# Patient Record
Sex: FEMALE | Race: WHITE | NOT HISPANIC OR LATINO | Employment: OTHER | URBAN - METROPOLITAN AREA
[De-identification: names, ages, dates, MRNs, and addresses within clinical notes are randomized per-mention and may not be internally consistent; named-entity substitution may affect disease eponyms.]

---

## 2017-07-26 ENCOUNTER — OFFICE VISIT (OUTPATIENT)
Dept: URGENT CARE | Facility: CLINIC | Age: 82
End: 2017-07-26
Payer: COMMERCIAL

## 2017-07-26 PROCEDURE — 99213 OFFICE O/P EST LOW 20 MIN: CPT

## 2017-07-26 PROCEDURE — 96372 THER/PROPH/DIAG INJ SC/IM: CPT

## 2018-04-12 LAB
BACTERIA UR QL AUTO: ABNORMAL
BILIRUB UR QL STRIP: NEGATIVE
CLARITY UR: CLEAR
COLOR UR: YELLOW
GLUCOSE UR STRIP-MCNC: NEGATIVE MG/DL
HGB UR QL STRIP.AUTO: ABNORMAL
KETONES UR STRIP-MCNC: NEGATIVE MG/DL
LEUKOCYTE ESTERASE UR QL STRIP: NEGATIVE
MUCUS THREADS (HISTORICAL): PRESENT /HPF
NITRITE UR QL STRIP: NEGATIVE
NON-SQ EPI CELLS URNS QL MICRO: ABNORMAL /HPF
PH UR STRIP.AUTO: 5.5 [PH] (ref 4.5–8)
PROT UR STRIP-MCNC: NEGATIVE MG/DL
RBC #/AREA URNS AUTO: ABNORMAL /HPF
SP GR UR STRIP.AUTO: <= 1.005 (ref 1–1.03)
UROBILINOGEN UR QL STRIP.AUTO: 0.2 EU/DL (ref 0.2–8)
WBC #/AREA URNS AUTO: ABNORMAL /HPF

## 2018-06-28 ENCOUNTER — OFFICE VISIT (OUTPATIENT)
Dept: URGENT CARE | Facility: CLINIC | Age: 83
End: 2018-06-28
Payer: COMMERCIAL

## 2018-06-28 VITALS
RESPIRATION RATE: 18 BRPM | TEMPERATURE: 98.6 F | SYSTOLIC BLOOD PRESSURE: 141 MMHG | HEART RATE: 80 BPM | OXYGEN SATURATION: 97 % | DIASTOLIC BLOOD PRESSURE: 65 MMHG

## 2018-06-28 DIAGNOSIS — J02.9 ACUTE PHARYNGITIS, UNSPECIFIED ETIOLOGY: Primary | ICD-10-CM

## 2018-06-28 LAB — S PYO AG THROAT QL: NEGATIVE

## 2018-06-28 PROCEDURE — 87070 CULTURE OTHR SPECIMN AEROBIC: CPT | Performed by: NURSE PRACTITIONER

## 2018-06-28 PROCEDURE — 99213 OFFICE O/P EST LOW 20 MIN: CPT | Performed by: NURSE PRACTITIONER

## 2018-06-28 RX ORDER — LEVOTHYROXINE SODIUM 0.07 MG/1
TABLET ORAL
Status: ON HOLD | COMMUNITY
Start: 2018-06-14 | End: 2020-06-26 | Stop reason: SDUPTHER

## 2018-06-28 RX ORDER — LOSARTAN POTASSIUM 50 MG/1
50 TABLET ORAL DAILY
COMMUNITY
Start: 2018-06-14 | End: 2019-11-04 | Stop reason: SDUPTHER

## 2018-06-28 RX ORDER — METOPROLOL SUCCINATE 25 MG/1
TABLET, EXTENDED RELEASE ORAL
COMMUNITY
Start: 2018-06-14 | End: 2019-07-26 | Stop reason: SDUPTHER

## 2018-06-28 RX ORDER — ALPRAZOLAM 0.25 MG/1
TABLET ORAL
COMMUNITY
Start: 2018-06-14 | End: 2020-06-09 | Stop reason: HOSPADM

## 2018-06-28 NOTE — PATIENT INSTRUCTIONS

## 2018-06-28 NOTE — PROGRESS NOTES
[facility]        NAME: Leela Valenzuela is a 80 y o  female  : 1935    MRN: [id number]  DATE: 2018  TIME: 2:08 PM    Assessment and Plan   Acute pharyngitis, unspecified etiology [J02 9]  1  Acute pharyngitis, unspecified etiology  POCT rapid strepA    Throat culture         Patient Instructions   May use any of the following for symptomatic control of sore throat symptoms:  Saltwater gargles, tea with honey, Chloraseptic spray, lozenges  Mucinex DM as directed  Follow up with PCP in 3-5 days  Proceed to  ER if symptoms worsen  Chief Complaint     Chief Complaint   Patient presents with    Sore Throat     Pt c/o a sore throat since yesterday  History of Present Illness       Sore Throat    The current episode started yesterday  There has been no fever  Associated symptoms include congestion, a plugged ear sensation and swollen glands  Pertinent negatives include no shortness of breath  She has tried acetaminophen for the symptoms  Review of Systems   Review of Systems   Constitutional: Negative  HENT: Positive for congestion and sore throat  Eyes: Negative  Respiratory: Negative  Negative for shortness of breath  Cardiovascular: Negative  Gastrointestinal: Negative  Genitourinary: Negative  Musculoskeletal: Negative  Neurological: Negative  Hematological: Negative  Psychiatric/Behavioral: Negative  Current Medications     No current outpatient prescriptions on file  Current Allergies     Allergies as of 2018    (No Known Allergies)            The following portions of the patient's history were reviewed and updated as appropriate: allergies, current medications, past family history, past medical history, past social history, past surgical history and problem list      No past medical history on file  No past surgical history on file  No family history on file        Medications have been verified  Objective   /65   Pulse 80   Temp 98 6 °F (37 °C)   Resp 18   SpO2 97%        Physical Exam     Physical Exam   Constitutional: She is oriented to person, place, and time  She appears well-developed and well-nourished  No distress  HENT:   Head: Normocephalic and atraumatic  Left Ear: External ear normal    Nose: Nose normal    Mouth/Throat: Posterior oropharyngeal edema and posterior oropharyngeal erythema present  No oropharyngeal exudate  Eyes: EOM are normal  Pupils are equal, round, and reactive to light  Neck: Normal range of motion  Neck supple  Cardiovascular: Normal rate, regular rhythm and normal heart sounds  Pulmonary/Chest: Breath sounds normal  No respiratory distress  She has no wheezes  She has no rales  Abdominal: Soft  Bowel sounds are normal    Lymphadenopathy:     She has cervical adenopathy  Neurological: She is oriented to person, place, and time  She has normal reflexes  Skin: Skin is warm and dry  She is not diaphoretic  Psychiatric: She has a normal mood and affect  Her behavior is normal  Judgment and thought content normal    Nursing note and vitals reviewed

## 2018-07-01 LAB — BACTERIA THROAT CULT: NORMAL

## 2018-07-04 ENCOUNTER — HOSPITAL ENCOUNTER (EMERGENCY)
Facility: HOSPITAL | Age: 83
Discharge: HOME | End: 2018-07-04
Attending: FAMILY MEDICINE | Admitting: FAMILY MEDICINE
Payer: COMMERCIAL

## 2018-07-04 VITALS
DIASTOLIC BLOOD PRESSURE: 86 MMHG | TEMPERATURE: 98.1 F | HEIGHT: 66 IN | WEIGHT: 126 LBS | RESPIRATION RATE: 16 BRPM | BODY MASS INDEX: 20.25 KG/M2 | OXYGEN SATURATION: 98 % | HEART RATE: 72 BPM | SYSTOLIC BLOOD PRESSURE: 184 MMHG

## 2018-07-04 DIAGNOSIS — J06.9 VIRAL UPPER RESPIRATORY TRACT INFECTION: Primary | ICD-10-CM

## 2018-07-04 PROCEDURE — 99283 EMERGENCY DEPT VISIT LOW MDM: CPT

## 2018-07-04 RX ORDER — AMOXICILLIN AND CLAVULANATE POTASSIUM 875; 125 MG/1; MG/1
1 TABLET, FILM COATED ORAL EVERY 12 HOURS SCHEDULED
Qty: 14 TABLET | Refills: 0 | Status: SHIPPED | OUTPATIENT
Start: 2018-07-04 | End: 2018-07-11

## 2018-07-04 RX ORDER — IBUPROFEN 400 MG/1
400 TABLET ORAL ONCE
Status: COMPLETED | OUTPATIENT
Start: 2018-07-04 | End: 2018-07-04

## 2018-07-04 RX ADMIN — IBUPROFEN 400 MG: 400 TABLET ORAL at 09:45

## 2018-07-04 NOTE — DISCHARGE INSTRUCTIONS
Viral Syndrome   WHAT YOU NEED TO KNOW:   Viral syndrome is a term used for a viral infection that has no clear cause  Viruses are spread easily from person to person through the air and on shared items  DISCHARGE INSTRUCTIONS:   Call 911 for the following:   · You have a seizure  · You cannot be woken  · You have chest pain or trouble breathing  Return to the emergency department if:   · You have a stiff neck, a bad headache, and sensitivity to light  · You feel weak, dizzy, or confused  · You stop urinating or urinate a lot less than normal      · You cough up blood or thick, yellow or green, mucus  · You have severe abdominal pain or your abdomen is larger than usual   Contact your healthcare provider if:   · Your symptoms do not get better with treatment, or get worse, after 3 days  · You have a rash or ear pain  · You have burning when you urinate  · You have questions or concerns about your condition or care  Medicines: You may  need any of the following:  · Acetaminophen  decreases pain and fever  It is available without a doctor's order  Ask how much medicine to take and how often to take it  Follow directions  Acetaminophen can cause liver damage if not taken correctly  · NSAIDs , such as ibuprofen, help decrease swelling, pain, and fever  NSAIDs can cause stomach bleeding or kidney problems in certain people  If you take blood thinner medicine, always ask your healthcare provider if NSAIDs are safe for you  Always read the medicine label and follow directions  · Cold medicine  helps decrease swelling, control a cough, and relieve chest or nasal congestion  · Saline nasal spray  helps decrease nasal congestion  · Take your medicine as directed  Contact your healthcare provider if you think your medicine is not helping or if you have side effects  Tell him of her if you are allergic to any medicine   Keep a list of the medicines, vitamins, and herbs you take  Include the amounts, and when and why you take them  Bring the list or the pill bottles to follow-up visits  Carry your medicine list with you in case of an emergency  Manage your symptoms:   · Drink liquids as directed  to prevent dehydration  Ask how much liquid to drink each day and which liquids are best for you  Ask if you should drink an oral rehydration solution (ORS)  An ORS has the right amounts of water, salts, and sugar you need to replace body fluids  This may help prevent dehydration caused by vomiting or diarrhea  Do not drink liquids with caffeine  Drinks with caffeine can make dehydration worse  · Get plenty of rest  to help your body heal  Take naps throughout the day  Ask your healthcare provider when you can return to work and your normal activities  · Use a cool mist humidifier  to help you breathe easier if you have nasal or chest congestion  Ask your healthcare provider how to use a cool mist humidifier  · Eat honey or use cough drops  to help decrease throat discomfort  Ask your healthcare provider how much honey you should eat each day  Cough drops are available without a doctor's order  Follow directions for taking cough drops  · Do not smoke and stay away from others who smoke  Nicotine and other chemicals in cigarettes and cigars can cause lung damage  Smoking can also delay healing  Ask your healthcare provider for information if you currently smoke and need help to quit  E-cigarettes or smokeless tobacco still contain nicotine  Talk to your healthcare provider before you use these products  · Wash your hands frequently  to prevent the spread of germs to others  Use soap and water  Use gel hand  when soap and water are not available  Wash your hands after you use the bathroom, cough, or sneeze  Wash your hands before you prepare or eat food    Follow up with your healthcare provider as directed:  Write down your questions so you remember to ask them during your visits  © 2017 [address] Information is for End User's use only and may not be sold, redistributed or otherwise used for commercial purposes  All illustrations and images included in CareNotes® are the copyrighted property of A D [employer] M [employer] Inc  or Jitendra Love  The above information is an  only  It is not intended as medical advice for individual conditions or treatments  Talk to your doctor, nurse or pharmacist before following any medical regimen to see if it is safe and effective for you

## 2018-07-04 NOTE — ED PROVIDER NOTES
History  Chief Complaint   Patient presents with    Nasal Congestion     has for 1 week  Was at PCP last week  No RX     URI       History provided by:  Patient   used: No    URI   Presenting symptoms: congestion, cough, fatigue and rhinorrhea    Presenting symptoms: no ear pain, no facial pain, no fever and no sore throat    Severity:  Moderate  Onset quality:  Gradual  Duration:  1 week  Timing:  Constant  Progression:  Worsening  Chronicity:  New  Relieved by:  Nothing  Worsened by:  Nothing  Ineffective treatments:  Decongestant  Associated symptoms: myalgias and sneezing    Associated symptoms: no arthralgias, no headaches, no neck pain, no sinus pain, no swollen glands and no wheezing    Risk factors: not elderly, no chronic cardiac disease, no chronic kidney disease, no chronic respiratory disease, no immunosuppression, no recent illness, no recent travel and no sick contacts        Prior to Admission Medications   Prescriptions Last Dose Informant Patient Reported? Taking? ALPRAZolam (XANAX) 0 25 mg tablet   Yes No   Sig: Use 1 tab 2x/day as needed for anxiety   levothyroxine 75 mcg tablet   Yes No   Sig: Take 1 tab po qday  losartan (COZAAR) 25 mg tablet   Yes No   Sig: Take 25 mg by mouth   metoprolol succinate (TOPROL-XL) 25 mg 24 hr tablet   Yes No   Sig: Take 1 tab po daily      Facility-Administered Medications: None       Past Medical History:   Diagnosis Date    Disease of thyroid gland     Hypertension        No past surgical history on file  No family history on file  I have reviewed and agree with the history as documented  Social History   Substance Use Topics    Smoking status: Never Smoker    Smokeless tobacco: Never Used    Alcohol use No        Review of Systems   Constitutional: Positive for fatigue  Negative for fever  HENT: Positive for congestion, rhinorrhea and sneezing  Negative for ear pain, sinus pain and sore throat      Respiratory: Positive for cough  Negative for wheezing  Musculoskeletal: Positive for myalgias  Negative for arthralgias and neck pain  Neurological: Negative for headaches  Physical Exam  Physical Exam   Constitutional: She appears well-developed and well-nourished  No distress  HENT:   Head: Normocephalic and atraumatic  Mouth/Throat: Oropharynx is clear and moist  No oropharyngeal exudate  Eyes: EOM are normal  Pupils are equal, round, and reactive to light  Right eye exhibits no discharge  Left eye exhibits discharge  Neck: Normal range of motion  Neck supple  Cardiovascular: Normal rate, regular rhythm, normal heart sounds and intact distal pulses  Pulmonary/Chest: Effort normal and breath sounds normal    Skin: She is not diaphoretic  Psychiatric: She has a normal mood and affect  Her behavior is normal        Vital Signs  ED Triage Vitals [07/04/18 0929]   Temperature Pulse Respirations Blood Pressure SpO2   98 °F (36 7 °C) 76 16 (!) 217/192 98 %      Temp Source Heart Rate Source Patient Position - Orthostatic VS BP Location FiO2 (%)   Temporal Monitor Sitting Left arm --      Pain Score       --           Vitals:    07/04/18 0929 07/04/18 0934   BP: (!) 217/192 (!) 217/92   Pulse: 76    Patient Position - Orthostatic VS: Sitting        Visual Acuity      ED Medications  Medications - No data to display    Diagnostic Studies  Results Reviewed     None                 No orders to display              Procedures  Procedures       Phone Contacts  ED Phone Contact    ED Course    patient body aches states that she went to see her PCP 2 days ago and he did not prescribe antibiotics  I told patient and that she should not started antibiotics until started having fever worsening symptoms  Advised return to the ED symptom worse  Patient relies understand planned treatment                              MDM  Number of Diagnoses or Management Options  Risk of Complications, Morbidity, and/or Mortality  Presenting problems: low  Diagnostic procedures: low  Management options: low    Patient Progress  Patient progress: stable    CritCare Time    Disposition  Final diagnoses:   Viral upper respiratory tract infection     Time reflects when diagnosis was documented in both MDM as applicable and the Disposition within this note     Time User Action Codes Description Comment    7/4/2018  9:36 AM Aurelio Martinez Add [J06 9] Viral upper respiratory tract infection       ED Disposition     ED Disposition Condition Comment    Discharge  Miquel Harris discharge to home/self care  Condition at discharge: Stable        Follow-up Information     Follow up With Specialties Details Why Contact Info      In 2 days If symptoms worsen pcp           Patient's Medications   Discharge Prescriptions    AMOXICILLIN-CLAVULANATE (AUGMENTIN) 875-125 MG PER TABLET    Take 1 tablet by mouth every 12 (twelve) hours for 7 days       Start Date: 7/4/2018  End Date: 7/11/2018       Order Dose: 1 tablet       Quantity: 14 tablet    Refills: 0     No discharge procedures on file      ED Provider  Electronically Signed by           Bri Keller MD  07/04/18 5271

## 2018-08-27 ENCOUNTER — HOSPITAL ENCOUNTER (EMERGENCY)
Facility: HOSPITAL | Age: 83
Discharge: HOME | End: 2018-08-27
Attending: EMERGENCY MEDICINE | Admitting: EMERGENCY MEDICINE
Payer: COMMERCIAL

## 2018-08-27 ENCOUNTER — APPOINTMENT (EMERGENCY)
Dept: RADIOLOGY | Facility: HOSPITAL | Age: 83
End: 2018-08-27
Payer: COMMERCIAL

## 2018-08-27 VITALS
WEIGHT: 126 LBS | HEIGHT: 61 IN | HEART RATE: 53 BPM | TEMPERATURE: 97.9 F | BODY MASS INDEX: 23.79 KG/M2 | DIASTOLIC BLOOD PRESSURE: 72 MMHG | RESPIRATION RATE: 30 BRPM | SYSTOLIC BLOOD PRESSURE: 151 MMHG | OXYGEN SATURATION: 95 %

## 2018-08-27 DIAGNOSIS — I10 HYPERTENSION: Primary | ICD-10-CM

## 2018-08-27 LAB
ALBUMIN SERPL BCP-MCNC: 4.2 G/DL (ref 3.5–5.7)
ALP SERPL-CCNC: 56 U/L (ref 55–165)
ALT SERPL W P-5'-P-CCNC: 9 U/L (ref 7–52)
ANION GAP SERPL CALCULATED.3IONS-SCNC: 7 MMOL/L (ref 4–13)
AST SERPL W P-5'-P-CCNC: 18 U/L (ref 13–39)
ATRIAL RATE: 56 BPM
ATRIAL RATE: 62 BPM
BASOPHILS # BLD AUTO: 0 THOUSANDS/ΜL (ref 0–0.1)
BASOPHILS NFR BLD AUTO: 1 % (ref 0–2)
BILIRUB SERPL-MCNC: 0.6 MG/DL (ref 0.2–1)
BUN SERPL-MCNC: 16 MG/DL (ref 7–25)
CALCIUM SERPL-MCNC: 9.2 MG/DL (ref 8.6–10.5)
CHLORIDE SERPL-SCNC: 97 MMOL/L (ref 98–107)
CO2 SERPL-SCNC: 26 MMOL/L (ref 21–31)
CREAT SERPL-MCNC: 0.86 MG/DL (ref 0.6–1.2)
EOSINOPHIL # BLD AUTO: 0.1 THOUSAND/ΜL (ref 0–0.61)
EOSINOPHIL NFR BLD AUTO: 1 % (ref 0–5)
ERYTHROCYTE [DISTWIDTH] IN BLOOD BY AUTOMATED COUNT: 13 % (ref 11.5–14.5)
GFR SERPL CREATININE-BSD FRML MDRD: 63 ML/MIN/1.73SQ M
GLUCOSE SERPL-MCNC: 100 MG/DL (ref 65–99)
HCT VFR BLD AUTO: 35.8 % (ref 34.8–46.1)
HGB BLD-MCNC: 12.5 G/DL (ref 12–16)
LYMPHOCYTES # BLD AUTO: 1.3 THOUSANDS/ΜL (ref 0.6–4.47)
LYMPHOCYTES NFR BLD AUTO: 24 % (ref 21–51)
MCH RBC QN AUTO: 31.5 PG (ref 26–34)
MCHC RBC AUTO-ENTMCNC: 35.1 G/DL (ref 31–37)
MCV RBC AUTO: 90 FL (ref 81–99)
MONOCYTES # BLD AUTO: 0.5 THOUSAND/ΜL (ref 0.17–1.22)
MONOCYTES NFR BLD AUTO: 10 % (ref 2–12)
NEUTROPHILS # BLD AUTO: 3.5 THOUSANDS/ΜL (ref 1.4–6.5)
NEUTS SEG NFR BLD AUTO: 65 % (ref 42–75)
NRBC BLD AUTO-RTO: 0 /100 WBCS
P AXIS: 53 DEGREES
P AXIS: 87 DEGREES
PLATELET # BLD AUTO: 186 THOUSANDS/UL (ref 149–390)
PMV BLD AUTO: 6.7 FL (ref 8.6–11.7)
POTASSIUM SERPL-SCNC: 3.9 MMOL/L (ref 3.5–5.5)
PR INTERVAL: 230 MS
PROT SERPL-MCNC: 6.9 G/DL (ref 6.4–8.9)
QRS AXIS: 91 DEGREES
QRS AXIS: 96 DEGREES
QRSD INTERVAL: 104 MS
QRSD INTERVAL: 104 MS
QT INTERVAL: 428 MS
QT INTERVAL: 464 MS
QTC INTERVAL: 434 MS
QTC INTERVAL: 447 MS
RBC # BLD AUTO: 3.98 MILLION/UL (ref 3.9–5.2)
SODIUM SERPL-SCNC: 130 MMOL/L (ref 134–143)
T WAVE AXIS: 81 DEGREES
T WAVE AXIS: 87 DEGREES
TROPONIN I SERPL-MCNC: <0.03 NG/ML
VENTRICULAR RATE: 56 BPM
VENTRICULAR RATE: 62 BPM
WBC # BLD AUTO: 5.4 THOUSAND/UL (ref 4.8–10.8)

## 2018-08-27 PROCEDURE — 36415 COLL VENOUS BLD VENIPUNCTURE: CPT | Performed by: EMERGENCY MEDICINE

## 2018-08-27 PROCEDURE — 85025 COMPLETE CBC W/AUTO DIFF WBC: CPT | Performed by: EMERGENCY MEDICINE

## 2018-08-27 PROCEDURE — 80053 COMPREHEN METABOLIC PANEL: CPT | Performed by: EMERGENCY MEDICINE

## 2018-08-27 PROCEDURE — 99284 EMERGENCY DEPT VISIT MOD MDM: CPT

## 2018-08-27 PROCEDURE — 96374 THER/PROPH/DIAG INJ IV PUSH: CPT

## 2018-08-27 PROCEDURE — 93005 ELECTROCARDIOGRAM TRACING: CPT

## 2018-08-27 PROCEDURE — 84484 ASSAY OF TROPONIN QUANT: CPT | Performed by: EMERGENCY MEDICINE

## 2018-08-27 PROCEDURE — 71045 X-RAY EXAM CHEST 1 VIEW: CPT

## 2018-08-27 RX ORDER — ONDANSETRON 2 MG/ML
4 INJECTION INTRAMUSCULAR; INTRAVENOUS ONCE
Status: COMPLETED | OUTPATIENT
Start: 2018-08-27 | End: 2018-08-27

## 2018-08-27 RX ORDER — CLONIDINE HYDROCHLORIDE 0.1 MG/1
0.2 TABLET ORAL ONCE
Status: DISCONTINUED | OUTPATIENT
Start: 2018-08-27 | End: 2018-08-27

## 2018-08-27 RX ORDER — ALPRAZOLAM 0.5 MG/1
0.5 TABLET ORAL ONCE
Status: DISCONTINUED | OUTPATIENT
Start: 2018-08-27 | End: 2018-08-27

## 2018-08-27 RX ORDER — ONDANSETRON 4 MG/1
4 TABLET, ORALLY DISINTEGRATING ORAL ONCE
Status: DISCONTINUED | OUTPATIENT
Start: 2018-08-27 | End: 2018-08-27

## 2018-08-27 RX ORDER — FLUOXETINE 10 MG/1
10 CAPSULE ORAL DAILY
COMMUNITY
End: 2019-07-26 | Stop reason: ALTCHOICE

## 2018-08-27 RX ADMIN — ONDANSETRON 4 MG: 2 INJECTION INTRAMUSCULAR; INTRAVENOUS at 03:06

## 2018-08-27 NOTE — ED NOTES
Pt felt nauseated during PCXR  Pt HR dropped into 40s  Repeat EKG performed and shown to Dr Devora Frank, RN  08/27/18 9609

## 2018-08-27 NOTE — ED PROVIDER NOTES
History  Chief Complaint   Patient presents with    Hypertension     Pt is non-compliant with her HTN meds and takes them "when I feel like I need them " Pt states she went to bed with high BP today and awoke not feeling well, so she called EMS  On EMS arrival /114  A 1YEAR-OLD FEMALE WITH A HISTORY OF HYPERTENSION PRESENTS TO THE EMERGENCY DEPARTMENT WITH A SENSE OF BLOOD PRESSURE IS ELEVATED  THIS WAS PROMPTED BY A SENSE OF UNEASINESS ANXIETY AND FATIGUE THAT THE PATIENT TYPICALLY EXPERIENCES WHEN HER PRESSURE IS ELEVATED  DENIES ANY CHEST PAIN OR SHORTNESS OF BREATH IS FAMILIAR TO THE EMERGENCY DEPARTMENT AND TYPICALLY PRESENTS IN THIS FASHION THE EARLY MORNING HOURS WITH A SENSE OF UNEASINESS AND ANXIETY  SHE COMPLAINS OF NO CHEST PAIN OR SHORTNESS OF BREATH  SHE DENIES ANY NAUSEA VOMITING OR DIARRHEA  NO DIAPHORESIS OR SENSE OF FATIGUE  SHE IS NONCOMPLIANT WITH HER MEDICATION REGIMEN            Prior to Admission Medications   Prescriptions Last Dose Informant Patient Reported? Taking? ALPRAZolam (XANAX) 0 25 mg tablet   Yes Yes   Sig: Use 1 tab 2x/day as needed for anxiety   FLUoxetine (PROzac) 10 mg capsule   Yes Yes   Sig: Take 10 mg by mouth daily   levothyroxine 75 mcg tablet   Yes Yes   Sig: Take 1 tab po qday  losartan (COZAAR) 25 mg tablet   Yes Yes   Sig: Take 25 mg by mouth   metoprolol succinate (TOPROL-XL) 25 mg 24 hr tablet   Yes Yes   Sig: Take 1 tab po daily      Facility-Administered Medications: None       Past Medical History:   Diagnosis Date    Anxiety     Depression     Disease of thyroid gland     Hypertension        Past Surgical History:   Procedure Laterality Date    TONSILLECTOMY         History reviewed  No pertinent family history  I have reviewed and agree with the history as documented      Social History   Substance Use Topics    Smoking status: Never Smoker    Smokeless tobacco: Never Used    Alcohol use No        Review of Systems   Constitutional: Negative for chills and fever  HENT: Negative for ear pain, rhinorrhea and sore throat  Eyes: Negative for pain, redness and visual disturbance  Respiratory: Negative for cough and shortness of breath  Cardiovascular: Negative for chest pain and leg swelling  Gastrointestinal: Negative for abdominal pain, diarrhea, nausea and vomiting  Genitourinary: Negative for dysuria, flank pain, frequency and urgency  Musculoskeletal: Negative for back pain, myalgias and neck pain  Skin: Negative for rash  Neurological: Negative for dizziness, weakness, light-headedness and headaches  Hematological: Negative  Psychiatric/Behavioral: Negative for agitation, confusion and suicidal ideas  The patient is nervous/anxious  All other systems reviewed and are negative  Physical Exam  Physical Exam   Constitutional: She is oriented to person, place, and time  She appears well-developed and well-nourished  HENT:   Nose: Nose normal    Mouth/Throat: Oropharynx is clear and moist  No oropharyngeal exudate  Eyes: Conjunctivae and EOM are normal  Pupils are equal, round, and reactive to light  No scleral icterus  Neck: Normal range of motion  Neck supple  No JVD present  No tracheal deviation present  Cardiovascular: Normal rate, regular rhythm and normal heart sounds  No murmur heard  Pulmonary/Chest: Effort normal and breath sounds normal  No respiratory distress  She has no wheezes  She has no rales  Abdominal: Soft  Bowel sounds are normal  There is no tenderness  There is no guarding  Musculoskeletal: Normal range of motion  She exhibits no edema or tenderness  Neurological: She is alert and oriented to person, place, and time  No cranial nerve deficit or sensory deficit  She exhibits normal muscle tone  5/5 motor, nl sens   Skin: Skin is warm and dry  Psychiatric: She has a normal mood and affect  Her behavior is normal    Nursing note and vitals reviewed        Vital Signs  ED Triage Vitals [08/27/18 0220]   Temperature Pulse Respirations Blood Pressure SpO2   97 9 °F (36 6 °C) 64 18 (!) 241/113 97 %      Temp Source Heart Rate Source Patient Position - Orthostatic VS BP Location FiO2 (%)   Temporal Monitor -- Right arm --      Pain Score       No Pain           Vitals:    08/27/18 0220 08/27/18 0230   BP: (!) 241/113 (!) 208/89   Pulse: 64 61       Visual Acuity      ED Medications  Medications   cloNIDine (CATAPRES) tablet 0 2 mg (not administered)       Diagnostic Studies  Results Reviewed     Procedure Component Value Units Date/Time    CBC and differential [[id number]]  (Abnormal) Collected:  08/27/18 0239    Lab Status:  Final result Specimen:  Blood from Arm, Left Updated:  08/27/18 0248     WBC 5 40 Thousand/uL      RBC 3 98 Million/uL      Hemoglobin 12 5 g/dL      Hematocrit 35 8 %      MCV 90 fL      MCH 31 5 pg      MCHC 35 1 g/dL      RDW 13 0 %      MPV 6 7 (L) fL      Platelets 713 Thousands/uL      nRBC 0 /100 WBCs      Neutrophils Relative 65 %      Lymphocytes Relative 24 %      Monocytes Relative 10 %      Eosinophils Relative 1 %      Basophils Relative 1 %      Neutrophils Absolute 3 50 Thousands/µL      Lymphocytes Absolute 1 30 Thousands/µL      Monocytes Absolute 0 50 Thousand/µL      Eosinophils Absolute 0 10 Thousand/µL      Basophils Absolute 0 00 Thousands/µL     Comprehensive metabolic panel [[id number]] Collected:  08/27/18 0239    Lab Status: In process Specimen:  Blood from Arm, Left Updated:  08/27/18 0241    Troponin I [[id number]] Collected:  08/27/18 0239    Lab Status: In process Specimen:  Blood from Arm, Left Updated:  08/27/18 0241                 X-ray chest 1 view portable    (Results Pending)              Procedures  Procedures       Phone Contacts  ED Phone Contact    ED Course                               MDM  Number of Diagnoses or Management Options  Diagnosis management comments: AT 3:15 A M  THE PATIENT WAS COMFORTABLE    HER PREVIOUS EPISODE OF NAUSEA HAD RESOLVED AFTER ZOFRAN   BLOOD PRESSURE IS ESSENTIALLY NORMOTENSIVE AND ACCEPTABLE  PATIENT IS COMPLAINT FREE  VITAL SIGNS REMAINED STABLE  PATIENT REMAINS HEMODYNAMICALLY AND CLINICALLY STABLE APPROPRIATE FOR DISCHARGE AT THIS POINT IN TIME   WITHOUT EVIDENCE OF A CARDIOPULMONARY ETIOLOGY  CritCare Time    Disposition  Final diagnoses:   None     ED Disposition     None      Follow-up Information    None         Patient's Medications   Discharge Prescriptions    No medications on file     No discharge procedures on file      ED Provider  Electronically Signed by           Elvia Cerrato MD  08/27/18 5653       Elvia Cerrato MD  08/28/18 6863

## 2018-08-27 NOTE — ED PROCEDURE NOTE
PROCEDURE  ECG 12 Lead Documentation  Date/Time: 8/27/2018 2:26 AM  Performed by: Nini Waterman  Authorized by: Juwan LAKHANI     ECG reviewed by me, the ED Provider: yes    Patient location:  ED  Previous ECG:     Previous ECG:  Unavailable    Comparison to cardiac monitor: No    Interpretation:     Interpretation: abnormal    Rate:     ECG rate:  60    ECG rate assessment: normal    Rhythm:     Rhythm: sinus rhythm and A-V block    Ectopy:     Ectopy: none    QRS:     QRS axis:  Normal    QRS intervals:  Normal  Conduction:     Conduction: abnormal      Abnormal conduction: incomplete RBBB    ST segments:     ST segments:  Normal  T waves:     T waves: normal    Other findings:     Other findings: BEATRICE Chavarria MD  08/27/18 2979

## 2018-08-27 NOTE — DISCHARGE INSTRUCTIONS
Chronic Hypertension, Ambulatory Care   GENERAL INFORMATION:   Chronic hypertension  is a long-term condition in which your blood pressure (BP) is higher than normal  Your BP is the force of your blood moving against the walls of your arteries  Hypertension is a BP of 140/90 or higher  Common symptoms include the following:   · Headache     · Blurred vision    · Chest pain     · Dizziness or weakness     · Trouble breathing     · Nosebleeds  Seek immediate care for the following symptoms:   · Severe headache or vision loss    · Weakness in an arm or leg    · Confusion or difficulty speaking    · Discomfort in your chest that feels like squeezing, pressure, fullness, or pain    · Suddenly feeling lightheaded or trouble breathing    · Pain or discomfort in your back, neck, jaw, stomach, or arm  Treatment for chronic hypertension  may include medicine to lower your BP  You may also need to make lifestyle changes  Take your medicine exactly as directed  Manage chronic hypertension:   · Take your BP at home  Sit and rest for 5 minutes before you take your BP  Extend your arm and support it on a flat surface  Your arm should be at the same level as your heart  Follow the directions that came with your BP monitor  If possible, take at least 2 BP readings each time  Take your BP at least twice a day at the same times each day, such as morning and evening  Keep a log of your BP readings and bring it to your follow-up visits  · Eat less sodium (salt)  Do not add sodium to your food  Limit foods that are high in sodium, such as canned foods, potato chips, and cold cuts  Your healthcare provider may suggest that you follow the [address] [place] [address] Street  The plan is low in sodium, unhealthy fats, and total fat  It is high in potassium, calcium, and fiber  · Exercise regularly  Exercise at least 30 minutes per day, on most days of the week  This will help decrease your BP   Ask your healthcare provider about the best exercise plan for you  · Limit alcohol  Women should limit alcohol to 1 drink a day  Men should limit alcohol to 2 drinks a day  A drink of alcohol is 12 ounces of beer, 5 ounces of wine, or 1½ ounces of liquor  · Do not smoke  If you smoke, it is never too late to quit  Smoking can increase your BP  Smoking also worsens other health conditions you may have that can increase your risk for hypertension  Ask your healthcare provider for information if you need help quitting  Follow up with your healthcare provider as directed: You will need to return to have your BP checked and to have other lab tests done  Write down your questions so you remember to ask them during your visits  CARE AGREEMENT:   You have the right to help plan your care  Learn about your health condition and how it may be treated  Discuss treatment options with your caregivers to decide what care you want to receive  You always have the right to refuse treatment  The above information is an  only  It is not intended as medical advice for individual conditions or treatments  Talk to your doctor, nurse or pharmacist before following any medical regimen to see if it is safe and effective for you  © 2014 [address] is for End User's use only and may not be sold, redistributed or otherwise used for commercial purposes  All illustrations and images included in CareNotes® are the copyrighted property of A D A M , Inc  or Jitendra Love

## 2019-05-29 ENCOUNTER — APPOINTMENT (EMERGENCY)
Dept: CT IMAGING | Facility: HOSPITAL | Age: 84
End: 2019-05-29
Payer: COMMERCIAL

## 2019-05-29 ENCOUNTER — HOSPITAL ENCOUNTER (EMERGENCY)
Facility: HOSPITAL | Age: 84
Discharge: HOME | End: 2019-05-30
Attending: EMERGENCY MEDICINE | Admitting: EMERGENCY MEDICINE
Payer: COMMERCIAL

## 2019-05-29 DIAGNOSIS — N39.0 UTI (URINARY TRACT INFECTION): ICD-10-CM

## 2019-05-29 DIAGNOSIS — K57.92 DIVERTICULITIS: ICD-10-CM

## 2019-05-29 DIAGNOSIS — R10.2 PELVIC PAIN: Primary | ICD-10-CM

## 2019-05-29 LAB
ALBUMIN SERPL BCP-MCNC: 4.1 G/DL (ref 3.5–5.7)
ALP SERPL-CCNC: 56 U/L (ref 55–165)
ALT SERPL W P-5'-P-CCNC: 8 U/L (ref 7–52)
ANION GAP SERPL CALCULATED.3IONS-SCNC: 6 MMOL/L (ref 4–13)
AST SERPL W P-5'-P-CCNC: 18 U/L (ref 13–39)
BACTERIA UR QL AUTO: ABNORMAL /HPF
BASOPHILS # BLD AUTO: 0 THOUSANDS/ΜL (ref 0–0.1)
BASOPHILS NFR BLD AUTO: 1 % (ref 0–2)
BILIRUB SERPL-MCNC: 0.5 MG/DL (ref 0.2–1)
BILIRUB UR QL STRIP: NEGATIVE
BUN SERPL-MCNC: 16 MG/DL (ref 7–25)
CALCIUM SERPL-MCNC: 9.5 MG/DL (ref 8.6–10.5)
CHLORIDE SERPL-SCNC: 95 MMOL/L (ref 98–107)
CLARITY UR: CLEAR
CO2 SERPL-SCNC: 28 MMOL/L (ref 21–31)
COLOR UR: YELLOW
CREAT SERPL-MCNC: 0.93 MG/DL (ref 0.6–1.2)
EOSINOPHIL # BLD AUTO: 0.1 THOUSAND/ΜL (ref 0–0.61)
EOSINOPHIL NFR BLD AUTO: 2 % (ref 0–5)
ERYTHROCYTE [DISTWIDTH] IN BLOOD BY AUTOMATED COUNT: 13.2 % (ref 11.5–14.5)
GFR SERPL CREATININE-BSD FRML MDRD: 57 ML/MIN/1.73SQ M
GLUCOSE SERPL-MCNC: 98 MG/DL (ref 65–99)
GLUCOSE UR STRIP-MCNC: NEGATIVE MG/DL
HCT VFR BLD AUTO: 33.7 % (ref 42–47)
HGB BLD-MCNC: 11.8 G/DL (ref 12–16)
HGB UR QL STRIP.AUTO: ABNORMAL
KETONES UR STRIP-MCNC: NEGATIVE MG/DL
LEUKOCYTE ESTERASE UR QL STRIP: ABNORMAL
LIPASE SERPL-CCNC: 66 U/L (ref 11–82)
LYMPHOCYTES # BLD AUTO: 1.1 THOUSANDS/ΜL (ref 0.6–4.47)
LYMPHOCYTES NFR BLD AUTO: 24 % (ref 21–51)
MCH RBC QN AUTO: 32.1 PG (ref 26–34)
MCHC RBC AUTO-ENTMCNC: 35.1 G/DL (ref 31–37)
MCV RBC AUTO: 92 FL (ref 81–99)
MONOCYTES # BLD AUTO: 0.5 THOUSAND/ΜL (ref 0.17–1.22)
MONOCYTES NFR BLD AUTO: 10 % (ref 2–12)
NEUTROPHILS # BLD AUTO: 3.1 THOUSANDS/ΜL (ref 1.4–6.5)
NEUTS SEG NFR BLD AUTO: 64 % (ref 42–75)
NITRITE UR QL STRIP: NEGATIVE
NON-SQ EPI CELLS URNS QL MICRO: ABNORMAL /HPF
PH UR STRIP.AUTO: 5.5 [PH]
PLATELET # BLD AUTO: 177 THOUSANDS/UL (ref 149–390)
PMV BLD AUTO: 6.6 FL (ref 8.6–11.7)
POTASSIUM SERPL-SCNC: 3.9 MMOL/L (ref 3.5–5.5)
PROT SERPL-MCNC: 6.9 G/DL (ref 6.4–8.9)
PROT UR STRIP-MCNC: NEGATIVE MG/DL
RBC # BLD AUTO: 3.68 MILLION/UL (ref 3.9–5.2)
RBC #/AREA URNS AUTO: ABNORMAL /HPF
SODIUM SERPL-SCNC: 129 MMOL/L (ref 134–143)
SP GR UR STRIP.AUTO: 1.01 (ref 1–1.03)
UROBILINOGEN UR QL STRIP.AUTO: 0.2 E.U./DL
WBC # BLD AUTO: 4.8 THOUSAND/UL (ref 4.8–10.8)
WBC #/AREA URNS AUTO: ABNORMAL /HPF

## 2019-05-29 PROCEDURE — 96361 HYDRATE IV INFUSION ADD-ON: CPT

## 2019-05-29 PROCEDURE — 99284 EMERGENCY DEPT VISIT MOD MDM: CPT

## 2019-05-29 PROCEDURE — 85025 COMPLETE CBC W/AUTO DIFF WBC: CPT | Performed by: EMERGENCY MEDICINE

## 2019-05-29 PROCEDURE — 74176 CT ABD & PELVIS W/O CONTRAST: CPT

## 2019-05-29 PROCEDURE — 36415 COLL VENOUS BLD VENIPUNCTURE: CPT | Performed by: EMERGENCY MEDICINE

## 2019-05-29 PROCEDURE — 81003 URINALYSIS AUTO W/O SCOPE: CPT | Performed by: EMERGENCY MEDICINE

## 2019-05-29 PROCEDURE — 80053 COMPREHEN METABOLIC PANEL: CPT | Performed by: EMERGENCY MEDICINE

## 2019-05-29 PROCEDURE — 83690 ASSAY OF LIPASE: CPT | Performed by: EMERGENCY MEDICINE

## 2019-05-29 PROCEDURE — 81001 URINALYSIS AUTO W/SCOPE: CPT | Performed by: EMERGENCY MEDICINE

## 2019-05-29 RX ORDER — LEVOFLOXACIN 5 MG/ML
750 INJECTION, SOLUTION INTRAVENOUS ONCE
Status: COMPLETED | OUTPATIENT
Start: 2019-05-30 | End: 2019-05-30

## 2019-05-29 RX ORDER — METRONIDAZOLE 500 MG/1
500 TABLET ORAL EVERY 8 HOURS SCHEDULED
Qty: 30 TABLET | Refills: 0 | Status: SHIPPED | OUTPATIENT
Start: 2019-05-29 | End: 2019-06-09

## 2019-05-29 RX ORDER — LEVOFLOXACIN 500 MG/1
500 TABLET, FILM COATED ORAL ONCE
Status: DISCONTINUED | OUTPATIENT
Start: 2019-05-29 | End: 2019-05-29

## 2019-05-29 RX ORDER — LEVOFLOXACIN 500 MG/1
500 TABLET, FILM COATED ORAL DAILY
Qty: 7 TABLET | Refills: 0 | Status: SHIPPED | OUTPATIENT
Start: 2019-05-29 | End: 2019-06-09

## 2019-05-29 RX ORDER — CLONIDINE HYDROCHLORIDE 0.1 MG/1
0.1 TABLET ORAL ONCE
Status: COMPLETED | OUTPATIENT
Start: 2019-05-29 | End: 2019-05-29

## 2019-05-29 RX ADMIN — SODIUM CHLORIDE 500 ML: 0.9 INJECTION, SOLUTION INTRAVENOUS at 22:57

## 2019-05-29 RX ADMIN — CLONIDINE HYDROCHLORIDE 0.1 MG: 0.1 TABLET ORAL at 23:20

## 2019-05-30 VITALS
WEIGHT: 122 LBS | SYSTOLIC BLOOD PRESSURE: 135 MMHG | HEART RATE: 53 BPM | RESPIRATION RATE: 18 BRPM | TEMPERATURE: 97.1 F | BODY MASS INDEX: 22.45 KG/M2 | HEIGHT: 62 IN | DIASTOLIC BLOOD PRESSURE: 62 MMHG | OXYGEN SATURATION: 96 %

## 2019-05-30 PROCEDURE — 96367 TX/PROPH/DG ADDL SEQ IV INF: CPT

## 2019-05-30 PROCEDURE — 96365 THER/PROPH/DIAG IV INF INIT: CPT

## 2019-05-30 RX ADMIN — METRONIDAZOLE 500 MG: 500 SOLUTION INTRAVENOUS at 00:00

## 2019-05-30 RX ADMIN — LEVOFLOXACIN 750 MG: 750 INJECTION, SOLUTION INTRAVENOUS at 00:35

## 2019-06-02 ENCOUNTER — HOSPITAL ENCOUNTER (EMERGENCY)
Facility: HOSPITAL | Age: 84
Discharge: HOME | End: 2019-06-02
Attending: EMERGENCY MEDICINE | Admitting: EMERGENCY MEDICINE
Payer: COMMERCIAL

## 2019-06-02 VITALS
OXYGEN SATURATION: 97 % | DIASTOLIC BLOOD PRESSURE: 99 MMHG | WEIGHT: 121.91 LBS | HEIGHT: 62 IN | RESPIRATION RATE: 18 BRPM | HEART RATE: 74 BPM | SYSTOLIC BLOOD PRESSURE: 204 MMHG | TEMPERATURE: 98.3 F | BODY MASS INDEX: 22.43 KG/M2

## 2019-06-02 DIAGNOSIS — K59.00 CONSTIPATION: Primary | ICD-10-CM

## 2019-06-02 DIAGNOSIS — K57.92 DIVERTICULITIS: ICD-10-CM

## 2019-06-02 LAB
ANION GAP SERPL CALCULATED.3IONS-SCNC: 7 MMOL/L (ref 4–13)
APTT PPP: 30 SECONDS (ref 26–38)
BASOPHILS # BLD AUTO: 0.1 THOUSANDS/ΜL (ref 0–0.1)
BASOPHILS NFR BLD AUTO: 1 % (ref 0–2)
BUN SERPL-MCNC: 15 MG/DL (ref 7–25)
CALCIUM SERPL-MCNC: 9.6 MG/DL (ref 8.6–10.5)
CHLORIDE SERPL-SCNC: 95 MMOL/L (ref 98–107)
CO2 SERPL-SCNC: 26 MMOL/L (ref 21–31)
CREAT SERPL-MCNC: 1.02 MG/DL (ref 0.6–1.2)
EOSINOPHIL # BLD AUTO: 0.1 THOUSAND/ΜL (ref 0–0.61)
EOSINOPHIL NFR BLD AUTO: 1 % (ref 0–5)
ERYTHROCYTE [DISTWIDTH] IN BLOOD BY AUTOMATED COUNT: 13.3 % (ref 11.5–14.5)
GFR SERPL CREATININE-BSD FRML MDRD: 51 ML/MIN/1.73SQ M
GLUCOSE SERPL-MCNC: 98 MG/DL (ref 65–99)
HCT VFR BLD AUTO: 36 % (ref 42–47)
HGB BLD-MCNC: 12.4 G/DL (ref 12–16)
INR PPP: 1.12 (ref 0.9–1.5)
LYMPHOCYTES # BLD AUTO: 1.1 THOUSANDS/ΜL (ref 0.6–4.47)
LYMPHOCYTES NFR BLD AUTO: 19 % (ref 21–51)
MCH RBC QN AUTO: 31.3 PG (ref 26–34)
MCHC RBC AUTO-ENTMCNC: 34.6 G/DL (ref 31–37)
MCV RBC AUTO: 91 FL (ref 81–99)
MONOCYTES # BLD AUTO: 0.5 THOUSAND/ΜL (ref 0.17–1.22)
MONOCYTES NFR BLD AUTO: 9 % (ref 2–12)
NEUTROPHILS # BLD AUTO: 4.1 THOUSANDS/ΜL (ref 1.4–6.5)
NEUTS SEG NFR BLD AUTO: 70 % (ref 42–75)
PLATELET # BLD AUTO: 186 THOUSANDS/UL (ref 149–390)
PMV BLD AUTO: 6.8 FL (ref 8.6–11.7)
POTASSIUM SERPL-SCNC: 4.3 MMOL/L (ref 3.5–5.5)
PROTHROMBIN TIME: 12.9 SECONDS (ref 10.2–13)
RBC # BLD AUTO: 3.97 MILLION/UL (ref 3.9–5.2)
SODIUM SERPL-SCNC: 128 MMOL/L (ref 134–143)
WBC # BLD AUTO: 5.9 THOUSAND/UL (ref 4.8–10.8)

## 2019-06-02 PROCEDURE — 99284 EMERGENCY DEPT VISIT MOD MDM: CPT

## 2019-06-02 PROCEDURE — 85730 THROMBOPLASTIN TIME PARTIAL: CPT | Performed by: EMERGENCY MEDICINE

## 2019-06-02 PROCEDURE — 85025 COMPLETE CBC W/AUTO DIFF WBC: CPT | Performed by: EMERGENCY MEDICINE

## 2019-06-02 PROCEDURE — 80048 BASIC METABOLIC PNL TOTAL CA: CPT | Performed by: EMERGENCY MEDICINE

## 2019-06-02 PROCEDURE — 85610 PROTHROMBIN TIME: CPT | Performed by: EMERGENCY MEDICINE

## 2019-06-02 PROCEDURE — 36415 COLL VENOUS BLD VENIPUNCTURE: CPT | Performed by: EMERGENCY MEDICINE

## 2019-06-02 RX ORDER — ALPRAZOLAM 0.5 MG/1
0.25 TABLET ORAL ONCE
Status: COMPLETED | OUTPATIENT
Start: 2019-06-02 | End: 2019-06-02

## 2019-06-02 RX ORDER — SODIUM CHLORIDE 9 MG/ML
125 INJECTION, SOLUTION INTRAVENOUS CONTINUOUS
Status: DISCONTINUED | OUTPATIENT
Start: 2019-06-02 | End: 2019-06-02 | Stop reason: HOSPADM

## 2019-06-02 RX ORDER — BISACODYL 10 MG
10 SUPPOSITORY, RECTAL RECTAL ONCE
Status: COMPLETED | OUTPATIENT
Start: 2019-06-02 | End: 2019-06-02

## 2019-06-02 RX ADMIN — SODIUM CHLORIDE 125 ML/HR: 0.9 INJECTION, SOLUTION INTRAVENOUS at 19:45

## 2019-06-02 RX ADMIN — ALPRAZOLAM 0.25 MG: 0.5 TABLET ORAL at 20:40

## 2019-06-02 RX ADMIN — BISACODYL 10 MG: 10 SUPPOSITORY RECTAL at 19:45

## 2019-06-09 ENCOUNTER — HOSPITAL ENCOUNTER (EMERGENCY)
Facility: HOSPITAL | Age: 84
Discharge: HOME | End: 2019-06-10
Payer: COMMERCIAL

## 2019-06-09 ENCOUNTER — APPOINTMENT (EMERGENCY)
Dept: CT IMAGING | Facility: HOSPITAL | Age: 84
End: 2019-06-09
Payer: COMMERCIAL

## 2019-06-09 DIAGNOSIS — I10 HYPERTENSION, UNSPECIFIED TYPE: ICD-10-CM

## 2019-06-09 DIAGNOSIS — K59.00 CONSTIPATION: ICD-10-CM

## 2019-06-09 DIAGNOSIS — R10.30 LOWER ABDOMINAL PAIN: Primary | ICD-10-CM

## 2019-06-09 LAB
ALBUMIN SERPL BCP-MCNC: 4.3 G/DL (ref 3.5–5.7)
ALP SERPL-CCNC: 54 U/L (ref 55–165)
ALT SERPL W P-5'-P-CCNC: 12 U/L (ref 7–52)
ANION GAP SERPL CALCULATED.3IONS-SCNC: 7 MMOL/L (ref 4–13)
APTT PPP: 32 SECONDS (ref 26–38)
AST SERPL W P-5'-P-CCNC: 20 U/L (ref 13–39)
BACTERIA UR QL AUTO: NORMAL /HPF
BASOPHILS # BLD AUTO: 0 THOUSANDS/ΜL (ref 0–0.1)
BASOPHILS NFR BLD AUTO: 1 % (ref 0–2)
BILIRUB SERPL-MCNC: 0.5 MG/DL (ref 0.2–1)
BILIRUB UR QL STRIP: NEGATIVE
BUN SERPL-MCNC: 9 MG/DL (ref 7–25)
CALCIUM SERPL-MCNC: 9.5 MG/DL (ref 8.6–10.5)
CHLORIDE SERPL-SCNC: 94 MMOL/L (ref 98–107)
CLARITY UR: CLEAR
CO2 SERPL-SCNC: 27 MMOL/L (ref 21–31)
COLOR UR: ABNORMAL
CREAT SERPL-MCNC: 1.06 MG/DL (ref 0.6–1.2)
EOSINOPHIL # BLD AUTO: 0.1 THOUSAND/ΜL (ref 0–0.61)
EOSINOPHIL NFR BLD AUTO: 1 % (ref 0–5)
ERYTHROCYTE [DISTWIDTH] IN BLOOD BY AUTOMATED COUNT: 13.5 % (ref 11.5–14.5)
GFR SERPL CREATININE-BSD FRML MDRD: 48 ML/MIN/1.73SQ M
GLUCOSE SERPL-MCNC: 96 MG/DL (ref 65–99)
GLUCOSE UR STRIP-MCNC: NEGATIVE MG/DL
HCT VFR BLD AUTO: 36.5 % (ref 42–47)
HGB BLD-MCNC: 12.6 G/DL (ref 12–16)
HGB UR QL STRIP.AUTO: ABNORMAL
INR PPP: 1.15 (ref 0.9–1.5)
KETONES UR STRIP-MCNC: NEGATIVE MG/DL
LEUKOCYTE ESTERASE UR QL STRIP: NEGATIVE
LIPASE SERPL-CCNC: 50 U/L (ref 11–82)
LYMPHOCYTES # BLD AUTO: 1.1 THOUSANDS/ΜL (ref 0.6–4.47)
LYMPHOCYTES NFR BLD AUTO: 24 % (ref 21–51)
MCH RBC QN AUTO: 31.3 PG (ref 26–34)
MCHC RBC AUTO-ENTMCNC: 34.5 G/DL (ref 31–37)
MCV RBC AUTO: 91 FL (ref 81–99)
MONOCYTES # BLD AUTO: 0.6 THOUSAND/ΜL (ref 0.17–1.22)
MONOCYTES NFR BLD AUTO: 12 % (ref 2–12)
NEUTROPHILS # BLD AUTO: 2.8 THOUSANDS/ΜL (ref 1.4–6.5)
NEUTS SEG NFR BLD AUTO: 62 % (ref 42–75)
NITRITE UR QL STRIP: NEGATIVE
NON-SQ EPI CELLS URNS QL MICRO: NORMAL /HPF
PH UR STRIP.AUTO: 7 [PH]
PLATELET # BLD AUTO: 170 THOUSANDS/UL (ref 149–390)
PMV BLD AUTO: 6.6 FL (ref 8.6–11.7)
POTASSIUM SERPL-SCNC: 3.7 MMOL/L (ref 3.5–5.5)
PROT SERPL-MCNC: 6.9 G/DL (ref 6.4–8.9)
PROT UR STRIP-MCNC: NEGATIVE MG/DL
PROTHROMBIN TIME: 13.3 SECONDS (ref 10.2–13)
RBC # BLD AUTO: 4.02 MILLION/UL (ref 3.9–5.2)
RBC #/AREA URNS AUTO: NORMAL /HPF
SODIUM SERPL-SCNC: 128 MMOL/L (ref 134–143)
SP GR UR STRIP.AUTO: <=1.005 (ref 1–1.03)
UROBILINOGEN UR QL STRIP.AUTO: 0.2 E.U./DL
WBC # BLD AUTO: 4.5 THOUSAND/UL (ref 4.8–10.8)
WBC #/AREA URNS AUTO: NORMAL /HPF

## 2019-06-09 PROCEDURE — 85610 PROTHROMBIN TIME: CPT

## 2019-06-09 PROCEDURE — 99284 EMERGENCY DEPT VISIT MOD MDM: CPT

## 2019-06-09 PROCEDURE — 96365 THER/PROPH/DIAG IV INF INIT: CPT

## 2019-06-09 PROCEDURE — 36415 COLL VENOUS BLD VENIPUNCTURE: CPT

## 2019-06-09 PROCEDURE — 85730 THROMBOPLASTIN TIME PARTIAL: CPT

## 2019-06-09 PROCEDURE — 83690 ASSAY OF LIPASE: CPT

## 2019-06-09 PROCEDURE — 81001 URINALYSIS AUTO W/SCOPE: CPT

## 2019-06-09 PROCEDURE — 96375 TX/PRO/DX INJ NEW DRUG ADDON: CPT

## 2019-06-09 PROCEDURE — 74176 CT ABD & PELVIS W/O CONTRAST: CPT

## 2019-06-09 PROCEDURE — 85025 COMPLETE CBC W/AUTO DIFF WBC: CPT

## 2019-06-09 PROCEDURE — 80053 COMPREHEN METABOLIC PANEL: CPT

## 2019-06-09 RX ORDER — LEVOTHYROXINE SODIUM 0.12 MG/1
60 TABLET ORAL DAILY
COMMUNITY
End: 2019-10-07

## 2019-06-09 RX ORDER — FENTANYL CITRATE 50 UG/ML
25 INJECTION, SOLUTION INTRAMUSCULAR; INTRAVENOUS ONCE
Status: COMPLETED | OUTPATIENT
Start: 2019-06-09 | End: 2019-06-09

## 2019-06-09 RX ADMIN — SODIUM CHLORIDE 2 MG/HR: 9 INJECTION, SOLUTION INTRAVENOUS at 23:13

## 2019-06-09 RX ADMIN — IOHEXOL 50 ML: 240 INJECTION, SOLUTION INTRATHECAL; INTRAVASCULAR; INTRAVENOUS; ORAL at 22:45

## 2019-06-09 RX ADMIN — FENTANYL CITRATE 25 MCG: 50 INJECTION INTRAMUSCULAR; INTRAVENOUS at 22:50

## 2019-06-09 RX ADMIN — SODIUM CHLORIDE 500 ML: 0.9 INJECTION, SOLUTION INTRAVENOUS at 22:50

## 2019-06-10 VITALS
HEIGHT: 62 IN | HEART RATE: 69 BPM | BODY MASS INDEX: 22.45 KG/M2 | OXYGEN SATURATION: 97 % | TEMPERATURE: 98.4 F | SYSTOLIC BLOOD PRESSURE: 154 MMHG | DIASTOLIC BLOOD PRESSURE: 72 MMHG | WEIGHT: 122 LBS | RESPIRATION RATE: 18 BRPM

## 2019-06-10 PROBLEM — K59.00 CONSTIPATION: Status: ACTIVE | Noted: 2019-06-10

## 2019-06-10 PROBLEM — R10.30 LOWER ABDOMINAL PAIN: Status: ACTIVE | Noted: 2019-06-10

## 2019-06-10 PROBLEM — I10 HYPERTENSION: Status: ACTIVE | Noted: 2019-06-10

## 2019-06-22 ENCOUNTER — HOSPITAL ENCOUNTER (EMERGENCY)
Facility: HOSPITAL | Age: 84
Discharge: HOME | End: 2019-06-22
Payer: COMMERCIAL

## 2019-06-22 VITALS
HEIGHT: 62 IN | HEART RATE: 66 BPM | TEMPERATURE: 99.5 F | RESPIRATION RATE: 18 BRPM | DIASTOLIC BLOOD PRESSURE: 63 MMHG | SYSTOLIC BLOOD PRESSURE: 141 MMHG | BODY MASS INDEX: 22.08 KG/M2 | OXYGEN SATURATION: 99 % | WEIGHT: 120 LBS

## 2019-06-22 DIAGNOSIS — G89.29 CHRONIC ABDOMINAL PAIN: Primary | ICD-10-CM

## 2019-06-22 DIAGNOSIS — R10.9 CHRONIC ABDOMINAL PAIN: Primary | ICD-10-CM

## 2019-06-22 LAB
ALBUMIN SERPL BCP-MCNC: 3.9 G/DL (ref 3.5–5.7)
ALP SERPL-CCNC: 51 U/L (ref 55–165)
ALT SERPL W P-5'-P-CCNC: 11 U/L (ref 7–52)
ANION GAP SERPL CALCULATED.3IONS-SCNC: 7 MMOL/L (ref 4–13)
AST SERPL W P-5'-P-CCNC: 18 U/L (ref 13–39)
BILIRUB SERPL-MCNC: 0.5 MG/DL (ref 0.2–1)
BILIRUB UR QL STRIP: NEGATIVE
BUN SERPL-MCNC: 13 MG/DL (ref 7–25)
CALCIUM SERPL-MCNC: 9.4 MG/DL (ref 8.6–10.5)
CHLORIDE SERPL-SCNC: 99 MMOL/L (ref 98–107)
CLARITY UR: CLEAR
CO2 SERPL-SCNC: 28 MMOL/L (ref 21–31)
COLOR UR: YELLOW
CREAT SERPL-MCNC: 0.84 MG/DL (ref 0.6–1.2)
ERYTHROCYTE [DISTWIDTH] IN BLOOD BY AUTOMATED COUNT: 13.5 % (ref 11.5–14.5)
GFR SERPL CREATININE-BSD FRML MDRD: 64 ML/MIN/1.73SQ M
GLUCOSE SERPL-MCNC: 93 MG/DL (ref 65–99)
GLUCOSE UR STRIP-MCNC: NEGATIVE MG/DL
HCT VFR BLD AUTO: 34.1 % (ref 42–47)
HGB BLD-MCNC: 12 G/DL (ref 12–16)
HGB UR QL STRIP.AUTO: NEGATIVE
KETONES UR STRIP-MCNC: NEGATIVE MG/DL
LEUKOCYTE ESTERASE UR QL STRIP: NEGATIVE
LIPASE SERPL-CCNC: 52 U/L (ref 11–82)
MCH RBC QN AUTO: 31.9 PG (ref 26–34)
MCHC RBC AUTO-ENTMCNC: 35 G/DL (ref 31–37)
MCV RBC AUTO: 91 FL (ref 81–99)
NITRITE UR QL STRIP: NEGATIVE
PH UR STRIP.AUTO: 5.5 [PH]
PLATELET # BLD AUTO: 174 THOUSANDS/UL (ref 149–390)
PMV BLD AUTO: 6.8 FL (ref 8.6–11.7)
POTASSIUM SERPL-SCNC: 4 MMOL/L (ref 3.5–5.5)
PROT SERPL-MCNC: 6.7 G/DL (ref 6.4–8.9)
PROT UR STRIP-MCNC: NEGATIVE MG/DL
RBC # BLD AUTO: 3.75 MILLION/UL (ref 3.9–5.2)
SODIUM SERPL-SCNC: 134 MMOL/L (ref 134–143)
SP GR UR STRIP.AUTO: 1.01 (ref 1–1.03)
UROBILINOGEN UR QL STRIP.AUTO: 0.2 E.U./DL
WBC # BLD AUTO: 4.5 THOUSAND/UL (ref 4.8–10.8)

## 2019-06-22 PROCEDURE — 99284 EMERGENCY DEPT VISIT MOD MDM: CPT

## 2019-06-22 PROCEDURE — 83690 ASSAY OF LIPASE: CPT

## 2019-06-22 PROCEDURE — 36415 COLL VENOUS BLD VENIPUNCTURE: CPT

## 2019-06-22 PROCEDURE — 96375 TX/PRO/DX INJ NEW DRUG ADDON: CPT

## 2019-06-22 PROCEDURE — 96374 THER/PROPH/DIAG INJ IV PUSH: CPT

## 2019-06-22 PROCEDURE — 81003 URINALYSIS AUTO W/O SCOPE: CPT

## 2019-06-22 PROCEDURE — 80053 COMPREHEN METABOLIC PANEL: CPT

## 2019-06-22 RX ORDER — LORAZEPAM 2 MG/ML
0.5 INJECTION INTRAMUSCULAR ONCE
Status: COMPLETED | OUTPATIENT
Start: 2019-06-22 | End: 2019-06-22

## 2019-06-22 RX ORDER — LANSOPRAZOLE 30 MG/1
30 CAPSULE, DELAYED RELEASE ORAL DAILY
Status: ON HOLD | COMMUNITY
End: 2020-06-26 | Stop reason: SDUPTHER

## 2019-06-22 RX ORDER — MAGNESIUM HYDROXIDE/ALUMINUM HYDROXICE/SIMETHICONE 120; 1200; 1200 MG/30ML; MG/30ML; MG/30ML
30 SUSPENSION ORAL ONCE
Status: COMPLETED | OUTPATIENT
Start: 2019-06-22 | End: 2019-06-22

## 2019-06-22 RX ORDER — HYDRALAZINE HYDROCHLORIDE 20 MG/ML
10 INJECTION INTRAMUSCULAR; INTRAVENOUS ONCE
Status: COMPLETED | OUTPATIENT
Start: 2019-06-22 | End: 2019-06-22

## 2019-06-22 RX ADMIN — ALUMINUM HYDROXIDE, MAGNESIUM HYDROXIDE, AND SIMETHICONE 30 ML: 200; 200; 20 SUSPENSION ORAL at 09:37

## 2019-06-22 RX ADMIN — HYDRALAZINE HYDROCHLORIDE 10 MG: 20 INJECTION INTRAMUSCULAR; INTRAVENOUS at 09:57

## 2019-06-22 RX ADMIN — LORAZEPAM 0.5 MG: 2 INJECTION INTRAMUSCULAR; INTRAVENOUS at 09:58

## 2019-07-01 ENCOUNTER — TRANSCRIBE ORDERS (OUTPATIENT)
Dept: ADMINISTRATIVE | Facility: HOSPITAL | Age: 84
End: 2019-07-01

## 2019-07-01 DIAGNOSIS — K57.92 DIVERTICULITIS: Primary | ICD-10-CM

## 2019-07-01 DIAGNOSIS — R19.7 DIARRHEA, UNSPECIFIED TYPE: ICD-10-CM

## 2019-07-01 DIAGNOSIS — R10.32 LLQ PAIN: ICD-10-CM

## 2019-07-11 ENCOUNTER — HOSPITAL ENCOUNTER (EMERGENCY)
Facility: HOSPITAL | Age: 84
Discharge: HOME | End: 2019-07-11
Attending: EMERGENCY MEDICINE | Admitting: EMERGENCY MEDICINE
Payer: COMMERCIAL

## 2019-07-11 VITALS
SYSTOLIC BLOOD PRESSURE: 197 MMHG | RESPIRATION RATE: 16 BRPM | BODY MASS INDEX: 22.08 KG/M2 | HEART RATE: 63 BPM | WEIGHT: 120 LBS | TEMPERATURE: 98.7 F | OXYGEN SATURATION: 96 % | HEIGHT: 62 IN | DIASTOLIC BLOOD PRESSURE: 88 MMHG

## 2019-07-11 DIAGNOSIS — I10 HYPERTENSION: Primary | ICD-10-CM

## 2019-07-11 LAB
ANION GAP SERPL CALCULATED.3IONS-SCNC: 7 MMOL/L (ref 4–13)
ATRIAL RATE: 74 BPM
BASOPHILS # BLD AUTO: 0 THOUSANDS/ΜL (ref 0–0.1)
BASOPHILS NFR BLD AUTO: 1 % (ref 0–2)
BUN SERPL-MCNC: 12 MG/DL (ref 7–25)
CALCIUM SERPL-MCNC: 9.6 MG/DL (ref 8.6–10.5)
CHLORIDE SERPL-SCNC: 99 MMOL/L (ref 98–107)
CO2 SERPL-SCNC: 29 MMOL/L (ref 21–31)
CREAT SERPL-MCNC: 0.85 MG/DL (ref 0.6–1.2)
EOSINOPHIL # BLD AUTO: 0.1 THOUSAND/ΜL (ref 0–0.61)
EOSINOPHIL NFR BLD AUTO: 2 % (ref 0–5)
ERYTHROCYTE [DISTWIDTH] IN BLOOD BY AUTOMATED COUNT: 13.3 % (ref 11.5–14.5)
GFR SERPL CREATININE-BSD FRML MDRD: 63 ML/MIN/1.73SQ M
GLUCOSE SERPL-MCNC: 95 MG/DL (ref 65–99)
HCT VFR BLD AUTO: 37.3 % (ref 42–47)
HGB BLD-MCNC: 12.8 G/DL (ref 12–16)
LYMPHOCYTES # BLD AUTO: 1.1 THOUSANDS/ΜL (ref 0.6–4.47)
LYMPHOCYTES NFR BLD AUTO: 26 % (ref 21–51)
MAGNESIUM SERPL-MCNC: 2.2 MG/DL (ref 1.9–2.7)
MCH RBC QN AUTO: 31.8 PG (ref 26–34)
MCHC RBC AUTO-ENTMCNC: 34.4 G/DL (ref 31–37)
MCV RBC AUTO: 93 FL (ref 81–99)
MONOCYTES # BLD AUTO: 0.4 THOUSAND/ΜL (ref 0.17–1.22)
MONOCYTES NFR BLD AUTO: 10 % (ref 2–12)
NEUTROPHILS # BLD AUTO: 2.6 THOUSANDS/ΜL (ref 1.4–6.5)
NEUTS SEG NFR BLD AUTO: 61 % (ref 42–75)
P AXIS: 85 DEGREES
PLATELET # BLD AUTO: 205 THOUSANDS/UL (ref 149–390)
PMV BLD AUTO: 7 FL (ref 8.6–11.7)
POTASSIUM SERPL-SCNC: 3.9 MMOL/L (ref 3.5–5.5)
PR INTERVAL: 238 MS
QRS AXIS: 82 DEGREES
QRSD INTERVAL: 96 MS
QT INTERVAL: 384 MS
QTC INTERVAL: 426 MS
RBC # BLD AUTO: 4.03 MILLION/UL (ref 3.9–5.2)
SODIUM SERPL-SCNC: 135 MMOL/L (ref 134–143)
T WAVE AXIS: 75 DEGREES
TROPONIN I SERPL-MCNC: <0.03 NG/ML
VENTRICULAR RATE: 74 BPM
WBC # BLD AUTO: 4.3 THOUSAND/UL (ref 4.8–10.8)

## 2019-07-11 PROCEDURE — 36415 COLL VENOUS BLD VENIPUNCTURE: CPT | Performed by: EMERGENCY MEDICINE

## 2019-07-11 PROCEDURE — 80048 BASIC METABOLIC PNL TOTAL CA: CPT | Performed by: EMERGENCY MEDICINE

## 2019-07-11 PROCEDURE — 99284 EMERGENCY DEPT VISIT MOD MDM: CPT

## 2019-07-11 PROCEDURE — 93010 ELECTROCARDIOGRAM REPORT: CPT | Performed by: INTERNAL MEDICINE

## 2019-07-11 PROCEDURE — 85025 COMPLETE CBC W/AUTO DIFF WBC: CPT | Performed by: EMERGENCY MEDICINE

## 2019-07-11 PROCEDURE — 93005 ELECTROCARDIOGRAM TRACING: CPT

## 2019-07-11 PROCEDURE — 83735 ASSAY OF MAGNESIUM: CPT | Performed by: EMERGENCY MEDICINE

## 2019-07-11 PROCEDURE — 96374 THER/PROPH/DIAG INJ IV PUSH: CPT

## 2019-07-11 PROCEDURE — 84484 ASSAY OF TROPONIN QUANT: CPT | Performed by: EMERGENCY MEDICINE

## 2019-07-11 RX ORDER — LABETALOL 20 MG/4 ML (5 MG/ML) INTRAVENOUS SYRINGE
10 ONCE
Status: COMPLETED | OUTPATIENT
Start: 2019-07-11 | End: 2019-07-11

## 2019-07-11 RX ADMIN — LABETALOL 20 MG/4 ML (5 MG/ML) INTRAVENOUS SYRINGE 10 MG: at 06:46

## 2019-07-11 NOTE — ED PROVIDER NOTES
History  Chief Complaint   Patient presents with    High Blood Pressure     feels flushed, 224/120 at 0530     PATIENT NOTES SHE HAS NOT BEEN FEELING WELL WITH DIVERTICULITIS AND FEELS THAT THE ANTIBIOTICS THAT WERE PRESCRIBED TO HER MADE HER FEEL WORSE, HOWEVER SHE DID COMPLETE THE ANTIBIOTICS  HOWEVER THIS MORNING SHE NOTED HER BLOOD PRESSURE BE VERY HIGH AND THEREFORE CAME TO THE ER TO BE EVALUATED  INITIAL SYSTOLIC BLOOD PRESSURES GREATER THAN 200 HERE IN THE DEPARTMENT  PATIENT DENIES CHEST PAIN, SHE DENIES HEADACHE, MALAISE, SHORTNESS OF BREATH OR NEAR-SYNCOPE  Prior to Admission Medications   Prescriptions Last Dose Informant Patient Reported? Taking? ALPRAZolam (XANAX) 0 25 mg tablet  Self Yes No   Sig: Use 1 tab 2x/day as needed for anxiety   FLUoxetine (PROzac) 10 mg capsule Not Taking at Unknown time Self Yes No   Sig: Take 10 mg by mouth daily   lansoprazole (PREVACID) 30 mg capsule   Yes No   Sig: Take 30 mg by mouth daily   levothyroxine 125 mcg tablet   Yes No   Sig: Take 60 mcg by mouth daily   levothyroxine 75 mcg tablet  Self Yes No   Sig: tuesday, thursday, saturday, sunday   linaCLOtide (LINZESS) 145 MCG CAPS   Yes No   Sig: Take 145 mcg by mouth daily   losartan (COZAAR) 25 mg tablet  Self Yes No   Sig: Take 25 mg by mouth daily    magnesium citrate solution   No No   Sig: Take 296 mL by mouth once for 1 dose   metoprolol succinate (TOPROL-XL) 25 mg 24 hr tablet  Self Yes No   Sig: Take 1 tab po daily      Facility-Administered Medications: None       Past Medical History:   Diagnosis Date    Anxiety     Depression     Disease of thyroid gland     Diverticulitis     Hypertension        Past Surgical History:   Procedure Laterality Date    TONSILLECTOMY         Family History   Problem Relation Age of Onset    Cancer Father     Prostate cancer Father      I have reviewed and agree with the history as documented      Social History     Tobacco Use    Smoking status: Never Smoker    Smokeless tobacco: Never Used   Substance Use Topics    Alcohol use: No    Drug use: No        Review of Systems   Constitutional: Negative for chills and fever  Eyes: Negative for visual disturbance  Respiratory: Negative for chest tightness and shortness of breath  Cardiovascular: Negative for chest pain  All other systems reviewed and are negative  Physical Exam  Physical Exam   Constitutional: She is oriented to person, place, and time  She appears well-developed and well-nourished  PLEASANT ELDERLY FEMALE WHO IS AMBULATORY  SHE IS NONTOXIC AND IS IN NO ACUTE DISTRESS  HENT:   Head: Normocephalic and atraumatic  Mouth/Throat: Oropharynx is clear and moist    Eyes: Conjunctivae and EOM are normal    Neck: Normal range of motion  Cardiovascular: Normal rate, regular rhythm and normal heart sounds  Pulmonary/Chest: Effort normal and breath sounds normal  No respiratory distress  Abdominal: Soft  Bowel sounds are normal  She exhibits no distension  There is no tenderness  Musculoskeletal: She exhibits no edema or deformity  Neurological: She is alert and oriented to person, place, and time  No cranial nerve deficit  NORMAL GAIT   Skin: Capillary refill takes less than 2 seconds  No rash noted  Vitals reviewed        Vital Signs  ED Triage Vitals [07/11/19 0606]   Temperature Pulse Respirations Blood Pressure SpO2   98 7 °F (37 1 °C) 81 20 (!) 239/119 98 %      Temp src Heart Rate Source Patient Position - Orthostatic VS BP Location FiO2 (%)   -- Monitor Sitting Left arm --      Pain Score       No Pain           Vitals:    07/11/19 0606   BP: (!) 239/119   Pulse: 81   Patient Position - Orthostatic VS: Sitting         Visual Acuity      ED Medications  Medications   Labetalol HCl (NORMODYNE) injection 10 mg (10 mg Intravenous Given 7/11/19 0646)       Diagnostic Studies  Results Reviewed     Procedure Component Value Units Date/Time    Basic metabolic panel [[id number]] Collected:  07/11/19 0652    Lab Status: In process Specimen:  Blood from Arm, Right Updated:  07/11/19 0655    Magnesium [[id number]] Collected:  07/11/19 2251    Lab Status: In process Specimen:  Blood from Arm, Right Updated:  07/11/19 0655    Troponin I [[id number]] Collected:  07/11/19 0652    Lab Status: In process Specimen:  Blood from Arm, Right Updated:  07/11/19 0655    CBC and differential [[id number]] Collected:  07/11/19 0652    Lab Status: In process Specimen:  Blood from Arm, Right Updated:  07/11/19 0655                 No orders to display              Procedures  ECG 12 Lead Documentation Only  Date/Time: 7/11/2019 6:45 AM  Performed by: Keny Rodriguez DO  Authorized by: Keny Rodriguez DO     Indications / Diagnosis:  HYPERTENSION  ECG reviewed by me, the ED Provider: yes    Patient location:  ED  Previous ECG:     Previous ECG:  Unavailable    Comparison to cardiac monitor: Yes    Interpretation:     Interpretation: non-specific    Rate:     ECG rate:  74    ECG rate assessment: normal    Rhythm:     Rhythm: sinus rhythm    Ectopy:     Ectopy: none    Conduction:     Conduction: abnormal      Abnormal conduction: incomplete RBBB and 1st degree    Comments:      NO ST SEGMENT CHANGES TO SUGGEST ACUTE ISCHEMIA           ED Course      EXAMINATION AND EVALUATION  PATIENT PRESENTS WITH CONCERNS OF ELEVATED BLOOD PRESSURE   BLOOD PRESSURE IS MARKEDLY ELEVATED  SHE DID NOT TAKE HER MORNING MEDICINES  WILL START OFF WITH SOME IV LABETALOL AND ASSESS RESPONSE  HER ULTIMATE DISPOSITION WILL BE SIGNED OUT TO DR Samuel Haque                          MDM    Disposition  Final diagnoses:   None     ED Disposition     None      Follow-up Information    None         Patient's Medications   Discharge Prescriptions    No medications on file     No discharge procedures on file      ED Provider  Electronically Signed by           Keny Rodriguez DO  07/11/19 9369

## 2019-07-11 NOTE — DISCHARGE INSTRUCTIONS
Taking blood pressure medication today as normal   Follow-up with your family doctor in the next several days to have a blood pressure recheck

## 2019-07-11 NOTE — ED PROVIDER NOTES
History  Chief Complaint   Patient presents with    High Blood Pressure     feels flushed, 224/120 at 0530     I have accepted care from Dr Riya Rhodes  Patient blood pressure is now 180/80  She feels well  Patient says that she is being treated for diverticulitis  This time she has no abdominal pain  On exam patient's blood pressure is 180/80 her abdominal exam is normal she has no tenderness at all  Will discharge home for follow-up with her PMD          Prior to Admission Medications   Prescriptions Last Dose Informant Patient Reported? Taking? ALPRAZolam (XANAX) 0 25 mg tablet  Self Yes No   Sig: Use 1 tab 2x/day as needed for anxiety   FLUoxetine (PROzac) 10 mg capsule Not Taking at Unknown time Self Yes No   Sig: Take 10 mg by mouth daily   lansoprazole (PREVACID) 30 mg capsule   Yes No   Sig: Take 30 mg by mouth daily   levothyroxine 125 mcg tablet   Yes No   Sig: Take 60 mcg by mouth daily   levothyroxine 75 mcg tablet  Self Yes No   Sig: tuesday, thursday, saturday, sunday   linaCLOtide (LINZESS) 145 MCG CAPS   Yes No   Sig: Take 145 mcg by mouth daily   losartan (COZAAR) 25 mg tablet  Self Yes No   Sig: Take 25 mg by mouth daily    magnesium citrate solution   No No   Sig: Take 296 mL by mouth once for 1 dose   metoprolol succinate (TOPROL-XL) 25 mg 24 hr tablet  Self Yes No   Sig: Take 1 tab po daily      Facility-Administered Medications: None       Past Medical History:   Diagnosis Date    Anxiety     Depression     Disease of thyroid gland     Diverticulitis     Hypertension        Past Surgical History:   Procedure Laterality Date    TONSILLECTOMY         Family History   Problem Relation Age of Onset    Cancer Father     Prostate cancer Father      I have reviewed and agree with the history as documented      Social History     Tobacco Use    Smoking status: Never Smoker    Smokeless tobacco: Never Used   Substance Use Topics    Alcohol use: No    Drug use: No        Review of Systems    Physical Exam  Physical Exam    Vital Signs  ED Triage Vitals [07/11/19 0606]   Temperature Pulse Respirations Blood Pressure SpO2   98 7 °F (37 1 °C) 81 20 (!) 239/119 98 %      Temp src Heart Rate Source Patient Position - Orthostatic VS BP Location FiO2 (%)   -- Monitor Sitting Left arm --      Pain Score       No Pain           Vitals:    07/11/19 0606 07/11/19 0700 07/11/19 0730   BP: (!) 239/119 (!) 183/81 (!) 197/88   Pulse: 81 63 63   Patient Position - Orthostatic VS: Sitting Sitting Sitting         Visual Acuity      ED Medications  Medications   Labetalol HCl (NORMODYNE) injection 10 mg (10 mg Intravenous Given 7/11/19 0646)       Diagnostic Studies  Results Reviewed     Procedure Component Value Units Date/Time    Basic metabolic panel [[id number]] Collected:  07/11/19 0652    Lab Status:  Final result Specimen:  Blood from Arm, Right Updated:  07/11/19 6014     Sodium 135 mmol/L      Potassium 3 9 mmol/L      Chloride 99 mmol/L      CO2 29 mmol/L      ANION GAP 7 mmol/L      BUN 12 mg/dL      Creatinine 0 85 mg/dL      Glucose 95 mg/dL      Calcium 9 6 mg/dL      eGFR 63 ml/min/1 73sq m     Narrative:       [place] guidelines for Chronic Kidney Disease (CKD):     Stage 1 with normal or high GFR (GFR > 90 mL/min/1 73 square meters)    Stage 2 Mild CKD (GFR = 60-89 mL/min/1 73 square meters)    Stage 3A Moderate CKD (GFR = 45-59 mL/min/1 73 square meters)    Stage 3B Moderate CKD (GFR = 30-44 mL/min/1 73 square meters)    Stage 4 Severe CKD (GFR = 15-29 mL/min/1 73 square meters)    Stage 5 End Stage CKD (GFR <15 mL/min/1 73 square meters)  Note: GFR calculation is accurate only with a steady state creatinine    Magnesium [[id number]]  (Normal) Collected:  07/11/19 0652    Lab Status:  Final result Specimen:  Blood from Arm, Right Updated:  07/11/19 0722     Magnesium 2 2 mg/dL     Troponin I [[id number]]  (Normal) Collected:  07/11/19 7203    Lab Status:  Final result Specimen:  Blood from Arm, Right Updated:  07/11/19 0722     Troponin I <0 03 ng/mL     CBC and differential [[id number]]  (Abnormal) Collected:  07/11/19 0652    Lab Status:  Final result Specimen:  Blood from Arm, Right Updated:  07/11/19 0659     WBC 4 30 Thousand/uL      RBC 4 03 Million/uL      Hemoglobin 12 8 g/dL      Hematocrit 37 3 %      MCV 93 fL      MCH 31 8 pg      MCHC 34 4 g/dL      RDW 13 3 %      MPV 7 0 fL      Platelets 263 Thousands/uL      Neutrophils Relative 61 %      Lymphocytes Relative 26 %      Monocytes Relative 10 %      Eosinophils Relative 2 %      Basophils Relative 1 %      Neutrophils Absolute 2 60 Thousands/µL      Lymphocytes Absolute 1 10 Thousands/µL      Monocytes Absolute 0 40 Thousand/µL      Eosinophils Absolute 0 10 Thousand/µL      Basophils Absolute 0 00 Thousands/µL                  No orders to display              Procedures  Procedures       ED Course                               MDM    Disposition  Final diagnoses:   None     ED Disposition     None      Follow-up Information    None         Patient's Medications   Discharge Prescriptions    No medications on file     No discharge procedures on file      ED Provider  Electronically Signed by           Juan Bella MD  07/11/19 3986

## 2019-07-17 ENCOUNTER — HOSPITAL ENCOUNTER (EMERGENCY)
Facility: HOSPITAL | Age: 84
Discharge: HOME | End: 2019-07-17
Attending: FAMILY MEDICINE
Payer: COMMERCIAL

## 2019-07-17 VITALS
HEIGHT: 62 IN | BODY MASS INDEX: 22.08 KG/M2 | OXYGEN SATURATION: 97 % | DIASTOLIC BLOOD PRESSURE: 60 MMHG | HEART RATE: 58 BPM | SYSTOLIC BLOOD PRESSURE: 132 MMHG | RESPIRATION RATE: 16 BRPM | TEMPERATURE: 98.2 F | WEIGHT: 120 LBS

## 2019-07-17 DIAGNOSIS — I10 ESSENTIAL HYPERTENSION: Primary | ICD-10-CM

## 2019-07-17 LAB
ANION GAP SERPL CALCULATED.3IONS-SCNC: 4 MMOL/L (ref 4–13)
ATRIAL RATE: 64 BPM
BACTERIA UR QL AUTO: ABNORMAL /HPF
BASOPHILS # BLD AUTO: 0 THOUSANDS/ΜL (ref 0–0.1)
BASOPHILS NFR BLD AUTO: 0 % (ref 0–2)
BILIRUB UR QL STRIP: NEGATIVE
BUN SERPL-MCNC: 14 MG/DL (ref 7–25)
CALCIUM SERPL-MCNC: 9.3 MG/DL (ref 8.6–10.5)
CHLORIDE SERPL-SCNC: 99 MMOL/L (ref 98–107)
CLARITY UR: CLEAR
CO2 SERPL-SCNC: 31 MMOL/L (ref 21–31)
COLOR UR: YELLOW
CREAT SERPL-MCNC: 0.91 MG/DL (ref 0.6–1.2)
EOSINOPHIL # BLD AUTO: 0 THOUSAND/ΜL (ref 0–0.61)
EOSINOPHIL NFR BLD AUTO: 1 % (ref 0–5)
ERYTHROCYTE [DISTWIDTH] IN BLOOD BY AUTOMATED COUNT: 13.3 % (ref 11.5–14.5)
GFR SERPL CREATININE-BSD FRML MDRD: 58 ML/MIN/1.73SQ M
GLUCOSE SERPL-MCNC: 91 MG/DL (ref 65–99)
GLUCOSE UR STRIP-MCNC: NEGATIVE MG/DL
HCT VFR BLD AUTO: 37.1 % (ref 42–47)
HGB BLD-MCNC: 12.8 G/DL (ref 12–16)
HGB UR QL STRIP.AUTO: ABNORMAL
KETONES UR STRIP-MCNC: NEGATIVE MG/DL
LEUKOCYTE ESTERASE UR QL STRIP: NEGATIVE
LYMPHOCYTES # BLD AUTO: 0.7 THOUSANDS/ΜL (ref 0.6–4.47)
LYMPHOCYTES NFR BLD AUTO: 17 % (ref 21–51)
MCH RBC QN AUTO: 31.9 PG (ref 26–34)
MCHC RBC AUTO-ENTMCNC: 34.5 G/DL (ref 31–37)
MCV RBC AUTO: 93 FL (ref 81–99)
MONOCYTES # BLD AUTO: 0.4 THOUSAND/ΜL (ref 0.17–1.22)
MONOCYTES NFR BLD AUTO: 10 % (ref 2–12)
NEUTROPHILS # BLD AUTO: 2.7 THOUSANDS/ΜL (ref 1.4–6.5)
NEUTS SEG NFR BLD AUTO: 72 % (ref 42–75)
NITRITE UR QL STRIP: NEGATIVE
NON-SQ EPI CELLS URNS QL MICRO: ABNORMAL /HPF
P AXIS: 53 DEGREES
PH UR STRIP.AUTO: 6.5 [PH]
PLATELET # BLD AUTO: 188 THOUSANDS/UL (ref 149–390)
PMV BLD AUTO: 6.7 FL (ref 8.6–11.7)
POTASSIUM SERPL-SCNC: 4 MMOL/L (ref 3.5–5.5)
PR INTERVAL: 228 MS
PROT UR STRIP-MCNC: NEGATIVE MG/DL
QRS AXIS: 87 DEGREES
QRSD INTERVAL: 100 MS
QT INTERVAL: 412 MS
QTC INTERVAL: 425 MS
RBC # BLD AUTO: 4.01 MILLION/UL (ref 3.9–5.2)
RBC #/AREA URNS AUTO: ABNORMAL /HPF
SODIUM SERPL-SCNC: 134 MMOL/L (ref 134–143)
SP GR UR STRIP.AUTO: 1.01 (ref 1–1.03)
T WAVE AXIS: 78 DEGREES
UROBILINOGEN UR QL STRIP.AUTO: 0.2 E.U./DL
VENTRICULAR RATE: 64 BPM
WBC # BLD AUTO: 3.8 THOUSAND/UL (ref 4.8–10.8)
WBC #/AREA URNS AUTO: ABNORMAL /HPF

## 2019-07-17 PROCEDURE — 81003 URINALYSIS AUTO W/O SCOPE: CPT | Performed by: FAMILY MEDICINE

## 2019-07-17 PROCEDURE — 93010 ELECTROCARDIOGRAM REPORT: CPT | Performed by: INTERNAL MEDICINE

## 2019-07-17 PROCEDURE — 80048 BASIC METABOLIC PNL TOTAL CA: CPT | Performed by: FAMILY MEDICINE

## 2019-07-17 PROCEDURE — 99283 EMERGENCY DEPT VISIT LOW MDM: CPT

## 2019-07-17 PROCEDURE — 36415 COLL VENOUS BLD VENIPUNCTURE: CPT | Performed by: FAMILY MEDICINE

## 2019-07-17 PROCEDURE — 93005 ELECTROCARDIOGRAM TRACING: CPT

## 2019-07-17 PROCEDURE — 85025 COMPLETE CBC W/AUTO DIFF WBC: CPT | Performed by: FAMILY MEDICINE

## 2019-07-17 PROCEDURE — 81001 URINALYSIS AUTO W/SCOPE: CPT | Performed by: FAMILY MEDICINE

## 2019-07-17 RX ORDER — CLONIDINE HYDROCHLORIDE 0.1 MG/1
0.1 TABLET ORAL ONCE
Status: COMPLETED | OUTPATIENT
Start: 2019-07-17 | End: 2019-07-17

## 2019-07-17 RX ADMIN — CLONIDINE HYDROCHLORIDE 0.1 MG: 0.1 TABLET ORAL at 10:37

## 2019-07-17 NOTE — ED PROVIDER NOTES
History  Chief Complaint   Patient presents with    Hypertension     sent by pmd for evaluation of hypertension, asymptomatic  History provided by:  Patient   used: No      This is a 61-year-old the female with history of anxiety hypothyroid presented to ED with complain of elevated blood pressure  Patient has been seen in the ED multiple times for similar issue and was told to follow up with her PCP  Patient states that she woke up this morning checked her blood pressure then was 200 over 110  She states she called her PCP who recommended to come to the ED for further evaluation  Patient is currently asymptomatic she denies any headache blurry vision double vision at this time  She denies any chest pain  Patient states she is unsure why her blood pressure was elevated  Prior to Admission Medications   Prescriptions Last Dose Informant Patient Reported? Taking?    ALPRAZolam (XANAX) 0 25 mg tablet  Self Yes No   Sig: Use 1 tab 2x/day as needed for anxiety   FLUoxetine (PROzac) 10 mg capsule  Self Yes No   Sig: Take 10 mg by mouth daily   lansoprazole (PREVACID) 30 mg capsule   Yes No   Sig: Take 30 mg by mouth daily   levothyroxine 125 mcg tablet   Yes No   Sig: Take 60 mcg by mouth daily   levothyroxine 75 mcg tablet  Self Yes No   Sig: tuesday, thursday, saturday, sunday   linaCLOtide (LINZESS) 145 MCG CAPS   Yes No   Sig: Take 145 mcg by mouth daily   losartan (COZAAR) 25 mg tablet  Self Yes No   Sig: Take 25 mg by mouth daily    magnesium citrate solution   No No   Sig: Take 296 mL by mouth once for 1 dose   metoprolol succinate (TOPROL-XL) 25 mg 24 hr tablet  Self Yes No   Sig: Take 1 tab po daily      Facility-Administered Medications: None       Past Medical History:   Diagnosis Date    Anxiety     Depression     Disease of thyroid gland     Diverticulitis     Hypertension        Past Surgical History:   Procedure Laterality Date    TONSILLECTOMY         Family History Problem Relation Age of Onset    Cancer Father     Prostate cancer Father      I have reviewed and agree with the history as documented  Social History     Tobacco Use    Smoking status: Never Smoker    Smokeless tobacco: Never Used   Substance Use Topics    Alcohol use: No    Drug use: No        Review of Systems   Constitutional: Negative  HENT: Negative  Respiratory: Negative  Cardiovascular: Negative  Genitourinary: Negative  Neurological: Negative  Psychiatric/Behavioral: Negative  Physical Exam  Physical Exam   Constitutional: She is oriented to person, place, and time  She appears well-developed and well-nourished  HENT:   Head: Normocephalic and atraumatic  Neck: Normal range of motion  Neck supple  Cardiovascular: Normal rate, regular rhythm and normal heart sounds  Pulmonary/Chest: Effort normal and breath sounds normal    Abdominal: Soft  Bowel sounds are normal    Neurological: She is alert and oriented to person, place, and time  Skin: Skin is warm  Nursing note and vitals reviewed        Vital Signs  ED Triage Vitals [07/17/19 1011]   Temperature Pulse Respirations Blood Pressure SpO2   98 2 °F (36 8 °C) 87 16 (!) 222/110 98 %      Temp Source Heart Rate Source Patient Position - Orthostatic VS BP Location FiO2 (%)   Temporal Monitor Sitting Left arm --      Pain Score       No Pain           Vitals:    07/17/19 1011 07/17/19 1147   BP: (!) 222/110 132/60   Pulse: 87 58   Patient Position - Orthostatic VS: Sitting          Visual Acuity      ED Medications  Medications   cloNIDine (CATAPRES) tablet 0 1 mg (0 1 mg Oral Given 7/17/19 1037)       Diagnostic Studies  Results Reviewed     Procedure Component Value Units Date/Time    Basic metabolic panel [[id number]] Collected:  07/17/19 1057    Lab Status:  Final result Specimen:  Blood from Arm, Right Updated:  07/17/19 1138     Sodium 134 mmol/L      Potassium 4 0 mmol/L      Chloride 99 mmol/L      CO2 31 mmol/L      ANION GAP 4 mmol/L      BUN 14 mg/dL      Creatinine 0 91 mg/dL      Glucose 91 mg/dL      Calcium 9 3 mg/dL      eGFR 58 ml/min/1 73sq m     Narrative:       [place] guidelines for Chronic Kidney Disease (CKD):     Stage 1 with normal or high GFR (GFR > 90 mL/min/1 73 square meters)    Stage 2 Mild CKD (GFR = 60-89 mL/min/1 73 square meters)    Stage 3A Moderate CKD (GFR = 45-59 mL/min/1 73 square meters)    Stage 3B Moderate CKD (GFR = 30-44 mL/min/1 73 square meters)    Stage 4 Severe CKD (GFR = 15-29 mL/min/1 73 square meters)    Stage 5 End Stage CKD (GFR <15 mL/min/1 73 square meters)  Note: GFR calculation is accurate only with a steady state creatinine    UA w Reflex to Microscopic w Reflex to Culture [[id number]]  (Abnormal) Collected:  07/17/19 1127    Lab Status:  Final result Specimen:  Urine, Clean Catch Updated:  07/17/19 1134     Color, UA Yellow     Clarity, UA Clear     Specific Gravity, UA 1 010     pH, UA 6 5     Leukocytes, UA Negative     Nitrite, UA Negative     Protein, UA Negative mg/dl      Glucose, UA Negative mg/dl      Ketones, UA Negative mg/dl      Urobilinogen, UA 0 2 E U /dl      Bilirubin, UA Negative     Blood, UA 1+    Urine Microscopic [[id number]] Collected:  07/17/19 1127    Lab Status:   In process Specimen:  Urine, Clean Catch Updated:  07/17/19 1131    CBC and differential [[id number]]  (Abnormal) Collected:  07/17/19 1057    Lab Status:  Final result Specimen:  Blood from Arm, Right Updated:  07/17/19 1109     WBC 3 80 Thousand/uL      RBC 4 01 Million/uL      Hemoglobin 12 8 g/dL      Hematocrit 37 1 %      MCV 93 fL      MCH 31 9 pg      MCHC 34 5 g/dL      RDW 13 3 %      MPV 6 7 fL      Platelets 959 Thousands/uL      Neutrophils Relative 72 %      Lymphocytes Relative 17 %      Monocytes Relative 10 %      Eosinophils Relative 1 %      Basophils Relative 0 %      Neutrophils Absolute 2 70 Thousands/µL      Lymphocytes Absolute 0 70 Thousands/µL      Monocytes Absolute 0 40 Thousand/µL      Eosinophils Absolute 0 00 Thousand/µL      Basophils Absolute 0 00 Thousands/µL                  No orders to display              Procedures  Procedures       ED Course  ED Course as of Jul 17 1154   Wed Jul 17, 2019   1152 Patient is awake alert oriented x3 deny any headache at this time  I recommend the patient follow up with her PCP for further evaluation and treatment she may need to start her on medication for blood pressure  Patient agrees  blood pressure has improved to 130 2/60 patient is asymptomatic will discharge home  MDM    Disposition  Final diagnoses:   Essential hypertension     Time reflects when diagnosis was documented in both MDM as applicable and the Disposition within this note     Time User Action Codes Description Comment    7/17/2019 11:53 AM Aleksandra Hogue Add [I10] Essential hypertension       ED Disposition     ED Disposition Condition Date/Time Comment    Discharge Stable Wed Jul 17, 2019 11:53 AM Shelley Chávez discharge to home/self care  Follow-up Information     Follow up With Specialties Details Why Contact Info    pcp  Schedule an appointment as soon as possible for a visit in 2 days If symptoms worsen           Patient's Medications   Discharge Prescriptions    No medications on file     No discharge procedures on file      ED Provider  Electronically Signed by           Miriam Shrestha MD  07/17/19 6303

## 2019-07-26 ENCOUNTER — HOSPITAL ENCOUNTER (EMERGENCY)
Facility: HOSPITAL | Age: 84
Discharge: HOME | End: 2019-07-26
Attending: EMERGENCY MEDICINE
Payer: COMMERCIAL

## 2019-07-26 ENCOUNTER — OFFICE VISIT (OUTPATIENT)
Dept: CARDIOLOGY CLINIC | Facility: CLINIC | Age: 84
End: 2019-07-26
Payer: COMMERCIAL

## 2019-07-26 VITALS
BODY MASS INDEX: 21.9 KG/M2 | SYSTOLIC BLOOD PRESSURE: 112 MMHG | HEIGHT: 62 IN | WEIGHT: 119 LBS | HEART RATE: 68 BPM | DIASTOLIC BLOOD PRESSURE: 60 MMHG

## 2019-07-26 VITALS
DIASTOLIC BLOOD PRESSURE: 53 MMHG | BODY MASS INDEX: 22.07 KG/M2 | TEMPERATURE: 97.7 F | OXYGEN SATURATION: 97 % | RESPIRATION RATE: 18 BRPM | HEIGHT: 62 IN | WEIGHT: 119.93 LBS | HEART RATE: 59 BPM | SYSTOLIC BLOOD PRESSURE: 116 MMHG

## 2019-07-26 DIAGNOSIS — I10 HYPERTENSION: Primary | ICD-10-CM

## 2019-07-26 LAB
ANION GAP SERPL CALCULATED.3IONS-SCNC: 8 MMOL/L (ref 4–13)
BASOPHILS # BLD AUTO: 0 THOUSANDS/ΜL (ref 0–0.1)
BASOPHILS NFR BLD AUTO: 1 % (ref 0–2)
BUN SERPL-MCNC: 11 MG/DL (ref 7–25)
CALCIUM SERPL-MCNC: 9.9 MG/DL (ref 8.6–10.5)
CHLORIDE SERPL-SCNC: 99 MMOL/L (ref 98–107)
CO2 SERPL-SCNC: 26 MMOL/L (ref 21–31)
CREAT SERPL-MCNC: 0.84 MG/DL (ref 0.6–1.2)
EOSINOPHIL # BLD AUTO: 0.1 THOUSAND/ΜL (ref 0–0.61)
EOSINOPHIL NFR BLD AUTO: 1 % (ref 0–5)
ERYTHROCYTE [DISTWIDTH] IN BLOOD BY AUTOMATED COUNT: 13.4 % (ref 11.5–14.5)
GFR SERPL CREATININE-BSD FRML MDRD: 64 ML/MIN/1.73SQ M
GLUCOSE SERPL-MCNC: 95 MG/DL (ref 65–99)
HCT VFR BLD AUTO: 37.1 % (ref 42–47)
HGB BLD-MCNC: 13 G/DL (ref 12–16)
LYMPHOCYTES # BLD AUTO: 0.9 THOUSANDS/ΜL (ref 0.6–4.47)
LYMPHOCYTES NFR BLD AUTO: 19 % (ref 21–51)
MCH RBC QN AUTO: 32.2 PG (ref 26–34)
MCHC RBC AUTO-ENTMCNC: 35 G/DL (ref 31–37)
MCV RBC AUTO: 92 FL (ref 81–99)
MONOCYTES # BLD AUTO: 0.5 THOUSAND/ΜL (ref 0.17–1.22)
MONOCYTES NFR BLD AUTO: 11 % (ref 2–12)
NEUTROPHILS # BLD AUTO: 3.1 THOUSANDS/ΜL (ref 1.4–6.5)
NEUTS SEG NFR BLD AUTO: 68 % (ref 42–75)
PLATELET # BLD AUTO: 189 THOUSANDS/UL (ref 149–390)
PMV BLD AUTO: 6.9 FL (ref 8.6–11.7)
POTASSIUM SERPL-SCNC: 3.9 MMOL/L (ref 3.5–5.5)
RBC # BLD AUTO: 4.02 MILLION/UL (ref 3.9–5.2)
SODIUM SERPL-SCNC: 133 MMOL/L (ref 134–143)
TSH SERPL DL<=0.05 MIU/L-ACNC: 2.75 UIU/ML (ref 0.45–5.33)
WBC # BLD AUTO: 4.6 THOUSAND/UL (ref 4.8–10.8)

## 2019-07-26 PROCEDURE — 84443 ASSAY THYROID STIM HORMONE: CPT | Performed by: EMERGENCY MEDICINE

## 2019-07-26 PROCEDURE — 85025 COMPLETE CBC W/AUTO DIFF WBC: CPT | Performed by: EMERGENCY MEDICINE

## 2019-07-26 PROCEDURE — 3074F SYST BP LT 130 MM HG: CPT | Performed by: INTERNAL MEDICINE

## 2019-07-26 PROCEDURE — 80048 BASIC METABOLIC PNL TOTAL CA: CPT | Performed by: EMERGENCY MEDICINE

## 2019-07-26 PROCEDURE — 99203 OFFICE O/P NEW LOW 30 MIN: CPT | Performed by: INTERNAL MEDICINE

## 2019-07-26 PROCEDURE — 99283 EMERGENCY DEPT VISIT LOW MDM: CPT

## 2019-07-26 PROCEDURE — 36415 COLL VENOUS BLD VENIPUNCTURE: CPT | Performed by: EMERGENCY MEDICINE

## 2019-07-26 RX ORDER — ASPIRIN 81 MG/1
81 TABLET, CHEWABLE ORAL DAILY
COMMUNITY
Start: 2015-08-10

## 2019-07-26 RX ORDER — METOPROLOL SUCCINATE 25 MG/1
25 TABLET, EXTENDED RELEASE ORAL EVERY 12 HOURS
Qty: 60 TABLET | Refills: 0 | Status: SHIPPED | OUTPATIENT
Start: 2019-07-26 | End: 2019-11-04 | Stop reason: SDUPTHER

## 2019-07-26 RX ORDER — CLONIDINE HYDROCHLORIDE 0.1 MG/1
0.2 TABLET ORAL ONCE
Status: COMPLETED | OUTPATIENT
Start: 2019-07-26 | End: 2019-07-26

## 2019-07-26 RX ADMIN — CLONIDINE HYDROCHLORIDE 0.2 MG: 0.1 TABLET ORAL at 03:45

## 2019-07-26 NOTE — PATIENT INSTRUCTIONS
DASH Eating Plan   AMBULATORY CARE:   The DASH (Dietary Approaches to Stop Hypertension) Eating Plan  is designed to help prevent or lower high blood pressure  It can also help to lower LDL (bad) cholesterol and decrease your risk for heart disease  The plan is low in sodium, sugar, unhealthy fats, and total fat  It is high in potassium, calcium, magnesium, and fiber  These nutrients are added when you eat more fruits, vegetables, and whole grains  Your sodium limit each day: Your dietitian will tell you how much sodium is safe for you to have each day  People with high blood pressure should have no more than 1,500 to 2,300 mg of sodium in a day  A teaspoon (tsp) of salt has 2,300 mg of sodium  This may seem like a difficult goal, but small changes to the foods you eat can make a big difference  Your healthcare provider or dietitian can help you create a meal plan that follows your sodium limit  How to limit sodium:   · Read food labels  Food labels can help you choose foods that are low in sodium  The amount of sodium is listed in milligrams (mg)  The % Daily Value (DV) column tells you how much of your daily needs are met by 1 serving of the food for each nutrient listed  Choose foods that have less than 5% of the DV of sodium  These foods are considered low in sodium  Foods that have 20% or more of the DV of sodium are considered high in sodium  Avoid foods that have more than 300 mg of sodium in each serving  Choose foods that say low-sodium, reduced-sodium, or no salt added on the food label  · Avoid salt  Do not salt food at the table, and add very little salt to foods during cooking  Use herbs and spices, such as onions, garlic, and salt-free seasonings to add flavor to foods  Try lemon or lime juice or vinegar to give foods a tart flavor  Use hot peppers or a small amount of hot pepper sauce to add a spicy flavor to foods  · Ask about salt substitutes    Ask your healthcare provider if you may use salt substitutes  Some salt substitutes have ingredients that can be harmful if you have certain health conditions  · Choose foods carefully at restaurants  Meals from restaurants, especially fast food restaurants, are often high in sodium  Some restaurants have nutrition information that tells you the amount of sodium in their foods  Ask to have your food prepared with less, or no salt  What you need to know about fats:   · Include healthy fats  Examples are unsaturated fats and omega-3 fatty acids  Unsaturated fats are found in soybean, canola, olive, or sunflower oil, and liquid and soft tub margarines  Omega-3 fatty acids are found in fatty fish, such as salmon, tuna, mackerel, and sardines  It is also found in flaxseed oil and ground flaxseed  · Avoid unhealthy fats  Do not eat unhealthy fats, such as saturated fats and trans fats  Saturated fats are found in foods that contain fat from animals  Examples are fatty meats, whole milk, butter, cream, and other dairy foods  It is also found in shortening, stick margarine, palm oil, and coconut oil  Trans fats are found in fried foods, crackers, chips, and baked goods made with margarine or shortening  Foods to include: With the DASH eating plan, you need to eat a certain number of servings from each food group  This will help you get enough of certain nutrients and limit others  The amount of servings you should eat depends on how many calories you need  Your dietitian can tell you how many calories you need  The number of servings listed next to the food groups below are for people who need about 2,000 calories each day    · Grains:  6 to 8 servings (3 of these servings should be whole-grain foods)    ¨ 1 slice of whole-grain bread     ¨ 1 ounce of dry cereal    ¨ ½ cup of cooked cereal, pasta, or brown rice    · Vegetables and fruits:  4 to 5 servings of fruits and 4 to 5 servings of vegetables    ¨ 1 medium fruit    ¨ ½ cup of frozen, canned (no added salt), or chopped fresh vegetables     ¨ ½ cup of fresh, frozen, dried, or canned fruit (canned in light syrup or fruit juice)    ¨ ½ cup of vegetable or fruit juice    · Dairy:  2 to 3 servings    ¨ 1 cup of nonfat (skim) or 1% milk    ¨ 1½ ounces of fat-free or low-fat cheese    ¨ 6 ounces of nonfat or low-fat yogurt    · Lean meat, poultry, and fish:  6 ounces or less    [employer] (chicken, turkey) with no skin    ¨ Fish (especially fatty fish, such as salmon, fresh tuna, or mackerel)    ¨ Lean beef and pork (loin, round, extra lean hamburger)    ¨ Egg whites and egg substitutes    · Nuts, seeds, and legumes:  4 to 5 servings each week    ¨ ½ cup of cooked beans and peas    ¨ 1½ ounces of unsalted nuts    ¨ 2 tablespoons of peanut butter or seeds    · Sweets and added sugars:  5 or less each week    ¨ 1 tablespoon of sugar, jelly, or jam    ¨ ½ cup of sorbet or gelatin    ¨ 1 cup of lemonade    · Fats:  2 to 3 servings each week    ¨ 1 teaspoon of soft margarine or vegetable oil    ¨ 1 tablespoon of mayonnaise    ¨ 2 tablespoons of salad dressing  Foods to avoid:   · Grains:      [employer], such as doughnuts, pastries, cookies, and biscuits (high in fat and sugar)    ¨ Mixes for cornbread and biscuits, packaged foods, such as bread stuffing, rice and pasta mixes, macaroni and cheese, and instant cereals (high in sodium)    · Fruits and vegetables:      ¨ Regular, canned vegetables (high in sodium)    ¨ Sauerkraut, pickled vegetables, and other foods prepared in brine (high in sodium)    ¨ Fried vegetables or vegetables in butter or high-fat sauces    ¨ Fruit in cream or butter sauce (high in fat)    · Dairy:      ¨ Whole milk, 2% milk, and cream (high in fat)    ¨ Regular cheese and processed cheese (high in fat and sodium)    · Meats and protein foods:      ¨ Smoked or cured meat, such as corned beef, ordaz, ham, hot dogs, and sausage (high in fat and sodium)    ¨ Canned beans and canned meats or spreads, such as potted meats, sardines, anchovies, and imitation seafood (high in sodium)    ¨ Deli or lunch meats, such as bologna, ham, turkey, and roast beef (high in sodium)    ¨ High-fat meat (T-bone steak, regular hamburger, and ribs)    ¨ Whole eggs and egg yolks (high in fat)    · Other:      ¨ Seasonings made with salt, such as garlic salt, celery salt, onion salt, seasoned salt, meat tenderizers, and monosodium glutamate (MSG)    ¨ Miso soup and canned or dried soup mixes (high in sodium)    ¨ Regular soy sauce, barbecue sauce, teriyaki sauce, steak sauce, Worcestershire sauce, and most flavored vinegars (high in sodium)    ¨ Regular condiments, such as mustard, ketchup, and salad dressings (high in sodium)    ¨ Gravy and sauces, such as Jose or cheese sauces (high in sodium and fat)    ¨ Drinks high in sugar, such as soda or fruit drinks    [employer] foods, such as salted chips, popcorn, pretzels, pork rinds, salted crackers, and salted nuts    ¨ Frozen foods, such as dinners, entrees, vegetables with sauces, and breaded meats (high in sodium)  Other guidelines to follow:   · Maintain a healthy weight  Your risk for heart disease is higher if you are overweight  Your healthcare provider may suggest that you lose weight if you are overweight  You can lose weight by eating fewer calories and foods that have added sugars and fat  The DASH meal plan can help you do this  Decrease calories by eating smaller portions at each meal and fewer snacks  Ask your healthcare provider for more information about how to lose weight  · Exercise regularly  Regular exercise can help you reach or maintain a healthy weight  Regular exercise can also help decrease your blood pressure and improve your cholesterol levels  Get 30 minutes or more of moderate exercise each day of the week  To lose weight, get at least 60 minutes of exercise  Talk to your healthcare provider about the best exercise program for you      · Limit alcohol  Women should limit alcohol to 1 drink a day  Men should limit alcohol to 2 drinks a day  A drink of alcohol is 12 ounces of beer, 5 ounces of wine, or 1½ ounces of liquor  © 2017 [address] Information is for End User's use only and may not be sold, redistributed or otherwise used for commercial purposes  All illustrations and images included in CareNotes® are the copyrighted property of [employer] , [employer]  or Jitendra Love  The above information is an  only  It is not intended as medical advice for individual conditions or treatments  Talk to your doctor, nurse or pharmacist before following any medical regimen to see if it is safe and effective for you

## 2019-07-26 NOTE — ED PROVIDER NOTES
History  Chief Complaint   Patient presents with    High Blood Pressure     80YEAR-OLD FEMALE WITH A HISTORY OF HYPERTENSION PRESENTS TO THE EMERGENCY DEPARTMENT WITH ELEVATED BLOOD PRESSURE THAT WAS NOTED AT HOME AFTER THE PATIENT SINCE HER PRESSURE WAS UP  SHE IS EXPERIENCING A SENSE OF WARMTH IN HER FACE AND HER HEAD AND THIS LETTER IS BELIEVE THAT HER PRESSURE WAS ELEVATED AND INDEED IT WAS BY HOME MONITORING TO THE EXTENT OF SYSTOLIC OVER 708  PATIENT AND NO CHEST PAIN ARE SHORTNESS OF BREATH  NO NAUSEA VOMITING OR DIARRHEA  SHE DENIES ANY COUGH SORE THROAT OR RUNNY NOSE SHE DOES TAKE BLOOD PRESSURE MEDICATIONS BUT HISTORICALLY HAS HAD DIFFICULTY CONTROLLING HER BLOOD PRESSURE AND HAS BEEN ON A VARIETY OF AGENTS FOR BLOOD PRESSURE  Prior to Admission Medications   Prescriptions Last Dose Informant Patient Reported? Taking?    ALPRAZolam (XANAX) 0 25 mg tablet  Self Yes No   Sig: Use 1 tab 2x/day as needed for anxiety   FLUoxetine (PROzac) 10 mg capsule  Self Yes No   Sig: Take 10 mg by mouth daily   lansoprazole (PREVACID) 30 mg capsule   Yes No   Sig: Take 30 mg by mouth daily   levothyroxine 125 mcg tablet   Yes No   Sig: Take 60 mcg by mouth daily   levothyroxine 75 mcg tablet  Self Yes No   Sig: tuesday, thursday, saturday, sunday   linaCLOtide (LINZESS) 145 MCG CAPS   Yes No   Sig: Take 145 mcg by mouth daily   losartan (COZAAR) 25 mg tablet 7/25/2019 at 2100 Self Yes Yes   Sig: Take 50 mg by mouth daily    magnesium citrate solution   No No   Sig: Take 296 mL by mouth once for 1 dose   metoprolol succinate (TOPROL-XL) 25 mg 24 hr tablet 7/25/2019 Self Yes Yes   Sig: Take 1 tab po daily      Facility-Administered Medications: None       Past Medical History:   Diagnosis Date    Anxiety     Depression     Disease of thyroid gland     Diverticulitis     Hypertension        Past Surgical History:   Procedure Laterality Date    TONSILLECTOMY         Family History   Problem Relation Age of Onset    Cancer Father     Prostate cancer Father      I have reviewed and agree with the history as documented  Social History     Tobacco Use    Smoking status: Never Smoker    Smokeless tobacco: Never Used   Substance Use Topics    Alcohol use: No    Drug use: No        Review of Systems   Constitutional: Positive for chills and fatigue  Negative for fever  HENT: Negative for ear pain, rhinorrhea and sore throat  Eyes: Negative for pain, redness and visual disturbance  Respiratory: Positive for cough and shortness of breath  Cardiovascular: Positive for chest pain  Negative for leg swelling  Gastrointestinal: Negative for abdominal pain, diarrhea, nausea and vomiting  Endocrine: Positive for cold intolerance and heat intolerance  Genitourinary: Negative for dysuria, flank pain, frequency and urgency  Musculoskeletal: Negative for back pain, myalgias and neck pain  Skin: Negative for rash  Neurological: Positive for headaches  Negative for dizziness, weakness and light-headedness  Hematological: Negative  Psychiatric/Behavioral: Negative for agitation, confusion and suicidal ideas  The patient is not nervous/anxious  All other systems reviewed and are negative  Physical Exam  Physical Exam   Constitutional: She is oriented to person, place, and time  She appears well-developed and well-nourished  HENT:   Nose: Nose normal    Mouth/Throat: Oropharynx is clear and moist  No oropharyngeal exudate  Eyes: Pupils are equal, round, and reactive to light  Conjunctivae and EOM are normal  No scleral icterus  Neck: Normal range of motion  Neck supple  No JVD present  No tracheal deviation present  Cardiovascular: Normal rate, regular rhythm and normal heart sounds  No murmur heard  Pulmonary/Chest: Effort normal and breath sounds normal  No respiratory distress  She has no wheezes  She has no rales  Abdominal: Soft  Bowel sounds are normal  There is no tenderness  There is no guarding  Musculoskeletal: Normal range of motion  She exhibits no edema or tenderness  Neurological: She is alert and oriented to person, place, and time  No cranial nerve deficit or sensory deficit  She exhibits normal muscle tone  5/5 motor, nl sens   Skin: Skin is warm and dry  Psychiatric: She has a normal mood and affect  Her behavior is normal    Nursing note and vitals reviewed  Vital Signs  ED Triage Vitals [07/26/19 0329]   Temperature Pulse Respirations Blood Pressure SpO2   97 7 °F (36 5 °C) 74 18 (!) 215/102 97 %      Temp Source Heart Rate Source Patient Position - Orthostatic VS BP Location FiO2 (%)   Temporal Monitor Sitting Right arm --      Pain Score       No Pain           Vitals:    07/26/19 0329   BP: (!) 215/102   Pulse: 74   Patient Position - Orthostatic VS: Sitting         Visual Acuity      ED Medications  Medications   cloNIDine (CATAPRES) tablet 0 2 mg (0 2 mg Oral Given 7/26/19 2205)       Diagnostic Studies  Results Reviewed     None                 No orders to display              Procedures  Procedures       ED Course                               MDM    Disposition  Final diagnoses:   None     ED Disposition     None      Follow-up Information    None         Patient's Medications   Discharge Prescriptions    No medications on file     No discharge procedures on file      ED Provider  Electronically Signed by           Conor Rivers MD  07/26/19 9807

## 2019-07-26 NOTE — ED NOTES
Patient's daughter called the ED concerned that her mother was not admitted to the hospital   Discussed follow up care with PCP for evaluation of blood pressure  Any concerns she can call again or bring patient to the ED        Thien Mcneil RN  07/26/19 9688

## 2019-07-26 NOTE — PROGRESS NOTES
[facility] [address] [place] [address] [phone]                                              Cardiology Office Consult / Follow Up  Christin Cabrera, 80 y o  female  YOB: 1935  MRN: [id number] Encounter: [id number]      PCP - Ely Rasheed MD    Assessment and Plan  1  Hypertension  2  Hypothyroidism  3  H/o diverticulitis  4  Anxiety    Plan  Hypertension  · Had been on stable dose of losartan 25 + metoprolol XL 25 for many year,s but then recently has had uncontrolled BP  · Losartan was recently increased from 25-50 by her PCP in but despite that her blood pressures continued to be elevated, particularly at night  · That those be some amount of ANCA anxiety component as well as been contributing to her elevated blood pressures  · She received her clonidine 0 2 mg today and the ED, after which her blood pressure dropped significantly and she became lightheaded  She has been orthostatic over the past few hours after getting that clonidine  · I personally checked her blood pressure it was 112/60 with sitting, and decreased to 90/60 on standing up  She did became related standing up  · This is likely postural hypertension related to the clonidine and should wear off once the effect of clonidine reverse of of    · Considering her recurrent severe increases in blood pressure, and evidence of LVH on ECG suggesting extended periods of hypertension, we will uptitrate her BP meds   · she feels that the metoprolol seems to control her blood pressure, but the losartan does not to much to her blood pressure  · We will increase her metoprolol XL to 25 b i d , , and this will started from tomorrow after her lightheadedness has improved and the clonidine is completely worn off  · Low salt diet advised    Follow up with me in office on Tuesday for repeat BP check and further changes to BP meds    History of Present Illness   80-year-old female comes in as a new patient for evaluation of hypertension  She has longstanding history of hypertension, and has been losartan and metoprolol for many years  Recently her blood pressure has been intermittently very elevated to 777-527 systolic  As a result, her losartan was increased from 25-50 mg weeks ago  But despite that her blood pressure seems to have remained elevated intermittently  Per patient, her blood pressure is usually well controlled during the day in the range of 130s  But her blood pressure is usually elevated in the middle of the night  A couple of days ago around 2:00 a m  She woke up and had a blood pressure of 210  She was recently and diagnosed and treated for diverticulitis  Since then she has continued to have some abdominal discomfort  She was scheduled to undergo an EGD for the same  Mom but when she presented for the same, she was found to have a blood pressure of 995 systolic, and as a result this was canceled  Next a again she went to the ED with elevated blood pressures  She was given a dose of clonidine 0 2 mg, and discharged home with follow-up with Cardiology  Since and getting the clonidine 0 2 mg, she has been feeling lightheaded and dizzy, and particularly with standing up  Historical Information   Past Medical History:   Diagnosis Date    Anxiety     CAD (coronary artery disease)     Carotid Duplex 03/06/2018    Plaque formation was prominent bilaterally    Depression     Disease of thyroid gland     Diverticulitis     Dyslipidemia     ECHO 09/14/2017    EF 55%, mild mitral regurg, small pericardial effusion      Hypertension     Mitral regurgitation     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 infarction)     Takotsubo cardiomyopathy      Past Surgical History:   Procedure Laterality Date    CARDIAC CATHETERIZATION  08/27/2012    EF 35%, 70% stenosis of diagonal, Otherwise all OK apart from myopathy    CARDIAC CATHETERIZATION  10/10/2012    EF 55%, no significant CAD   Mild stress induced cardiomyopathy    TONSILLECTOMY       Family History   Problem Relation Age of Onset    Cancer Father     Prostate cancer Father      Current Outpatient Medications on File Prior to Visit   Medication Sig Dispense Refill    ALPRAZolam (XANAX) 0 25 mg tablet Use 1 tab 2x/day as needed for anxiety      lansoprazole (PREVACID) 30 mg capsule Take 30 mg by mouth daily      levothyroxine 125 mcg tablet Take 60 mcg by mouth daily      levothyroxine 75 mcg tablet tuesday, thursday, saturday, sunday      linaCLOtide (LINZESS) 145 MCG CAPS Take 145 mcg by mouth daily      losartan (COZAAR) 50 mg tablet Take 50 mg by mouth daily       magnesium citrate solution Take 296 mL by mouth once for 1 dose 296 mL 0    [DISCONTINUED] metoprolol succinate (TOPROL-XL) 25 mg 24 hr tablet Take 1 tab po daily      [DISCONTINUED] FLUoxetine (PROzac) 10 mg capsule Take 10 mg by mouth daily       Current Facility-Administered Medications on File Prior to Visit   Medication Dose Route Frequency Provider Last Rate Last Dose    [COMPLETED] cloNIDine (CATAPRES) tablet 0 2 mg  0 2 mg Oral Once Gladis Treviño MD   0 2 mg at 07/26/19 0345     Allergies   Allergen Reactions    No Known Allergies      Social History     Socioeconomic History    Marital status:       Spouse name: Not on file    Number of children: Not on file    Years of education: Not on file    Highest education level: Not on file   Occupational History    Not on file   Social Needs    Financial resource strain: Not on file    Food insecurity:     Worry: Not on file     Inability: Not on file    Transportation needs:     Medical: Not on file     Non-medical: Not on file   Tobacco Use    Smoking status: Never Smoker    Smokeless tobacco: Never Used   Substance and Sexual Activity    Alcohol use: No    Drug use: No    Sexual activity: Never   Lifestyle    Physical activity:     Days per week: Not on file     Minutes per session: Not on file    Stress: Not on file   Relationships    Social connections:     Talks on phone: Not on file     Gets together: Not on file     Attends [employer] service: Not on file     Active member of club or organization: Not on file     Attends meetings of clubs or organizations: Not on file     Relationship status: Not on file    Intimate partner violence:     Fear of current or ex partner: Not on file     Emotionally abused: Not on file     Physically abused: Not on file     Forced sexual activity: Not on file   Other Topics Concern    Not on file   Social History Narrative    Not on file        Review of Systems  No c/o chest pain, No c/o palpitations, No c/o shortness of breath, c/o dizziness or light-headedness since taking clonidine  No c/o nausea, fever/ c/o lower abdominal pain+  All other systems negative, except as noted in HPI    Vitals:  Vitals:    07/26/19 1410 07/26/19 1514   BP: 102/64 112/60   BP Location:  Left arm   Patient Position:  Sitting   Pulse: 68    Weight: 54 kg (119 lb)    Height: 5' 2" (1 575 m)      BMI - Body mass index is 21 77 kg/m²  Wt Readings from Last 7 Encounters:   07/26/19 54 kg (119 lb)   07/26/19 54 4 kg (119 lb 14 9 oz)   07/17/19 54 4 kg (120 lb)   07/11/19 54 4 kg (120 lb)   06/22/19 54 4 kg (120 lb)   06/09/19 55 3 kg (122 lb)   06/02/19 55 3 kg (121 lb 14 6 oz)       Physical Exam  Vitals reviewed  Nursing note & chart reviewed  Constitutional:  Licha Sebastian appears frail, pleasant and cooperative  Alert and oriented x 3  No acute distress   HEENT:    Normocephalic, atraumatic   Neck:  Supple, no JVD, negative AJR   Lungs:  Respirations unlabored, clear to auscultation bilaterally, no rales, no wheezing  Chest Wall:  No tenderness, no pertinent deformity   Heart[de-identified]  Regular rate, Regular rhythm, S1 and S2 normal, no murmurs, no rubs, no gallops   Abdomen:  Soft, non-tender, non-distended   Mild lower abdominal tenderness   Neurological:  Awake, alert, oriented x3  Non-focal exam    Extremities:  Trace pedal edema, no calf tenderness       Labs:  CBC:   Lab Results   Component Value Date    WBC 4 60 (L) 07/26/2019    RBC 4 02 07/26/2019    HGB 13 0 07/26/2019    HCT 37 1 (L) 07/26/2019    MCV 92 07/26/2019     07/26/2019    RDW 13 4 07/26/2019       CMP:   Lab Results   Component Value Date    K 3 9 07/26/2019    CL 99 07/26/2019    CO2 26 07/26/2019    BUN 11 07/26/2019    CREATININE 0 84 07/26/2019    EGFR 64 07/26/2019    CALCIUM 9 9 07/26/2019    AST 18 06/22/2019    ALT 11 06/22/2019    ALKPHOS 51 (L) 06/22/2019       Magnesium:  Lab Results   Component Value Date    MG 2 2 07/11/2019       Lipid Profile:   No results found for: CHOL, HDL, TRIG, LDLCALC    Thyroid Studies:   Lab Results   Component Value Date    [id number] 2 750 07/26/2019       No components found for: [employer] [place]    Lab Results   Component Value Date    INR 1 15 06/09/2019    INR 1 12 06/02/2019   5    Imaging: No results found  Cardiac testing:   No results found for this or any previous visit  No results found for this or any previous visit  No results found for this or any previous visit  No results found for this or any previous visit

## 2019-07-31 ENCOUNTER — OFFICE VISIT (OUTPATIENT)
Dept: CARDIOLOGY CLINIC | Facility: CLINIC | Age: 84
End: 2019-07-31
Payer: COMMERCIAL

## 2019-07-31 VITALS
HEART RATE: 60 BPM | DIASTOLIC BLOOD PRESSURE: 62 MMHG | HEIGHT: 62 IN | BODY MASS INDEX: 21.77 KG/M2 | SYSTOLIC BLOOD PRESSURE: 140 MMHG

## 2019-07-31 DIAGNOSIS — I10 ESSENTIAL HYPERTENSION: Primary | ICD-10-CM

## 2019-07-31 PROCEDURE — 99213 OFFICE O/P EST LOW 20 MIN: CPT | Performed by: INTERNAL MEDICINE

## 2019-07-31 NOTE — PROGRESS NOTES
[facility]  Pradeep Grier, [address], [address] [facility]   [phone]                                              Cardiology Office Follow Up  Dann Anaya, 80 y o  female  YOB: 1935  MRN: [id number] Encounter: [id number]      PCP - Brennen Lucas MD    Assessment and Plan  Assessment and Plan  1  Hypertension  2  Hypothyroidism  3  H/o diverticulitis  4  Anxiety    Plan  Hypertension  · Had been on stable dose of losartan 25 + metoprolol XL 25 for many year,s but then recently has had uncontrolled BP  · Had orthostatic hypotension with clonidine 0 2 mg from hospital ED  · Currently on metoprolol XL 25 bid + Losartan 50 at night  · BP logs show multiple BP readings in 150-170s, and 1 reading in 190s  · Will increase Losartan gradually up  · Start with Losartan 25 in AM with 50 mg in PM for 1 week, and then go up to 50 bid if SBP > 150  · Avoid excessive BP reduction for now  · Goal SBP <150/95  · Low salt diet advised  · Reduce frequency of home BP checks, and she will follow up with psychiatry for her anxiety    Follow up for nursing visit in 2 months for BP check    History of Present Illness   80-year-old female comes in for short term follow up evaluation of hypertension  She has longstanding history of hypertension, and has been losartan and metoprolol for many years  Recently her blood pressure has been intermittently very elevated to 038-959 systolic  As a result, her losartan was increased from 25-50 mg weeks ago  But despite that her blood pressure seems to have remained elevated intermittently  Per patient, her blood pressure is usually well controlled during the day in the range of 130s  But her blood pressure is usually elevated in the middle of the night  A couple of days ago around 2:00 a m  She woke up and had a blood pressure of 210  She was recently and diagnosed and treated for diverticulitis    Since then she has continued to have some abdominal discomfort  She was scheduled to undergo an EGD for the same  Mom but when she presented for the same, she was found to have a blood pressure of 184 systolic, and as a result this was canceled  Next a again she went to the ED with elevated blood pressures  She was given a dose of clonidine 0 2 mg, and discharged home with follow-up with Cardiology  Since and getting the clonidine 0 2 mg, she has been feeling lightheaded and dizzy, and particularly with standing up  Interval History: 7/31/19  Has been doing relatively well  Continues to have a lot of anxiety  She continues to check her BP 2-3 times/day, and has been keeping a log  Her dizziness improved the same day after the effects of clonidine wore off  Historical Information   Past Medical History:   Diagnosis Date    Anxiety     CAD (coronary artery disease)     Carotid Duplex 03/06/2018    Plaque formation was prominent bilaterally    Depression     Disease of thyroid gland     Diverticulitis     Dyslipidemia     ECHO 09/14/2017    EF 55%, mild mitral regurg, small pericardial effusion   Hypertension     Mitral regurgitation     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 infarction)     Takotsubo cardiomyopathy      Past Surgical History:   Procedure Laterality Date    CARDIAC CATHETERIZATION  08/27/2012    EF 35%, 70% stenosis of diagonal, Otherwise all OK apart from myopathy    CARDIAC CATHETERIZATION  10/10/2012    EF 55%, no significant CAD    Mild stress induced cardiomyopathy    TONSILLECTOMY       Family History   Problem Relation Age of Onset    Cancer Father     Prostate cancer Father      Current Outpatient Medications on File Prior to Visit   Medication Sig Dispense Refill    ALPRAZolam (XANAX) 0 25 mg tablet Use 1 tab 2x/day as needed for anxiety      aspirin 81 mg chewable tablet Chew 81 mg daily      lansoprazole (PREVACID) 30 mg capsule Take 30 mg by mouth daily      levothyroxine 125 mcg tablet Take 60 mcg by mouth daily      levothyroxine 75 mcg tablet tuesday, thursday, saturday, sunday      linaCLOtide (LINZESS) 145 MCG CAPS Take 145 mcg by mouth daily      losartan (COZAAR) 50 mg tablet Take 50 mg by mouth daily       metoprolol succinate (TOPROL-XL) 25 mg 24 hr tablet Take 1 tablet (25 mg total) by mouth every 12 (twelve) hours 60 tablet 0    magnesium citrate solution Take 296 mL by mouth once for 1 dose 296 mL 0     No current facility-administered medications on file prior to visit  Allergies   Allergen Reactions    No Known Allergies      Social History     Socioeconomic History    Marital status:       Spouse name: None    Number of children: None    Years of education: None    Highest education level: None   Occupational History    None   Social Needs    Financial resource strain: None    Food insecurity:     Worry: None     Inability: None    Transportation needs:     Medical: None     Non-medical: None   Tobacco Use    Smoking status: Never Smoker    Smokeless tobacco: Never Used   Substance and Sexual Activity    Alcohol use: No    Drug use: No    Sexual activity: Never   Lifestyle    Physical activity:     Days per week: None     Minutes per session: None    Stress: None   Relationships    Social connections:     Talks on phone: None     Gets together: None     Attends Mandaen service: None     Active member of club or organization: None     Attends meetings of clubs or organizations: None     Relationship status: None    Intimate partner violence:     Fear of current or ex partner: None     Emotionally abused: None     Physically abused: None     Forced sexual activity: None   Other Topics Concern    None   Social History Narrative    None        Review of Systems  No c/o chest pain, No c/o palpitations, No c/o shortness of breath, No c/o dizziness or light-headedness   All other systems negative, except as noted in HPI    Vitals:  Vitals:    07/31/19 1550 BP: 140/62   Pulse: 60   Height: 5' 2" (1 575 m)     BMI - Body mass index is 21 77 kg/m²  Wt Readings from Last 7 Encounters:   07/26/19 54 kg (119 lb)   07/26/19 54 4 kg (119 lb 14 9 oz)   07/17/19 54 4 kg (120 lb)   07/11/19 54 4 kg (120 lb)   06/22/19 54 4 kg (120 lb)   06/09/19 55 3 kg (122 lb)   06/02/19 55 3 kg (121 lb 14 6 oz)       Physical Exam  Vitals reviewed, BP logs reviewed  Chart reviewed  Constitutional:  Jeferson Mosqueda appears  well, pleasant and cooperative  Alert and oriented x 3  No acute distress   HEENT:    Normocephalic, atraumatic   Neck:  Supple, no JVD, negative AJR   Lungs:  Respirations unlabored, clear to auscultation bilaterally, no rales, no wheezing  Chest Wall:  No tenderness, no pertinent deformity   Heart[de-identified]  Regular rate, Regular rhythm, S1 and S2 normal, no murmurs, no rubs, no gallops   Abdomen:  Soft, non-tender, non-distended   Neurological:  Awake, alert, oriented x3  Non-focal exam    Extremities:  No pedal edema, no calf tenderness       Labs:  CBC:   Lab Results   Component Value Date    WBC 4 60 (L) 07/26/2019    RBC 4 02 07/26/2019    HGB 13 0 07/26/2019    HCT 37 1 (L) 07/26/2019    MCV 92 07/26/2019     07/26/2019    RDW 13 4 07/26/2019       CMP:   Lab Results   Component Value Date    K 3 9 07/26/2019    CL 99 07/26/2019    CO2 26 07/26/2019    BUN 11 07/26/2019    CREATININE 0 84 07/26/2019    EGFR 64 07/26/2019    CALCIUM 9 9 07/26/2019    AST 18 06/22/2019    ALT 11 06/22/2019    ALKPHOS 51 (L) 06/22/2019       Magnesium:  Lab Results   Component Value Date    MG 2 2 07/11/2019       Lipid Profile:   No results found for: CHOL, HDL, TRIG, LDLCALC    Thyroid Studies:   Lab Results   Component Value Date    COW9HBRAMLUY 2 750 07/26/2019       No components found for: [employer] [place]    Lab Results   Component Value Date    INR 1 15 06/09/2019    INR 1 12 06/02/2019   5    Imaging: No results found      Cardiac testing:   No results found for this or any previous visit  No results found for this or any previous visit  No results found for this or any previous visit  No results found for this or any previous visit

## 2019-09-30 ENCOUNTER — OFFICE VISIT (OUTPATIENT)
Dept: URGENT CARE | Facility: CLINIC | Age: 84
End: 2019-09-30
Payer: COMMERCIAL

## 2019-09-30 ENCOUNTER — CLINICAL SUPPORT (OUTPATIENT)
Dept: CARDIOLOGY CLINIC | Facility: CLINIC | Age: 84
End: 2019-09-30
Payer: COMMERCIAL

## 2019-09-30 VITALS — SYSTOLIC BLOOD PRESSURE: 150 MMHG | HEART RATE: 70 BPM | DIASTOLIC BLOOD PRESSURE: 76 MMHG

## 2019-09-30 VITALS
OXYGEN SATURATION: 97 % | WEIGHT: 119 LBS | TEMPERATURE: 98.2 F | HEART RATE: 68 BPM | RESPIRATION RATE: 18 BRPM | HEIGHT: 62 IN | SYSTOLIC BLOOD PRESSURE: 134 MMHG | BODY MASS INDEX: 21.9 KG/M2 | DIASTOLIC BLOOD PRESSURE: 70 MMHG

## 2019-09-30 DIAGNOSIS — I10 ESSENTIAL HYPERTENSION: Primary | ICD-10-CM

## 2019-09-30 DIAGNOSIS — J02.9 SORE THROAT: ICD-10-CM

## 2019-09-30 DIAGNOSIS — J02.9 ACUTE PHARYNGITIS, UNSPECIFIED ETIOLOGY: Primary | ICD-10-CM

## 2019-09-30 LAB — S PYO AG THROAT QL: NEGATIVE

## 2019-09-30 PROCEDURE — S9083 URGENT CARE CENTER GLOBAL: HCPCS | Performed by: NURSE PRACTITIONER

## 2019-09-30 PROCEDURE — 87070 CULTURE OTHR SPECIMN AEROBIC: CPT | Performed by: NURSE PRACTITIONER

## 2019-09-30 PROCEDURE — 99211 OFF/OP EST MAY X REQ PHY/QHP: CPT | Performed by: INTERNAL MEDICINE

## 2019-09-30 PROCEDURE — 99213 OFFICE O/P EST LOW 20 MIN: CPT | Performed by: NURSE PRACTITIONER

## 2019-09-30 PROCEDURE — 87880 STREP A ASSAY W/OPTIC: CPT | Performed by: NURSE PRACTITIONER

## 2019-09-30 RX ORDER — METHYLPREDNISOLONE 4 MG/1
TABLET ORAL
Qty: 1 EACH | Refills: 0 | Status: SHIPPED | OUTPATIENT
Start: 2019-09-30 | End: 2019-10-07

## 2019-09-30 NOTE — PROGRESS NOTES
[facility]        NAME: Jose Maria Morton is a 80 y o  female  : 1935    MRN: [id number]  DATE: 2019  TIME: 12:46 PM    Assessment and Plan   Acute pharyngitis, unspecified etiology [J02 9]  1  Acute pharyngitis, unspecified etiology  methylPREDNISolone 4 MG tablet therapy pack    Throat culture   2  Sore throat  POCT rapid strepA    methylPREDNISolone 4 MG tablet therapy pack    Throat culture         Patient Instructions     Patient Instructions   Your sore throat appears viral   The rapid strep was negative, and we will send the other swab for a culture  If it is positive, we will call you  Drink warm tea with honey, gargle salt water, drink cool beverages and use over the counter medications for pain  Take the medrol dose pack (steroid) as ordered until completed  This will help decrease some of the pain and swelling in your throat  Pharyngitis, Ambulatory Care   GENERAL INFORMATION:   Pharyngitis  is swelling of the inside of your throat, called the pharynx  Pharyngitis is often caused by a cold or flu virus  It may also be caused by bacteria such as strep  Other causes include smoking, a runny nose, allergies, or acid reflux  Common symptoms include the following:   · Sore throat or pain when you swallow    · Fever, chills, and body aches    · Hoarse or raspy voice    · Cough, runny or stuffy nose, itchy or watery eyes    · Headache    · Upset stomach and loss of appetite    · Mild neck stiffness    · Swollen glands that feel like hard lumps when you touch your neck    · White and yellow pus-filled blisters in the back of your throat  Seek immediate care for the following symptoms:   · A painful lump in your throat that does not go away after 5 days    · Blood in your throat or ear    · Fever higher than 102? F (39?C) or lasts longer than 3 days    · Confusion    · Trouble breathing or swallowing  Treatment for pharyngitis  may include antibiotics if your sore throat is caused by bacteria  Viral pharyngitis will go away on its own without treatment  Your sore throat should start to feel better within 3 to 5 days for both viral and bacterial infections  Pain medicine may also be given to decrease your sore throat pain  Manage your symptoms:   · Gargle with salt water  to help reduce swelling in your throat  Mix ¼ teaspoon salt with 1 cup of warm water and gargle  · Drink more liquids  to help prevent dehydration  Cold or warm drinks may help soothe your throat  · Humidify your room  with a cool-steam humidifier to help moisten the air in your room and calm your cough  · Soothe your throat  with cough drops, ice, soft foods, or popsicles  · Rest your throat as much as possible  Try not to use your voice to decrease throat irritation  Prevent the spread of germs  by washing your hands with soap and warm water  Pharyngitis spreads easily from one person to another  Do not share food or drinks  Follow up with your healthcare provider as directed:  Write down your questions so you remember to ask them during your visits  CARE AGREEMENT:   You have the right to help plan your care  Learn about your health condition and how it may be treated  Discuss treatment options with your caregivers to decide what care you want to receive  You always have the right to refuse treatment  The above information is an  only  It is not intended as medical advice for individual conditions or treatments  Talk to your doctor, nurse or pharmacist before following any medical regimen to see if it is safe and effective for you  © 2014 [address] is for End User's use only and may not be sold, redistributed or otherwise used for commercial purposes  All illustrations and images included in CareNotes® are the copyrighted property of A D A M [employer]  or Jitendra Love  Follow up with PCP in 3-5 days    Proceed to  ER if symptoms worsen  Chief Complaint     Chief Complaint   Patient presents with    Sore Throat     sore throat and headache for 1 day         History of Present Illness       Patient was sore throat and headache x1 day  She states that she had trouble sleeping last night because her throat hurt  No treatments tried  No known sick contacts  Review of Systems   Review of Systems   HENT: Positive for sore throat  Neurological: Positive for headaches  All other systems reviewed and are negative          Current Medications       Current Outpatient Medications:     ALPRAZolam (XANAX) 0 25 mg tablet, Use 1 tab 2x/day as needed for anxiety, Disp: , Rfl:     aspirin 81 mg chewable tablet, Chew 81 mg daily, Disp: , Rfl:     lansoprazole (PREVACID) 30 mg capsule, Take 30 mg by mouth daily, Disp: , Rfl:     levothyroxine 125 mcg tablet, Take 60 mcg by mouth daily, Disp: , Rfl:     levothyroxine 75 mcg tablet, tuesday, thursday, saturday, sunday, Disp: , Rfl:     linaCLOtide (LINZESS) 145 MCG CAPS, Take 145 mcg by mouth daily, Disp: , Rfl:     metoprolol succinate (TOPROL-XL) 25 mg 24 hr tablet, Take 1 tablet (25 mg total) by mouth every 12 (twelve) hours, Disp: 60 tablet, Rfl: 0    losartan (COZAAR) 50 mg tablet, Take 50 mg by mouth daily , Disp: , Rfl:     magnesium citrate solution, Take 296 mL by mouth once for 1 dose, Disp: 296 mL, Rfl: 0    methylPREDNISolone 4 MG tablet therapy pack, Use as directed on package, Disp: 1 each, Rfl: 0    Current Allergies     Allergies as of 09/30/2019 - Reviewed 09/30/2019   Allergen Reaction Noted    No known allergies  11/09/2015            The following portions of the patient's history were reviewed and updated as appropriate: allergies, current medications, past family history, past medical history, past social history, past surgical history and problem list      Past Medical History:   Diagnosis Date    Anxiety     CAD (coronary artery disease)     Carotid Duplex 03/06/2018    Plaque formation was prominent bilaterally    Depression     Disease of thyroid gland     Diverticulitis     Dyslipidemia     ECHO 09/14/2017    EF 55%, mild mitral regurg, small pericardial effusion   Hypertension     Mitral regurgitation     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 infarction)     Takotsubo cardiomyopathy        Past Surgical History:   Procedure Laterality Date    CARDIAC CATHETERIZATION  08/27/2012    EF 35%, 70% stenosis of diagonal, Otherwise all OK apart from myopathy    CARDIAC CATHETERIZATION  10/10/2012    EF 55%, no significant CAD  Mild stress induced cardiomyopathy    TONSILLECTOMY         Family History   Problem Relation Age of Onset    Cancer Father     Prostate cancer Father          Medications have been verified  Objective   /70   Pulse 68   Temp 98 2 °F (36 8 °C) (Tympanic)   Resp 18   Ht 5' 2" (1 575 m)   Wt 54 kg (119 lb)   SpO2 97%   BMI 21 77 kg/m²        Physical Exam     Physical Exam   Constitutional: She is oriented to person, place, and time  She appears well-developed and well-nourished  She is cooperative  Non-toxic appearance  No distress  HENT:   Head: Normocephalic and atraumatic  Right Ear: Tympanic membrane, external ear and ear canal normal  Decreased hearing (chronic) is noted  Left Ear: Tympanic membrane, external ear and ear canal normal  Decreased hearing (chronic) is noted  Nose: Nose normal    Mouth/Throat: Uvula is midline and mucous membranes are normal  Posterior oropharyngeal edema (slight) and posterior oropharyngeal erythema (Mild) present  No oropharyngeal exudate or tonsillar abscesses  Tonsils are 1+ on the right  Tonsils are 1+ on the left  No tonsillar exudate  Neck: Normal range of motion  Neck supple  Cardiovascular: Normal rate, regular rhythm and normal heart sounds  Pulmonary/Chest: Effort normal and breath sounds normal  No accessory muscle usage or stridor   No apnea, no tachypnea and no bradypnea  No respiratory distress  She has no decreased breath sounds  She has no wheezes  She has no rhonchi  She has no rales  Abdominal: Soft  Bowel sounds are normal  She exhibits no distension  There is no tenderness  Lymphadenopathy:     She has no cervical adenopathy  Neurological: She is alert and oriented to person, place, and time  Skin: Skin is warm and dry  Capillary refill takes less than 2 seconds  She is not diaphoretic  Psychiatric: She has a normal mood and affect  Her behavior is normal  Judgment and thought content normal    Nursing note and vitals reviewed

## 2019-09-30 NOTE — PROGRESS NOTES
Patient came in for a BP check today  BP today is 150//76  Right before she came in, BP was taken at [facility] due to allergies, her BP was 134/70  She is feeling "rotten" due to allergies and did not get a good night's rest    She is currently taking Losartan 50 mg bid  MD Tatianna Taylor             Continue same meds  Losartan 50 bid, metoprolol 25 bid  Follow up with me in office in early December 2019, as scheduled

## 2019-10-02 LAB — BACTERIA THROAT CULT: NORMAL

## 2019-10-07 ENCOUNTER — APPOINTMENT (EMERGENCY)
Dept: CT IMAGING | Facility: HOSPITAL | Age: 84
End: 2019-10-07
Payer: COMMERCIAL

## 2019-10-07 ENCOUNTER — HOSPITAL ENCOUNTER (EMERGENCY)
Facility: HOSPITAL | Age: 84
Discharge: HOME | End: 2019-10-07
Attending: EMERGENCY MEDICINE | Admitting: EMERGENCY MEDICINE
Payer: COMMERCIAL

## 2019-10-07 ENCOUNTER — APPOINTMENT (EMERGENCY)
Dept: RADIOLOGY | Facility: HOSPITAL | Age: 84
End: 2019-10-07
Payer: COMMERCIAL

## 2019-10-07 VITALS
SYSTOLIC BLOOD PRESSURE: 186 MMHG | TEMPERATURE: 98.2 F | HEIGHT: 62 IN | BODY MASS INDEX: 21.9 KG/M2 | RESPIRATION RATE: 20 BRPM | DIASTOLIC BLOOD PRESSURE: 86 MMHG | WEIGHT: 119 LBS | HEART RATE: 59 BPM | OXYGEN SATURATION: 97 %

## 2019-10-07 DIAGNOSIS — J40 BRONCHITIS: ICD-10-CM

## 2019-10-07 DIAGNOSIS — J32.9 SINUSITIS: Primary | ICD-10-CM

## 2019-10-07 LAB
ALBUMIN SERPL BCP-MCNC: 4.2 G/DL (ref 3.5–5.7)
ALP SERPL-CCNC: 59 U/L (ref 55–165)
ALT SERPL W P-5'-P-CCNC: 10 U/L (ref 7–52)
ANION GAP SERPL CALCULATED.3IONS-SCNC: 5 MMOL/L (ref 4–13)
AST SERPL W P-5'-P-CCNC: 16 U/L (ref 13–39)
BACTERIA UR QL AUTO: ABNORMAL /HPF
BASOPHILS # BLD AUTO: 0 THOUSANDS/ΜL (ref 0–0.1)
BASOPHILS NFR BLD AUTO: 1 % (ref 0–2)
BILIRUB SERPL-MCNC: 0.5 MG/DL (ref 0.2–1)
BILIRUB UR QL STRIP: NEGATIVE
BUN SERPL-MCNC: 12 MG/DL (ref 7–25)
CALCIUM SERPL-MCNC: 9.4 MG/DL (ref 8.6–10.5)
CHLORIDE SERPL-SCNC: 96 MMOL/L (ref 98–107)
CLARITY UR: CLEAR
CO2 SERPL-SCNC: 31 MMOL/L (ref 21–31)
COLOR UR: YELLOW
CREAT SERPL-MCNC: 0.92 MG/DL (ref 0.6–1.2)
EOSINOPHIL # BLD AUTO: 0.1 THOUSAND/ΜL (ref 0–0.61)
EOSINOPHIL NFR BLD AUTO: 2 % (ref 0–5)
ERYTHROCYTE [DISTWIDTH] IN BLOOD BY AUTOMATED COUNT: 13.2 % (ref 11.5–14.5)
GFR SERPL CREATININE-BSD FRML MDRD: 57 ML/MIN/1.73SQ M
GLUCOSE SERPL-MCNC: 82 MG/DL (ref 65–99)
GLUCOSE UR STRIP-MCNC: NEGATIVE MG/DL
HCT VFR BLD AUTO: 39 % (ref 42–47)
HGB BLD-MCNC: 13.4 G/DL (ref 12–16)
HGB UR QL STRIP.AUTO: ABNORMAL
KETONES UR STRIP-MCNC: NEGATIVE MG/DL
LEUKOCYTE ESTERASE UR QL STRIP: ABNORMAL
LYMPHOCYTES # BLD AUTO: 1.3 THOUSANDS/ΜL (ref 0.6–4.47)
LYMPHOCYTES NFR BLD AUTO: 21 % (ref 21–51)
MCH RBC QN AUTO: 31.7 PG (ref 26–34)
MCHC RBC AUTO-ENTMCNC: 34.3 G/DL (ref 31–37)
MCV RBC AUTO: 92 FL (ref 81–99)
MONOCYTES # BLD AUTO: 0.7 THOUSAND/ΜL (ref 0.17–1.22)
MONOCYTES NFR BLD AUTO: 12 % (ref 2–12)
NEUTROPHILS # BLD AUTO: 3.9 THOUSANDS/ΜL (ref 1.4–6.5)
NEUTS SEG NFR BLD AUTO: 65 % (ref 42–75)
NITRITE UR QL STRIP: NEGATIVE
NON-SQ EPI CELLS URNS QL MICRO: ABNORMAL /HPF
PH UR STRIP.AUTO: 6 [PH]
PLATELET # BLD AUTO: 255 THOUSANDS/UL (ref 149–390)
PMV BLD AUTO: 6.4 FL (ref 8.6–11.7)
POTASSIUM SERPL-SCNC: 3.9 MMOL/L (ref 3.5–5.5)
PROT SERPL-MCNC: 7.2 G/DL (ref 6.4–8.9)
PROT UR STRIP-MCNC: NEGATIVE MG/DL
RBC # BLD AUTO: 4.22 MILLION/UL (ref 3.9–5.2)
RBC #/AREA URNS AUTO: ABNORMAL /HPF
SODIUM SERPL-SCNC: 132 MMOL/L (ref 134–143)
SP GR UR STRIP.AUTO: 1.01 (ref 1–1.03)
TROPONIN I SERPL-MCNC: <0.03 NG/ML
UROBILINOGEN UR QL STRIP.AUTO: 1 E.U./DL
WBC # BLD AUTO: 6 THOUSAND/UL (ref 4.8–10.8)
WBC #/AREA URNS AUTO: ABNORMAL /HPF

## 2019-10-07 PROCEDURE — 70450 CT HEAD/BRAIN W/O DYE: CPT

## 2019-10-07 PROCEDURE — 71046 X-RAY EXAM CHEST 2 VIEWS: CPT

## 2019-10-07 PROCEDURE — 87631 RESP VIRUS 3-5 TARGETS: CPT | Performed by: EMERGENCY MEDICINE

## 2019-10-07 PROCEDURE — 80053 COMPREHEN METABOLIC PANEL: CPT | Performed by: EMERGENCY MEDICINE

## 2019-10-07 PROCEDURE — 99284 EMERGENCY DEPT VISIT MOD MDM: CPT

## 2019-10-07 PROCEDURE — 81001 URINALYSIS AUTO W/SCOPE: CPT | Performed by: EMERGENCY MEDICINE

## 2019-10-07 PROCEDURE — 85025 COMPLETE CBC W/AUTO DIFF WBC: CPT | Performed by: EMERGENCY MEDICINE

## 2019-10-07 PROCEDURE — 36415 COLL VENOUS BLD VENIPUNCTURE: CPT | Performed by: EMERGENCY MEDICINE

## 2019-10-07 PROCEDURE — 84484 ASSAY OF TROPONIN QUANT: CPT | Performed by: EMERGENCY MEDICINE

## 2019-10-07 RX ORDER — LEVOTHYROXINE SODIUM 0.05 MG/1
50 TABLET ORAL 3 TIMES WEEKLY
Status: ON HOLD | COMMUNITY
End: 2020-06-26 | Stop reason: SDUPTHER

## 2019-10-07 RX ORDER — AMOXICILLIN AND CLAVULANATE POTASSIUM 875; 125 MG/1; MG/1
1 TABLET, FILM COATED ORAL EVERY 12 HOURS
Qty: 14 TABLET | Refills: 0 | Status: SHIPPED | OUTPATIENT
Start: 2019-10-07 | End: 2019-10-14

## 2019-10-07 RX ORDER — ALPRAZOLAM 0.5 MG/1
0.25 TABLET ORAL ONCE
Status: COMPLETED | OUTPATIENT
Start: 2019-10-07 | End: 2019-10-07

## 2019-10-07 RX ORDER — AMOXICILLIN AND CLAVULANATE POTASSIUM 875; 125 MG/1; MG/1
1 TABLET, FILM COATED ORAL EVERY 12 HOURS SCHEDULED
Status: DISCONTINUED | OUTPATIENT
Start: 2019-10-07 | End: 2019-10-07 | Stop reason: HOSPADM

## 2019-10-07 RX ADMIN — ALPRAZOLAM 0.25 MG: 0.5 TABLET ORAL at 15:55

## 2019-10-07 RX ADMIN — AMOXICILLIN AND CLAVULANATE POTASSIUM 1 TABLET: 875; 125 TABLET, FILM COATED ORAL at 17:23

## 2019-10-07 NOTE — DISCHARGE INSTRUCTIONS
You have an infection in your sinuses which you need to take antibiotics for  Your blood pressure was elevated to day and you should have this closely monitored by your primary care provider and this should be done within the next 24-48 hours  You should also be rechecked at this time  Please return if your symptoms worsen

## 2019-10-07 NOTE — ED PROVIDER NOTES
History  Chief Complaint   Patient presents with   Fabiola Edwina Like Symptoms     for past 1 week per patient; just not going away       History provided by:  Patient   used: No    Cough   Cough characteristics:  Dry and non-productive  Severity:  Moderate  Duration:  1 week  Timing:  Intermittent  Progression:  Unchanged  Chronicity:  New  Smoker: no    Context: upper respiratory infection (For the past week)    Context: not exposure to allergens, not sick contacts and not smoke exposure    Relieved by:  Nothing  Associated symptoms: chest pain ( chest hurts when she coughs), headaches ( frontal headache), rhinorrhea and sinus congestion    Associated symptoms: no chills, no diaphoresis, no ear pain, no eye discharge, no fever, no myalgias, no rash, no shortness of breath, no sore throat and no wheezing        Prior to Admission Medications   Prescriptions Last Dose Informant Patient Reported? Taking?    ALPRAZolam (XANAX) 0 25 mg tablet 10/7/2019 at Unknown time Self Yes Yes   Sig: Use 1 tab 2x/day as needed for anxiety   aspirin 81 mg chewable tablet 10/6/2019 at Unknown time  Yes Yes   Sig: Chew 81 mg daily   lansoprazole (PREVACID) 30 mg capsule 10/7/2019 at Unknown time  Yes Yes   Sig: Take 30 mg by mouth daily   levothyroxine 50 mcg tablet 10/7/2019 at Unknown time  Yes Yes   Sig: Take 50 mcg by mouth 3 (three) times a week   levothyroxine 75 mcg tablet 10/6/2019 at Unknown time Self Yes Yes   Sig: tuesday, thursday, saturday, sunday   losartan (COZAAR) 50 mg tablet 10/7/2019 at Unknown time Self Yes Yes   Sig: Take 50 mg by mouth daily    metoprolol succinate (TOPROL-XL) 25 mg 24 hr tablet 10/7/2019 at Unknown time  No Yes   Sig: Take 1 tablet (25 mg total) by mouth every 12 (twelve) hours      Facility-Administered Medications: None       Past Medical History:   Diagnosis Date    Anxiety     CAD (coronary artery disease)     Carotid Duplex 03/06/2018    Plaque formation was prominent bilaterally    Depression     Disease of thyroid gland     Diverticulitis     Dyslipidemia     ECHO 09/14/2017    EF 55%, mild mitral regurg, small pericardial effusion   Hypertension     Mitral regurgitation     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 infarction)     Takotsubo cardiomyopathy        Past Surgical History:   Procedure Laterality Date    CARDIAC CATHETERIZATION  08/27/2012    EF 35%, 70% stenosis of diagonal, Otherwise all OK apart from myopathy    CARDIAC CATHETERIZATION  10/10/2012    EF 55%, no significant CAD  Mild stress induced cardiomyopathy    TONSILLECTOMY         Family History   Problem Relation Age of Onset    Cancer Father     Prostate cancer Father      I have reviewed and agree with the history as documented  Social History     Tobacco Use    Smoking status: Never Smoker    Smokeless tobacco: Never Used   Substance Use Topics    Alcohol use: No    Drug use: No        Review of Systems   Constitutional: Negative for chills, diaphoresis and fever  HENT: Positive for congestion, rhinorrhea, sinus pressure, sinus pain and sneezing  Negative for ear pain and sore throat  Eyes: Negative for discharge  Respiratory: Positive for cough  Negative for shortness of breath and wheezing  Cardiovascular: Positive for chest pain ( chest hurts when she coughs)  Gastrointestinal: Negative for diarrhea, nausea and vomiting  Endocrine: Negative for cold intolerance  Genitourinary: Negative for dysuria  Musculoskeletal: Negative for myalgias  Skin: Negative for rash  Neurological: Positive for headaches ( frontal headache)  Hematological: Does not bruise/bleed easily  Psychiatric/Behavioral: The patient is not nervous/anxious  Physical Exam  Physical Exam   Constitutional: She is oriented to person, place, and time  She appears well-developed and well-nourished  No distress  HENT:   Head: Normocephalic and atraumatic     Right Ear: External ear normal    Left Ear: External ear normal    Nose: Nose normal    Mouth/Throat: Oropharynx is clear and moist  No oropharyngeal exudate  Eyes: Pupils are equal, round, and reactive to light  Conjunctivae are normal    Neck: Normal range of motion  Neck supple  No tracheal deviation present  Cardiovascular: Normal rate and regular rhythm  No murmur heard  Pulmonary/Chest: Effort normal and breath sounds normal  No stridor  Abdominal: Soft  She exhibits no distension  There is no tenderness  There is no guarding  Musculoskeletal: Normal range of motion  She exhibits no edema  No obvious deformities on exposed limbs identified  Patient is ambulatory  Neurological: She is alert and oriented to person, place, and time  No cranial nerve deficit  Moves all extremities without difficulty   Skin: Skin is warm and dry  Capillary refill takes less than 2 seconds  She is not diaphoretic  Psychiatric: She has a normal mood and affect  Nursing note and vitals reviewed        Vital Signs  ED Triage Vitals [10/07/19 1247]   Temperature Pulse Respirations Blood Pressure SpO2   98 2 °F (36 8 °C) 70 16 136/63 99 %      Temp Source Heart Rate Source Patient Position - Orthostatic VS BP Location FiO2 (%)   Temporal Monitor Sitting Left arm --      Pain Score       No Pain           Vitals:    10/07/19 1628 10/07/19 1630 10/07/19 1645 10/07/19 1700   BP: (!) 200/85   (!) 186/86   Pulse:  59 62 59   Patient Position - Orthostatic VS:             Visual Acuity      ED Medications  Medications   ALPRAZolam (XANAX) tablet 0 25 mg (0 25 mg Oral Given 10/7/19 1555)       Diagnostic Studies  Results Reviewed     Procedure Component Value Units Date/Time    Troponin I [[id number]]  (Normal) Collected:  10/07/19 1408    Lab Status:  Final result Specimen:  Blood from Arm, Right Updated:  10/07/19 1434     Troponin I <0 03 ng/mL     Comprehensive metabolic panel [[id number]]  (Abnormal) Collected:  10/07/19 1408    Lab Status:  Final result Specimen: Blood from Arm, Right Updated:  10/07/19 1434     Sodium 132 mmol/L      Potassium 3 9 mmol/L      Chloride 96 mmol/L      CO2 31 mmol/L      ANION GAP 5 mmol/L      BUN 12 mg/dL      Creatinine 0 92 mg/dL      Glucose 82 mg/dL      Calcium 9 4 mg/dL      AST 16 U/L      ALT 10 U/L      Alkaline Phosphatase 59 U/L      Total Protein 7 2 g/dL      Albumin 4 2 g/dL      Total Bilirubin 0 50 mg/dL      eGFR 57 ml/min/1 73sq m     Narrative:       [place] guidelines for Chronic Kidney Disease (CKD):     Stage 1 with normal or high GFR (GFR > 90 mL/min/1 73 square meters)    Stage 2 Mild CKD (GFR = 60-89 mL/min/1 73 square meters)    Stage 3A Moderate CKD (GFR = 45-59 mL/min/1 73 square meters)    Stage 3B Moderate CKD (GFR = 30-44 mL/min/1 73 square meters)    Stage 4 Severe CKD (GFR = 15-29 mL/min/1 73 square meters)    Stage 5 End Stage CKD (GFR <15 mL/min/1 73 square meters)  Note: GFR calculation is accurate only with a steady state creatinine    Urine Microscopic [[id number]]  (Abnormal) Collected:  10/07/19 1402    Lab Status:  Final result Specimen:  Urine, Clean Catch Updated:  10/07/19 1422     RBC, UA 2-4 /hpf      WBC, UA 1-2 /hpf      Epithelial Cells Occasional /hpf      Bacteria, UA None Seen /hpf     CBC and differential [[id number]]  (Abnormal) Collected:  10/07/19 1408    Lab Status:  Final result Specimen:  Blood from Arm, Right Updated:  10/07/19 1415     WBC 6 00 Thousand/uL      RBC 4 22 Million/uL      Hemoglobin 13 4 g/dL      Hematocrit 39 0 %      MCV 92 fL      MCH 31 7 pg      MCHC 34 3 g/dL      RDW 13 2 %      MPV 6 4 fL      Platelets 185 Thousands/uL      Neutrophils Relative 65 %      Lymphocytes Relative 21 %      Monocytes Relative 12 %      Eosinophils Relative 2 %      Basophils Relative 1 %      Neutrophils Absolute 3 90 Thousands/µL      Lymphocytes Absolute 1 30 Thousands/µL      Monocytes Absolute 0 70 Thousand/µL      Eosinophils Absolute 0 10 Thousand/µL      Basophils Absolute 0 00 Thousands/µL     UA w Reflex to Microscopic [[id number]]  (Abnormal) Collected:  10/07/19 1402    Lab Status:  Final result Specimen:  Urine, Clean Catch Updated:  10/07/19 1414     Color, UA Yellow     Clarity, UA Clear     Specific Gravity, UA 1 010     pH, UA 6 0     Leukocytes, UA Trace     Nitrite, UA Negative     Protein, UA Negative mg/dl      Glucose, UA Negative mg/dl      Ketones, UA Negative mg/dl      Urobilinogen, UA 1 0 E U /dl      Bilirubin, UA Negative     Blood, UA Trace-Intact    Influenza A/B and RSV by PCR [[id number]] Collected:  10/07/19 1403    Lab Status: In process Specimen:  Nasopharyngeal Swab Updated:  10/07/19 1411                 XR chest 2 views   Final Result by Erika Garcia MD (10/07 1445)      No acute cardiopulmonary disease  Pectus excavatum deformity  Workstation performed: [id number]         CT head without contrast   Final Result by Laney Sheridan MD (10/07 1445)      No acute intracranial abnormality  Moderate cerebral chronic microangiopathic disease  Cerebellar atrophy  Left ethmoid sinus disease, correlate for sinusitis  Workstation performed: [id number]                    Procedures  Procedures       ED Course                               MDM  Number of Diagnoses or Management Options  Bronchitis:   Sinusitis:   Diagnosis management comments: History and exam are consistent with acute sinusitis  Vital signs are stable, blood pressure was elevated  Patient is well appearing  Otherwise unremarkable exam     The patient is tolerating fluids and is well hydrated  No clinical evidence by history or evaluation to suspect meningitis and/or sepsis  There was no evidence to suggest deep abscess or cavernous sinus involvement  Chest pain is not likely cardiac  It is more likely due to her cough    The cough is not consistent with pneumonia and is likely coming from her excessive mucus production  Patient's blood pressure was elevated  She did take her antihypertensive medication today, however she states she feels very anxious about being in the emergency room  She was given 1/2 dose of her Xanax which she states made her feel a lot better  Her blood pressure was at an acceptable level upon discharge  I did however instructed her to have this blood pressure closely recheck by her primary care provider tomorrow  There was no evidence of end-organ damage and therefore she is stable for discharge  I discussed with the patient, diagnosis, plan of care, medications and to follow up with the patient's primary physician within 2 days for recheck  The patient was instructed to return if the worsens in anyway, especially if not tolerating fluids, increased sinus pain or swelling, worsening headache, persistent fever, difficulty swallowing or breathing, or as needed  The patient agreed with plan  ENT referral was also given  Amount and/or Complexity of Data Reviewed  Clinical lab tests: ordered and reviewed  Tests in the radiology section of CPT®: ordered and reviewed  Tests in the medicine section of CPT®: ordered and reviewed  Independent visualization of images, tracings, or specimens: yes    Risk of Complications, Morbidity, and/or Mortality  Presenting problems: moderate  Diagnostic procedures: moderate  Management options: moderate    Patient Progress  Patient progress: stable      Disposition  Final diagnoses:   Sinusitis   Bronchitis     Time reflects when diagnosis was documented in both MDM as applicable and the Disposition within this note     Time User Action Codes Description Comment    10/7/2019  5:14 PM Blake Ledbetter, [address] [J32 [id number]] Sinusitis     10/7/2019  5:15 PM Blake Ledbetter, [address] Bronchitis       ED Disposition     ED Disposition Condition Date/Time Comment    Discharge Stable Mon Oct 7, 2019  5:14 PM Sasha Bourne discharge to home/self care              Follow-up Information    None         Discharge Medication List as of 10/7/2019  5:22 PM      START taking these medications    Details   amoxicillin-clavulanate (AUGMENTIN) 875-125 mg per tablet Take 1 tablet by mouth every 12 (twelve) hours for 7 days, Starting Mon 10/7/2019, Until Mon 10/14/2019, Normal         CONTINUE these medications which have NOT CHANGED    Details   ALPRAZolam (XANAX) 0 25 mg tablet Use 1 tab 2x/day as needed for anxiety, Historical Med      aspirin 81 mg chewable tablet Chew 81 mg daily, Starting Mon 8/10/2015, Historical Med      lansoprazole (PREVACID) 30 mg capsule Take 30 mg by mouth daily, Historical Med      !! levothyroxine 50 mcg tablet Take 50 mcg by mouth 3 (three) times a week, Historical Med      !! levothyroxine 75 mcg tablet tuesday, thursday, saturday, sunday, Historical Med      losartan (COZAAR) 50 mg tablet Take 50 mg by mouth daily , Starting Thu 6/14/2018, Until Wed 9/30/2020, Historical Med      metoprolol succinate (TOPROL-XL) 25 mg 24 hr tablet Take 1 tablet (25 mg total) by mouth every 12 (twelve) hours, Starting Fri 7/26/2019, Normal       !! - Potential duplicate medications found  Please discuss with provider  No discharge procedures on file      ED Provider  Electronically Signed by           Hamilton Winters DO  10/08/19 9522

## 2019-10-08 LAB
ATRIAL RATE: 60 BPM
FLUAV AG SPEC QL: NOT DETECTED
FLUBV AG SPEC QL: NOT DETECTED
P AXIS: 37 DEGREES
PR INTERVAL: 236 MS
QRS AXIS: 73 DEGREES
QRSD INTERVAL: 92 MS
QT INTERVAL: 426 MS
QTC INTERVAL: 426 MS
RSV B RNA SPEC QL NAA+PROBE: NOT DETECTED
T WAVE AXIS: 72 DEGREES
VENTRICULAR RATE: 60 BPM

## 2019-10-08 PROCEDURE — 93010 ELECTROCARDIOGRAM REPORT: CPT | Performed by: INTERNAL MEDICINE

## 2019-10-09 ENCOUNTER — TELEPHONE (OUTPATIENT)
Dept: OBGYN CLINIC | Facility: MEDICAL CENTER | Age: 84
End: 2019-10-09

## 2019-10-25 ENCOUNTER — TELEPHONE (OUTPATIENT)
Dept: CARDIOLOGY CLINIC | Facility: CLINIC | Age: 84
End: 2019-10-25

## 2019-10-25 NOTE — TELEPHONE ENCOUNTER
She can take Metoprolol regularly  1/2 losartan only once daily  Until she is seen by Dr Geovanna Varela

## 2019-10-25 NOTE — TELEPHONE ENCOUNTER
Lianet Peres called to report her BP  At 1245pm today it was 108/67 and she "minds it" so she is not taking her BP meds (metoprolol and losartan) right now  Has been running low for about a week now  Will check later and see  She said has been running okay, 124/72, 120/71 then went up a little Wednesday night 140/79  I moved her appt up with dr Sathish Diamond  But do you have any recs? I also told her his note says losartan 50 bid if SBP > 150

## 2019-11-04 ENCOUNTER — OFFICE VISIT (OUTPATIENT)
Dept: CARDIOLOGY CLINIC | Facility: CLINIC | Age: 84
End: 2019-11-04
Payer: COMMERCIAL

## 2019-11-04 VITALS
BODY MASS INDEX: 22.08 KG/M2 | WEIGHT: 120 LBS | HEART RATE: 70 BPM | DIASTOLIC BLOOD PRESSURE: 82 MMHG | HEIGHT: 62 IN | SYSTOLIC BLOOD PRESSURE: 138 MMHG

## 2019-11-04 DIAGNOSIS — I10 HYPERTENSION: Primary | ICD-10-CM

## 2019-11-04 DIAGNOSIS — E03.9 HYPOTHYROIDISM: ICD-10-CM

## 2019-11-04 PROCEDURE — 99214 OFFICE O/P EST MOD 30 MIN: CPT | Performed by: INTERNAL MEDICINE

## 2019-11-04 PROCEDURE — 3075F SYST BP GE 130 - 139MM HG: CPT | Performed by: INTERNAL MEDICINE

## 2019-11-04 PROCEDURE — 3079F DIAST BP 80-89 MM HG: CPT | Performed by: INTERNAL MEDICINE

## 2019-11-04 RX ORDER — ESCITALOPRAM OXALATE 5 MG/1
5 TABLET ORAL DAILY
Refills: 0 | Status: ON HOLD | COMMUNITY
Start: 2019-10-24 | End: 2020-02-09 | Stop reason: CLARIF

## 2019-11-04 RX ORDER — METOPROLOL SUCCINATE 25 MG/1
25 TABLET, EXTENDED RELEASE ORAL EVERY 12 HOURS
Qty: 60 TABLET | Refills: 3 | Status: SHIPPED | OUTPATIENT
Start: 2019-11-04 | End: 2019-12-06 | Stop reason: SDUPTHER

## 2019-11-04 RX ORDER — MULTIVIT WITH MINERALS/LUTEIN
3 TABLET ORAL
Refills: 0 | Status: ON HOLD | COMMUNITY
Start: 2019-10-24 | End: 2020-02-09 | Stop reason: CLARIF

## 2019-11-04 RX ORDER — LOSARTAN POTASSIUM 50 MG/1
50 TABLET ORAL DAILY
Qty: 30 TABLET | Refills: 3 | Status: ON HOLD | OUTPATIENT
Start: 2019-11-04 | End: 2020-03-03 | Stop reason: SDUPTHER

## 2019-11-04 NOTE — PROGRESS NOTES
[facility], [address] [facility] [address], [address]   [phone]                                              Cardiology Office Follow Up  Malia Childers, 80 y o  female  YOB: 1935  MRN: [id number] Encounter: [id number]      PCP - Kyle Nolan MD    Assessment and Plan  1  Hypertension  2  Hypothyroidism  3  H/o diverticulitis  4  Anxiety    Plan  Hypertension  · Had been on stable dose of losartan 25 + metoprolol XL 25 for many year,s but then recently has had uncontrolled BP  · Had orthostatic hypotension with clonidine 0 2 mg from hospital ED  · Currently on metoprolol XL 25 bid + Losartan 50 at night  · Goal SBP <150/95  · Low salt diet advised  · She has been very worried about her blood pressure at home, and on her own decides to hold her blood pressure medications of blood pressure is 110 and she is feeling okay  · She had stopped taking the losartan and metoprolol of week ago due to concerns for low normal blood pressure, but then her blood pressure went up to 180s to 200s, and as a result she decided to start taking it again  · I have advised her to stop checking her blood pressure at home and simply keep taking the losartan 50 and metoprolol 25 mg b i d  Hypothyroidism  · On levothyroxine 75  · She has been missing pills of medications due to trying to not take any medications with the levothyroxine, and this is contributing to significant fluctuation in her blood pressure  · Instead of getting to take the medications, it would be better that she at least takes all the medications to get the and then keep her check on her TSH    Follow-up in 6 months    History of Present Illness   80-year-old female comes in for short term follow up evaluation of hypertension  She has longstanding history of hypertension, and has been losartan and metoprolol for many years    Recently her blood pressure has been intermittently very elevated to 836-860 systolic  As a result, her losartan was increased from 25-50 mg weeks ago  But despite that her blood pressure seems to have remained elevated intermittently  Per patient, her blood pressure is usually well controlled during the day in the range of 130s  But her blood pressure is usually elevated in the middle of the night  A couple of days ago around 2:00 a m  She woke up and had a blood pressure of 210  She was recently and diagnosed and treated for diverticulitis  Since then she has continued to have some abdominal discomfort  She was scheduled to undergo an EGD for the same  Mom but when she presented for the same, she was found to have a blood pressure of 036 systolic, and as a result this was canceled  Next a again she went to the ED with elevated blood pressures  She was given a dose of clonidine 0 2 mg, and discharged home with follow-up with Cardiology  Since and getting the clonidine 0 2 mg, she has been feeling lightheaded and dizzy, and particularly with standing up  Interval History: 7/31/19  Has been doing relatively well  Continues to have a lot of anxiety  She continues to check her BP 2-3 times/day, and has been keeping a log  Her dizziness improved the same day after the effects of clonidine wore off  Interval history - 11/4/19  She comes back for 2 month follow-up of her blood pressure  No more symptoms of dizziness or lightheadedness after having been off clonidine  She continues to be very anxious about her blood pressure, and addressed to make adjustments on her own based on these blood pressure readings, and also forgets to take blood pressure medications at times due to trying to avoid taking it along with levothyroxine  She had called the office about 10 days ago stating that her blood pressure was 108, and she felt it was too low and as a result was not taking her blood pressure medications    But after that her blood pressure went in the 180s, and as a result she started taking it again    Historical Information   Past Medical History:   Diagnosis Date    Anxiety     CAD (coronary artery disease)     Carotid Duplex 03/06/2018    Plaque formation was prominent bilaterally    Depression     Disease of thyroid gland     Diverticulitis     Dyslipidemia     ECHO 09/14/2017    EF 55%, mild mitral regurg, small pericardial effusion   Hypertension     Mitral regurgitation     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 infarction)     Takotsubo cardiomyopathy      Past Surgical History:   Procedure Laterality Date    CARDIAC CATHETERIZATION  08/27/2012    EF 35%, 70% stenosis of diagonal, Otherwise all OK apart from myopathy    CARDIAC CATHETERIZATION  10/10/2012    EF 55%, no significant CAD  Mild stress induced cardiomyopathy    TONSILLECTOMY       Family History   Problem Relation Age of Onset    Cancer Father     Prostate cancer Father      Current Outpatient Medications on File Prior to Visit   Medication Sig Dispense Refill    ALPRAZolam (XANAX) 0 25 mg tablet Use 1 tab 2x/day as needed for anxiety      aspirin 81 mg chewable tablet Chew 81 mg daily      lansoprazole (PREVACID) 30 mg capsule Take 30 mg by mouth daily      levothyroxine 50 mcg tablet Take 50 mcg by mouth 3 (three) times a week      levothyroxine 75 mcg tablet tuesday, thursday, saturday, sunday      losartan (COZAAR) 50 mg tablet Take 50 mg by mouth daily       metoprolol succinate (TOPROL-XL) 25 mg 24 hr tablet Take 1 tablet (25 mg total) by mouth every 12 (twelve) hours 60 tablet 0    RA MELATONIN 3 MG Take 3 mg by mouth daily at bedtime  0    escitalopram (LEXAPRO) 5 mg tablet Take 5 mg by mouth daily   0     No current facility-administered medications on file prior to visit  Allergies   Allergen Reactions    No Known Allergies      Social History     Socioeconomic History    Marital status:       Spouse name: None    Number of children: None    Years of education: None    Highest education level: None   Occupational History    None   Social Needs    Financial resource strain: None    Food insecurity:     Worry: None     Inability: None    Transportation needs:     Medical: None     Non-medical: None   Tobacco Use    Smoking status: Never Smoker    Smokeless tobacco: Never Used   Substance and Sexual Activity    Alcohol use: No    Drug use: No    Sexual activity: Never   Lifestyle    Physical activity:     Days per week: None     Minutes per session: None    Stress: None   Relationships    Social connections:     Talks on phone: None     Gets together: None     Attends Zoroastrianism service: None     Active member of club or organization: None     Attends meetings of clubs or organizations: None     Relationship status: None    Intimate partner violence:     Fear of current or ex partner: None     Emotionally abused: None     Physically abused: None     Forced sexual activity: None   Other Topics Concern    None   Social History Narrative    None        Review of Systems  No c/o chest pain, No c/o palpitations, No c/o shortness of breath, No c/o dizziness or light-headedness   All other systems negative, except as noted in HPI    Vitals:  Vitals:    11/04/19 1519   BP: 138/82   Pulse: 70   Weight: 54 4 kg (120 lb)   Height: 5' 2" (1 575 m)     BMI - Body mass index is 21 95 kg/m²  Wt Readings from Last 7 Encounters:   11/04/19 54 4 kg (120 lb)   10/07/19 54 kg (119 lb)   09/30/19 54 kg (119 lb)   07/26/19 54 kg (119 lb)   07/26/19 54 4 kg (119 lb 14 9 oz)   07/17/19 54 4 kg (120 lb)   07/11/19 54 4 kg (120 lb)       Physical Exam  Vitals reviewed, BP logs reviewed  Chart reviewed  Constitutional:  Ирина Diaz appears  well, pleasant and cooperative  Alert and oriented x 3  No acute distress   HEENT:    Normocephalic, atraumatic   Neck:  Supple, no JVD, negative AJR   Lungs:  Respirations unlabored, clear to auscultation bilaterally, no rales, no wheezing     Chest Wall:  No tenderness, no pertinent deformity   Heart[de-identified]  Regular rate, Regular rhythm, S1 and S2 normal, no murmurs, no rubs, no gallops   Abdomen:  Soft, non-tender, non-distended   Neurological:  Awake, alert, oriented x3  Non-focal exam    Extremities:  No pedal edema, no calf tenderness       Labs:  CBC:   Lab Results   Component Value Date    WBC 6 00 10/07/2019    RBC 4 22 10/07/2019    HGB 13 4 10/07/2019    HCT 39 0 (L) 10/07/2019    MCV 92 10/07/2019     10/07/2019    RDW 13 2 10/07/2019       CMP:   Lab Results   Component Value Date    K 3 9 10/07/2019    CL 96 (L) 10/07/2019    CO2 31 10/07/2019    BUN 12 10/07/2019    CREATININE 0 92 10/07/2019    EGFR 57 10/07/2019    CALCIUM 9 4 10/07/2019    AST 16 10/07/2019    ALT 10 10/07/2019    ALKPHOS 59 10/07/2019       Magnesium:  Lab Results   Component Value Date    MG 2 2 07/11/2019       Lipid Profile:   No results found for: CHOL, HDL, TRIG, LDLCALC    Thyroid Studies:   Lab Results   Component Value Date    [id number] 2 750 07/26/2019       No components found for: [facility] [place]    Lab Results   Component Value Date    INR 1 15 06/09/2019    INR 1 12 06/02/2019   5    Imaging: No results found  Cardiac testing:   No results found for this or any previous visit  No results found for this or any previous visit  No results found for this or any previous visit  No results found for this or any previous visit

## 2019-11-04 NOTE — PATIENT INSTRUCTIONS
DASH Eating Plan   AMBULATORY CARE:   The DASH (Dietary Approaches to Stop Hypertension) Eating Plan  is designed to help prevent or lower high blood pressure  It can also help to lower LDL (bad) cholesterol and decrease your risk for heart disease  The plan is low in sodium, sugar, unhealthy fats, and total fat  It is high in potassium, calcium, magnesium, and fiber  These nutrients are added when you eat more fruits, vegetables, and whole grains  Your sodium limit each day: Your dietitian will tell you how much sodium is safe for you to have each day  People with high blood pressure should have no more than 1,500 to 2,300 mg of sodium in a day  A teaspoon (tsp) of salt has 2,300 mg of sodium  This may seem like a difficult goal, but small changes to the foods you eat can make a big difference  Your healthcare provider or dietitian can help you create a meal plan that follows your sodium limit  How to limit sodium:   · Read food labels  Food labels can help you choose foods that are low in sodium  The amount of sodium is listed in milligrams (mg)  The % Daily Value (DV) column tells you how much of your daily needs are met by 1 serving of the food for each nutrient listed  Choose foods that have less than 5% of the DV of sodium  These foods are considered low in sodium  Foods that have 20% or more of the DV of sodium are considered high in sodium  Avoid foods that have more than 300 mg of sodium in each serving  Choose foods that say low-sodium, reduced-sodium, or no salt added on the food label  · Avoid salt  Do not salt food at the table, and add very little salt to foods during cooking  Use herbs and spices, such as onions, garlic, and salt-free seasonings to add flavor to foods  Try lemon or lime juice or vinegar to give foods a tart flavor  Use hot peppers or a small amount of hot pepper sauce to add a spicy flavor to foods  · Ask about salt substitutes    Ask your healthcare provider if you may use salt substitutes  Some salt substitutes have ingredients that can be harmful if you have certain health conditions  · Choose foods carefully at restaurants  Meals from restaurants, especially fast food restaurants, are often high in sodium  Some restaurants have nutrition information that tells you the amount of sodium in their foods  Ask to have your food prepared with less, or no salt  What you need to know about fats:   · Include healthy fats  Examples are unsaturated fats and omega-3 fatty acids  Unsaturated fats are found in soybean, canola, olive, or sunflower oil, and liquid and soft tub margarines  Omega-3 fatty acids are found in fatty fish, such as salmon, tuna, mackerel, and sardines  It is also found in flaxseed oil and ground flaxseed  · Avoid unhealthy fats  Do not eat unhealthy fats, such as saturated fats and trans fats  Saturated fats are found in foods that contain fat from animals  Examples are fatty meats, whole milk, butter, cream, and other dairy foods  It is also found in shortening, stick margarine, palm oil, and coconut oil  Trans fats are found in fried foods, crackers, chips, and baked goods made with margarine or shortening  Foods to include: With the DASH eating plan, you need to eat a certain number of servings from each food group  This will help you get enough of certain nutrients and limit others  The amount of servings you should eat depends on how many calories you need  Your dietitian can tell you how many calories you need  The number of servings listed next to the food groups below are for people who need about 2,000 calories each day    · Grains:  6 to 8 servings (3 of these servings should be whole-grain foods)    ¨ 1 slice of whole-grain bread     ¨ 1 ounce of dry cereal    ¨ ½ cup of cooked cereal, pasta, or brown rice    · Vegetables and fruits:  4 to 5 servings of fruits and 4 to 5 servings of vegetables    ¨ 1 medium fruit    ¨ ½ cup of frozen, canned (no added salt), or chopped fresh vegetables     ¨ ½ cup of fresh, frozen, dried, or canned fruit (canned in light syrup or fruit juice)    ¨ ½ cup of vegetable or fruit juice    · Dairy:  2 to 3 servings    ¨ 1 cup of nonfat (skim) or 1% milk    ¨ 1½ ounces of fat-free or low-fat cheese    ¨ 6 ounces of nonfat or low-fat yogurt    · Lean meat, poultry, and fish:  6 ounces or less    [employer] (chicken, turkey) with no skin    ¨ Fish (especially fatty fish, such as salmon, fresh tuna, or mackerel)    ¨ Lean beef and pork (loin, round, extra lean hamburger)    ¨ Egg whites and egg substitutes    · Nuts, seeds, and legumes:  4 to 5 servings each week    ¨ ½ cup of cooked beans and peas    ¨ 1½ ounces of unsalted nuts    ¨ 2 tablespoons of peanut butter or seeds    · Sweets and added sugars:  5 or less each week    ¨ 1 tablespoon of sugar, jelly, or jam    ¨ ½ cup of sorbet or gelatin    ¨ 1 cup of lemonade    · Fats:  2 to 3 servings each week    ¨ 1 teaspoon of soft margarine or vegetable oil    ¨ 1 tablespoon of mayonnaise    ¨ 2 tablespoons of salad dressing  Foods to avoid:   · Grains:      [employer], such as doughnuts, pastries, cookies, and biscuits (high in fat and sugar)    ¨ Mixes for cornbread and biscuits, packaged foods, such as bread stuffing, rice and pasta mixes, macaroni and cheese, and instant cereals (high in sodium)    · Fruits and vegetables:      ¨ Regular, canned vegetables (high in sodium)    ¨ Sauerkraut, pickled vegetables, and other foods prepared in brine (high in sodium)    ¨ Fried vegetables or vegetables in butter or high-fat sauces    ¨ Fruit in cream or butter sauce (high in fat)    · Dairy:      ¨ Whole milk, 2% milk, and cream (high in fat)    ¨ Regular cheese and processed cheese (high in fat and sodium)    · Meats and protein foods:      ¨ Smoked or cured meat, such as corned beef, ordaz, ham, hot dogs, and sausage (high in fat and sodium)    ¨ Canned beans and canned meats or spreads, such as potted meats, sardines, anchovies, and imitation seafood (high in sodium)    ¨ Deli or lunch meats, such as bologna, ham, turkey, and roast beef (high in sodium)    ¨ High-fat meat (T-bone steak, regular hamburger, and ribs)    ¨ Whole eggs and egg yolks (high in fat)    · Other:      ¨ Seasonings made with salt, such as garlic salt, celery salt, onion salt, seasoned salt, meat tenderizers, and monosodium glutamate (MSG)    ¨ Miso soup and canned or dried soup mixes (high in sodium)    ¨ Regular soy sauce, barbecue sauce, teriyaki sauce, steak sauce, Worcestershire sauce, and most flavored vinegars (high in sodium)    ¨ Regular condiments, such as mustard, ketchup, and salad dressings (high in sodium)    ¨ Gravy and sauces, such as Jose or cheese sauces (high in sodium and fat)    ¨ Drinks high in sugar, such as soda or fruit drinks    [employer] foods, such as salted chips, popcorn, pretzels, pork rinds, salted crackers, and salted nuts    ¨ Frozen foods, such as dinners, entrees, vegetables with sauces, and breaded meats (high in sodium)  Other guidelines to follow:   · Maintain a healthy weight  Your risk for heart disease is higher if you are overweight  Your healthcare provider may suggest that you lose weight if you are overweight  You can lose weight by eating fewer calories and foods that have added sugars and fat  The DASH meal plan can help you do this  Decrease calories by eating smaller portions at each meal and fewer snacks  Ask your healthcare provider for more information about how to lose weight  · Exercise regularly  Regular exercise can help you reach or maintain a healthy weight  Regular exercise can also help decrease your blood pressure and improve your cholesterol levels  Get 30 minutes or more of moderate exercise each day of the week  To lose weight, get at least 60 minutes of exercise  Talk to your healthcare provider about the best exercise program for you      · Limit alcohol  Women should limit alcohol to 1 drink a day  Men should limit alcohol to 2 drinks a day  A drink of alcohol is 12 ounces of beer, 5 ounces of wine, or 1½ ounces of liquor  © 2017 [address] [employer] Information is for End User's use only and may not be sold, redistributed or otherwise used for commercial purposes  All illustrations and images included in CareNotes® are the copyrighted property of [employer] , [employer]  or Jitendra Love  The above information is an  only  It is not intended as medical advice for individual conditions or treatments  Talk to your doctor, nurse or pharmacist before following any medical regimen to see if it is safe and effective for you

## 2019-11-20 ENCOUNTER — APPOINTMENT (EMERGENCY)
Dept: CT IMAGING | Facility: HOSPITAL | Age: 84
End: 2019-11-20
Payer: COMMERCIAL

## 2019-11-20 ENCOUNTER — HOSPITAL ENCOUNTER (EMERGENCY)
Facility: HOSPITAL | Age: 84
Discharge: HOME | End: 2019-11-21
Attending: EMERGENCY MEDICINE | Admitting: EMERGENCY MEDICINE
Payer: COMMERCIAL

## 2019-11-20 DIAGNOSIS — K59.00 CONSTIPATION, UNSPECIFIED CONSTIPATION TYPE: ICD-10-CM

## 2019-11-20 DIAGNOSIS — N39.0 UTI (URINARY TRACT INFECTION): ICD-10-CM

## 2019-11-20 DIAGNOSIS — R10.32 LEFT LOWER QUADRANT ABDOMINAL PAIN: Primary | ICD-10-CM

## 2019-11-20 LAB
ALBUMIN SERPL BCP-MCNC: 4.6 G/DL (ref 3.5–5.7)
ALP SERPL-CCNC: 64 U/L (ref 55–165)
ALT SERPL W P-5'-P-CCNC: 8 U/L (ref 7–52)
ANION GAP SERPL CALCULATED.3IONS-SCNC: 6 MMOL/L (ref 4–13)
AST SERPL W P-5'-P-CCNC: 16 U/L (ref 13–39)
BACTERIA UR QL AUTO: ABNORMAL /HPF
BASOPHILS # BLD AUTO: 0 THOUSANDS/ΜL (ref 0–0.1)
BASOPHILS NFR BLD AUTO: 1 % (ref 0–2)
BILIRUB SERPL-MCNC: 0.4 MG/DL (ref 0.2–1)
BILIRUB UR QL STRIP: NEGATIVE
BUN SERPL-MCNC: 17 MG/DL (ref 7–25)
CALCIUM SERPL-MCNC: 9.4 MG/DL (ref 8.6–10.5)
CHLORIDE SERPL-SCNC: 96 MMOL/L (ref 98–107)
CLARITY UR: CLEAR
CO2 SERPL-SCNC: 29 MMOL/L (ref 21–31)
COLOR UR: YELLOW
CREAT SERPL-MCNC: 1.02 MG/DL (ref 0.6–1.2)
EOSINOPHIL # BLD AUTO: 0.1 THOUSAND/ΜL (ref 0–0.61)
EOSINOPHIL NFR BLD AUTO: 2 % (ref 0–5)
ERYTHROCYTE [DISTWIDTH] IN BLOOD BY AUTOMATED COUNT: 13.3 % (ref 11.5–14.5)
GFR SERPL CREATININE-BSD FRML MDRD: 51 ML/MIN/1.73SQ M
GLUCOSE SERPL-MCNC: 99 MG/DL (ref 65–99)
GLUCOSE UR STRIP-MCNC: NEGATIVE MG/DL
HCT VFR BLD AUTO: 36.3 % (ref 42–47)
HGB BLD-MCNC: 12.2 G/DL (ref 12–16)
HGB UR QL STRIP.AUTO: ABNORMAL
KETONES UR STRIP-MCNC: NEGATIVE MG/DL
LEUKOCYTE ESTERASE UR QL STRIP: ABNORMAL
LIPASE SERPL-CCNC: 74 U/L (ref 11–82)
LYMPHOCYTES # BLD AUTO: 1.5 THOUSANDS/ΜL (ref 0.6–4.47)
LYMPHOCYTES NFR BLD AUTO: 27 % (ref 21–51)
MCH RBC QN AUTO: 31.2 PG (ref 26–34)
MCHC RBC AUTO-ENTMCNC: 33.7 G/DL (ref 31–37)
MCV RBC AUTO: 92 FL (ref 81–99)
MONOCYTES # BLD AUTO: 0.6 THOUSAND/ΜL (ref 0.17–1.22)
MONOCYTES NFR BLD AUTO: 11 % (ref 2–12)
MUCOUS THREADS UR QL AUTO: ABNORMAL
NEUTROPHILS # BLD AUTO: 3.4 THOUSANDS/ΜL (ref 1.4–6.5)
NEUTS SEG NFR BLD AUTO: 60 % (ref 42–75)
NITRITE UR QL STRIP: NEGATIVE
NON-SQ EPI CELLS URNS QL MICRO: ABNORMAL /HPF
PH UR STRIP.AUTO: 5 [PH]
PLATELET # BLD AUTO: 208 THOUSANDS/UL (ref 149–390)
PMV BLD AUTO: 6.7 FL (ref 8.6–11.7)
POTASSIUM SERPL-SCNC: 3.9 MMOL/L (ref 3.5–5.5)
PROT SERPL-MCNC: 7.2 G/DL (ref 6.4–8.9)
PROT UR STRIP-MCNC: NEGATIVE MG/DL
RBC # BLD AUTO: 3.92 MILLION/UL (ref 3.9–5.2)
RBC #/AREA URNS AUTO: ABNORMAL /HPF
SODIUM SERPL-SCNC: 131 MMOL/L (ref 134–143)
SP GR UR STRIP.AUTO: 1.02 (ref 1–1.03)
UROBILINOGEN UR QL STRIP.AUTO: 0.2 E.U./DL
WBC # BLD AUTO: 5.7 THOUSAND/UL (ref 4.8–10.8)
WBC #/AREA URNS AUTO: ABNORMAL /HPF

## 2019-11-20 PROCEDURE — 96361 HYDRATE IV INFUSION ADD-ON: CPT

## 2019-11-20 PROCEDURE — 83690 ASSAY OF LIPASE: CPT | Performed by: EMERGENCY MEDICINE

## 2019-11-20 PROCEDURE — 85025 COMPLETE CBC W/AUTO DIFF WBC: CPT | Performed by: EMERGENCY MEDICINE

## 2019-11-20 PROCEDURE — 36415 COLL VENOUS BLD VENIPUNCTURE: CPT | Performed by: EMERGENCY MEDICINE

## 2019-11-20 PROCEDURE — 87086 URINE CULTURE/COLONY COUNT: CPT | Performed by: EMERGENCY MEDICINE

## 2019-11-20 PROCEDURE — 74177 CT ABD & PELVIS W/CONTRAST: CPT

## 2019-11-20 PROCEDURE — 80053 COMPREHEN METABOLIC PANEL: CPT | Performed by: EMERGENCY MEDICINE

## 2019-11-20 PROCEDURE — 81003 URINALYSIS AUTO W/O SCOPE: CPT | Performed by: EMERGENCY MEDICINE

## 2019-11-20 PROCEDURE — 99284 EMERGENCY DEPT VISIT MOD MDM: CPT

## 2019-11-20 PROCEDURE — 99284 EMERGENCY DEPT VISIT MOD MDM: CPT | Performed by: EMERGENCY MEDICINE

## 2019-11-20 PROCEDURE — 96374 THER/PROPH/DIAG INJ IV PUSH: CPT

## 2019-11-20 PROCEDURE — 81001 URINALYSIS AUTO W/SCOPE: CPT | Performed by: EMERGENCY MEDICINE

## 2019-11-20 RX ORDER — CEPHALEXIN 500 MG/1
500 CAPSULE ORAL ONCE
Status: COMPLETED | OUTPATIENT
Start: 2019-11-20 | End: 2019-11-20

## 2019-11-20 RX ORDER — KETOROLAC TROMETHAMINE 30 MG/ML
15 INJECTION, SOLUTION INTRAMUSCULAR; INTRAVENOUS ONCE
Status: COMPLETED | OUTPATIENT
Start: 2019-11-20 | End: 2019-11-20

## 2019-11-20 RX ORDER — CEPHALEXIN 500 MG/1
500 CAPSULE ORAL EVERY 12 HOURS SCHEDULED
Qty: 10 CAPSULE | Refills: 0 | Status: SHIPPED | OUTPATIENT
Start: 2019-11-20 | End: 2019-11-25

## 2019-11-20 RX ADMIN — IOHEXOL 100 ML: 350 INJECTION, SOLUTION INTRAVENOUS at 22:06

## 2019-11-20 RX ADMIN — SODIUM CHLORIDE 500 ML: 0.9 INJECTION, SOLUTION INTRAVENOUS at 20:08

## 2019-11-20 RX ADMIN — IOHEXOL 50 ML: 240 INJECTION, SOLUTION INTRATHECAL; INTRAVASCULAR; INTRAVENOUS; ORAL at 22:06

## 2019-11-20 RX ADMIN — KETOROLAC TROMETHAMINE 15 MG: 30 INJECTION, SOLUTION INTRAMUSCULAR; INTRAVENOUS at 20:06

## 2019-11-20 RX ADMIN — CEPHALEXIN 500 MG: 500 CAPSULE ORAL at 23:51

## 2019-11-21 VITALS
SYSTOLIC BLOOD PRESSURE: 200 MMHG | RESPIRATION RATE: 16 BRPM | BODY MASS INDEX: 21.35 KG/M2 | HEART RATE: 77 BPM | TEMPERATURE: 97.6 F | WEIGHT: 116 LBS | OXYGEN SATURATION: 100 % | HEIGHT: 62 IN | DIASTOLIC BLOOD PRESSURE: 100 MMHG

## 2019-11-21 LAB — BACTERIA UR CULT: NORMAL

## 2019-11-21 NOTE — DISCHARGE INSTRUCTIONS
RETURN IF WORSE IN ANY WAY:     CHEST PAIN, SHORTNESS OF BREATH  INCREASED PAIN  FEVER OR FLU LIKE SYMPTOMS  OR NEW AND CONCERNING SYMPTOMS SIGNS OR SYMPTOMS:    PLEASE CALL YOUR PRIMARY DOCTOR IN THE MORNING TO SET UP FOLLOW UP FOR TOMORROW OR FRIDAY   PLEASE REVIEW THE WORK UP RESULTS WITH YOUR DOCTOR

## 2019-11-21 NOTE — ED NOTES
Patient resting quietly reporting she's just tired - lungs cta, abdomen soft with positive bowel sounds x4, negative n/v/d - patient reports her discomfort was on the left side and she does have a history of diverticulitits     Mega Fulton RN  11/20/19 3257

## 2019-11-21 NOTE — ED PROVIDER NOTES
History  Chief Complaint   Patient presents with    Abdominal Pain     patient has left lower quadrant pain that began around 1600 today  denies N,V,D but does have history of diverticulitis     80YEAR-OLD FEMALE  PMH: DIVERTICULITIS  SX HX: NO ABDOMINAL SURGERY HX    PATIENT IS HERE FOR LEFT LOWER QUADRANT ABDOMINAL PAIN      PT IS FAIRLY CERTAIN THIS IS HER DIVERTICULITIS - SHE HAS HAD IS SEVERAL TIMES  SHE WOULD RATE THIS FLARE AS MODERATE  SHE HAS NEVER BEEN ADMITTED FOR DIVERTICULITIS    STATES THIS STARTED AROUND FOR A COUPLE WEEKS, WORSE OVER THE LAST 2 DAYS AGO  CAME ON GRADUALLY  IT HAS BEEN COMING AND GOING, BUT IT HAS BEEN CONSTANT FOR THE LAST COUPLE OF HOURS  IT IS NOW  RATED 5/10  DESCRIBED AS PRESSURE    ASSOCIATED SYMPTOMS:  URINARY  SYMPTOMS: THERE IS NO DYSURIA, NO HEMATURIA, NO FREQUENCY  REPORTS NAUSEA, BUT DENIES ANY VOMITING      NO CP OR SOB  NO BACK PAIN OR FLANK PAIN    DENIES FEVERS AND CHILLS  DENIES LOOSE STOOLS OR DIARRHEA  NO BLOODY STOOLS - NOT BLACK OR BLOODY  DENIES CONSTIPATION, AND NO NAUSEA VOMITING    ALLEVIATING OR EXACERBATING FACTORS:  UNCERTAIN    INTERVENTIONS:   200 mg ADVIL AT 5PM          History provided by:  Patient  Abdominal Pain   Pain location:  LLQ  Pain quality: aching    Pain radiates to:  Back  Pain severity:  Moderate  Onset quality:  Gradual  Timing:  Intermittent  Progression:  Worsening  Chronicity:  Recurrent  Context: not awakening from sleep, not diet changes, not eating, not laxative use, not medication withdrawal, not previous surgeries, not recent illness, not recent sexual activity, not recent travel, not retching, not sick contacts, not suspicious food intake and not trauma    Relieved by:  Nothing  Worsened by:  Nothing  Ineffective treatments:  None tried  Associated symptoms: no chest pain, no chills, no constipation, no cough, no diarrhea, no dysuria, no fatigue, no fever, no flatus, no hematemesis, no hematochezia, no hematuria, no melena, no nausea, no shortness of breath, no sore throat and no vomiting    Risk factors: being elderly        Prior to Admission Medications   Prescriptions Last Dose Informant Patient Reported? Taking? ALPRAZolam (XANAX) 0 25 mg tablet  Self Yes No   Sig: Use 1 tab 2x/day as needed for anxiety   RA MELATONIN 3 MG   Yes No   Sig: Take 3 mg by mouth daily at bedtime   aspirin 81 mg chewable tablet   Yes No   Sig: Chew 81 mg daily   escitalopram (LEXAPRO) 5 mg tablet   Yes No   Sig: Take 5 mg by mouth daily    lansoprazole (PREVACID) 30 mg capsule   Yes No   Sig: Take 30 mg by mouth daily   levothyroxine 50 mcg tablet   Yes No   Sig: Take 50 mcg by mouth 3 (three) times a week   levothyroxine 75 mcg tablet  Self Yes No   Sig: tuesday, thursday, saturday, sunday   losartan (COZAAR) 50 mg tablet   No No   Sig: Take 1 tablet (50 mg total) by mouth daily   metoprolol succinate (TOPROL-XL) 25 mg 24 hr tablet   No No   Sig: Take 1 tablet (25 mg total) by mouth every 12 (twelve) hours      Facility-Administered Medications: None       Past Medical History:   Diagnosis Date    Anxiety     CAD (coronary artery disease)     Carotid Duplex 03/06/2018    Plaque formation was prominent bilaterally    Depression     Disease of thyroid gland     Diverticulitis     Dyslipidemia     ECHO 09/14/2017    EF 55%, mild mitral regurg, small pericardial effusion   Hypertension     Mitral regurgitation     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 infarction)     Takotsubo cardiomyopathy        Past Surgical History:   Procedure Laterality Date    CARDIAC CATHETERIZATION  08/27/2012    EF 35%, 70% stenosis of diagonal, Otherwise all OK apart from myopathy    CARDIAC CATHETERIZATION  10/10/2012    EF 55%, no significant CAD  Mild stress induced cardiomyopathy    TONSILLECTOMY         Family History   Problem Relation Age of Onset    Cancer Father     Prostate cancer Father      I have reviewed and agree with the history as documented      Social History     Tobacco Use    Smoking status: Never Smoker    Smokeless tobacco: Never Used   Substance Use Topics    Alcohol use: No    Drug use: No        Review of Systems   Constitutional: Negative for chills, fatigue and fever  HENT: Negative for rhinorrhea, sinus pressure, sinus pain, sore throat and trouble swallowing  Respiratory: Negative for cough and shortness of breath  Cardiovascular: Negative for chest pain  Gastrointestinal: Positive for abdominal pain  Negative for abdominal distention, anal bleeding, blood in stool, constipation, diarrhea, flatus, hematemesis, hematochezia, melena, nausea and vomiting  Genitourinary: Negative for difficulty urinating, dysuria, flank pain, frequency and hematuria  Musculoskeletal: Positive for back pain  Negative for gait problem, joint swelling, myalgias, neck pain and neck stiffness  Neurological: Negative for dizziness, weakness, light-headedness and headaches  All other systems reviewed and are negative  Physical Exam  Physical Exam   Constitutional: She is oriented to person, place, and time  She appears well-developed and well-nourished  No distress  HENT:   Head: Normocephalic and atraumatic  Nose: Nose normal    Mouth/Throat: Oropharynx is clear and moist  No oropharyngeal exudate  Eyes: Pupils are equal, round, and reactive to light  Conjunctivae and EOM are normal  Right eye exhibits no discharge  Left eye exhibits no discharge  No scleral icterus  Neck: Normal range of motion  Neck supple  No JVD present  No tracheal deviation present  Cardiovascular: Normal rate, regular rhythm, normal heart sounds and intact distal pulses  Exam reveals no gallop and no friction rub  No murmur heard  Pulmonary/Chest: Effort normal and breath sounds normal  No stridor  No respiratory distress  She has no wheezes  She has no rhonchi  She has no rales  She exhibits no tenderness  Abdominal: Soft   Bowel sounds are normal  She exhibits no distension, no pulsatile liver, no abdominal bruit, no ascites, no pulsatile midline mass and no mass  There is no hepatosplenomegaly, splenomegaly or hepatomegaly  There is tenderness in the left lower quadrant  There is no rigidity, no rebound, no guarding, no CVA tenderness, no tenderness at McBurney's point and negative Gu's sign  No hernia  Musculoskeletal: Normal range of motion  She exhibits no edema, tenderness or deformity  Lymphadenopathy:     She has no cervical adenopathy  Neurological: She is alert and oriented to person, place, and time  No cranial nerve deficit or sensory deficit  She exhibits normal muscle tone  Coordination normal    Skin: Skin is warm  Capillary refill takes less than 2 seconds  No rash noted  She is not diaphoretic  No erythema  No pallor  Psychiatric: She has a normal mood and affect   Her behavior is normal  Judgment and thought content normal        Vital Signs  ED Triage Vitals [11/20/19 1902]   Temperature Pulse Respirations Blood Pressure SpO2   98 2 °F (36 8 °C) 79 16 (!) 180/81 100 %      Temp Source Heart Rate Source Patient Position - Orthostatic VS BP Location FiO2 (%)   Temporal Monitor Sitting Left arm --      Pain Score       6           Vitals:    11/20/19 1902 11/20/19 2359   BP: (!) 180/81 (!) 200/100   Pulse: 79 77   Patient Position - Orthostatic VS: Sitting Sitting         Visual Acuity      ED Medications  Medications   ketorolac (TORADOL) injection 15 mg (15 mg Intravenous Given 11/20/19 2006)   sodium chloride 0 9 % bolus 500 mL (0 mL Intravenous Stopped 11/21/19 0001)   iohexol (OMNIPAQUE) 350 MG/ML injection (MULTI-DOSE) 100 mL (100 mL Intravenous Given 11/20/19 2206)   iohexol (OMNIPAQUE) 240 MG/ML solution 50 mL (50 mL Oral Given 11/20/19 2206)   cephalexin (KEFLEX) capsule 500 mg (500 mg Oral Given 11/20/19 2351)       Diagnostic Studies  Results Reviewed     Procedure Component Value Units Date/Time    Lipase [[id number]]  (Normal) Collected: 11/20/19 2001    Lab Status:  Final result Specimen:  Blood from Arm, Right Updated:  11/20/19 2027     Lipase 74 u/L     CMP [[id number]]  (Abnormal) Collected:  11/20/19 2001    Lab Status:  Final result Specimen:  Blood from Arm, Right Updated:  11/20/19 2027     Sodium 131 mmol/L      Potassium 3 9 mmol/L      Chloride 96 mmol/L      CO2 29 mmol/L      ANION GAP 6 mmol/L      BUN 17 mg/dL      Creatinine 1 02 mg/dL      Glucose 99 mg/dL      Calcium 9 4 mg/dL      AST 16 U/L      ALT 8 U/L      Alkaline Phosphatase 64 U/L      Total Protein 7 2 g/dL      Albumin 4 6 g/dL      Total Bilirubin 0 40 mg/dL      eGFR 51 ml/min/1 73sq m     Narrative:       [place] guidelines for Chronic Kidney Disease (CKD):     Stage 1 with normal or high GFR (GFR > 90 mL/min/1 73 square meters)    Stage 2 Mild CKD (GFR = 60-89 mL/min/1 73 square meters)    Stage 3A Moderate CKD (GFR = 45-59 mL/min/1 73 square meters)    Stage 3B Moderate CKD (GFR = 30-44 mL/min/1 73 square meters)    Stage 4 Severe CKD (GFR = 15-29 mL/min/1 73 square meters)    Stage 5 End Stage CKD (GFR <15 mL/min/1 73 square meters)  Note: GFR calculation is accurate only with a steady state creatinine    Urine Microscopic [[id number]]  (Abnormal) Collected:  11/20/19 1955    Lab Status:  Final result Specimen:  Urine, Clean Catch Updated:  11/20/19 2011     RBC, UA 1-2 /hpf      WBC, UA 20-30 /hpf      Epithelial Cells Occasional /hpf      Bacteria, UA Occasional /hpf      MUCUS THREADS Occasional    Urine culture [[id number]] Collected:  11/20/19 1955    Lab Status:   In process Specimen:  Urine, Clean Catch Updated:  11/20/19 2011    UA w Reflex to Microscopic w Reflex to Culture [[id number]]  (Abnormal) Collected:  11/20/19 1955    Lab Status:  Final result Specimen:  Urine, Clean Catch Updated:  11/20/19 2009     Color, UA Yellow     Clarity, UA Clear     Specific [place], UA 1 020     pH, UA 5 0     Leukocytes, UA 3+ Nitrite, UA Negative     Protein, UA Negative mg/dl      Glucose, UA Negative mg/dl      Ketones, UA Negative mg/dl      Urobilinogen, UA 0 2 E U /dl      Bilirubin, UA Negative     Blood, UA 1+    CBC and differential [[id number]]  (Abnormal) Collected:  11/20/19 2001    Lab Status:  Final result Specimen:  Blood from Arm, Right Updated:  11/20/19 2009     WBC 5 70 Thousand/uL      RBC 3 92 Million/uL      Hemoglobin 12 2 g/dL      Hematocrit 36 3 %      MCV 92 fL      MCH 31 2 pg      MCHC 33 7 g/dL      RDW 13 3 %      MPV 6 7 fL      Platelets 938 Thousands/uL      Neutrophils Relative 60 %      Lymphocytes Relative 27 %      Monocytes Relative 11 %      Eosinophils Relative 2 %      Basophils Relative 1 %      Neutrophils Absolute 3 40 Thousands/µL      Lymphocytes Absolute 1 50 Thousands/µL      Monocytes Absolute 0 60 Thousand/µL      Eosinophils Absolute 0 10 Thousand/µL      Basophils Absolute 0 00 Thousands/µL                  CT Abdomen pelvis with contrast   Final Result by Jaclyn Bee MD (11/20 3714)      1  Stool-filled ascending, transverse, and proximal descending colon, correlate for constipation  2   Diverticulosis without evidence of diverticulitis  3   Small amount of contrast within the lower esophagus may be due to gastroesophageal reflux  4   Moderate to severe narrowing at the origins of the celiac and superior mesenteric arteries  5   Other findings as above              Workstation performed: [id number]                    Procedures  Procedures       ED Course  ED Course as of Nov 21 0411 Wed Nov 20, 2019   2248 Nitrite, UA: Negative   2248 POCT URINE PROTEIN: Negative   2248 Glucose, UA: Negative   2248 Ketones, UA: Negative   2248 Leukocytes, UA(!): 3+   2248 Lipase: 74   2248 WBC, UA(!): 20-30   2248 WBC: 5 70   2248 Hemoglobin: 12 2   2248 Neutrophils %: 60   2249 Lymphocytes Relative: 27   2249 Monocytes Relative: 11   2250 PT FEELS MUCH BETTER  SHE IS ANXIOUS TO LEAVE AND IS REQUESTING DC  SHE UNDERSTANDS RESULTS   UTI, OTHERWISE LABS UNREMARKABLE AND APPEAR AT BASELINE    SHE UNDERSTANDS THAT CT STILL NOT READ      2250 Sodium(!): 131   2250 Potassium: 3 9   2250 Chloride(!): 96   2250 CO2: 29   2250 Anion Gap: 6   2250 BUN: 17   2250 Creatinine: 1 02   2250 Calcium: 9 4   2250 AST: 16   2339 CT ABDOMEN AND PELVIS WITH IV CONTRAST     INDICATION:   Abdominal pain, acute, nonlocalized      COMPARISON:  CT of abdomen pelvis on Farhana 10, 2019      IMPRESSION:     1   Stool-filled ascending, transverse, and proximal descending colon, correlate for constipation  2   Diverticulosis without evidence of diverticulitis  3   Small amount of contrast within the lower esophagus may be due to gastroesophageal reflux  4   Moderate to severe narrowing at the origins of the celiac and superior mesenteric arteries  5   Other findings as above                 2340 CT reviewed  Will dc at pt's request  Dx: UTI          2345 PATIENT UNDERSTANDS THE RESULTS OF HER WORKUP    SHE IS VERY APPRECIATIVE OF HER ER CARE AND SHE IS READY TO MANAGE FROM HOME          2346 PT JUST TOOK HER NIGHT TIME BP MEDS  SHE DOES NOT WANT TO WAIT TO SEE IF IT TAKES AFFECT, WANTS TO GO HOME NOW                                  MDM    Disposition  Final diagnoses:   Left lower quadrant abdominal pain   UTI (urinary tract infection)   Constipation, unspecified constipation type     Time reflects when diagnosis was documented in both MDM as applicable and the Disposition within this note     Time User Action Codes Description Comment    11/20/2019 11:02 PM Beck Thurman Add [R10 32] Left lower quadrant abdominal pain     11/20/2019 11:02 PM Beck Thurman Add [N39 0] UTI (urinary tract infection)     11/21/2019  4:06 AM Beck Thurman Add [K59 00] Constipation, unspecified constipation type       ED Disposition     ED Disposition Condition Date/Time Comment    Discharge Stable Wed Nov 20, 2019 11:40 PM Lily Salvia discharge to home/self care  Follow-up Information    None         Discharge Medication List as of 11/20/2019 11:43 PM      START taking these medications    Details   cephalexin (KEFLEX) 500 mg capsule Take 1 capsule (500 mg total) by mouth every 12 (twelve) hours for 5 days, Starting Wed 11/20/2019, Until Mon 11/25/2019, Normal         CONTINUE these medications which have NOT CHANGED    Details   ALPRAZolam (XANAX) 0 25 mg tablet Use 1 tab 2x/day as needed for anxiety, Historical Med      aspirin 81 mg chewable tablet Chew 81 mg daily, Starting Mon 8/10/2015, Historical Med      escitalopram (LEXAPRO) 5 mg tablet Take 5 mg by mouth daily , Starting Thu 10/24/2019, Historical Med      lansoprazole (PREVACID) 30 mg capsule Take 30 mg by mouth daily, Historical Med      !! levothyroxine 50 mcg tablet Take 50 mcg by mouth 3 (three) times a week, Historical Med      !! levothyroxine 75 mcg tablet tuesday, thursday, saturday, sunday, Historical Med      losartan (COZAAR) 50 mg tablet Take 1 tablet (50 mg total) by mouth daily, Starting Mon 11/4/2019, Until Sun 2/20/2022, Normal      metoprolol succinate (TOPROL-XL) 25 mg 24 hr tablet Take 1 tablet (25 mg total) by mouth every 12 (twelve) hours, Starting Mon 11/4/2019, Normal      RA MELATONIN 3 MG Take 3 mg by mouth daily at bedtime, Starting Thu 10/24/2019, Historical Med       !! - Potential duplicate medications found  Please discuss with provider  No discharge procedures on file      ED Provider  Electronically Signed by           Yola Dong MD  11/21/19 0559       Yola Dong MD  11/21/19 2502

## 2019-12-03 ENCOUNTER — OFFICE VISIT (OUTPATIENT)
Dept: CARDIOLOGY CLINIC | Facility: CLINIC | Age: 84
End: 2019-12-03
Payer: COMMERCIAL

## 2019-12-03 VITALS
BODY MASS INDEX: 22.08 KG/M2 | SYSTOLIC BLOOD PRESSURE: 130 MMHG | HEIGHT: 62 IN | HEART RATE: 66 BPM | WEIGHT: 120 LBS | DIASTOLIC BLOOD PRESSURE: 70 MMHG

## 2019-12-03 DIAGNOSIS — R55 SYNCOPE, UNSPECIFIED SYNCOPE TYPE: Primary | ICD-10-CM

## 2019-12-03 DIAGNOSIS — I51.81 TAKOTSUBO CARDIOMYOPATHY: ICD-10-CM

## 2019-12-03 DIAGNOSIS — I10 BENIGN ESSENTIAL HTN: ICD-10-CM

## 2019-12-03 PROCEDURE — 99214 OFFICE O/P EST MOD 30 MIN: CPT | Performed by: INTERNAL MEDICINE

## 2019-12-03 PROCEDURE — 3078F DIAST BP <80 MM HG: CPT | Performed by: INTERNAL MEDICINE

## 2019-12-03 PROCEDURE — 3075F SYST BP GE 130 - 139MM HG: CPT | Performed by: INTERNAL MEDICINE

## 2019-12-03 RX ORDER — PREDNISOLONE ACETATE 10 MG/ML
1 SUSPENSION/ DROPS OPHTHALMIC
COMMUNITY
End: 2020-06-26 | Stop reason: HOSPADM

## 2019-12-03 NOTE — PROGRESS NOTES
Patient ID: Sadaf Haque is a 80 y o  female  Plan:      Takotsubo cardiomyopathy  Resolved  Syncope  No clear etiology  Short pauses at [facility]  Will check an event recorder  Benign essential HTN  BP well controlled today  Follow up Plan:   if the event recorder is unrevealing then return visit in 1 year  HPI:     The patient is seen today regarding follow-up of a syncopal episode  On black Friday, that is the day after Thanksgiving, she was shopping and suddenly felt lightheaded and just about passed out  She was [facility] to [facility]  An echocardiogram was normal   There were short pauses on telemetry that is less than 2 seconds  Follow-up was recommended  No recent chest pain or chest pressure  She follows with us for a history of hypertension and prior Takasubo's cardiomyopathy  she is brought today by her granddaughter  Most recent or relevant cardiac/vascular testing:      Cardiac catheterization 10/02/2010 012: No significant CAD  Echocardiogram 11/30/2019: Normal       Past Surgical History:   Procedure Laterality Date    CARDIAC CATHETERIZATION  08/27/2012    EF 35%, 70% stenosis of diagonal, Otherwise all OK apart from myopathy    CARDIAC CATHETERIZATION  10/10/2012    EF 55%, no significant CAD  Mild stress induced cardiomyopathy    TONSILLECTOMY       CMP:   Lab Results   Component Value Date    K 3 9 11/20/2019    CL 96 (L) 11/20/2019    CO2 29 11/20/2019    BUN 17 11/20/2019    CREATININE 1 02 11/20/2019    EGFR 51 11/20/2019       Lipid Profile: No results found for: CHOL, TRIG, HDL, LDL      Review of Systems   10  point ROS  was otherwise non pertinent or negative except as per HPI or as below  Gait:   Slow but normal       Objective:     /70   Pulse 66   Ht 5' 2" (1 575 m)   Wt 54 4 kg (120 lb)   BMI 21 95 kg/m²     PHYSICAL EXAM:    General:  Normal appearance in no distress  Eyes:  Anicteric    Oral mucosa: Moist   Neck:  No JVD  Carotid upstrokes are brisk without bruits  No masses  Chest:  Clear to auscultation and percussion  Cardiac:  Normal PMI  Normal S1 and S2  No murmur gallop or rub  Abdomen:  Soft and nontender  No palpable organomegaly or aortic enlargement  Extremities:  No peripheral edema  Musculoskeletal:  Symmetric  Vascular:  Femoral pulses are brisk without bruits  Popliteal pulses are intact bilaterally  Pedal pulses are intact  Neuro:  Grossly symmetric  Psych:  Alert and oriented x3  Current Outpatient Medications:     ALPRAZolam (XANAX) 0 25 mg tablet, Use 1 tab 2x/day as needed for anxiety, Disp: , Rfl:     aspirin 81 mg chewable tablet, Chew 81 mg daily, Disp: , Rfl:     escitalopram (LEXAPRO) 5 mg tablet, Take 5 mg by mouth daily , Disp: , Rfl: 0    lansoprazole (PREVACID) 30 mg capsule, Take 30 mg by mouth daily, Disp: , Rfl:     levothyroxine 50 mcg tablet, Take 50 mcg by mouth 3 (three) times a week, Disp: , Rfl:     levothyroxine 75 mcg tablet, tuesday, thursday, saturday, sunday, Disp: , Rfl:     losartan (COZAAR) 50 mg tablet, Take 1 tablet (50 mg total) by mouth daily (Patient taking differently: Take 50 mg by mouth 2 (two) times a day ), Disp: 30 tablet, Rfl: 3    metoprolol succinate (TOPROL-XL) 25 mg 24 hr tablet, Take 1 tablet (25 mg total) by mouth every 12 (twelve) hours, Disp: 60 tablet, Rfl: 3    prednisoLONE acetate (PRED FORTE) 1 % ophthalmic suspension, Apply 1 drop to eye, Disp: , Rfl:     RA MELATONIN 3 MG, Take 3 mg by mouth daily at bedtime, Disp: , Rfl: 0  Allergies   Allergen Reactions    No Known Allergies      Past Medical History:   Diagnosis Date    Anxiety     CAD (coronary artery disease)     Carotid Duplex 03/06/2018    Plaque formation was prominent bilaterally    Depression     Disease of thyroid gland     Diverticulitis     Dyslipidemia     ECHO 09/14/2017    EF 55%, mild mitral regurg, small pericardial effusion      Hypertension     Mitral regurgitation     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 infarction)     Takotsubo cardiomyopathy            Social History     Tobacco Use   Smoking Status Never Smoker   Smokeless Tobacco Never Used

## 2019-12-06 DIAGNOSIS — I10 HYPERTENSION: ICD-10-CM

## 2019-12-06 RX ORDER — METOPROLOL SUCCINATE 25 MG/1
25 TABLET, EXTENDED RELEASE ORAL EVERY 12 HOURS
Qty: 180 TABLET | Refills: 3 | Status: SHIPPED | OUTPATIENT
Start: 2019-12-06 | End: 2020-05-20 | Stop reason: HOSPADM

## 2019-12-06 NOTE — TELEPHONE ENCOUNTER
refill   Received:  Today   Message Contents   [facility]             Metoprolol XL 25 mg bid     90-day supply     [employer]

## 2020-02-09 ENCOUNTER — HOSPITAL ENCOUNTER (INPATIENT)
Facility: HOSPITAL | Age: 85
LOS: 1 days | Discharge: HOME | DRG: 282 | End: 2020-02-10
Attending: INTERNAL MEDICINE | Admitting: INTERNAL MEDICINE
Payer: COMMERCIAL

## 2020-02-09 ENCOUNTER — APPOINTMENT (EMERGENCY)
Dept: RADIOLOGY | Facility: HOSPITAL | Age: 85
DRG: 282 | End: 2020-02-09
Payer: COMMERCIAL

## 2020-02-09 DIAGNOSIS — R10.30 LOWER ABDOMINAL PAIN: ICD-10-CM

## 2020-02-09 DIAGNOSIS — K59.04 CHRONIC IDIOPATHIC CONSTIPATION: ICD-10-CM

## 2020-02-09 DIAGNOSIS — I16.1 HYPERTENSIVE EMERGENCY: ICD-10-CM

## 2020-02-09 DIAGNOSIS — E03.4 HYPOTHYROIDISM DUE TO ACQUIRED ATROPHY OF THYROID: Chronic | ICD-10-CM

## 2020-02-09 DIAGNOSIS — I21.A1 TYPE 2 MI (MYOCARDIAL INFARCTION) (HCC): Primary | ICD-10-CM

## 2020-02-09 PROBLEM — E03.9 HYPOTHYROIDISM: Chronic | Status: ACTIVE | Noted: 2019-11-04

## 2020-02-09 LAB
ANION GAP SERPL CALCULATED.3IONS-SCNC: 8 MMOL/L (ref 4–13)
ATRIAL RATE: 78 BPM
BASOPHILS # BLD AUTO: 0 THOUSANDS/ΜL (ref 0–0.1)
BASOPHILS NFR BLD AUTO: 0 % (ref 0–2)
BUN SERPL-MCNC: 11 MG/DL (ref 7–25)
CALCIUM SERPL-MCNC: 10.2 MG/DL (ref 8.6–10.5)
CHLORIDE SERPL-SCNC: 97 MMOL/L (ref 98–107)
CO2 SERPL-SCNC: 28 MMOL/L (ref 21–31)
CREAT SERPL-MCNC: 0.92 MG/DL (ref 0.6–1.2)
D DIMER PPP FEU-MCNC: 0.16 UG/ML FEU
EOSINOPHIL # BLD AUTO: 0 THOUSAND/ΜL (ref 0–0.61)
EOSINOPHIL NFR BLD AUTO: 1 % (ref 0–5)
ERYTHROCYTE [DISTWIDTH] IN BLOOD BY AUTOMATED COUNT: 13 % (ref 11.5–14.5)
GFR SERPL CREATININE-BSD FRML MDRD: 57 ML/MIN/1.73SQ M
GLUCOSE SERPL-MCNC: 104 MG/DL (ref 65–99)
HCT VFR BLD AUTO: 40.5 % (ref 42–47)
HGB BLD-MCNC: 13.3 G/DL (ref 12–16)
LYMPHOCYTES # BLD AUTO: 0.7 THOUSANDS/ΜL (ref 0.6–4.47)
LYMPHOCYTES NFR BLD AUTO: 15 % (ref 21–51)
MCH RBC QN AUTO: 30.7 PG (ref 26–34)
MCHC RBC AUTO-ENTMCNC: 32.9 G/DL (ref 31–37)
MCV RBC AUTO: 93 FL (ref 81–99)
MONOCYTES # BLD AUTO: 0.5 THOUSAND/ΜL (ref 0.17–1.22)
MONOCYTES NFR BLD AUTO: 9 % (ref 2–12)
NEUTROPHILS # BLD AUTO: 3.8 THOUSANDS/ΜL (ref 1.4–6.5)
NEUTS SEG NFR BLD AUTO: 75 % (ref 42–75)
P AXIS: 88 DEGREES
PLATELET # BLD AUTO: 208 THOUSANDS/UL (ref 149–390)
PMV BLD AUTO: 6.9 FL (ref 8.6–11.7)
POTASSIUM SERPL-SCNC: 4.2 MMOL/L (ref 3.5–5.5)
PR INTERVAL: 218 MS
QRS AXIS: 95 DEGREES
QRSD INTERVAL: 104 MS
QT INTERVAL: 400 MS
QTC INTERVAL: 456 MS
RBC # BLD AUTO: 4.34 MILLION/UL (ref 3.9–5.2)
SODIUM SERPL-SCNC: 133 MMOL/L (ref 134–143)
T WAVE AXIS: 90 DEGREES
TROPONIN I SERPL-MCNC: 0.49 NG/ML
TROPONIN I SERPL-MCNC: 1.16 NG/ML
TROPONIN I SERPL-MCNC: 1.22 NG/ML
VENTRICULAR RATE: 78 BPM
WBC # BLD AUTO: 5.1 THOUSAND/UL (ref 4.8–10.8)

## 2020-02-09 PROCEDURE — 99285 EMERGENCY DEPT VISIT HI MDM: CPT

## 2020-02-09 PROCEDURE — 71046 X-RAY EXAM CHEST 2 VIEWS: CPT

## 2020-02-09 PROCEDURE — 36415 COLL VENOUS BLD VENIPUNCTURE: CPT | Performed by: INTERNAL MEDICINE

## 2020-02-09 PROCEDURE — 85379 FIBRIN DEGRADATION QUANT: CPT | Performed by: INTERNAL MEDICINE

## 2020-02-09 PROCEDURE — 84484 ASSAY OF TROPONIN QUANT: CPT | Performed by: INTERNAL MEDICINE

## 2020-02-09 PROCEDURE — 85025 COMPLETE CBC W/AUTO DIFF WBC: CPT | Performed by: INTERNAL MEDICINE

## 2020-02-09 PROCEDURE — 93005 ELECTROCARDIOGRAM TRACING: CPT

## 2020-02-09 PROCEDURE — 99223 1ST HOSP IP/OBS HIGH 75: CPT | Performed by: PHYSICIAN ASSISTANT

## 2020-02-09 PROCEDURE — 99283 EMERGENCY DEPT VISIT LOW MDM: CPT | Performed by: INTERNAL MEDICINE

## 2020-02-09 PROCEDURE — 80048 BASIC METABOLIC PNL TOTAL CA: CPT | Performed by: INTERNAL MEDICINE

## 2020-02-09 PROCEDURE — 93010 ELECTROCARDIOGRAM REPORT: CPT | Performed by: INTERNAL MEDICINE

## 2020-02-09 PROCEDURE — 84484 ASSAY OF TROPONIN QUANT: CPT | Performed by: PHYSICIAN ASSISTANT

## 2020-02-09 RX ORDER — ONDANSETRON 2 MG/ML
4 INJECTION INTRAMUSCULAR; INTRAVENOUS EVERY 6 HOURS PRN
Status: DISCONTINUED | OUTPATIENT
Start: 2020-02-09 | End: 2020-02-10 | Stop reason: HOSPADM

## 2020-02-09 RX ORDER — MELATONIN
1000 DAILY
Status: DISCONTINUED | OUTPATIENT
Start: 2020-02-09 | End: 2020-02-10 | Stop reason: HOSPADM

## 2020-02-09 RX ORDER — ACETAMINOPHEN 325 MG/1
650 TABLET ORAL EVERY 6 HOURS PRN
Status: DISCONTINUED | OUTPATIENT
Start: 2020-02-09 | End: 2020-02-10 | Stop reason: HOSPADM

## 2020-02-09 RX ORDER — MELATONIN
1000 DAILY
COMMUNITY

## 2020-02-09 RX ORDER — PREDNISOLONE ACETATE 10 MG/ML
1 SUSPENSION/ DROPS OPHTHALMIC
Status: DISCONTINUED | OUTPATIENT
Start: 2020-02-10 | End: 2020-02-10 | Stop reason: HOSPADM

## 2020-02-09 RX ORDER — ASPIRIN 81 MG/1
81 TABLET, CHEWABLE ORAL DAILY
Status: DISCONTINUED | OUTPATIENT
Start: 2020-02-09 | End: 2020-02-09

## 2020-02-09 RX ORDER — ALPRAZOLAM 0.25 MG/1
0.25 TABLET ORAL 3 TIMES DAILY PRN
Status: DISCONTINUED | OUTPATIENT
Start: 2020-02-09 | End: 2020-02-10 | Stop reason: HOSPADM

## 2020-02-09 RX ORDER — METOPROLOL SUCCINATE 25 MG/1
25 TABLET, EXTENDED RELEASE ORAL EVERY 12 HOURS SCHEDULED
Status: DISCONTINUED | OUTPATIENT
Start: 2020-02-09 | End: 2020-02-10 | Stop reason: HOSPADM

## 2020-02-09 RX ORDER — ENALAPRILAT 2.5 MG/2ML
1.25 INJECTION INTRAVENOUS EVERY 6 HOURS PRN
Status: DISCONTINUED | OUTPATIENT
Start: 2020-02-09 | End: 2020-02-10 | Stop reason: HOSPADM

## 2020-02-09 RX ORDER — MINERAL OIL AND PETROLATUM 150; 830 MG/G; MG/G
OINTMENT OPHTHALMIC
Status: DISCONTINUED | OUTPATIENT
Start: 2020-02-09 | End: 2020-02-10 | Stop reason: HOSPADM

## 2020-02-09 RX ORDER — NITROGLYCERIN 0.4 MG/1
0.4 TABLET SUBLINGUAL
Status: DISCONTINUED | OUTPATIENT
Start: 2020-02-09 | End: 2020-02-10 | Stop reason: HOSPADM

## 2020-02-09 RX ORDER — LEVOTHYROXINE SODIUM 0.07 MG/1
75 TABLET ORAL
Status: DISCONTINUED | OUTPATIENT
Start: 2020-02-11 | End: 2020-02-10 | Stop reason: HOSPADM

## 2020-02-09 RX ORDER — ASPIRIN 81 MG/1
81 TABLET, CHEWABLE ORAL DAILY
Status: DISCONTINUED | OUTPATIENT
Start: 2020-02-10 | End: 2020-02-10 | Stop reason: HOSPADM

## 2020-02-09 RX ORDER — LEVOTHYROXINE SODIUM 0.05 MG/1
50 TABLET ORAL 3 TIMES WEEKLY
Status: DISCONTINUED | OUTPATIENT
Start: 2020-02-10 | End: 2020-02-10 | Stop reason: HOSPADM

## 2020-02-09 RX ORDER — SODIUM CHLORIDE 9 MG/ML
3 INJECTION INTRAVENOUS AS NEEDED
Status: DISCONTINUED | OUTPATIENT
Start: 2020-02-09 | End: 2020-02-10 | Stop reason: HOSPADM

## 2020-02-09 RX ORDER — AMLODIPINE BESYLATE 5 MG/1
5 TABLET ORAL ONCE
Status: COMPLETED | OUTPATIENT
Start: 2020-02-09 | End: 2020-02-09

## 2020-02-09 RX ORDER — MAGNESIUM HYDROXIDE/ALUMINUM HYDROXICE/SIMETHICONE 120; 1200; 1200 MG/30ML; MG/30ML; MG/30ML
30 SUSPENSION ORAL EVERY 4 HOURS PRN
Status: DISCONTINUED | OUTPATIENT
Start: 2020-02-09 | End: 2020-02-10 | Stop reason: HOSPADM

## 2020-02-09 RX ORDER — POLYETHYLENE GLYCOL 3350 17 G/17G
17 POWDER, FOR SOLUTION ORAL DAILY
Status: DISCONTINUED | OUTPATIENT
Start: 2020-02-10 | End: 2020-02-10 | Stop reason: HOSPADM

## 2020-02-09 RX ORDER — PANTOPRAZOLE SODIUM 20 MG/1
20 TABLET, DELAYED RELEASE ORAL
Status: DISCONTINUED | OUTPATIENT
Start: 2020-02-10 | End: 2020-02-10 | Stop reason: HOSPADM

## 2020-02-09 RX ORDER — LOSARTAN POTASSIUM 50 MG/1
50 TABLET ORAL 2 TIMES DAILY
Status: DISCONTINUED | OUTPATIENT
Start: 2020-02-09 | End: 2020-02-10 | Stop reason: HOSPADM

## 2020-02-09 RX ADMIN — ENOXAPARIN SODIUM 40 MG: 40 INJECTION SUBCUTANEOUS at 13:13

## 2020-02-09 RX ADMIN — METOPROLOL SUCCINATE 25 MG: 25 TABLET, EXTENDED RELEASE ORAL at 21:41

## 2020-02-09 RX ADMIN — AMLODIPINE BESYLATE 5 MG: 5 TABLET ORAL at 12:02

## 2020-02-09 RX ADMIN — NITROGLYCERIN 0.4 MG: 0.4 TABLET SUBLINGUAL at 13:13

## 2020-02-09 RX ADMIN — LOSARTAN POTASSIUM 50 MG: 50 TABLET, FILM COATED ORAL at 17:26

## 2020-02-09 RX ADMIN — ALUMINUM HYDROXIDE, MAGNESIUM HYDROXIDE, AND SIMETHICONE 30 ML: 200; 200; 20 SUSPENSION ORAL at 13:13

## 2020-02-09 RX ADMIN — ALPRAZOLAM 0.25 MG: 0.25 TABLET ORAL at 17:26

## 2020-02-09 RX ADMIN — ACETAMINOPHEN 650 MG: 325 TABLET ORAL at 13:23

## 2020-02-09 RX ADMIN — ENOXAPARIN SODIUM 50 MG: 60 INJECTION SUBCUTANEOUS at 21:41

## 2020-02-09 RX ADMIN — Medication: at 21:41

## 2020-02-09 NOTE — ASSESSMENT & PLAN NOTE
· Likely secondary to elevated blood pressure  · Will continue to trend troponin  · Monitor telemetry  · Cardiology consult in the a m  · Check an echocardiogram in the a m

## 2020-02-09 NOTE — H&P
H&P- Blake Gonzalez 1935, 80 y o  female MRN: [id number]    Unit/Bed#: -02 Encounter: [id number]    Primary Care Provider: Kayley Dacosta MD   Date and time admitted to hospital: 2/9/2020 10:40 AM        * Type 2 MI (myocardial infarction) ([facility] [address] )  Assessment & Plan  · Likely secondary to elevated blood pressure  · Will continue to trend troponin  · Monitor telemetry  · Cardiology consult in the a m  · Had Echo November 30, 2019 at [address] with EF 60%, mild aortic regurg, mild mitral regurg, mild tricuspid regurg  Troponin increasing, will change to therapeutic lovenox    Hypertensive emergency  Assessment & Plan  · BP was 205/98 in the emergency room, she was given Norvasc  · Will have p r n  Medicines and resume her home meds  · Symptomatic secondary to elevated troponin    Hypothyroidism  Assessment & Plan  · Continue levothyroxine      VTE Prophylaxis: Enoxaparin (Lovenox)  Code Status: full code  POLST: POLST is not applicable to this patient  Discussion with family: daughter and son updated in the room    Anticipated Length of Stay:  Patient will be admitted on an Inpatient basis with an anticipated length of stay of  > 2 midnights  Justification for Hospital Stay: Lab monitoring, specialist input    Chief Complaint:   Midsternal discomfort    History of Present Illness:    Blake Gonzalez is a 80 y o  female who presents with midsternal discomfort  Patient with past medical history CAD, hypertension, hyperlipidemia, history of Takotsubo's cardiomyopathy prior MI presented secondary to midsternal chest discomfort she was found have a blood pressure of 205/98  She also had abnormal troponin  Patient reported yesterday had fried potatoes and fish, had some indigestion all night  This morning continued with feeling  Had elevated blood pressure, took meds, no improvement  Was feeling good until ate the dinner  No jaw pain, arm  Had some neck and back pain  No diaphoresis, clammy    Had some burping and belching  Patient reports improvement after the nitro in the Maalox and she was getting ready to have some soup  Repeat blood pressure improved to 130/84  Patient had syncopal episode in November 30th while out shopping had a workup at [facility] which was negative  Review of Systems:  Review of Systems   Constitutional: Negative for chills, diaphoresis and fever  HENT: Positive for rhinorrhea  Negative for sore throat and trouble swallowing  Eyes: Negative for discharge and redness  Respiratory: Negative for cough and shortness of breath  Cardiovascular: Positive for chest pain  Negative for palpitations and leg swelling  Gastrointestinal: Positive for abdominal pain and nausea  Negative for diarrhea and vomiting  Genitourinary: Negative for dysuria and hematuria  Musculoskeletal: Positive for back pain and neck pain  Skin: Negative for rash and wound  Neurological: Negative for dizziness and headaches  Psychiatric/Behavioral: Negative for agitation and confusion  Past Medical and Surgical History:   Past Medical History:   Diagnosis Date    Anxiety     CAD (coronary artery disease)     Carotid Duplex 03/06/2018    Plaque formation was prominent bilaterally    Depression     Disease of thyroid gland     Diverticulitis     Dyslipidemia     ECHO 09/14/2017    EF 55%, mild mitral regurg, small pericardial effusion   Hypertension     Mitral regurgitation     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 infarction)     Shingles     Takotsubo cardiomyopathy        Past Surgical History:   Procedure Laterality Date    CARDIAC CATHETERIZATION  08/27/2012    EF 35%, 70% stenosis of diagonal, Otherwise all OK apart from myopathy    CARDIAC CATHETERIZATION  10/10/2012    EF 55%, no significant CAD    Mild stress induced cardiomyopathy    CATARACT EXTRACTION Bilateral     EYE SURGERY      bilateral cataract    TONSILLECTOMY         Meds/Allergies:  Prior to Admission medications Medication Sig Start Date End Date Taking? Authorizing Provider   ALPRAZolam Norbert Vela) 0 25 mg tablet Use 1 tab 2x/day as needed for anxiety 6/14/18   Historical Provider, MD   aspirin 81 mg chewable tablet Chew 81 mg daily 8/10/15   Historical Provider, MD   escitalopram (LEXAPRO) 5 mg tablet Take 5 mg by mouth daily  10/24/19   Historical Provider, MD   lansoprazole (PREVACID) 30 mg capsule Take 30 mg by mouth daily    Historical Provider, MD   levothyroxine 50 mcg tablet Take 50 mcg by mouth 3 (three) times a week    Historical Provider, MD   levothyroxine 75 mcg tablet tuesday, thursday, saturday, sunday 6/14/18   Historical Provider, MD   losartan (COZAAR) 50 mg tablet Take 1 tablet (50 mg total) by mouth daily  Patient taking differently: Take 50 mg by mouth 2 (two) times a day  11/4/19 2/20/22  Irma Carvajal MD   metoprolol succinate (TOPROL-XL) 25 mg 24 hr tablet Take 1 tablet (25 mg total) by mouth every 12 (twelve) hours 12/6/19   Pratik Jain PA-C   prednisoLONE acetate (PRED FORTE) 1 % ophthalmic suspension Apply 1 drop to eye    Historical Provider, MD KERR MELATONIN 3 MG Take 3 mg by mouth daily at bedtime 10/24/19   Historical Provider, MD     I have reviewed home medications with patient personally  Allergies: Allergies   Allergen Reactions    No Known Allergies        Social History:  Marital Status:     Occupation: retired  Patient Pre-hospital Living Situation: home  Patient Pre-hospital Level of Mobility: no assisted device  Patient Pre-hospital Diet Restrictions: low fat, low sodium  Substance Use History:     Social History     Substance and Sexual Activity   Alcohol Use Never    Frequency: Never     Social History     Tobacco Use   Smoking Status Never Smoker   Smokeless Tobacco Never Used     Social History     Substance and Sexual Activity   Drug Use Never       Family History:  I have reviewed the patients family history    Physical Exam:   Vitals:   Blood Pressure: 130/84 (02/09/20 1343)  Pulse: 88 (02/09/20 1229)  Temperature: 98 °F (36 7 °C) (02/09/20 1229)  Temp Source: Temporal (02/09/20 1229)  Respirations: 18 (02/09/20 1229)  Height: 5' 2" (157 5 cm) (02/09/20 1229)  Weight - Scale: 52 5 kg (115 lb 11 9 oz) (02/09/20 1229)  SpO2: 97 % (02/09/20 1229)    Physical Exam   Constitutional: She is oriented to person, place, and time  She appears well-developed and well-nourished  Frail elderly  female   HENT:   Head: Normocephalic and atraumatic  Eyes: Conjunctivae and EOM are normal  Right eye exhibits no discharge  Left eye exhibits no discharge  Glasses in place   Neck: Normal range of motion  No tracheal deviation present  Cardiovascular: Normal rate, regular rhythm and normal heart sounds  Exam reveals no gallop and no friction rub  No murmur heard  Pulmonary/Chest: Effort normal and breath sounds normal  No respiratory distress  She has no wheezes  She has no rales  Abdominal: Soft  Bowel sounds are normal  She exhibits no distension and no mass  There is no tenderness  There is no guarding  Musculoskeletal: Normal range of motion  She exhibits no edema, tenderness or deformity  Neurological: She is alert and oriented to person, place, and time  Skin: Skin is warm and dry  No rash noted  No erythema  There is pallor  Psychiatric: Her behavior is normal  Judgment and thought content normal    Anxious   Nursing note and vitals reviewed  Additional Data:   Lab Results: I have personally reviewed pertinent reports        Results from last 7 days   Lab Units 02/09/20  1106   WBC Thousand/uL 5 10   HEMOGLOBIN g/dL 13 3   HEMATOCRIT % 40 5*   PLATELETS Thousands/uL 208   NEUTROS PCT % 75   LYMPHS PCT % 15*   MONOS PCT % 9   EOS PCT % 1     Results from last 7 days   Lab Units 02/09/20  1106   POTASSIUM mmol/L 4 2   CHLORIDE mmol/L 97*   CO2 mmol/L 28   BUN mg/dL 11   CREATININE mg/dL 0 92   CALCIUM mg/dL 10 2     Imaging: xray pending  X-ray chest 2 views   Final Result by Quin Samuel MD (02/09 1159)      No acute cardiopulmonary disease  Workstation performed: [id number]           [facility] Records Reviewed: Yes     ** Please Note: This note has been constructed using a voice recognition system   **

## 2020-02-09 NOTE — ASSESSMENT & PLAN NOTE
· BP was 205/98 in the emergency room, she was given Norvasc  · Will have p r n   Medicines and resume her home meds  · Symptomatic secondary to elevated troponin

## 2020-02-09 NOTE — ED PROVIDER NOTES
History  Chief Complaint   Patient presents with    Chest Pain     pt reports "indigestion" since last pm   Denies radiation  not reproduceable     70-year-old female accompanied by her son presents with midsternal discomfort  She states she eat food and dry last evening and she feels that she is not able to digest to purchase some epigastric discomfort and midsternal chest pressure  Her son is concerned because at home her blood pressure was over 829 systolic  She has no true chest pain chest pressure some tightness, she states nonradiating she is not short of breath  She denies any lightheadedness dizziness  She denies any recent illness fever chills or rigors  She sees her cardiologist regularly takes losartan b i d  As well as metoprolol b i d  She states she was told not to take her medication for systolic blood pressure was under 140  She did take both medications this morning at around 7:30 a m  Murphy Perla Patient states that usually in place her blood pressure in a good range between 135 and 145 however today did not seem to coming down  She is extremely anxious as per her son  Prior to Admission Medications   Prescriptions Last Dose Informant Patient Reported? Taking?    ALPRAZolam (XANAX) 0 25 mg tablet 2/9/2020 at Unknown time Self Yes Yes   Sig: Use 1 tab 2x/day as needed for anxiety   aspirin 81 mg chewable tablet 2/9/2020 at Unknown time  Yes Yes   Sig: Chew 81 mg daily   cholecalciferol (VITAMIN D3) 1,000 units tablet 2/9/2020 at Unknown time  Yes Yes   Sig: Take 1,000 Units by mouth daily   lansoprazole (PREVACID) 30 mg capsule 2/9/2020 at Unknown time  Yes Yes   Sig: Take 30 mg by mouth daily   levothyroxine 50 mcg tablet Past Week at Unknown time  Yes Yes   Sig: Take 50 mcg by mouth 3 (three) times a week   levothyroxine 75 mcg tablet 2/9/2020 at Unknown time Self Yes Yes   Sig: tuesday, thursday, saturday, sunday   losartan (COZAAR) 50 mg tablet 2/9/2020 at Unknown time  No Yes   Sig: Take 1 tablet (50 mg total) by mouth daily   Patient taking differently: Take 50 mg by mouth 2 (two) times a day    metoprolol succinate (TOPROL-XL) 25 mg 24 hr tablet 2/9/2020 at Unknown time  No Yes   Sig: Take 1 tablet (25 mg total) by mouth every 12 (twelve) hours   prednisoLONE acetate (PRED FORTE) 1 % ophthalmic suspension 2/9/2020 at Unknown time  Yes Yes   Sig: Apply 1 drop to eye      Facility-Administered Medications: None       Past Medical History:   Diagnosis Date    Anxiety     CAD (coronary artery disease)     Carotid Duplex 03/06/2018    Plaque formation was prominent bilaterally    Depression     Disease of thyroid gland     Diverticulitis     Dyslipidemia     ECHO 09/14/2017    EF 55%, mild mitral regurg, small pericardial effusion   Hypertension     Mitral regurgitation     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 infarction)     Shingles     Takotsubo cardiomyopathy        Past Surgical History:   Procedure Laterality Date    CARDIAC CATHETERIZATION  08/27/2012    EF 35%, 70% stenosis of diagonal, Otherwise all OK apart from myopathy    CARDIAC CATHETERIZATION  10/10/2012    EF 55%, no significant CAD  Mild stress induced cardiomyopathy    CATARACT EXTRACTION Bilateral     EYE SURGERY      bilateral cataract    TONSILLECTOMY         Family History   Problem Relation Age of Onset    Cancer Father     Prostate cancer Father      I have reviewed and agree with the history as documented  Social History     Tobacco Use    Smoking status: Never Smoker    Smokeless tobacco: Never Used   Substance Use Topics    Alcohol use: Never     Frequency: Never    Drug use: Never        Review of Systems   Constitutional: Negative  Respiratory: Negative  Cardiovascular: Positive for chest pain  Gastrointestinal: Positive for nausea  Genitourinary: Negative  Musculoskeletal: Negative  Skin: Negative  Neurological: Negative  Hematological: Negative      Psychiatric/Behavioral: The patient is nervous/anxious  Physical Exam  Physical Exam   Constitutional: She is oriented to person, place, and time  Non-toxic appearance  She does not appear ill  No distress  Frail-appearing 61-year-old female  HENT:   Head: Normocephalic and atraumatic  Neck: Normal range of motion  Neck supple  Cardiovascular: Normal rate, regular rhythm, intact distal pulses and normal pulses  Pulmonary/Chest: Effort normal and breath sounds normal    Abdominal: Soft  Bowel sounds are normal  She exhibits no distension  There is no tenderness  Musculoskeletal:        Right lower leg: Normal  She exhibits no edema  Left lower leg: Normal  She exhibits no edema  Neurological: She is alert and oriented to person, place, and time  Skin: Skin is warm and dry  Capillary refill takes less than 2 seconds  Psychiatric: Her mood appears anxious  Nursing note and vitals reviewed        Vital Signs  ED Triage Vitals   Temperature Pulse Respirations Blood Pressure SpO2   02/09/20 1049 02/09/20 1049 02/09/20 1049 02/09/20 1049 02/09/20 1049   (!) 97 4 °F (36 3 °C) 75 20 (!) 205/98 100 %      Temp Source Heart Rate Source Patient Position - Orthostatic VS BP Location FiO2 (%)   02/09/20 1229 02/09/20 1229 02/09/20 1049 02/09/20 1049 --   Temporal Monitor Lying Right arm       Pain Score       02/09/20 1049       3           Vitals:    02/10/20 0305 02/10/20 0753 02/10/20 0917 02/10/20 1224   BP: 120/63 137/71 137/71 110/60   Pulse: 68 65 65    Patient Position - Orthostatic VS: Lying Lying           Visual Acuity      ED Medications  Medications   amLODIPine (NORVASC) tablet 5 mg (5 mg Oral Given 2/9/20 1202)       Diagnostic Studies  Results Reviewed     Procedure Component Value Units Date/Time    Magnesium [[id number]]  (Normal) Collected:  02/10/20 0604    Lab Status:  Final result Specimen:  Blood from Arm, Right Updated:  02/10/20 0711     Magnesium 2 0 mg/dL     Lipid panel [[id number]] Collected:  02/10/20 7522    Lab Status:  Final result Specimen:  Blood from Arm, Right Updated:  02/10/20 0711     Cholesterol 169 mg/dL      Triglycerides 75 mg/dL      HDL, Direct 61 mg/dL      LDL Calculated 93 mg/dL      Non-HDL-Chol (CHOL-HDL) 108 mg/dl     Basic metabolic panel [[id number]] Collected:  02/10/20 0604    Lab Status:  Final result Specimen:  Blood from Arm, Right Updated:  02/10/20 0711     Sodium 134 mmol/L      Potassium 4 3 mmol/L      Chloride 99 mmol/L      CO2 27 mmol/L      ANION GAP 8 mmol/L      BUN 10 mg/dL      Creatinine 0 83 mg/dL      Glucose 87 mg/dL      Calcium 9 4 mg/dL      eGFR 65 ml/min/1 73sq m     Narrative:       [place] guidelines for Chronic Kidney Disease (CKD):     Stage 1 with normal or high GFR (GFR > 90 mL/min/1 73 square meters)    Stage 2 Mild CKD (GFR = 60-89 mL/min/1 73 square meters)    Stage 3A Moderate CKD (GFR = 45-59 mL/min/1 73 square meters)    Stage 3B Moderate CKD (GFR = 30-44 mL/min/1 73 square meters)    Stage 4 Severe CKD (GFR = 15-29 mL/min/1 73 square meters)    Stage 5 End Stage CKD (GFR <15 mL/min/1 73 square meters)  Note: GFR calculation is accurate only with a steady state creatinine    Troponin I [[id number]]  (Abnormal) Collected:  02/09/20 1757    Lab Status:  Final result Specimen:  Blood from Arm, Left Updated:  02/09/20 1827     Troponin I 1 22 ng/mL     Troponin I [[id number]]  (Abnormal) Collected:  02/09/20 1455    Lab Status:  Final result Specimen:  Blood from Arm, Left Updated:  02/09/20 1526     Troponin I 1 16 ng/mL     Troponin I [[id number]]  (Abnormal) Collected:  02/09/20 1106    Lab Status:  Final result Specimen:  Blood from Arm, Right Updated:  02/09/20 1136     Troponin I 0 49 ng/mL     Basic metabolic panel [[id number]]  (Abnormal) Collected:  02/09/20 1106    Lab Status:  Final result Specimen:  Blood from Arm, Right Updated:  02/09/20 1135     Sodium 133 mmol/L      Potassium 4 2 mmol/L      Chloride 97 mmol/L CO2 28 mmol/L      ANION GAP 8 mmol/L      BUN 11 mg/dL      Creatinine 0 92 mg/dL      Glucose 104 mg/dL      Calcium 10 2 mg/dL      eGFR 57 ml/min/1 73sq m     Narrative:       [place] guidelines for Chronic Kidney Disease (CKD):     Stage 1 with normal or high GFR (GFR > 90 mL/min/1 73 square meters)    Stage 2 Mild CKD (GFR = 60-89 mL/min/1 73 square meters)    Stage 3A Moderate CKD (GFR = 45-59 mL/min/1 73 square meters)    Stage 3B Moderate CKD (GFR = 30-44 mL/min/1 73 square meters)    Stage 4 Severe CKD (GFR = 15-29 mL/min/1 73 square meters)    Stage 5 End Stage CKD (GFR <15 mL/min/1 73 square meters)  Note: GFR calculation is accurate only with a steady state creatinine    D-dimer, quantitative [[id number]]  (Normal) Collected:  02/09/20 1106    Lab Status:  Final result Specimen:  Blood from Arm, Right Updated:  02/09/20 1126     D-Dimer, Quant 0 16 ug/ml FEU     CBC and differential [[id number]]  (Abnormal) Collected:  02/09/20 1106    Lab Status:  Final result Specimen:  Blood from Arm, Right Updated:  02/09/20 1116     WBC 5 10 Thousand/uL      RBC 4 34 Million/uL      Hemoglobin 13 3 g/dL      Hematocrit 40 5 %      MCV 93 fL      MCH 30 7 pg      MCHC 32 9 g/dL      RDW 13 0 %      MPV 6 9 fL      Platelets 443 Thousands/uL      Neutrophils Relative 75 %      Lymphocytes Relative 15 %      Monocytes Relative 9 %      Eosinophils Relative 1 %      Basophils Relative 0 %      Neutrophils Absolute 3 80 Thousands/µL      Lymphocytes Absolute 0 70 Thousands/µL      Monocytes Absolute 0 50 Thousand/µL      Eosinophils Absolute 0 00 Thousand/µL      Basophils Absolute 0 00 Thousands/µL                  X-ray chest 2 views   Final Result by Rock Amor MD (02/09 1159)      No acute cardiopulmonary disease              Workstation performed: [id number]                    Procedures  ECG 12 Lead Documentation Only  Date/Time: 2/9/2020 11:03 AM  Performed by: Aldo Ramirez Vicente Craig MD  Authorized by: Abigail Zavala MD     Indications / Diagnosis:  CP  ECG reviewed by me, the ED Provider: yes    Patient location:  ED  Previous ECG:     Previous ECG:  Compared to current    Comparison ECG info:  Change in AVL inverted Twave     Similarity:  Changes noted    Comparison to cardiac monitor: Yes    Interpretation:     Interpretation: abnormal    Rhythm:     Rhythm: sinus rhythm    Ectopy:     Ectopy: none    QRS:     QRS axis:  Normal  Conduction:     Conduction: abnormal      Abnormal conduction: incomplete RBBB    ST segments:     ST segments:  Normal  T waves:     T waves: normal               ED Course  ED Course as of Feb 13 2038   Sun Feb 09, 2020   1128 D-Dimer, Quant: 0 16         HEART Risk Score      Most Recent Value   History  2 Filed at: 02/09/2020 1413   ECG  1 Filed at: 02/09/2020 1413   Age  2 Filed at: 02/09/2020 1413   Risk Factors  2 Filed at: 02/09/2020 1413   Troponin  2 Filed at: 02/09/2020 1413   Heart Score Risk Calculator   History  2 Filed at: 02/09/2020 1413   ECG  1 Filed at: 02/09/2020 1413   Age  2 Filed at: 02/09/2020 1413   Risk Factors  2 Filed at: 02/09/2020 1413   Troponin  2 Filed at: 02/09/2020 1413   HEART Score  9 Filed at: 02/09/2020 1413   HEART Score  9 Filed at: 02/09/2020 1413                            MDM      Disposition  Final diagnoses:   Type 2 MI (myocardial infarction) [facility])   Hypertensive emergency   Lower abdominal pain   Chronic idiopathic constipation   Hypothyroidism due to acquired atrophy of thyroid     Time reflects when diagnosis was documented in both MDM as applicable and the Disposition within this note     Time User Action Codes Description Comment    2/9/2020 12:05 PM Jettie Sea D Add [I21  A1] [facility]     2/9/2020 12:05 PM Jettie Sea D Modify Miriam Phy  A1] Type [facility] ([facility]     2/9/2020 12:05 PM Jettie Sea D Add [I16 1] Hypertensive emergency 2/9/2020  8:08 PM Flaco Perkins Add [R10 30] Lower abdominal pain     2/9/2020  8:08 PM Sarah Donahue Add [K59 04] Chronic idiopathic constipation     2/10/2020 11:47 AM Juana Quigley Add [E03 4] Hypothyroidism due to acquired atrophy of thyroid       ED Disposition     None      Follow-up Information     Follow up With Specialties Details Why Bijan Alfred MD Cardiology Follow up Please call and schedule for post hospital discharge follow-up visit to ideally occur within 1 week [facility]  [address] [place] [address] 3  [address] [facility] [address] [facility]      Primary care provider  Follow up in 1 week(s) Please follow-up with your primary care physician within 7 days of being discharged           Discharge Medication List as of 2/10/2020 12:10 PM      START taking these medications    Details   amLODIPine (NORVASC) 5 mg tablet Take 1 tablet (5 mg total) by mouth daily, Starting Mon 2/10/2020, Normal      !! aspirin 81 mg chewable tablet Chew 1 tablet (81 mg total) daily, Starting Tue 2/11/2020, Until Thu 3/12/2020, No Print       !! - Potential duplicate medications found  Please discuss with provider        CONTINUE these medications which have NOT CHANGED    Details   ALPRAZolam (XANAX) 0 25 mg tablet Use 1 tab 2x/day as needed for anxiety, Historical Med      !! aspirin 81 mg chewable tablet Chew 81 mg daily, Starting Mon 8/10/2015, Historical Med      cholecalciferol (VITAMIN D3) 1,000 units tablet Take 1,000 Units by mouth daily, Historical Med      lansoprazole (PREVACID) 30 mg capsule Take 30 mg by mouth daily, Historical Med      !! levothyroxine 50 mcg tablet Take 50 mcg by mouth 3 (three) times a week, Historical Med      !! levothyroxine 75 mcg tablet tuesday, thursday, saturday, sunday, Historical Med      losartan (COZAAR) 50 mg tablet Take 1 tablet (50 mg total) by mouth daily, Starting Mon 11/4/2019, Until Sun 2/20/2022, Normal      metoprolol succinate (TOPROL-XL) 25 mg 24 hr tablet Take 1 tablet (25 mg total) by mouth every 12 (twelve) hours, Starting Fri 12/6/2019, Normal      prednisoLONE acetate (PRED FORTE) 1 % ophthalmic suspension Apply 1 drop to eye, Historical Med       !! - Potential duplicate medications found  Please discuss with provider          Outpatient Discharge Orders   Discharge Diet     Activity as tolerated     No strenuous exercise     Call provider for:  persistent nausea or vomiting     Call provider for:  severe uncontrolled pain     Call provider for:  difficulty breathing, headache or visual disturbances     Call provider for:  persistent dizziness or light-headedness     Call provider for:  extreme fatigue     No dressing needed       ED Provider  Electronically Signed by           Pita Chester MD  02/09/20 1113       Pita Chester MD  02/13/20 2038       Pita Chester MD  02/13/20 2038

## 2020-02-09 NOTE — ASSESSMENT & PLAN NOTE
· Patient is completely asymptomatic  · Status post a Cardiology consultation  · Case reviewed with Dr Noe Montana - no need for any type of further inpatient workup  · This type 2 MI was secondary to uncontrolled blood pressure/hypertensive emergency  · Blood pressures are much better controlled now  · Will add an aspirin for discharge  · Otherwise continue beta-blocker and ARB  · Okay for DC home  · Outpatient follow-up with Cardiology and her primary care physician

## 2020-02-09 NOTE — PLAN OF CARE
Problem: Potential for Falls  Goal: Patient will remain free of falls  Description  INTERVENTIONS:  - Assess patient frequently for physical needs  -  Identify cognitive and physical deficits and behaviors that affect risk of falls    -  [place] fall precautions as indicated by assessment   - Educate patient/family on patient safety including physical limitations  - Instruct patient to call for assistance with activity based on assessment  - Modify environment to reduce risk of injury  - Consider OT/PT consult to assist with strengthening/mobility  Outcome: Progressing     Problem: CARDIOVASCULAR - ADULT  Goal: Maintains optimal cardiac output and hemodynamic stability  Description  INTERVENTIONS:  - Monitor I/O, vital signs and rhythm  - Monitor for S/S and trends of decreased cardiac output  - Assess quality of pulses, skin color and temperature  - Assess for signs of decreased coronary artery perfusion  - Instruct patient to report change in severity of symptoms   Outcome: Progressing  Goal: Absence of cardiac dysrhythmias or at baseline rhythm  Description  INTERVENTIONS:  - Continuous cardiac monitoring, vital signs, obtain 12 lead EKG if ordered  - Administer antiarrhythmic and heart rate control medications as ordered  - Monitor electrolytes and administer replacement therapy as ordered  Outcome: Progressing     Problem: METABOLIC, FLUID AND ELECTROLYTES - ADULT  Goal: Electrolytes maintained within normal limits  Description  INTERVENTIONS:  - Monitor labs and assess patient for signs and symptoms of electrolyte imbalances  - Administer electrolyte replacement as ordered  - Monitor response to electrolyte replacements, including repeat lab results as appropriate  - Instruct patient on fluid and nutrition as appropriate  Outcome: Progressing     Problem: SKIN/TISSUE INTEGRITY - ADULT  Goal: Skin integrity remains intact  Description  INTERVENTIONS  - Identify patients at risk for skin breakdown  - Assess and monitor skin integrity  - Assess and monitor nutrition and hydration status  - Monitor labs (i e  albumin)  - Assess for incontinence   - Turn and reposition patient  - Assist with mobility/ambulation  - Relieve pressure over bony prominences  - Avoid friction and shearing  - Provide appropriate hygiene as needed including keeping skin clean and dry  - Evaluate need for skin moisturizer/barrier cream  - Collaborate with interdisciplinary team (i e  Nutrition, Rehabilitation, etc )   - Patient/family teaching  Outcome: Progressing     Problem: MUSCULOSKELETAL - ADULT  Goal: Maintain or return mobility to safest level of function  Description  INTERVENTIONS:  - Assess patient's ability to carry out ADLs; assess patient's baseline for ADL function and identify physical deficits which impact ability to perform ADLs (bathing, care of mouth/teeth, toileting, grooming, dressing, etc )  - Assess/evaluate cause of self-care deficits   - Assess range of motion  - Assess patient's mobility  - Assess patient's need for assistive devices and provide as appropriate  - Encourage maximum independence but intervene and supervise when necessary  - Involve family in performance of ADLs  - Assess for home care needs following discharge   - Consider OT consult to assist with ADL evaluation and planning for discharge  - Provide patient education as appropriate  Outcome: Progressing     Problem: PAIN - ADULT  Goal: Verbalizes/displays adequate comfort level or baseline comfort level  Description  Interventions:  - Encourage patient to monitor pain and request assistance  - Assess pain using appropriate pain scale  - Administer analgesics based on type and severity of pain and evaluate response  - Implement non-pharmacological measures as appropriate and evaluate response  - Consider cultural and social influences on pain and pain management  - Notify physician/advanced practitioner if interventions unsuccessful or patient reports new pain  Outcome: Progressing     Problem: INFECTION - ADULT  Goal: Absence or prevention of progression during hospitalization  Description  INTERVENTIONS:  - Assess and monitor for signs and symptoms of infection  - Monitor lab/diagnostic results  - Monitor all insertion sites, i e  indwelling lines, tubes, and drains  - Shade appropriate cooling/warming therapies per order  - Administer medications as ordered  - Instruct and encourage patient and family to use good hand hygiene technique   Outcome: Progressing     Problem: SAFETY ADULT  Goal: Maintain or return to baseline ADL function  Description  INTERVENTIONS:  -  Assess patient's ability to carry out ADLs; assess patient's baseline for ADL function and identify physical deficits which impact ability to perform ADLs (bathing, care of mouth/teeth, toileting, grooming, dressing, etc )  - Assess/evaluate cause of self-care deficits   - Assess range of motion  - Assess patient's mobility; develop plan if impaired  - Assess patient's need for assistive devices and provide as appropriate  - Encourage maximum independence but intervene and supervise when necessary  - Involve family in performance of ADLs  - Assess for home care needs following discharge   - Consider OT consult to assist with ADL evaluation and planning for discharge  - Provide patient education as appropriate  Outcome: Progressing  Goal: Maintain or return mobility status to optimal level  Description  INTERVENTIONS:  - Assess patient's baseline mobility status (ambulation, transfers, stairs, etc )    - Identify cognitive and physical deficits and behaviors that affect mobility  - Identify mobility aids required to assist with transfers and/or ambulation (gait belt, sit-to-stand, lift, walker, cane, etc )  - Shade fall precautions as indicated by assessment  - Record patient progress and toleration of activity level on Mobility SBAR; progress patient to next Phase/Stage  - Instruct patient to call for assistance with activity based on assessment  - Consider rehabilitation consult to assist with strengthening/weightbearing, etc   Outcome: Progressing     Problem: DISCHARGE PLANNING  Goal: Discharge to home or other facility with appropriate resources  Description  INTERVENTIONS:  - Identify barriers to discharge w/patient and caregiver  - Arrange for needed discharge resources and transportation as appropriate  - Identify discharge learning needs (meds, wound care, etc )  - Refer to Case Management Department for coordinating discharge planning if the patient needs post-hospital services based on physician/advanced practitioner order or complex needs related to functional status, cognitive ability, or social support system   Outcome: Progressing     Problem: Knowledge Deficit  Goal: Patient/family/caregiver demonstrates understanding of disease process, treatment plan, medications, and discharge instructions  Description  Complete learning assessment and assess knowledge base    Interventions:  - Provide teaching at level of understanding  - Provide teaching via preferred learning methods  Outcome: Progressing

## 2020-02-10 VITALS
HEIGHT: 62 IN | SYSTOLIC BLOOD PRESSURE: 110 MMHG | RESPIRATION RATE: 18 BRPM | DIASTOLIC BLOOD PRESSURE: 60 MMHG | WEIGHT: 115.74 LBS | TEMPERATURE: 97.8 F | OXYGEN SATURATION: 95 % | HEART RATE: 65 BPM | BODY MASS INDEX: 21.3 KG/M2

## 2020-02-10 LAB
ANION GAP SERPL CALCULATED.3IONS-SCNC: 8 MMOL/L (ref 4–13)
BUN SERPL-MCNC: 10 MG/DL (ref 7–25)
CALCIUM SERPL-MCNC: 9.4 MG/DL (ref 8.6–10.5)
CHLORIDE SERPL-SCNC: 99 MMOL/L (ref 98–107)
CHOLEST SERPL-MCNC: 169 MG/DL (ref 0–200)
CO2 SERPL-SCNC: 27 MMOL/L (ref 21–31)
CREAT SERPL-MCNC: 0.83 MG/DL (ref 0.6–1.2)
GFR SERPL CREATININE-BSD FRML MDRD: 65 ML/MIN/1.73SQ M
GLUCOSE SERPL-MCNC: 87 MG/DL (ref 65–99)
HDLC SERPL-MCNC: 61 MG/DL
LDLC SERPL CALC-MCNC: 93 MG/DL (ref 0–100)
MAGNESIUM SERPL-MCNC: 2 MG/DL (ref 1.9–2.7)
NONHDLC SERPL-MCNC: 108 MG/DL
POTASSIUM SERPL-SCNC: 4.3 MMOL/L (ref 3.5–5.5)
SODIUM SERPL-SCNC: 134 MMOL/L (ref 134–143)
TRIGL SERPL-MCNC: 75 MG/DL (ref 44–166)
TROPONIN I SERPL-MCNC: 0.48 NG/ML

## 2020-02-10 PROCEDURE — 80061 LIPID PANEL: CPT | Performed by: PHYSICIAN ASSISTANT

## 2020-02-10 PROCEDURE — 99239 HOSP IP/OBS DSCHRG MGMT >30: CPT | Performed by: HOSPITALIST

## 2020-02-10 PROCEDURE — 83735 ASSAY OF MAGNESIUM: CPT | Performed by: PHYSICIAN ASSISTANT

## 2020-02-10 PROCEDURE — 80048 BASIC METABOLIC PNL TOTAL CA: CPT | Performed by: PHYSICIAN ASSISTANT

## 2020-02-10 PROCEDURE — 84484 ASSAY OF TROPONIN QUANT: CPT | Performed by: PHYSICIAN ASSISTANT

## 2020-02-10 PROCEDURE — 99222 1ST HOSP IP/OBS MODERATE 55: CPT | Performed by: PHYSICIAN ASSISTANT

## 2020-02-10 RX ORDER — AMLODIPINE BESYLATE 5 MG/1
5 TABLET ORAL DAILY
Qty: 30 TABLET | Refills: 0 | Status: SHIPPED | OUTPATIENT
Start: 2020-02-10 | End: 2020-02-18 | Stop reason: SDUPTHER

## 2020-02-10 RX ORDER — ASPIRIN 81 MG/1
81 TABLET, CHEWABLE ORAL DAILY
Refills: 0 | Status: ON HOLD
Start: 2020-02-11 | End: 2020-03-02 | Stop reason: CLARIF

## 2020-02-10 RX ORDER — AMLODIPINE BESYLATE 5 MG/1
5 TABLET ORAL DAILY
Status: DISCONTINUED | OUTPATIENT
Start: 2020-02-10 | End: 2020-02-10 | Stop reason: HOSPADM

## 2020-02-10 RX ADMIN — ASPIRIN 81 MG 81 MG: 81 TABLET ORAL at 09:13

## 2020-02-10 RX ADMIN — PANTOPRAZOLE SODIUM 20 MG: 20 TABLET, DELAYED RELEASE ORAL at 05:51

## 2020-02-10 RX ADMIN — ENOXAPARIN SODIUM 50 MG: 60 INJECTION SUBCUTANEOUS at 09:13

## 2020-02-10 RX ADMIN — LOSARTAN POTASSIUM 50 MG: 50 TABLET, FILM COATED ORAL at 09:14

## 2020-02-10 RX ADMIN — ALPRAZOLAM 0.25 MG: 0.25 TABLET ORAL at 12:24

## 2020-02-10 RX ADMIN — LEVOTHYROXINE SODIUM 50 MCG: 50 TABLET ORAL at 05:51

## 2020-02-10 RX ADMIN — POLYETHYLENE GLYCOL 3350 17 G: 17 POWDER, FOR SOLUTION ORAL at 09:17

## 2020-02-10 RX ADMIN — METOPROLOL SUCCINATE 25 MG: 25 TABLET, EXTENDED RELEASE ORAL at 09:17

## 2020-02-10 RX ADMIN — VITAMIN D, TAB 1000IU (100/BT) 1000 UNITS: 25 TAB at 09:13

## 2020-02-10 RX ADMIN — AMLODIPINE BESYLATE 5 MG: 5 TABLET ORAL at 12:24

## 2020-02-10 RX ADMIN — PREDNISOLONE ACETATE 1 DROP: 10 SUSPENSION/ DROPS OPHTHALMIC at 12:16

## 2020-02-10 RX ADMIN — ALPRAZOLAM 0.25 MG: 0.25 TABLET ORAL at 01:16

## 2020-02-10 NOTE — ASSESSMENT & PLAN NOTE
· Initial period of hypertensive emergency has resolved  · Case reviewed with Cardiologyraymundo for discharge home on pre-admit meds at pre-admit dosages  · Question of medication noncompliance, patient sporadically misses doses of her antihypertensives for unspecified reasons  · Will add Norvasc for discharge  · Medication compliance counseling was provided

## 2020-02-10 NOTE — DISCHARGE SUMMARY
Discharge- Lor Rosenthal 1935, 80 y o  female MRN: [id number]    Unit/Bed#: -02 Encounter: [id number]    Primary Care Provider: Ruma Kidd MD   Date and time admitted to hospital: 2/9/2020 10:40 AM    Patient was discharged prior to the initial suspected greater than 2 midnight stay because she got better sooner than expected, also cardiology cleared the patient and felt that the patient no longer required any type of further inpatient workup    * Type 2 MI (myocardial infarction) [facility])  Assessment & Plan  · Patient is completely asymptomatic  · Status post a Cardiology consultation  · Case reviewed with Dr Mckenna Martinez - no need for any type of further inpatient workup  · This type 2 MI was secondary to uncontrolled blood pressure/hypertensive emergency  · Blood pressures are much better controlled now  · Will add an aspirin for discharge  · Otherwise continue beta-blocker and ARB  · Okay for DC home  · Outpatient follow-up with Cardiology and her primary care physician    Hypertensive emergency  Assessment & Plan  · Initial period of hypertensive emergency has resolved  · Case reviewed with Cardiology, raymundo for discharge home on pre-admit meds at pre-admit dosages  · Question of medication noncompliance, patient sporadically misses doses of her antihypertensives for unspecified reasons  · Will add Norvasc for discharge  · Medication compliance counseling was provided    Hypothyroidism  Assessment & Plan  · Continue levothyroxine          Discharging Physician / Practitioner: Brennan Acosta MD  PCP: Ruma Kidd MD  Admission Date:   Admission Orders (From admission, onward)     Ordered        02/09/20 1207  Inpatient Admission  Once         02/09/20 1204  Inpatient Admission (expected length of stay for this patient Order details is greater than two midnights)  Once                   Discharge Date: 02/10/20    Resolved Problems  Date Reviewed: 2/9/2020    None          Consultations During Hospital Stay:  · Cardiology    Procedures Performed:   · None    Significant Findings / Test Results:   · Chest x-ray-no acute cardiopulmonary disease    Incidental Findings:   · None     Test Results Pending at Discharge (will require follow up): · None     Outpatient Tests Requested:  · None    Complications:     None    Reason for Admission:  A non ST segment elevation myocardial infarction type 2    Hospital Course:     Tiffani Pelayo is a 80 y o  female patient who originally presented to the hospital on 2/9/2020 due to midsternal chest discomfort  Please refer to the initial history and physical examination completed by Brant Curling for the initial presenting features and complaints  In brief, the patient is an 28-year-old female who presented to the emergency room yesterday for what she thought was indigestion  In the emergency room she was found to have minimally elevated troponins  First troponin was 0 49  At that point she was admitted to [facility] with telemetry  She subsequently ruled in for the possibility of an acute coronary event with the 2nd troponin being 1 16, the 3rd being 1 22 in the last being 0 48  Patient was anticoagulated with full-dose Lovenox  A cardiology consultation was obtained  After Cardiology evaluated the patient, they felt that there was no need for any further inpatient testing and/or workup  She recently had an echocardiogram as an outpatient prior to arrival   It was deemed that her elevated troponins/non ST segment elevation MI type 2 was secondary to periods of accelerated hypertension/hypertensive emergency  The patient admitted that she randomly does not take her regularly scheduled medications for unspecified reasons  Medication compliance counseling was provided  Patient will follow up in the outpatient setting with her primary care physician and with Cardiology    She was discharged home on all other pre-admit meds at pre-admit dosages  An aspirin was started also at time of discharge  Please see above list of diagnoses and related plan for additional information  Condition at Discharge: good     Discharge Day Visit / Exam:     Subjective:  Patient seen and examined, no complaints no distress  Vitals: Blood Pressure: 137/71 (02/10/20 0917)  Pulse: 65 (02/10/20 0917)  Temperature: 97 8 °F (36 6 °C) (02/10/20 0753)  Temp Source: Temporal (02/10/20 0753)  Respirations: 18 (02/10/20 0753)  Height: 5' 2" (157 5 cm) (02/09/20 1229)  Weight - Scale: 52 5 kg (115 lb 11 9 oz) (02/09/20 1229)  SpO2: 95 % (02/10/20 0753)  Exam:   Physical Exam   Constitutional: She is oriented to person, place, and time  She appears well-developed and well-nourished  HENT:   Head: Normocephalic and atraumatic  Nose: Nose normal    Mouth/Throat: Oropharynx is clear and moist    Eyes: Pupils are equal, round, and reactive to light  Conjunctivae and EOM are normal    Neck: Normal range of motion  Neck supple  No JVD present  No thyromegaly present  Cardiovascular: Normal rate, regular rhythm and intact distal pulses  Exam reveals no gallop and no friction rub  No murmur heard  Pulmonary/Chest: Effort normal and breath sounds normal  No respiratory distress  Abdominal: Soft  Bowel sounds are normal  She exhibits no distension and no mass  There is no tenderness  There is no guarding  Musculoskeletal: Normal range of motion  She exhibits no edema  Lymphadenopathy:     She has no cervical adenopathy  Neurological: She is alert and oriented to person, place, and time  No cranial nerve deficit  Skin: Skin is warm  No rash noted  No erythema  Psychiatric: She has a normal mood and affect  Her behavior is normal    Vitals reviewed        Discussion with Family:  No family members were present at the time of my discharge evaluation    Discharge instructions/Information to patient and family:   See after visit summary for information provided to patient and family  Provisions for Follow-Up Care:  See after visit summary for information related to follow-up care and any pertinent home health orders  Disposition:     Home    For Discharges to [address] [facility]:   · Not Applicable to this Patient - Not Applicable to this Patient    Planned Readmission:    None     Discharge Statement:  I spent 40 minutes discharging the patient  This time was spent on the day of discharge  I had direct contact with the patient on the day of discharge  Greater than 50% of the total time was spent examining patient, answering all patient questions, arranging and discussing plan of care with patient as well as directly providing post-discharge instructions  Additional time then spent on discharge activities  Discharge Medications:  See after visit summary for reconciled discharge medications provided to patient and family        ** Please Note: This note has been constructed using a voice recognition system **

## 2020-02-10 NOTE — UTILIZATION REVIEW
Initial Clinical Review    Admission: Date/Time/Statement: Admission Orders (From admission, onward)     Ordered        02/09/20 1207  Inpatient Admission  Once         02/09/20 1204  Inpatient Admission (expected length of stay for this patient Order details is greater than two midnights)  Once                   Orders Placed This Encounter   Procedures    Inpatient Admission (expected length of stay for this patient Order details is greater than two midnights)     Standing Status:   Standing     Number of Occurrences:   1     Order Specific Question:   Admitting Physician     Answer:   Samreen Polanco [[id number]]     Order Specific Question:   Level of Care     Answer:   Med Surg [[id number]]     Order Specific Question:   Estimated length of stay     Answer:   More than 2 Midnights     Order Specific Question:   Certification     Answer:   I certify that inpatient services are medically necessary for this patient for a duration of greater than two midnights  See H&P and MD Progress Notes for additional information about the patient's course of treatment   Inpatient Admission     Standing Status:   Standing     Number of Occurrences:   1     Order Specific Question:   Admitting Physician     Answer:   Samreen Polanco [[id number]]     Order Specific Question:   Level of Care     Answer:   Med Surg [16]     Order Specific Question:   Estimated length of stay     Answer:   More than 2 Midnights     Order Specific Question:   Certification     Answer:   I certify that inpatient services are medically necessary for this patient for a duration of greater than two midnights  See H&P and MD Progress Notes for additional information about the patient's course of treatment       ED Arrival Information     Expected Arrival Acuity Means of Arrival Escorted By Service Admission Type    - 2/9/2020 10:29 Emergent Walk-In Family Member Hospitalist Emergency    Arrival Complaint    hypertension        Chief Complaint   Patient presents with   Taisha Aldrich Chest Pain     pt reports "indigestion" since last pm   Denies radiation  not reproduceable     Assessment/Plan:   80-year-old female ambulatory to ER from home c/o midsternal discomfort, tightness, SBP>200 at home, despite meds  States indigestion feeling since eating fried potatoes & fish  Hx cad, htn, hld, takotsubo's cardiomyopathy, MI  Presents pale, anxious & hypertensive, c/o chest & abd pain, nausea, back & neck pain  Admission work-up showing hyponatremia & +troponin  Admitted to inpatient status for type 2 MI, cardio & GI consulted    ED Triage Vitals   Temperature Pulse Respirations Blood Pressure SpO2   02/09/20 1049 02/09/20 1049 02/09/20 1049 02/09/20 1049 02/09/20 1049   (!) 97 4 °F (36 3 °C) 75 20 (!) 205/98 100 %      Temp Source Heart Rate Source Patient Position - Orthostatic VS BP Location FiO2 (%)   02/09/20 1229 02/09/20 1229 02/09/20 1049 02/09/20 1049 --   Temporal Monitor Lying Right arm       Pain Score       02/09/20 1049       3        Wt Readings from Last 1 Encounters:   02/09/20 52 5 kg (115 lb 11 9 oz)     Additional Vital Signs:   02/09/20 1229  98 °F (36 7 °C)  88  18  190/106Abnormal   97 %  None (Room air)  Sitting   02/09/20 1204    77  18    98 %  None (Room air)  Lying   02/09/20 1202        180/88Abnormal          02/09/20 1056            None (Room air)     02/09/20 1049  97 4 °F (36 3 °C)Abnormal   75  20  205/98Abnormal   100 %  None (Room air)  Lying   Pertinent Labs/Diagnostic Test Results:   Results from last 7 days   Lab Units 02/09/20  1106   WBC Thousand/uL 5 10   HEMOGLOBIN g/dL 13 3   HEMATOCRIT % 40 5*   PLATELETS Thousands/uL 208   NEUTROS ABS Thousands/µL 3 80     Results from last 7 days   Lab Units 02/10/20  0604 02/09/20  1106   SODIUM mmol/L 134 133*   POTASSIUM mmol/L 4 3 4 2   CHLORIDE mmol/L 99 97*   CO2 mmol/L 27 28   ANION GAP mmol/L 8 8   BUN mg/dL 10 11   CREATININE mg/dL 0 83 0 92   EGFR ml/min/1 73sq m 65 57   CALCIUM mg/dL 9 4 10 2 MAGNESIUM mg/dL 2 0  --      Results from last 7 days   Lab Units 02/10/20  0604 02/09/20  1106   GLUCOSE RANDOM mg/dL 87 104*     Results from last 7 days   Lab Units 02/10/20  0604 02/09/20  1757 02/09/20  1455 02/09/20  1106   TROPONIN I ng/mL 0 48* 1 22* 1 16* 0 49*     Results from last 7 days   Lab Units 02/09/20  1106   D-DIMER QUANTITATIVE ug/ml FEU 0 16     2/9  cxr=No acute cardiopulmonary disease   ekg=Sinus rhythm with 1st degree A-V block  Left atrial enlargement  Rightward axis  Incomplete right bundle branch block  Anterior infarct (cited on or before 09-FEB-2020)    ED Treatment:   Medication Administration from 02/09/2020 1029 to 02/09/2020 1222       Date/Time Order Dose Route     02/09/2020 1202 amLODIPine (NORVASC) tablet 5 mg 5 mg Oral        Past Medical History:   Diagnosis Date    Anxiety     CAD (coronary artery disease)     Carotid Duplex 03/06/2018    Plaque formation was prominent bilaterally    Depression     Disease of thyroid gland     Diverticulitis     Dyslipidemia     ECHO 09/14/2017    EF 55%, mild mitral regurg, small pericardial effusion   Hypertension     Mitral regurgitation     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 infarction)     Shingles     Takotsubo cardiomyopathy      Present on Admission:   [facility] infarction) [facility] )   Hypothyroidism   Hypertensive emergency  Admitting Diagnosis: Hypertension [I10]  Hypertensive emergency [I16 1]  Type 2 MI (myocardial infarction) ([facility] ) [S29  A1]  Age/Sex: 80 y o  female  Admission Orders:  Telemetry  Consult cardio  Scd/foot pumps  Consult GI  Scheduled Medications:  artificial tear  Both Eyes HS   aspirin 81 mg Oral Daily   cholecalciferol 1,000 Units Oral Daily   enoxaparin 1 mg/kg Subcutaneous Q12H [address]   levothyroxine 50 mcg Oral Once per day on Mon Wed Fri   [START ON 2/11/2020] levothyroxine 75 mcg Oral Once per day on Sun Tue Thu Sat   losartan 50 mg Oral BID   metoprolol succinate 25 mg Oral Q12H [address]   pantoprazole 20 mg Oral Early Morning   polyethylene glycol 17 g Oral Daily   prednisoLONE acetate 1 drop Right Eye Once per day on Mon Wed Fri     PRN Meds:  acetaminophen 650 mg Oral Q6H PRN   ALPRAZolam 0 25 mg Oral TID PRN   aluminum-magnesium hydroxide-simethicone 30 mL Oral Q4H PRN   dextran 70-hypromellose 1 drop Both Eyes Q3H PRN   enalaprilat 1 25 mg Intravenous Q6H PRN   nitroglycerin 0 4 mg Sublingual Q5 Min PRN   ondansetron 4 mg Intravenous Q6H PRN   sodium chloride (PF) 3 mL Intravenous PRN     Network Utilization Review Department  [email or web address]  ATTENTION: Please call with any questions or concerns to [phone] and carefully listen to the prompts so that you are directed to the right person  All voicemails are confidential   Valeriy Sanchez all requests for admission clinical reviews, approved or denied determinations and any other requests to dedicated fax number below belonging to the campus where the patient is receiving treatment   List of dedicated fax numbers for the Facilities:  [address] DENIALS (Administrative/Medical Necessity) [phone]   [address] (Maternity/NICU/Pediatrics) [phone]   Sal[place] Gallego [phone]   Sandy Lopez [phone]   Pura Hardy [phone]   Gino Martinez [phone]   [address] [place] [address] [phone]   [facility]  [phone]   [address] [facility] [address]  [address] [address] [address] [facility] [phone]

## 2020-02-10 NOTE — PLAN OF CARE
Problem: Potential for Falls  Goal: Patient will remain free of falls  Description  INTERVENTIONS:  - Assess patient frequently for physical needs  -  Identify cognitive and physical deficits and behaviors that affect risk of falls    -  [place] fall precautions as indicated by assessment   - Educate patient/family on patient safety including physical limitations  - Instruct patient to call for assistance with activity based on assessment  - Modify environment to reduce risk of injury  - Consider OT/PT consult to assist with strengthening/mobility  Outcome: Progressing     Problem: CARDIOVASCULAR - ADULT  Goal: Maintains optimal cardiac output and hemodynamic stability  Description  INTERVENTIONS:  - Monitor I/O, vital signs and rhythm  - Monitor for S/S and trends of decreased cardiac output  - Assess quality of pulses, skin color and temperature  - Assess for signs of decreased coronary artery perfusion  - Instruct patient to report change in severity of symptoms   Outcome: Progressing  Goal: Absence of cardiac dysrhythmias or at baseline rhythm  Description  INTERVENTIONS:  - Continuous cardiac monitoring, vital signs, obtain 12 lead EKG if ordered  - Administer antiarrhythmic and heart rate control medications as ordered  - Monitor electrolytes and administer replacement therapy as ordered  Outcome: Progressing     Problem: METABOLIC, FLUID AND ELECTROLYTES - ADULT  Goal: Electrolytes maintained within normal limits  Description  INTERVENTIONS:  - Monitor labs and assess patient for signs and symptoms of electrolyte imbalances  - Administer electrolyte replacement as ordered  - Monitor response to electrolyte replacements, including repeat lab results as appropriate  - Instruct patient on fluid and nutrition as appropriate  Outcome: Progressing     Problem: SKIN/TISSUE INTEGRITY - ADULT  Goal: Skin integrity remains intact  Description  INTERVENTIONS  - Identify patients at risk for skin breakdown  - Assess and monitor skin integrity  - Assess and monitor nutrition and hydration status  - Monitor labs (i e  albumin)  - Assess for incontinence   - Turn and reposition patient  - Assist with mobility/ambulation  - Relieve pressure over bony prominences  - Avoid friction and shearing  - Provide appropriate hygiene as needed including keeping skin clean and dry  - Evaluate need for skin moisturizer/barrier cream  - Collaborate with interdisciplinary team (i e  Nutrition, Rehabilitation, etc )   - Patient/family teaching  Outcome: Progressing     Problem: MUSCULOSKELETAL - ADULT  Goal: Maintain or return mobility to safest level of function  Description  INTERVENTIONS:  - Assess patient's ability to carry out ADLs; assess patient's baseline for ADL function and identify physical deficits which impact ability to perform ADLs (bathing, care of mouth/teeth, toileting, grooming, dressing, etc )  - Assess/evaluate cause of self-care deficits   - Assess range of motion  - Assess patient's mobility  - Assess patient's need for assistive devices and provide as appropriate  - Encourage maximum independence but intervene and supervise when necessary  - Involve family in performance of ADLs  - Assess for home care needs following discharge   - Consider OT consult to assist with ADL evaluation and planning for discharge  - Provide patient education as appropriate  Outcome: Progressing     Problem: PAIN - ADULT  Goal: Verbalizes/displays adequate comfort level or baseline comfort level  Description  Interventions:  - Encourage patient to monitor pain and request assistance  - Assess pain using appropriate pain scale  - Administer analgesics based on type and severity of pain and evaluate response  - Implement non-pharmacological measures as appropriate and evaluate response  - Consider cultural and social influences on pain and pain management  - Notify physician/advanced practitioner if interventions unsuccessful or patient reports new pain  Outcome: Progressing     Problem: INFECTION - ADULT  Goal: Absence or prevention of progression during hospitalization  Description  INTERVENTIONS:  - Assess and monitor for signs and symptoms of infection  - Monitor lab/diagnostic results  - Monitor all insertion sites, i e  indwelling lines, tubes, and drains  - [place] appropriate cooling/warming therapies per order  - Administer medications as ordered  - Instruct and encourage patient and family to use good hand hygiene technique   Outcome: Progressing     Problem: SAFETY ADULT  Goal: Maintain or return to baseline ADL function  Description  INTERVENTIONS:  -  Assess patient's ability to carry out ADLs; assess patient's baseline for ADL function and identify physical deficits which impact ability to perform ADLs (bathing, care of mouth/teeth, toileting, grooming, dressing, etc )  - Assess/evaluate cause of self-care deficits   - Assess range of motion  - Assess patient's mobility; develop plan if impaired  - Assess patient's need for assistive devices and provide as appropriate  - Encourage maximum independence but intervene and supervise when necessary  - Involve family in performance of ADLs  - Assess for home care needs following discharge   - Consider OT consult to assist with ADL evaluation and planning for discharge  - Provide patient education as appropriate  Outcome: Progressing  Goal: Maintain or return mobility status to optimal level  Description  INTERVENTIONS:  - Assess patient's baseline mobility status (ambulation, transfers, stairs, etc )    - Identify cognitive and physical deficits and behaviors that affect mobility  - Identify mobility aids required to assist with transfers and/or ambulation (gait belt, sit-to-stand, lift, walker, cane, etc )  - [place] fall precautions as indicated by assessment  - Record patient progress and toleration of activity level on Mobility SBAR; progress patient to next Phase/Stage  - Instruct patient to call for assistance with activity based on assessment  - Consider rehabilitation consult to assist with strengthening/weightbearing, etc   Outcome: Progressing     Problem: DISCHARGE PLANNING  Goal: Discharge to home or other facility with appropriate resources  Description  INTERVENTIONS:  - Identify barriers to discharge w/patient and caregiver  - Arrange for needed discharge resources and transportation as appropriate  - Identify discharge learning needs (meds, wound care, etc )  - Refer to Case Management Department for coordinating discharge planning if the patient needs post-hospital services based on physician/advanced practitioner order or complex needs related to functional status, cognitive ability, or social support system   Outcome: Progressing     Problem: Knowledge Deficit  Goal: Patient/family/caregiver demonstrates understanding of disease process, treatment plan, medications, and discharge instructions  Description  Complete learning assessment and assess knowledge base    Interventions:  - Provide teaching at level of understanding  - Provide teaching via preferred learning methods  Outcome: Progressing

## 2020-02-10 NOTE — CONSULTS
Consult- Sadaf Haque 1935, 80 y o  female MRN: [id number]    Unit/Bed#: -02 Encounter: [id number]    Primary Care Provider: Julien Diego MD   Date and time admitted to hospital: 2/9/2020 10:40 AM      Inpatient consult to Cardiology  Consult performed by: Yady Woo PA-C  Consult ordered by: Merrill Messina PA-C        Assessment/Plan:   1  Hypertensive emergency   · SBP in the 200s at home, 205/98 in the ED  · BP now well controlled w/ -130s   · On Toprol 25mg BID and losartan 50 mg daily (occasionally takes BID) at home  Has isolated high blood pressures related to anxiety, but has otherwise been well-controlled at home  No medication changes at this time - will arrange outpatient f/u    2  Type 2 MI   · 0 49 --> 1 16 --> 1 22 --> 0 48  · Likely secondary to uncontrolled HTN on presentation   · EKG with nonspecific ST changes  · She is not interested in ischemic testing in the absence of chest pain    3  History of Takasubo CM - resolved  · Echo 11/30/19 - EF 60%    HPI: Sadaf Haque is a 80 y o  female who presents with epigastric pain and SBP >200  She had fried fish and potatoes for dinner last night and developed epigastric "fullness," as if the food was not being digested  This sensation caused her to have trouble falling asleep and she became anxious  BP was >200, and her son brought her to the ED  The epigastric fullness resolved after receiving Mylanta  She takes Toprol 25mg BID at home and is prescribed losartan 50mg BID  Recently she has been holding her AM losartan as she noticed that she was getting lightheaded  She reports that BP has been well controlled at home with SBP averaging 130s, without AM dose of losartan  Last night she attributes uncontrolled BP to being anxious about not being able to fall asleep  Troponin levels peaked to 1 22  No acute ischemic changes on EKG  No recent chest pain/pressure       EKG: NSR, 1st degree AV block, left atrial enlargement, incomplete RBBB, nonspecific ST changes    Telemetry: No events     Most recent cardiac testing:    Echo 11/30/19 - EF 60%  Mild TR/MR/AI  Small pericardial effusion w/o evidence of hemodynamic compromise     Review of Systems:   Review of Systems   Constitution: Negative  HENT: Negative  Eyes: Negative  Cardiovascular: Negative for chest pain, claudication, dyspnea on exertion, irregular heartbeat, leg swelling, near-syncope, orthopnea, palpitations, paroxysmal nocturnal dyspnea and syncope  Respiratory: Negative for cough, shortness of breath and wheezing  Endocrine: Negative  Skin: Negative  Musculoskeletal: Negative  Gastrointestinal: Positive for bloating and abdominal pain (epigastric)  Genitourinary: Negative  Neurological: Negative for dizziness and light-headedness  Psychiatric/Behavioral: The patient is nervous/anxious  Historical Information   Past Medical History:   Diagnosis Date    Anxiety     CAD (coronary artery disease)     Carotid Duplex 03/06/2018    Plaque formation was prominent bilaterally    Depression     Disease of thyroid gland     Diverticulitis     Dyslipidemia     ECHO 09/14/2017    EF 55%, mild mitral regurg, small pericardial effusion   Hypertension     Mitral regurgitation     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 infarction)     Shingles     Takotsubo cardiomyopathy      Past Surgical History:   Procedure Laterality Date    CARDIAC CATHETERIZATION  08/27/2012    EF 35%, 70% stenosis of diagonal, Otherwise all OK apart from myopathy    CARDIAC CATHETERIZATION  10/10/2012    EF 55%, no significant CAD    Mild stress induced cardiomyopathy    CATARACT EXTRACTION Bilateral     EYE SURGERY      bilateral cataract    TONSILLECTOMY       Social History     Substance and Sexual Activity   Alcohol Use Never    Frequency: Never     Social History     Substance and Sexual Activity   Drug Use Never     Social History     Tobacco Use   Smoking Status Never Smoker   Smokeless Tobacco Never Used       Family History:   Family History   Problem Relation Age of Onset    Cancer Father     Prostate cancer Father      Meds/Allergies   all current active meds have been reviewed  Medications Prior to Admission   Medication    ALPRAZolam (XANAX) 0 25 mg tablet    aspirin 81 mg chewable tablet    cholecalciferol (VITAMIN D3) 1,000 units tablet    lansoprazole (PREVACID) 30 mg capsule    levothyroxine 50 mcg tablet    levothyroxine 75 mcg tablet    losartan (COZAAR) 50 mg tablet    metoprolol succinate (TOPROL-XL) 25 mg 24 hr tablet    prednisoLONE acetate (PRED FORTE) 1 % ophthalmic suspension       Allergies   Allergen Reactions    No Known Allergies        Objective   Vitals: Blood pressure 137/71, pulse 65, temperature 97 8 °F (36 6 °C), temperature source Temporal, resp  rate 18, height 5' 2" (1 575 m), weight 52 5 kg (115 lb 11 9 oz), SpO2 95 %, not currently breastfeeding , Body mass index is 21 17 kg/m² ,   Orthostatic Blood Pressures      Most Recent Value   Blood Pressure  137/71 filed at 02/10/2020 0917   Patient Position - Orthostatic VS  Lying filed at 02/10/2020 3344        Physical Exam   Physical Exam   Constitutional: She is oriented to person, place, and time  She appears well-developed and well-nourished  No distress  HENT:   Head: Normocephalic and atraumatic  Eyes: EOM are normal  No scleral icterus  Neck: Normal range of motion  Neck supple  Cardiovascular: Normal rate, regular rhythm, S1 normal and S2 normal    No murmur heard  Pulmonary/Chest: Effort normal and breath sounds normal  She has no wheezes  She has no rales  Abdominal: Soft  Bowel sounds are normal    Musculoskeletal: She exhibits no edema  Neurological: She is alert and oriented to person, place, and time  Skin: Skin is warm and dry  Psychiatric: She has a normal mood and affect  Her behavior is normal    Nursing note and vitals reviewed          Lab Results:     Troponins:   Results from last 7 days   Lab Units 02/10/20  0604 02/09/20  1757 02/09/20  1455   TROPONIN I ng/mL 0 48* 1 22* 1 16*     BNP:    CBC with diff:   Results from last 7 days   Lab Units 02/09/20  1106   WBC Thousand/uL 5 10   HEMOGLOBIN g/dL 13 3   HEMATOCRIT % 40 5*   PLATELETS Thousands/uL 208   RBC Million/uL 4 34     CMP:  Results from last 7 days   Lab Units 02/10/20  0604 02/09/20  1106   POTASSIUM mmol/L 4 3 4 2   CHLORIDE mmol/L 99 97*   CO2 mmol/L 27 28   BUN mg/dL 10 11   CREATININE mg/dL 0 83 0 92   CALCIUM mg/dL 9 4 10 2   EGFR ml/min/1 73sq m 65 57     TSH:     Coags:

## 2020-02-10 NOTE — NURSING NOTE
Pt is alert and oriented, pleasant and cooperative  Heart is nsr w/first degree  No edema noted  Lungs are clear on room air  Denies cough, denies sob  Denies pain, states she feels 'much better, just a little heartburn now'  Hourly rounding discussed, no needs at this time  Call bell is within reach

## 2020-02-10 NOTE — NURSING NOTE
Pt requesting tylenol due to 'restlessness', educated that xanax is ordered and it treats restlessness, pt verbalized understanding and requested xanax, given per order  No other needs at this time  Call bell within reach

## 2020-02-18 ENCOUNTER — OFFICE VISIT (OUTPATIENT)
Dept: CARDIOLOGY CLINIC | Facility: CLINIC | Age: 85
End: 2020-02-18
Payer: COMMERCIAL

## 2020-02-18 VITALS
WEIGHT: 118 LBS | HEIGHT: 62 IN | HEART RATE: 68 BPM | DIASTOLIC BLOOD PRESSURE: 62 MMHG | SYSTOLIC BLOOD PRESSURE: 112 MMHG | BODY MASS INDEX: 21.71 KG/M2

## 2020-02-18 DIAGNOSIS — I21.A1 TYPE 2 MI (MYOCARDIAL INFARCTION) (HCC): ICD-10-CM

## 2020-02-18 DIAGNOSIS — E03.9 HYPOTHYROIDISM: ICD-10-CM

## 2020-02-18 DIAGNOSIS — R55 SYNCOPE: ICD-10-CM

## 2020-02-18 DIAGNOSIS — I10 HYPERTENSION: Primary | ICD-10-CM

## 2020-02-18 PROCEDURE — 99214 OFFICE O/P EST MOD 30 MIN: CPT | Performed by: INTERNAL MEDICINE

## 2020-02-18 RX ORDER — AMLODIPINE BESYLATE 5 MG/1
5 TABLET ORAL DAILY
Qty: 90 TABLET | Refills: 1 | Status: ON HOLD | OUTPATIENT
Start: 2020-02-18 | End: 2020-03-03 | Stop reason: SDUPTHER

## 2020-02-19 NOTE — PROGRESS NOTES
[facility] REILLY [place], [address] [facility] AFFILIATED WITH [facility], [address]   [phone]                                              Cardiology Office Follow Up  April Beer, 80 y o  female  YOB: 1935  MRN: [id number] Encounter: [id number]      PCP - Lin Bernabe MD    Assessment and Plan  1  Hypertension  2  Recent type 2 MI (2/10/2020)  · Troponin peak 1 22  3  Syncope (11/2019)  · Event monitor - 12/2019 - NSR, no Afib, occasional PACs & PVCs, 1st degree AV block, asymptomatic 2 3 second pauses, lowest HR 38 bpm  4  Hypothyroidism  5  H/o diverticulitis  6  Anxiety  7   H/o takotsubo cardiomyopathy    Plan  Hypertension  · BP has been previously very up & down with various medications changes with PCP & ED  · Originally had been stable on losartan 25 + metoprolol XL 25 for many years but then recently has had uncontrolled BP  · Had orthostatic hypotension with clonidine 0 2 mg from hospital ED  · Subsequently, with increase of losartan to 50, and continuing metoprolol XL 25 bid, it was again well controlled for months, before she ended up in the hospital with uncontrolled BP in 200s & type 2 MI in 2/2020  · She had amlodipine 5 mg added during that hospital stay  · Bp today is 122/62, and she is symptom free  · She continues to be very anxious about everything  · Avoid frequent BP checks  · Continue losartan 50, metoprolol XL 25 bid, amlodipine 5    Type 2 MI  · ECG 2/9/2020 - personally reviewed - NSR with 1st degree AV block, iRBBB, poor R wave progression  · Was evaluated by Lasha Hercules in hospital, and it was felt to be related to uncontrolled BP in 200s  · Currently no symptoms  · Echo - 11/2019 - LVEF 60%, mild TR/MR/AI    Syncope  · Single episode on black Friday while she was trying to do shopping in the afternoon in a crowded store  · No significant AV block or pauses on event monitor  · No further episodes  · Likely was reflex syncope  · Continue to monitor    Hypothyroidism  · Continue levothyroxine 75  · Previously was missing medications due to trying to avoid taking other medications with levothyroxine    Follow-up in 6 months    History of Present Illness   60-year-old female comes in for short term follow up evaluation of hypertension  She has longstanding history of hypertension, and has been losartan and metoprolol for many years  Recently her blood pressure has been intermittently very elevated to 975-751 systolic  As a result, her losartan was increased from 25-50 mg weeks ago  But despite that her blood pressure seems to have remained elevated intermittently  Per patient, her blood pressure is usually well controlled during the day in the range of 130s  But her blood pressure is usually elevated in the middle of the night  A couple of days ago around 2:00 a m  She woke up and had a blood pressure of 210  She was recently and diagnosed and treated for diverticulitis  Since then she has continued to have some abdominal discomfort  She was scheduled to undergo an EGD for the same  Mom but when she presented for the same, she was found to have a blood pressure of 782 systolic, and as a result this was canceled  Next a again she went to the ED with elevated blood pressures  She was given a dose of clonidine 0 2 mg, and discharged home with follow-up with Cardiology  Since and getting the clonidine 0 2 mg, she has been feeling lightheaded and dizzy, and particularly with standing up  Interval History: 7/31/19  Has been doing relatively well  Continues to have a lot of anxiety  She continues to check her BP 2-3 times/day, and has been keeping a log  Her dizziness improved the same day after the effects of clonidine wore off  Interval history - 11/4/19  She comes back for 2 month follow-up of her blood pressure  No more symptoms of dizziness or lightheadedness after having been off clonidine    She continues to be very anxious about her blood pressure, and addressed to make adjustments on her own based on these blood pressure readings, and also forgets to take blood pressure medications at times due to trying to avoid taking it along with levothyroxine  She had called the office about 10 days ago stating that her blood pressure was 108, and she felt it was too low and as a result was not taking her blood pressure medications  But after that her blood pressure went in the 180s, and as a result she started taking it again    Interval history - 2/19/2020  Since my last follow up visit, she had an episode of syncope on black Friday for which she followed up with Dr Elroy Gates, and was ordered an event monitor  This did not reveal any significant findings  Subsequently about a week ago, and she ended up in the hospital with complains of abdominal pain and fullness after eating some fried sweet potatoes and fish for dinner  She was unable to sleep as a result and got very anxious and her blood pressure was very elevated in the 200s  As a result she was brought into the ED for evaluation  Her symptoms resolved after [place]  But during this she was found to have troponin elevation to 1, and Cardiology was consulted  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, related to her elevated blood pressures  Her medications were adjusted and she was set up for outpatient follow-up with me here today  She reports no further symptoms since discharge, and is currently feeling well, although continues to be very anxious about her blood pressure  Historical Information   Past Medical History:   Diagnosis Date    Anxiety     CAD (coronary artery disease)     Carotid Duplex 03/06/2018    Plaque formation was prominent bilaterally    Depression     Disease of thyroid gland     Diverticulitis     Dyslipidemia     ECHO 09/14/2017    EF 55%, mild mitral regurg, small pericardial effusion      Hypertension     Mitral regurgitation     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 infarction)     Shingles     Takotsubo cardiomyopathy      Past Surgical History:   Procedure Laterality Date    CARDIAC CATHETERIZATION  08/27/2012    EF 35%, 70% stenosis of diagonal, Otherwise all OK apart from myopathy    CARDIAC CATHETERIZATION  10/10/2012    EF 55%, no significant CAD  Mild stress induced cardiomyopathy    CATARACT EXTRACTION Bilateral     EYE SURGERY      bilateral cataract    TONSILLECTOMY       Family History   Problem Relation Age of Onset    Cancer Father     Prostate cancer Father      Current Outpatient Medications on File Prior to Visit   Medication Sig Dispense Refill    ALPRAZolam (XANAX) 0 25 mg tablet Use 1 tab 2x/day as needed for anxiety      aspirin 81 mg chewable tablet Chew 1 tablet (81 mg total) daily  0    cholecalciferol (VITAMIN D3) 1,000 units tablet Take 1,000 Units by mouth daily      lansoprazole (PREVACID) 30 mg capsule Take 30 mg by mouth daily      levothyroxine 50 mcg tablet Take 50 mcg by mouth 3 (three) times a week      levothyroxine 75 mcg tablet tuesday, thursday, saturday, sunday      losartan (COZAAR) 50 mg tablet Take 1 tablet (50 mg total) by mouth daily (Patient taking differently: Take 50 mg by mouth 2 (two) times a day ) 30 tablet 3    metoprolol succinate (TOPROL-XL) 25 mg 24 hr tablet Take 1 tablet (25 mg total) by mouth every 12 (twelve) hours 180 tablet 3    prednisoLONE acetate (PRED FORTE) 1 % ophthalmic suspension Apply 1 drop to eye      [DISCONTINUED] amLODIPine (NORVASC) 5 mg tablet Take 1 tablet (5 mg total) by mouth daily 30 tablet 0    aspirin 81 mg chewable tablet Chew 81 mg daily       No current facility-administered medications on file prior to visit  Allergies   Allergen Reactions    No Known Allergies      Social History     Socioeconomic History    Marital status:       Spouse name: None    Number of children: None    Years of education: None    Highest education level: None   Occupational History  None   Social Needs    Financial resource strain: None    Food insecurity:     Worry: None     Inability: None    Transportation needs:     Medical: None     Non-medical: None   Tobacco Use    Smoking status: Never Smoker    Smokeless tobacco: Never Used   Substance and Sexual Activity    Alcohol use: Never     Frequency: Never    Drug use: Never    Sexual activity: Never   Lifestyle    Physical activity:     Days per week: None     Minutes per session: None    Stress: None   Relationships    Social connections:     Talks on phone: None     Gets together: None     Attends [employer] service: None     Active member of club or organization: None     Attends meetings of clubs or organizations: None     Relationship status: None    Intimate partner violence:     Fear of current or ex partner: None     Emotionally abused: None     Physically abused: None     Forced sexual activity: None   Other Topics Concern    None   Social History Narrative    None        Review of Systems  No c/o chest pain, No c/o palpitations, No c/o shortness of breath, No c/o dizziness or light-headedness   All other systems negative, except as noted in HPI    Vitals:  Vitals:    02/18/20 1243   BP: 112/62   Pulse: 68   Weight: 53 5 kg (118 lb)   Height: 5' 2" (1 575 m)     BMI - Body mass index is 21 58 kg/m²  Wt Readings from Last 7 Encounters:   02/18/20 53 5 kg (118 lb)   02/09/20 52 5 kg (115 lb 11 9 oz)   12/03/19 54 4 kg (120 lb)   11/20/19 52 6 kg (116 lb)   11/04/19 54 4 kg (120 lb)   10/07/19 54 kg (119 lb)   09/30/19 54 kg (119 lb)       Physical Exam   Constitutional: She is oriented to person, place, and time  She appears well-developed and well-nourished  No distress  HENT:   Head: Normocephalic and atraumatic  Eyes: No scleral icterus  Neck: No JVD present  Cardiovascular: Normal rate, regular rhythm and normal heart sounds  Exam reveals no gallop and no friction rub  No murmur heard    Pulmonary/Chest: Effort normal and breath sounds normal  No respiratory distress  She has no rales  Abdominal: Soft  She exhibits no distension  There is no tenderness  Musculoskeletal: She exhibits no edema  Neurological: She is alert and oriented to person, place, and time  Skin: Skin is warm  Psychiatric: She has a normal mood and affect  Her behavior is normal    Nursing note and vitals reviewed  Labs:  CBC:   Lab Results   Component Value Date    WBC 5 10 02/09/2020    RBC 4 34 02/09/2020    HGB 13 3 02/09/2020    HCT 40 5 (L) 02/09/2020    MCV 93 02/09/2020     02/09/2020    RDW 13 0 02/09/2020       CMP:   Lab Results   Component Value Date    K 4 3 02/10/2020    CL 99 02/10/2020    CO2 27 02/10/2020    BUN 10 02/10/2020    CREATININE 0 83 02/10/2020    EGFR 65 02/10/2020    CALCIUM 9 4 02/10/2020    AST 16 11/20/2019    ALT 8 11/20/2019    ALKPHOS 64 11/20/2019       Magnesium:  Lab Results   Component Value Date    MG 2 0 02/10/2020       Lipid Profile:   Lab Results   Component Value Date    HDL 61 02/10/2020    TRIG 75 02/10/2020    [address] 93 02/10/2020       Thyroid Studies:   Lab Results   Component Value Date    [id number] 2 750 07/26/2019       No components found for: [place]    Lab Results   Component Value Date    INR 1 15 06/09/2019    INR 1 12 06/02/2019   5    Imaging: No results found  Cardiac testing:   No results found for this or any previous visit  No results found for this or any previous visit  No results found for this or any previous visit  No results found for this or any previous visit

## 2020-03-02 ENCOUNTER — APPOINTMENT (EMERGENCY)
Dept: RADIOLOGY | Facility: HOSPITAL | Age: 85
End: 2020-03-02
Payer: COMMERCIAL

## 2020-03-02 ENCOUNTER — HOSPITAL ENCOUNTER (OUTPATIENT)
Facility: HOSPITAL | Age: 85
Setting detail: OBSERVATION
Discharge: HOME | End: 2020-03-03
Attending: EMERGENCY MEDICINE | Admitting: HOSPITALIST
Payer: COMMERCIAL

## 2020-03-02 DIAGNOSIS — I16.1 HYPERTENSIVE EMERGENCY: Primary | ICD-10-CM

## 2020-03-02 DIAGNOSIS — F41.9 ANXIETY: ICD-10-CM

## 2020-03-02 DIAGNOSIS — E03.9 HYPOTHYROIDISM: ICD-10-CM

## 2020-03-02 DIAGNOSIS — R94.31 ABNORMAL EKG: ICD-10-CM

## 2020-03-02 DIAGNOSIS — R94.31 EKG ABNORMALITY: ICD-10-CM

## 2020-03-02 DIAGNOSIS — I21.A1 TYPE 2 MI (MYOCARDIAL INFARCTION) (HCC): ICD-10-CM

## 2020-03-02 DIAGNOSIS — I10 HYPERTENSION: ICD-10-CM

## 2020-03-02 PROBLEM — E87.1 HYPONATREMIA: Status: ACTIVE | Noted: 2020-03-02

## 2020-03-02 LAB
ALBUMIN SERPL BCP-MCNC: 4.4 G/DL (ref 3.5–5.7)
ALP SERPL-CCNC: 65 U/L (ref 55–165)
ALT SERPL W P-5'-P-CCNC: 10 U/L (ref 7–52)
ANION GAP SERPL CALCULATED.3IONS-SCNC: 5 MMOL/L (ref 4–13)
AST SERPL W P-5'-P-CCNC: 22 U/L (ref 13–39)
BASOPHILS # BLD AUTO: 0 THOUSANDS/ΜL (ref 0–0.1)
BASOPHILS NFR BLD AUTO: 0 % (ref 0–2)
BILIRUB SERPL-MCNC: 0.4 MG/DL (ref 0.2–1)
BUN SERPL-MCNC: 15 MG/DL (ref 7–25)
CALCIUM SERPL-MCNC: 9.3 MG/DL (ref 8.6–10.5)
CHLORIDE SERPL-SCNC: 98 MMOL/L (ref 98–107)
CO2 SERPL-SCNC: 29 MMOL/L (ref 21–31)
CREAT SERPL-MCNC: 0.93 MG/DL (ref 0.6–1.2)
EOSINOPHIL # BLD AUTO: 0 THOUSAND/ΜL (ref 0–0.61)
EOSINOPHIL NFR BLD AUTO: 1 % (ref 0–5)
ERYTHROCYTE [DISTWIDTH] IN BLOOD BY AUTOMATED COUNT: 13.4 % (ref 11.5–14.5)
GFR SERPL CREATININE-BSD FRML MDRD: 57 ML/MIN/1.73SQ M
GLUCOSE SERPL-MCNC: 84 MG/DL (ref 65–99)
HCT VFR BLD AUTO: 36 % (ref 42–47)
HGB BLD-MCNC: 12 G/DL (ref 12–16)
LYMPHOCYTES # BLD AUTO: 0.5 THOUSANDS/ΜL (ref 0.6–4.47)
LYMPHOCYTES NFR BLD AUTO: 11 % (ref 21–51)
MAGNESIUM SERPL-MCNC: 2.1 MG/DL (ref 1.9–2.7)
MCH RBC QN AUTO: 31.1 PG (ref 26–34)
MCHC RBC AUTO-ENTMCNC: 33.4 G/DL (ref 31–37)
MCV RBC AUTO: 93 FL (ref 81–99)
MONOCYTES # BLD AUTO: 0.4 THOUSAND/ΜL (ref 0.17–1.22)
MONOCYTES NFR BLD AUTO: 9 % (ref 2–12)
NEUTROPHILS # BLD AUTO: 3.7 THOUSANDS/ΜL (ref 1.4–6.5)
NEUTS SEG NFR BLD AUTO: 79 % (ref 42–75)
PHOSPHATE SERPL-MCNC: 4 MG/DL (ref 3–5.5)
PLATELET # BLD AUTO: 192 THOUSANDS/UL (ref 149–390)
PMV BLD AUTO: 6.7 FL (ref 8.6–11.7)
POTASSIUM SERPL-SCNC: 4.2 MMOL/L (ref 3.5–5.5)
PROT SERPL-MCNC: 6.9 G/DL (ref 6.4–8.9)
RBC # BLD AUTO: 3.87 MILLION/UL (ref 3.9–5.2)
SODIUM SERPL-SCNC: 132 MMOL/L (ref 134–143)
TROPONIN I SERPL-MCNC: <0.03 NG/ML
TROPONIN I SERPL-MCNC: <0.03 NG/ML
TSH SERPL DL<=0.05 MIU/L-ACNC: 1.87 UIU/ML (ref 0.45–5.33)
WBC # BLD AUTO: 4.7 THOUSAND/UL (ref 4.8–10.8)

## 2020-03-02 PROCEDURE — 84484 ASSAY OF TROPONIN QUANT: CPT | Performed by: EMERGENCY MEDICINE

## 2020-03-02 PROCEDURE — 84484 ASSAY OF TROPONIN QUANT: CPT | Performed by: PHYSICIAN ASSISTANT

## 2020-03-02 PROCEDURE — 83735 ASSAY OF MAGNESIUM: CPT | Performed by: EMERGENCY MEDICINE

## 2020-03-02 PROCEDURE — 36415 COLL VENOUS BLD VENIPUNCTURE: CPT | Performed by: EMERGENCY MEDICINE

## 2020-03-02 PROCEDURE — 99285 EMERGENCY DEPT VISIT HI MDM: CPT | Performed by: EMERGENCY MEDICINE

## 2020-03-02 PROCEDURE — 85025 COMPLETE CBC W/AUTO DIFF WBC: CPT | Performed by: EMERGENCY MEDICINE

## 2020-03-02 PROCEDURE — 93005 ELECTROCARDIOGRAM TRACING: CPT

## 2020-03-02 PROCEDURE — 99285 EMERGENCY DEPT VISIT HI MDM: CPT

## 2020-03-02 PROCEDURE — 84100 ASSAY OF PHOSPHORUS: CPT | Performed by: EMERGENCY MEDICINE

## 2020-03-02 PROCEDURE — 84443 ASSAY THYROID STIM HORMONE: CPT | Performed by: EMERGENCY MEDICINE

## 2020-03-02 PROCEDURE — 71046 X-RAY EXAM CHEST 2 VIEWS: CPT

## 2020-03-02 PROCEDURE — 80053 COMPREHEN METABOLIC PANEL: CPT | Performed by: EMERGENCY MEDICINE

## 2020-03-02 PROCEDURE — 99220 PR INITIAL OBSERVATION CARE/DAY 70 MINUTES: CPT | Performed by: PHYSICIAN ASSISTANT

## 2020-03-02 RX ORDER — METOPROLOL SUCCINATE 25 MG/1
25 TABLET, EXTENDED RELEASE ORAL EVERY 12 HOURS
Status: DISCONTINUED | OUTPATIENT
Start: 2020-03-02 | End: 2020-03-03 | Stop reason: HOSPADM

## 2020-03-02 RX ORDER — ALPRAZOLAM 0.25 MG/1
0.25 TABLET ORAL 2 TIMES DAILY PRN
Status: DISCONTINUED | OUTPATIENT
Start: 2020-03-02 | End: 2020-03-03 | Stop reason: HOSPADM

## 2020-03-02 RX ORDER — ALPRAZOLAM 0.5 MG/1
0.25 TABLET ORAL ONCE
Status: COMPLETED | OUTPATIENT
Start: 2020-03-02 | End: 2020-03-02

## 2020-03-02 RX ORDER — ASPIRIN 81 MG/1
81 TABLET, CHEWABLE ORAL DAILY
Status: DISCONTINUED | OUTPATIENT
Start: 2020-03-03 | End: 2020-03-03 | Stop reason: HOSPADM

## 2020-03-02 RX ORDER — MELATONIN
1000 DAILY
Status: DISCONTINUED | OUTPATIENT
Start: 2020-03-03 | End: 2020-03-03 | Stop reason: HOSPADM

## 2020-03-02 RX ORDER — SODIUM CHLORIDE 9 MG/ML
100 INJECTION, SOLUTION INTRAVENOUS ONCE
Status: COMPLETED | OUTPATIENT
Start: 2020-03-02 | End: 2020-03-02

## 2020-03-02 RX ORDER — LEVOTHYROXINE SODIUM 0.05 MG/1
50 TABLET ORAL 3 TIMES WEEKLY
Status: DISCONTINUED | OUTPATIENT
Start: 2020-03-04 | End: 2020-03-03 | Stop reason: HOSPADM

## 2020-03-02 RX ORDER — PANTOPRAZOLE SODIUM 20 MG/1
20 TABLET, DELAYED RELEASE ORAL
Status: DISCONTINUED | OUTPATIENT
Start: 2020-03-03 | End: 2020-03-03 | Stop reason: HOSPADM

## 2020-03-02 RX ORDER — ACETAMINOPHEN 325 MG/1
650 TABLET ORAL EVERY 6 HOURS PRN
Status: DISCONTINUED | OUTPATIENT
Start: 2020-03-02 | End: 2020-03-03 | Stop reason: HOSPADM

## 2020-03-02 RX ORDER — PREDNISOLONE ACETATE 10 MG/ML
1 SUSPENSION/ DROPS OPHTHALMIC
Status: DISCONTINUED | OUTPATIENT
Start: 2020-03-02 | End: 2020-03-03 | Stop reason: HOSPADM

## 2020-03-02 RX ORDER — ASPIRIN 325 MG
325 TABLET ORAL ONCE
Status: COMPLETED | OUTPATIENT
Start: 2020-03-02 | End: 2020-03-02

## 2020-03-02 RX ORDER — LOSARTAN POTASSIUM 50 MG/1
50 TABLET ORAL 2 TIMES DAILY
Status: DISCONTINUED | OUTPATIENT
Start: 2020-03-02 | End: 2020-03-03 | Stop reason: HOSPADM

## 2020-03-02 RX ORDER — MULTIVITAMIN
1 CAPSULE ORAL DAILY
COMMUNITY

## 2020-03-02 RX ORDER — POLYETHYLENE GLYCOL 3350 17 G/17G
17 POWDER, FOR SOLUTION ORAL DAILY
COMMUNITY

## 2020-03-02 RX ORDER — POLYETHYLENE GLYCOL 3350 17 G/17G
17 POWDER, FOR SOLUTION ORAL DAILY
Status: DISCONTINUED | OUTPATIENT
Start: 2020-03-03 | End: 2020-03-03 | Stop reason: HOSPADM

## 2020-03-02 RX ORDER — AMLODIPINE BESYLATE 5 MG/1
5 TABLET ORAL DAILY
Status: DISCONTINUED | OUTPATIENT
Start: 2020-03-03 | End: 2020-03-03

## 2020-03-02 RX ORDER — LEVOTHYROXINE SODIUM 0.07 MG/1
75 TABLET ORAL
Status: DISCONTINUED | OUTPATIENT
Start: 2020-03-03 | End: 2020-03-03 | Stop reason: HOSPADM

## 2020-03-02 RX ORDER — ONDANSETRON 2 MG/ML
4 INJECTION INTRAMUSCULAR; INTRAVENOUS EVERY 6 HOURS PRN
Status: DISCONTINUED | OUTPATIENT
Start: 2020-03-02 | End: 2020-03-03 | Stop reason: HOSPADM

## 2020-03-02 RX ADMIN — METOPROLOL TARTRATE 25 MG: 25 TABLET ORAL at 18:58

## 2020-03-02 RX ADMIN — PREDNISOLONE ACETATE 1 DROP: 10 SUSPENSION/ DROPS OPHTHALMIC at 23:59

## 2020-03-02 RX ADMIN — ALPRAZOLAM 0.25 MG: 0.5 TABLET ORAL at 19:09

## 2020-03-02 RX ADMIN — ASPIRIN 325 MG: 325 TABLET, FILM COATED ORAL at 14:57

## 2020-03-02 RX ADMIN — SODIUM CHLORIDE 100 ML/HR: 0.9 INJECTION, SOLUTION INTRAVENOUS at 23:39

## 2020-03-02 RX ADMIN — LOSARTAN POTASSIUM 50 MG: 50 TABLET, FILM COATED ORAL at 23:40

## 2020-03-02 RX ADMIN — NITROGLYCERIN 1 INCH: 20 OINTMENT TOPICAL at 19:01

## 2020-03-02 NOTE — ED PROVIDER NOTES
History  Chief Complaint   Patient presents with    Palpitations     pt reports became "stressed" and anxious r/t family today  Pt reports sudden onset of palpitations and pressure in head  Took 1 xanax and called 911  Pt reports sx's have since resolved  [facility]     Patient is an 60-year-old female  She presents to the emergency room for evaluation of a possible anxiety attack  She was sitting at the table cutting of cookies when she became stressed  She began to experience palpitations  No real chest pain or shortness of breath  She did feel palpitations in her upper abdomen  Last November she did have a CT scan of her abdomen and pelvis which was negative for AAA  She does have a history of coronary artery disease  She has hypertension and hyperlipidemia  She has a history an old myocardial infarction, mitral regurgitation and Takotsubo's cardiomyopathy  She does have a known history of anxiety and depression  Symptoms were moderately severe  She took Xanax prior to arrival and feels improved  Prior to Admission Medications   Prescriptions Last Dose Informant Patient Reported? Taking?    ALPRAZolam (XANAX) 0 25 mg tablet  Self Yes No   Sig: Use 1 tab 2x/day as needed for anxiety   amLODIPine (NORVASC) 5 mg tablet   No No   Sig: Take 1 tablet (5 mg total) by mouth daily   aspirin 81 mg chewable tablet   Yes No   Sig: Chew 81 mg daily   aspirin 81 mg chewable tablet   No No   Sig: Chew 1 tablet (81 mg total) daily   cholecalciferol (VITAMIN D3) 1,000 units tablet   Yes No   Sig: Take 1,000 Units by mouth daily   lansoprazole (PREVACID) 30 mg capsule   Yes No   Sig: Take 30 mg by mouth daily   levothyroxine 50 mcg tablet   Yes No   Sig: Take 50 mcg by mouth 3 (three) times a week   levothyroxine 75 mcg tablet  Self Yes No   Sig: tuesday, thursday, saturday, sunday   losartan (COZAAR) 50 mg tablet   No No   Sig: Take 1 tablet (50 mg total) by mouth daily   Patient taking differently: Take 50 mg by mouth 2 (two) times a day    metoprolol succinate (TOPROL-XL) 25 mg 24 hr tablet   No No   Sig: Take 1 tablet (25 mg total) by mouth every 12 (twelve) hours   prednisoLONE acetate (PRED FORTE) 1 % ophthalmic suspension   Yes No   Sig: Apply 1 drop to eye      Facility-Administered Medications: None       Past Medical History:   Diagnosis Date    Anxiety     CAD (coronary artery disease)     Carotid Duplex 03/06/2018    Plaque formation was prominent bilaterally    Depression     Disease of thyroid gland     Diverticulitis     Dyslipidemia     ECHO 09/14/2017    EF 55%, mild mitral regurg, small pericardial effusion   Hypertension     Mitral regurgitation     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 infarction)     Shingles     Takotsubo cardiomyopathy        Past Surgical History:   Procedure Laterality Date    CARDIAC CATHETERIZATION  08/27/2012    EF 35%, 70% stenosis of diagonal, Otherwise all OK apart from myopathy    CARDIAC CATHETERIZATION  10/10/2012    EF 55%, no significant CAD  Mild stress induced cardiomyopathy    CATARACT EXTRACTION Bilateral     EYE SURGERY      bilateral cataract    TONSILLECTOMY         Family History   Problem Relation Age of Onset    Cancer Father     Prostate cancer Father      I have reviewed and agree with the history as documented  E-Cigarette/Vaping     E-Cigarette/Vaping Substances     Social History     Tobacco Use    Smoking status: Never Smoker    Smokeless tobacco: Never Used   Substance Use Topics    Alcohol use: Never     Frequency: Never    Drug use: Never       Review of Systems   Constitutional: Negative for chills and fever  HENT: Negative for rhinorrhea and sore throat  Eyes: Negative for pain, redness and visual disturbance  Respiratory: Negative for cough and shortness of breath  Cardiovascular: Positive for palpitations  Negative for chest pain and leg swelling     Gastrointestinal: Negative for abdominal pain, diarrhea and vomiting  Endocrine: Negative for polydipsia and polyuria  Genitourinary: Negative for dysuria, frequency, hematuria, vaginal bleeding and vaginal discharge  Musculoskeletal: Negative for back pain and neck pain  Skin: Negative for rash and wound  Allergic/Immunologic: Negative for immunocompromised state  Neurological: Positive for headaches  Negative for weakness and numbness  Hematological: Does not bruise/bleed easily  Psychiatric/Behavioral: Positive for dysphoric mood  Negative for hallucinations and suicidal ideas  The patient is nervous/anxious  All other systems reviewed and are negative  Physical Exam  Physical Exam   Constitutional: She is oriented to person, place, and time  She appears well-developed and well-nourished  No distress  HENT:   Head: Normocephalic and atraumatic  Mouth/Throat: Oropharynx is clear and moist    Eyes: Conjunctivae are normal  Right eye exhibits no discharge  Left eye exhibits no discharge  No scleral icterus  Neck: Normal range of motion  Neck supple  Cardiovascular: Normal rate, regular rhythm and intact distal pulses  Exam reveals no gallop and no friction rub  Murmur heard  Pulmonary/Chest: Effort normal and breath sounds normal  No stridor  No respiratory distress  She has no wheezes  She has no rales  Abdominal: Soft  Bowel sounds are normal  She exhibits no distension  There is no tenderness  There is no rebound and no guarding  Musculoskeletal: Normal range of motion  She exhibits no edema, tenderness or deformity  No CVA tenderness  No calf tenderness/palpable cords  Neurological: She is alert and oriented to person, place, and time  She has normal strength  No sensory deficit  GCS eye subscore is 4  GCS verbal subscore is 5  GCS motor subscore is 6  Skin: Skin is warm and dry  No rash noted  Psychiatric: She has a normal mood and affect  Her behavior is normal    Vitals reviewed        Vital Signs  ED Triage Vitals Temp Pulse Respirations Blood Pressure SpO2   -- 03/02/20 1400 03/02/20 1400 03/02/20 1400 03/02/20 1400    71 20 (!) 212/95 99 %      Temp src Heart Rate Source Patient Position - Orthostatic VS BP Location FiO2 (%)   -- 03/02/20 1806 03/02/20 1806 03/02/20 1806 --    Monitor Lying Left arm       Pain Score       03/02/20 1400       No Pain           Vitals:    03/02/20 1400 03/02/20 1445 03/02/20 1806   BP: (!) 212/95 161/73 (!) 187/81   Pulse: 71 63 70   Patient Position - Orthostatic VS:   Lying         Visual Acuity      ED Medications  Medications   nitroglycerin (NITRO-BID) 2 % TD ointment 1 inch (has no administration in time range)   metoprolol tartrate (LOPRESSOR) tablet 25 mg (has no administration in time range)   aspirin tablet 325 mg (325 mg Oral Given 3/2/20 1457)       Diagnostic Studies  Results Reviewed     Procedure Component Value Units Date/Time    TSH [[id number]]  (Normal) Collected:  03/02/20 1437    Lab Status:  Final result Specimen:  Blood from Arm, Right Updated:  03/02/20 1518     TSH 3RD GENERATON 1 870 uIU/mL     Narrative:       Patients undergoing fluorescein dye angiography may retain small amounts of fluorescein in the body for 48-72 hours post procedure  Samples containing fluorescein can produce falsely depressed TSH values  If the patient had this procedure,a specimen should be resubmitted post fluorescein clearance        Troponin I [[id number]]  (Normal) Collected:  03/02/20 1437    Lab Status:  Final result Specimen:  Blood from Arm, Right Updated:  03/02/20 1505     Troponin I <0 03 ng/mL     Phosphorus [[id number]]  (Normal) Collected:  03/02/20 1437    Lab Status:  Final result Specimen:  Blood from Arm, Right Updated:  03/02/20 1504     Phosphorus 4 0 mg/dL     Magnesium [[id number]]  (Normal) Collected:  03/02/20 1437    Lab Status:  Final result Specimen:  Blood from Arm, Right Updated:  03/02/20 1504     Magnesium 2 1 mg/dL     Comprehensive metabolic panel [[id number]] (Abnormal) Collected:  03/02/20 1437    Lab Status:  Final result Specimen:  Blood from Arm, Right Updated:  03/02/20 1504     Sodium 132 mmol/L      Potassium 4 2 mmol/L      Chloride 98 mmol/L      CO2 29 mmol/L      ANION GAP 5 mmol/L      BUN 15 mg/dL      Creatinine 0 93 mg/dL      Glucose 84 mg/dL      Calcium 9 3 mg/dL      AST 22 U/L      ALT 10 U/L      Alkaline Phosphatase 65 U/L      Total Protein 6 9 g/dL      Albumin 4 4 g/dL      Total Bilirubin 0 40 mg/dL      eGFR 57 ml/min/1 73sq m     Narrative:       [facility] guidelines for Chronic Kidney Disease (CKD):     Stage 1 with normal or high GFR (GFR > 90 mL/min/1 73 square meters)    Stage 2 Mild CKD (GFR = 60-89 mL/min/1 73 square meters)    Stage 3A Moderate CKD (GFR = 45-59 mL/min/1 73 square meters)    Stage 3B Moderate CKD (GFR = 30-44 mL/min/1 73 square meters)    Stage 4 Severe CKD (GFR = 15-29 mL/min/1 73 square meters)    Stage 5 End Stage CKD (GFR <15 mL/min/1 73 square meters)  Note: GFR calculation is accurate only with a steady state creatinine    CBC and differential [[id number]]  (Abnormal) Collected:  03/02/20 1437    Lab Status:  Final result Specimen:  Blood from Arm, Right Updated:  03/02/20 1444     WBC 4 70 Thousand/uL      RBC 3 87 Million/uL      Hemoglobin 12 0 g/dL      Hematocrit 36 0 %      MCV 93 fL      MCH 31 1 pg      MCHC 33 4 g/dL      RDW 13 4 %      MPV 6 7 fL      Platelets 262 Thousands/uL      Neutrophils Relative 79 %      Lymphocytes Relative 11 %      Monocytes Relative 9 %      Eosinophils Relative 1 %      Basophils Relative 0 %      Neutrophils Absolute 3 70 Thousands/µL      Lymphocytes Absolute 0 50 Thousands/µL      Monocytes Absolute 0 40 Thousand/µL      Eosinophils Absolute 0 00 Thousand/µL      Basophils Absolute 0 00 Thousands/µL                  XR chest 2 views   ED Interpretation by Bri German MD (03/02 1728)   Cardiomegaly  No infiltrates  Procedures  ECG 12 Lead Documentation Only  Date/Time: 3/2/2020 2:27 PM  Performed by: Sabina Bhakta MD  Authorized by: Sabina Bhakta MD     ECG reviewed by me, the ED Provider: yes    Patient location:  ED  Comments:      Normal sinus rhythm  There is first-degree AV block  Incomplete right bundle branch block  There are new T-wave inversions inferiorly and in V4 through V6  Right axis deviation  ED Course                               MDM  Number of Diagnoses or Management Options  Diagnosis management comments: Reviewed patient's old chart  Patient was admitted a couple weeks ago for an indigestion type chest pain  She had an elevated troponin  It was felt to be due to a hypertensive emergency  Today patient does not have any chest pain or shortness of breath  She did have palpitations  This may have been an anxiety attack  However, patient's EKG is changed  She has flipped Ts inferiorly and laterally that are concerning for ischemia  Troponin is negative  Blood pressure is, again, high  Lopressor nitroglycerin were ordered  Aspirin was ordered  Consulted with hospitalist for admission         Amount and/or Complexity of Data Reviewed  Clinical lab tests: ordered and reviewed  Tests in the radiology section of CPT®: ordered and reviewed  Review and summarize past medical records: yes  Discuss the patient with other providers: yes  Independent visualization of images, tracings, or specimens: yes          Disposition  Final diagnoses:   Hypertensive emergency   Abnormal EKG   Anxiety     Time reflects when diagnosis was documented in both MDM as applicable and the Disposition within this note     Time User Action Codes Description Comment    3/2/2020  6:27 PM Milind Cameron [I16 1] Hypertensive emergency     3/2/2020  6:27 PM Milind Kurtz Add [R94 31] Abnormal EKG     3/2/2020  6:28 PM Milind Kurtz Add [F41 9] Anxiety       ED Disposition     ED Disposition Condition Date/Time Comment    Admit Stable Mon Mar 2, 2020  6:27 PM       Follow-up Information    None         Patient's Medications   Discharge Prescriptions    No medications on file     No discharge procedures on file      PDMP Review     None          ED Provider  Electronically Signed by           Celia Dave MD  03/02/20 [id number]       Celia Dave MD  03/02/20 [id number]

## 2020-03-03 VITALS
HEART RATE: 65 BPM | OXYGEN SATURATION: 99 % | TEMPERATURE: 98.5 F | RESPIRATION RATE: 16 BRPM | DIASTOLIC BLOOD PRESSURE: 76 MMHG | HEIGHT: 62 IN | SYSTOLIC BLOOD PRESSURE: 172 MMHG | BODY MASS INDEX: 22.03 KG/M2 | WEIGHT: 119.71 LBS

## 2020-03-03 PROBLEM — R94.31 EKG ABNORMALITY: Chronic | Status: ACTIVE | Noted: 2020-03-02

## 2020-03-03 PROBLEM — E87.1 HYPONATREMIA: Chronic | Status: ACTIVE | Noted: 2020-03-02

## 2020-03-03 PROBLEM — F41.9 ANXIETY: Status: ACTIVE | Noted: 2020-03-03

## 2020-03-03 PROBLEM — I51.81 TAKOTSUBO CARDIOMYOPATHY: Chronic | Status: ACTIVE | Noted: 2019-12-03

## 2020-03-03 LAB
ANION GAP SERPL CALCULATED.3IONS-SCNC: 7 MMOL/L (ref 4–13)
ATRIAL RATE: 66 BPM
BUN SERPL-MCNC: 11 MG/DL (ref 7–25)
CALCIUM SERPL-MCNC: 9.2 MG/DL (ref 8.6–10.5)
CHLORIDE SERPL-SCNC: 102 MMOL/L (ref 98–107)
CO2 SERPL-SCNC: 28 MMOL/L (ref 21–31)
CREAT SERPL-MCNC: 0.81 MG/DL (ref 0.6–1.2)
ERYTHROCYTE [DISTWIDTH] IN BLOOD BY AUTOMATED COUNT: 13.5 % (ref 11.5–14.5)
GFR SERPL CREATININE-BSD FRML MDRD: 67 ML/MIN/1.73SQ M
GLUCOSE P FAST SERPL-MCNC: 76 MG/DL (ref 65–99)
GLUCOSE SERPL-MCNC: 76 MG/DL (ref 65–99)
HCT VFR BLD AUTO: 38.4 % (ref 42–47)
HGB BLD-MCNC: 12.8 G/DL (ref 12–16)
MCH RBC QN AUTO: 31.4 PG (ref 26–34)
MCHC RBC AUTO-ENTMCNC: 33.4 G/DL (ref 31–37)
MCV RBC AUTO: 94 FL (ref 81–99)
P AXIS: 83 DEGREES
PLATELET # BLD AUTO: 193 THOUSANDS/UL (ref 149–390)
PMV BLD AUTO: 7.1 FL (ref 8.6–11.7)
POTASSIUM SERPL-SCNC: 3.9 MMOL/L (ref 3.5–5.5)
PR INTERVAL: 256 MS
QRS AXIS: 92 DEGREES
QRSD INTERVAL: 104 MS
QT INTERVAL: 420 MS
QTC INTERVAL: 440 MS
RBC # BLD AUTO: 4.08 MILLION/UL (ref 3.9–5.2)
SODIUM SERPL-SCNC: 137 MMOL/L (ref 134–143)
T WAVE AXIS: 234 DEGREES
TROPONIN I SERPL-MCNC: <0.03 NG/ML
VENTRICULAR RATE: 66 BPM
WBC # BLD AUTO: 4.8 THOUSAND/UL (ref 4.8–10.8)

## 2020-03-03 PROCEDURE — 80048 BASIC METABOLIC PNL TOTAL CA: CPT | Performed by: PHYSICIAN ASSISTANT

## 2020-03-03 PROCEDURE — 99215 OFFICE O/P EST HI 40 MIN: CPT | Performed by: INTERNAL MEDICINE

## 2020-03-03 PROCEDURE — 93010 ELECTROCARDIOGRAM REPORT: CPT | Performed by: INTERNAL MEDICINE

## 2020-03-03 PROCEDURE — 99217 PR OBSERVATION CARE DISCHARGE MANAGEMENT: CPT | Performed by: NURSE PRACTITIONER

## 2020-03-03 PROCEDURE — 84484 ASSAY OF TROPONIN QUANT: CPT | Performed by: PHYSICIAN ASSISTANT

## 2020-03-03 PROCEDURE — 85027 COMPLETE CBC AUTOMATED: CPT | Performed by: PHYSICIAN ASSISTANT

## 2020-03-03 RX ORDER — AMLODIPINE BESYLATE 5 MG/1
10 TABLET ORAL DAILY
Status: DISCONTINUED | OUTPATIENT
Start: 2020-03-03 | End: 2020-03-03 | Stop reason: HOSPADM

## 2020-03-03 RX ORDER — LOSARTAN POTASSIUM 50 MG/1
50 TABLET ORAL 2 TIMES DAILY
Qty: 60 TABLET | Refills: 0 | Status: SHIPPED | OUTPATIENT
Start: 2020-03-03 | End: 2020-06-26 | Stop reason: HOSPADM

## 2020-03-03 RX ORDER — AMLODIPINE BESYLATE 5 MG/1
10 TABLET ORAL DAILY
Qty: 90 TABLET | Refills: 0 | Status: SHIPPED | OUTPATIENT
Start: 2020-03-03 | End: 2020-04-27 | Stop reason: SDUPTHER

## 2020-03-03 RX ORDER — AMLODIPINE BESYLATE 5 MG/1
10 TABLET ORAL DAILY
Status: DISCONTINUED | OUTPATIENT
Start: 2020-03-04 | End: 2020-03-03

## 2020-03-03 RX ADMIN — POLYETHYLENE GLYCOL 3350 17 G: 17 POWDER, FOR SOLUTION ORAL at 11:48

## 2020-03-03 RX ADMIN — LEVOTHYROXINE SODIUM 75 MCG: 75 TABLET ORAL at 06:35

## 2020-03-03 RX ADMIN — ASPIRIN 81 MG 81 MG: 81 TABLET ORAL at 11:46

## 2020-03-03 RX ADMIN — METOPROLOL SUCCINATE 25 MG: 25 TABLET, EXTENDED RELEASE ORAL at 11:47

## 2020-03-03 RX ADMIN — ALPRAZOLAM 0.25 MG: 0.25 TABLET ORAL at 06:35

## 2020-03-03 RX ADMIN — ALPRAZOLAM 0.25 MG: 0.25 TABLET ORAL at 14:20

## 2020-03-03 RX ADMIN — PANTOPRAZOLE SODIUM 20 MG: 20 TABLET, DELAYED RELEASE ORAL at 06:35

## 2020-03-03 RX ADMIN — AMLODIPINE BESYLATE 10 MG: 5 TABLET ORAL at 12:42

## 2020-03-03 RX ADMIN — VITAMIN D, TAB 1000IU (100/BT) 1000 UNITS: 25 TAB at 11:46

## 2020-03-03 RX ADMIN — LOSARTAN POTASSIUM 50 MG: 50 TABLET, FILM COATED ORAL at 11:46

## 2020-03-03 RX ADMIN — ENOXAPARIN SODIUM 40 MG: 40 INJECTION SUBCUTANEOUS at 11:48

## 2020-03-03 RX ADMIN — ACETAMINOPHEN 650 MG: 325 TABLET ORAL at 06:35

## 2020-03-03 NOTE — UTILIZATION REVIEW
Initial Clinical Review    Admission: Date/Time/Statement: Admission Orders (From admission, onward)     Ordered        03/02/20 1830  Place in Observation (expected length of stay for this patient is less than two midnights)  Once             Orders Placed This Encounter   Procedures    Place in Observation (expected length of stay for this patient is less than two midnights)     Standing Status:   Standing     Number of Occurrences:   1     Order Specific Question:   Admitting Physician     Answer:   Hu Carvajal [[id number]]     Order Specific Question:   Level of Care     Answer:   Med Surg [16]     ED Arrival Information     Expected Arrival Acuity Means of Arrival Escorted By Service Admission Type    - 3/2/2020 13:55 Urgent Ambulance [address] [facility] General Medicine Urgent    Arrival Complaint    anxiety / depression     Chief Complaint   Patient presents with    Palpitations     pt reports became "stressed" and anxious r/t family today  Pt reports sudden onset of palpitations and pressure in head  Took 1 xanax and called 911  Assessment/Plan: 80year old female with PMHx HTN, HLD, CAD/ MI, Takotsubo's cardiomyopathy, mitral regurgitation, Takotsubo's Cardiomyopathy, Depression/ anxiety  EMS to [facility] ED 3/2/20 2nd palpitations with anxiety  States  she was sitting at the table cutting cookies when she became stressed and began to experience moderately severe palpitations in her upper abdomen and took xanax  In ED:  BP  212/95  EKG - 1st degree A-V bloc, RAD, Incomplete RBBB, non-specific T wave abnormalities  - possible ischemia  Troponin negative   Sodium 132   ED TX: ASA, NTG paste, Lopressor po + Xanax  Placed in Observation 3/2/20 at 1830 2nd palpitations with new EKG changes  Plan: Telemetry, serial Troponin,Cardiology Consult   Also 1 L NSS IV 2nd low sodium    ED Triage Vitals   Temperature Pulse Respirations Blood Pressure SpO2   03/02/20 2040 03/02/20 1400 03/02/20 1400 03/02/20 1400 03/02/20 1400   97 5 °F (36 4 °C) 71 20 (!) 212/95 99 %      Temp Source Heart Rate Source Patient Position - Orthostatic VS BP Location FiO2 (%)   03/02/20 2040 03/02/20 1806 03/02/20 1806 03/02/20 1806 --   Temporal Monitor Lying Left arm     Wt Readings from Last 1 Encounters:   03/02/20 54 3 kg (119 lb 11 4 oz)     Additional Vital Signs:    03/02 0701 03/03 0700 03/03 0701 03/03 1025  Most Recent    Temperature (°F) 97 197 8 97 7  97 7 (36 5)    Pulse 5871 61  61    Respirations 1520 18  18    Blood Pressure 132/62212/95 159/72  159/72    Shock Index 0 330 44 0 38  0 38    SpO2 (%) 9699 95  95      I/O   03/02 0701 03/03 0700   I V  (mL/kg) 1000 (18 4)   Total Intake(mL/kg) 1000 (18 4)   Net +1000         Pertinent Labs/Diagnostic Test Results:   Results from last 7 days   Lab Units 03/03/20  0641 03/02/20  1437   WBC Thousand/uL 4 80 4 70*   HEMOGLOBIN g/dL 12 8 12 0   HEMATOCRIT % 38 4* 36 0*   PLATELETS Thousands/uL 193 192   NEUTROS ABS Thousands/µL  --  3 70     Results from last 7 days   Lab Units 03/03/20  0641 03/02/20  1437   SODIUM mmol/L 137 132*   POTASSIUM mmol/L 3 9 4 2   CHLORIDE mmol/L 102 98   CO2 mmol/L 28 29   ANION GAP mmol/L 7 5   BUN mg/dL 11 15   CREATININE mg/dL 0 81 0 93   EGFR ml/min/1 73sq m 67 57   CALCIUM mg/dL 9 2 9 3   MAGNESIUM mg/dL  --  2 1   PHOSPHORUS mg/dL  --  4 0     Results from last 7 days   Lab Units 03/02/20  1437   AST U/L 22   ALT U/L 10   ALK PHOS U/L 65   TOTAL PROTEIN g/dL 6 9   ALBUMIN g/dL 4 4   TOTAL BILIRUBIN mg/dL 0 40     Results from last 7 days   Lab Units 03/03/20  0641 03/02/20  1437   GLUCOSE RANDOM mg/dL 76 84     Results from last 7 days   Lab Units 03/03/20  0031 03/02/20  2220 03/02/20  1437   TROPONIN I ng/mL <0 03 <0 03 <0 03     Results from last 7 days   Lab Units 03/02/20  1437   TSH 3RD GENERATON uIU/mL 1 870        EKG -  Sinus rhythm with 1st degree A-V block  Rightward axis  Incomplete right bundle branch block  T wave abnormality, consider inferolateral ischemia  Abnormal ECG  When compared with ECG of 09-FEB-2020 10:59,  T wave inversion now evident in Inferior leads  T wave inversion now evident in Anterolateral leads    ED Treatment:   Medication Administration from 03/02/2020 1355 to 03/02/2020 1936       Date/Time Order Dose Route Action     03/02/2020 1457 aspirin tablet 325 mg 325 mg Oral Given     03/02/2020 1901 nitroglycerin (NITRO-BID) 2 % TD ointment 1 inch 1 inch Topical Given     03/02/2020 1858 metoprolol tartrate (LOPRESSOR) tablet 25 mg 25 mg Oral Given     03/02/2020 1909 ALPRAZolam (XANAX) tablet 0 25 mg 0 25 mg Oral Given     Past Medical History:   Diagnosis Date    Anxiety     CAD (coronary artery disease)     Carotid Duplex 03/06/2018    Plaque formation was prominent bilaterally    Depression     Disease of thyroid gland     Diverticulitis     Dyslipidemia     ECHO 09/14/2017    EF 55%, mild mitral regurg, small pericardial effusion   Hypertension     Mitral regurgitation     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 infarction)     Shingles     Takotsubo cardiomyopathy      Present on Admission:   EKG abnormality   Hypothyroidism   Benign essential HTN   Syncope   Hyponatremia    Admitting Diagnosis:  Anxiety [F41 9]  Palpitation [R00 2]  Abnormal EKG [R94 31]  Hypertensive emergency [I16 1]    Age/Sex: 80 y o  female    Admission Orders:  Telemetry  VS q4hrs  Up + OOB a sTolerated  Regular House Diet    Scheduled Medications:  amLODIPine 5 mg Oral Daily   aspirin 81 mg Oral Daily   cholecalciferol 1,000 Units Oral Daily   enoxaparin 40 mg Subcutaneous Daily   [START ON 3/4/2020] levothyroxine 50 mcg Oral Once per day on Mon Wed Fri   levothyroxine 75 mcg Oral Once per day on Sun Tue Thu Sat   losartan 50 mg Oral BID   metoprolol succinate 25 mg Oral Q12H   pantoprazole 20 mg Oral Early Morning   polyethylene glycol 17 g Oral Daily   prednisoLONE acetate 1 drop Right Eye Once per day on Mon Wed Fri     Continuous IV Infusions:  IVF sodium chloride 0 9 % infusion    Ordered Dose: 100 mL/hr Route: Intravenous Frequency: Once @ 100 mL/hr   Scheduled Start Date/Time: 03/02/20 2045 End Date/Time: 03/02/20 2339 after 1 doses        PRN Meds:  acetaminophen 650 mg Oral Q6H PRN  GIVEN X 1   ALPRAZolam 0 25 mg Oral BID PRN  GIVEN X 1   ondansetron 4 mg Intravenous Q6H PRN     IP CONSULT TO CASE MANAGEMENT  IP CONSULT TO CARDIOLOGY    Network Utilization Review Department  [email or web address]  ATTENTION: Please call with any questions or concerns to [phone] and carefully listen to the prompts so that you are directed to the right person  All voicemails are confidential   Daxa Celeste all requests for admission clinical reviews, approved or denied determinations and any other requests to dedicated fax number below belonging to the campus where the patient is receiving treatment   List of dedicated fax numbers for the Facilities:  [address] [place] [address] DENIALS (Administrative/Medical Necessity) [phone]   [address] [place] [address] (Maternity/NICU/Pediatrics) [phone]   Ivanna Roberts [phone]   Regine Talbot [phone]   Juli Rogel [phone]   Denton Dumont [phone]   [address] [place] [address] [phone]   [facility]  [phone]   [address] [facility] [address]  [address] [address] [address] [facility] [phone]

## 2020-03-03 NOTE — CONSULTS
Consult- Bijan Sanchez 1935, 80 y o  female MRN: [id number]    Unit/Bed#: -01 Encounter: [id number]    Primary Care Provider: Kenn Yost MD   Date and time admitted to hospital: 3/2/2020  1:55 PM      Inpatient consult to Cardiology  Consult performed by: Sonia Hanson PA-C  Consult ordered by: Loni Nolan PA-C        Assessment/Plan:   1  Abnormal EKG - new inferolateral T wave inversions   · /95 on presentation  · 3 troponin levels negative   · Recent type 2 MI w/ troponin peak 1 22 in the setting of uncontrolled BP (2/10/20) - refused stress test at that time but is now agreeable  · Will arrange outpatient Lexiscan    2  HTN   · /95 on presentation, improved to 150s this morning   · Continues to be labile w/ anxiety/stress   · Continue amlodipine, losartan, Toprol    3  History of takotsubo cardiomyopathy  4  Anxiety  5  Hypothyroidism - TSH normal      HPI: Bijan Sanchez is a 80 y o  female who presents with anxiety and palpitations  She was admitted last month with type 2 MI (trop peak 1 22) suspected secondary to BP in the 200s  BP has been labile, often uncontrolled when she is anxious  She was not interested in ischemic testing at that time  Last night she presented to the ED w/ concern for a panic attack  She had a stressful conversation with her daughter-in-law and was not able to calm herself down  She took Xanax with little relief and called EMS  She was admitted for observation as new inferolateral T wave inversions were noted on EKG  She denies chest pain, dyspnea, N/V or any other associated symptoms  EKG/tele: sinus rhythm w/ inferolateral T wave inversions    Most recent cardiac testing:   Echo 11/30/19 - EF 60%  Mild TR/MR/AI  Small pericardial effusion w/o evidence of hemodynamic compromise     Review of Systems:   Review of Systems   Constitution: Negative  HENT: Negative  Eyes: Negative  Cardiovascular: Positive for palpitations  Negative for chest pain, claudication, dyspnea on exertion, irregular heartbeat, leg swelling, near-syncope, orthopnea, paroxysmal nocturnal dyspnea and syncope  Respiratory: Negative for cough, shortness of breath and wheezing  Endocrine: Negative  Skin: Negative  Musculoskeletal: Negative  Gastrointestinal: Negative  Genitourinary: Negative  Neurological: Negative for dizziness and light-headedness  Psychiatric/Behavioral: The patient is nervous/anxious  Historical Information   Past Medical History:   Diagnosis Date    Anxiety     CAD (coronary artery disease)     Carotid Duplex 03/06/2018    Plaque formation was prominent bilaterally    Depression     Disease of thyroid gland     Diverticulitis     Dyslipidemia     ECHO 09/14/2017    EF 55%, mild mitral regurg, small pericardial effusion   Hypertension     Mitral regurgitation     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 infarction)     Shingles     Takotsubo cardiomyopathy      Past Surgical History:   Procedure Laterality Date    CARDIAC CATHETERIZATION  08/27/2012    EF 35%, 70% stenosis of diagonal, Otherwise all OK apart from myopathy    CARDIAC CATHETERIZATION  10/10/2012    EF 55%, no significant CAD    Mild stress induced cardiomyopathy    CATARACT EXTRACTION Bilateral     EYE SURGERY      bilateral cataract    TONSILLECTOMY       Social History     Substance and Sexual Activity   Alcohol Use Never    Frequency: Never     Social History     Substance and Sexual Activity   Drug Use Never     Social History     Tobacco Use   Smoking Status Never Smoker   Smokeless Tobacco Never Used       Family History:   Family History   Problem Relation Age of Onset    Cancer Father     Prostate cancer Father        Meds/Allergies   all current active meds have been reviewed  Medications Prior to Admission   Medication    ALPRAZolam (XANAX) 0 25 mg tablet    amLODIPine (NORVASC) 5 mg tablet    aspirin 81 mg chewable tablet    cholecalciferol (VITAMIN D3) 1,000 units tablet    lansoprazole (PREVACID) 30 mg capsule    levothyroxine 50 mcg tablet    levothyroxine 75 mcg tablet    losartan (COZAAR) 50 mg tablet    metoprolol succinate (TOPROL-XL) 25 mg 24 hr tablet    Multiple Vitamin (MULTIVITAMIN) capsule    polyethylene glycol (MIRALAX) 17 g packet    prednisoLONE acetate (PRED FORTE) 1 % ophthalmic suspension       Allergies   Allergen Reactions    No Known Allergies        Objective   Vitals: Blood pressure 159/72, pulse 61, temperature 97 7 °F (36 5 °C), temperature source Tympanic, resp  rate 18, height 5' 2" (1 575 m), weight 54 3 kg (119 lb 11 4 oz), SpO2 95 %, not currently breastfeeding , Body mass index is 21 9 kg/m² ,   Orthostatic Blood Pressures      Most Recent Value   Blood Pressure  159/72 filed at 03/03/2020 0800   Patient Position - Orthostatic VS  Lying filed at 03/03/2020 0800          Physical Exam   Physical Exam   Constitutional: She is oriented to person, place, and time  She appears well-developed and well-nourished  No distress  HENT:   Head: Normocephalic and atraumatic  Eyes: EOM are normal  No scleral icterus  Neck: Normal range of motion  Neck supple  Cardiovascular: Normal rate, regular rhythm, S1 normal and S2 normal    No murmur heard  Pulmonary/Chest: Effort normal and breath sounds normal  She has no wheezes  She has no rales  Abdominal: Soft  Bowel sounds are normal    Musculoskeletal: She exhibits no edema  Neurological: She is alert and oriented to person, place, and time  Skin: Skin is warm and dry  Psychiatric: She has a normal mood and affect  Her behavior is normal    Nursing note and vitals reviewed      Lab Results:     Troponins:   Results from last 7 days   Lab Units 03/03/20  0031 03/02/20  2220 03/02/20  1437   TROPONIN I ng/mL <0 03 <0 03 <0 03     BNP:    CBC with diff:   Results from last 7 days   Lab Units 03/03/20  0641 03/02/20  1437   WBC Thousand/uL 4 80 4 70* HEMOGLOBIN g/dL 12 8 12 0   HEMATOCRIT % 38 4* 36 0*   PLATELETS Thousands/uL 193 192   RBC Million/uL 4 08 3 87*     CMP:  Results from last 7 days   Lab Units 03/03/20  0641 03/02/20  1437   POTASSIUM mmol/L 3 9 4 2   CHLORIDE mmol/L 102 98   CO2 mmol/L 28 29   BUN mg/dL 11 15   CREATININE mg/dL 0 81 0 93   CALCIUM mg/dL 9 2 9 3   AST U/L  --  22   ALT U/L  --  10   ALK PHOS U/L  --  65   EGFR ml/min/1 73sq m 67 57     TSH:     Coags:

## 2020-03-03 NOTE — H&P
H&P- Sasha Bourne 1935, 80 y o  female MRN: [id number]    Unit/Bed#: -01 Encounter: [id number]    Primary Care Provider: Subhash Day MD   Date and time admitted to hospital: 3/2/2020  1:55 PM        * EKG abnormality  Assessment & Plan  · Patient presented with palpitations EKG noted to have flipped T-waves which is new compared to prior  · Monitor troponins  · Cardiology consult in the a m  Hyponatremia  Assessment & Plan  · 1 L IV fluids and monitor    Benign essential HTN  Assessment & Plan  · Continue meds and monitor    Hypothyroidism  Assessment & Plan  · Continue levothyroxine    VTE Prophylaxis: Enoxaparin (Lovenox)  Code Status: full code  POLST: There is no POLST form on file for this patient (pre-hospital)  Discussion with patient    Anticipated Length of Stay:  Patient will be admitted on an Observation basis with an anticipated length of stay of  < 2 midnights  Justification for Hospital Stay: cardiology input    Chief Complaint:   palpitations    History of Present Illness:    Sasha Bourne is a 80 y o  female who presents with palpitations  Patient with past medical history of CAD, hypertension, hyperlipidemia, history of Takotsubo's cardiomyopathy and prior MI presented to the hospital secondary to palpitations  Patient was brought in by EMS, she felt she was stressed, anxious and did not feel well  Felt palpitations in her stomach  ER found patient to have EKG changes  No jaw, neck pain, has chronic back pain  Patient was given Xanax 0 25 mg aspirin 325 mg, metoprolol 25 mg and nitro 2% ointment  Review of Systems:  Review of Systems   Constitutional: Negative for chills and fever  HENT: Negative for rhinorrhea, sore throat and trouble swallowing  Eyes: Negative for discharge and redness  Respiratory: Negative for cough and shortness of breath  Cardiovascular: Positive for palpitations  Negative for chest pain and leg swelling     Gastrointestinal: Negative for abdominal pain, diarrhea, nausea and vomiting  Genitourinary: Negative for dysuria and hematuria  Musculoskeletal: Positive for back pain  Negative for neck pain  Skin: Negative for rash and wound  Neurological: Negative for dizziness, weakness and headaches  Psychiatric/Behavioral: Positive for agitation  Negative for confusion  Past Medical and Surgical History:   Past Medical History:   Diagnosis Date    Anxiety     CAD (coronary artery disease)     Carotid Duplex 03/06/2018    Plaque formation was prominent bilaterally    Depression     Disease of thyroid gland     Diverticulitis     Dyslipidemia     ECHO 09/14/2017    EF 55%, mild mitral regurg, small pericardial effusion   Hypertension     Mitral regurgitation     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 infarction)     Shingles     Takotsubo cardiomyopathy        Past Surgical History:   Procedure Laterality Date    CARDIAC CATHETERIZATION  08/27/2012    EF 35%, 70% stenosis of diagonal, Otherwise all OK apart from myopathy    CARDIAC CATHETERIZATION  10/10/2012    EF 55%, no significant CAD  Mild stress induced cardiomyopathy    CATARACT EXTRACTION Bilateral     EYE SURGERY      bilateral cataract    TONSILLECTOMY         Meds/Allergies:  Prior to Admission medications    Medication Sig Start Date End Date Taking?  Authorizing Provider   ALPRAZolam Sonia Fam) 0 25 mg tablet Use 1 tab 2x/day as needed for anxiety 6/14/18  Yes Historical Provider, MD   amLODIPine (NORVASC) 5 mg tablet Take 1 tablet (5 mg total) by mouth daily 2/18/20  Yes Lauren Cortez MD   aspirin 81 mg chewable tablet Chew 81 mg daily 8/10/15  Yes Historical Provider, MD   aspirin 81 mg chewable tablet Chew 1 tablet (81 mg total) daily 2/11/20 3/12/20 Yes Isa Alberto MD   cholecalciferol (VITAMIN D3) 1,000 units tablet Take 1,000 Units by mouth daily   Yes Historical Provider, MD   lansoprazole (PREVACID) 30 mg capsule Take 30 mg by mouth daily Yes Historical Provider, MD   levothyroxine 50 mcg tablet Take 50 mcg by mouth 3 (three) times a week   Yes Historical Provider, MD   levothyroxine 75 mcg tablet tuesday, thursday, saturday, sunday 6/14/18  Yes Historical Provider, MD   losartan (COZAAR) 50 mg tablet Take 1 tablet (50 mg total) by mouth daily  Patient taking differently: Take 50 mg by mouth 2 (two) times a day  11/4/19 2/20/22 Yes Juanito Tompkins MD   metoprolol succinate (TOPROL-XL) 25 mg 24 hr tablet Take 1 tablet (25 mg total) by mouth every 12 (twelve) hours 12/6/19  Yes Franca Freeman PA-C   Multiple Vitamin (MULTIVITAMIN) capsule Take 1 capsule by mouth daily   Yes Historical Provider, MD   polyethylene glycol (MIRALAX) 17 g packet Take 17 g by mouth daily   Yes Historical Provider, MD   prednisoLONE acetate (PRED FORTE) 1 % ophthalmic suspension Apply 1 drop to eye   Yes Historical Provider, MD     I have reviewed home medications with patient personally  Allergies: Allergies   Allergen Reactions    No Known Allergies        Social History:  Marital Status:     Occupation: retired  Patient Pre-hospital Living Situation: home  Patient Pre-hospital Level of Mobility: no assisted device  Patient Pre-hospital Diet Restrictions: low fat, low sodium  Substance Use History:   Social History     Substance and Sexual Activity   Alcohol Use Never    Frequency: Never     Social History     Tobacco Use   Smoking Status Never Smoker   Smokeless Tobacco Never Used     Social History     Substance and Sexual Activity   Drug Use Never       Family History:  I have reviewed the patients family history    Physical Exam:   Vitals:   Blood Pressure: 143/63 (03/02/20 2040)  Pulse: 62 (03/02/20 2040)  Temperature: 97 5 °F (36 4 °C) (03/02/20 2040)  Temp Source: Temporal (03/02/20 2040)  Respirations: 17 (03/02/20 2040)  Height: 5' 2" (157 5 cm) (03/02/20 2020)  Weight - Scale: 54 3 kg (119 lb 11 4 oz) (03/02/20 2020)  SpO2: 97 % (03/02/20 2040)    Physical Exam   Constitutional: She is oriented to person, place, and time  She appears well-developed and well-nourished  Frail elderly  female   HENT:   Head: Normocephalic and atraumatic  Eyes: Conjunctivae and EOM are normal  Right eye exhibits no discharge  Left eye exhibits no discharge  Neck: Normal range of motion  No tracheal deviation present  Cardiovascular: Normal rate, regular rhythm and normal heart sounds  Exam reveals no gallop and no friction rub  No murmur heard  Pulmonary/Chest: Effort normal and breath sounds normal  No respiratory distress  She has no wheezes  She has no rales  Abdominal: Soft  Bowel sounds are normal  She exhibits no distension and no mass  There is no tenderness  There is no guarding  Musculoskeletal: Normal range of motion  She exhibits edema  She exhibits no tenderness or deformity  Neurological: She is alert and oriented to person, place, and time  Skin: Skin is warm and dry  No rash noted  No erythema  There is pallor  Psychiatric: She has a normal mood and affect  Her behavior is normal  Judgment and thought content normal    Nursing note and vitals reviewed  Additional Data:   Lab Results: I have personally reviewed pertinent reports  Results from last 7 days   Lab Units 03/02/20  1437   WBC Thousand/uL 4 70*   HEMOGLOBIN g/dL 12 0   HEMATOCRIT % 36 0*   PLATELETS Thousands/uL 192   NEUTROS PCT % 79*   LYMPHS PCT % 11*   MONOS PCT % 9   EOS PCT % 1     Results from last 7 days   Lab Units 03/02/20  1437   POTASSIUM mmol/L 4 2   CHLORIDE mmol/L 98   CO2 mmol/L 29   BUN mg/dL 15   CREATININE mg/dL 0 93   CALCIUM mg/dL 9 3   ALK PHOS U/L 65   ALT U/L 10   AST U/L 22                   Imaging: Formal read pending  XR chest 2 views   ED Interpretation by Anurag Vázquez MD (03/02 1728)   Cardiomegaly  No infiltrates            NetAccess / Epic Records Reviewed: Yes     ** Please Note: This note has been constructed using a voice recognition system   **

## 2020-03-03 NOTE — DISCHARGE INSTRUCTIONS
Nuclear Stress Test   WHAT YOU NEED TO KNOW:   What do I need to know about a nuclear stress test?  A nuclear stress test uses exercise or medicine to put stress on your heart  A radioactive liquid is used to help your heart show up better in pictures  Pictures of your heart are taken before and after you exercise or get medicine  The pictures help your healthcare provider compare blood flow to your heart muscle during rest and stress  Why may I need a nuclear stress test?   · Find the cause of chest pain, dizziness, tiredness, or trouble breathing    · Monitor or check for heart disease or an abnormal heartbeat    · Check for heart damage after a heart attack    · Check blood flow to your heart after heart surgery  How do I prepare for a nuclear stress test?   · Tell your healthcare provider if you are pregnant, think you are pregnant, or are breastfeeding  Also tell your healthcare provider if you have an allergy to any contrast liquid  Caffeine and nicotine can affect your test results  Do not have caffeine for at least 48 hours before your test  This includes foods, drinks, and medicine with caffeine  Do not smoke for 48 hours before your test or as directed  · Your healthcare provider may tell you not to eat or drink anything after midnight on the day of your test  He will tell you what medicines to take or not take on the day of your test  If you take a beta blocker, you may be told to skip your dose on the day of your test  Remove all jewelry and metal before your test  Wear comfortable clothes and shoes if you will exercise during the test  If you have an inhaler, bring it with you  The test may take 2 to 4 hours  What will happen during a nuclear stress test?   · A healthcare provider will insert an IV and place electrodes (sticky patches) on your chest  Hair may be removed to help the patches stick to your skin  Your healthcare provider will attach a wire to each patch   The wires are connected to a monitor that will display the electrical activity of your heart  · Your healthcare provider will inject radioactive liquid into your IV  Your arm may feel cold when the liquid is injected  This should only last for a minute  You will rest for 20 to 40 minutes  Then your healthcare provider will take pictures of your heart  Keep your arms above your head and lie still while pictures are taken  · You will exercise or receive medicine to stress your heart  Your heart rate, heart rhythm, and blood pressure will be monitored closely during the test      ¨ During a nuclear stress test with exercise  you will walk on a treadmill or pedal on a stationary bicycle  The speed and resistance of the exercise machine may be increased over time  You will be asked to exercise for as long as you can  Your healthcare provider will tell you to stop exercising if you have shortness of breath, chest pain, or leg pain  ¨ During a nuclear stress test with medicine  your healthcare provider will inject medicine through your IV  The medicine will make your heart beat faster and work harder  It may make you feel anxious, dizzy, nauseous, shaky, or short of breath  You may also have mild chest pain  These symptoms usually stop when your healthcare provider stops giving you medicine  Tell your healthcare provider if you have severe chest pain or dizziness  Other medicine may be given to treat severe chest pain or dizziness  · Your healthcare provider will inject more radioactive liquid through your IV  You will rest for 20 to 40 minutes  Then your healthcare provider will take more pictures of your heart  What will happen after a nuclear stress test?  A healthcare provider will remove your IV  You can usually return to work and your normal activities right away  What are the risks of a nuclear stress test?  You may have an allergic reaction to the radioactive liquid   Medicine or exercise may cause an abnormal heartbeat, chest pain, dizziness, or a heart attack  Medicine given to stress your heart may cause wheezing or shortness of breath  CARE AGREEMENT:   You have the right to help plan your care  Learn about your health condition and how it may be treated  Discuss treatment options with your caregivers to decide what care you want to receive  You always have the right to refuse treatment  The above information is an  only  It is not intended as medical advice for individual conditions or treatments  Talk to your doctor, nurse or pharmacist before following any medical regimen to see if it is safe and effective for you  © 2017 [facility] [facility] Information is for End User's use only and may not be sold, redistributed or otherwise used for commercial purposes  All illustrations and images included in CareNotes® are the copyrighted property of A D A M [employer] Inc  or Jitendra Love

## 2020-03-03 NOTE — ASSESSMENT & PLAN NOTE
· Patient presented with palpitations EKG noted to have flipped T-waves which is new compared to prior  · Monitor troponins  · Cardiology consult in the a m

## 2020-03-03 NOTE — NURSING NOTE
Pt IV was removed and dry dressing applied  Pt dressed self and PCA assisted with gathering belongings  Pt was taken to the lobby via wheelchair escorted by son and RN  The discharge instruction was gone over with Pt and pt expressed her understanding of the instructions

## 2020-03-03 NOTE — DISCHARGE SUMMARY
Discharge- Julio Gomes 1935, 80 y o  female MRN: [id number]    Unit/Bed#: -01 Encounter: [id number]    Primary Care Provider: Nata Rhodes MD   Date and time admitted to hospital: 3/2/2020  1:55 PM        * EKG abnormality  Assessment & Plan  · Patient presented with palpitations EKG noted to have inferolateral flipped T-waves which is new compared to prior  · Monitor troponins - negative x3  · Cardiology consult in the a m  - patient is appropriate for discharge home and to follow-up with scheduled outpatient Lexiscan stress test      Hyponatremia  Assessment & Plan  · 1 L IV fluids and monitor  · Resolved    Benign essential HTN  Assessment & Plan  · Continue meds and monitor  · Improved after patient received medications    Hypothyroidism  Assessment & Plan  · Continue levothyroxine    Anxiety  Assessment & Plan  · Continue p r n  Xanax      Discharging Physician / Practitioner: PALLAVI Way Junior  PCP: Nata Rhodes MD  Admission Date:   Admission Orders (From admission, onward)     Ordered        03/02/20 1830  Place in Observation (expected length of stay for this patient is less than two midnights)  Once                   Discharge Date: 03/03/20    Resolved Problems  Date Reviewed: 3/3/2020    None          Consultations During Hospital Stay:  · Cardiology    Procedures Performed:   3/2/2020: CHEST      INDICATION:   Palpitations      COMPARISON:  2/9/2020     EXAM PERFORMED/VIEWS:  XR CHEST PA & LATERAL        FINDINGS:     Cardiomediastinal silhouette appears unremarkable      The lungs are clear  No pneumothorax or pleural effusion      Pectus excavatum deformity again seen  Visualized osseous structures appear otherwise unremarkable for the patient's age      IMPRESSION:     No acute cardiopulmonary disease      Significant Findings / Test Results:   · Inferolateral flipped Twaves on EKG    Incidental Findings:   · As above     Test Results Pending at Discharge (will require follow up): · None     Outpatient Tests Requested:  · Lexiscan stress test    Complications:     None    Reason for Admission: palpitations    Hospital Course:     Shelly Coy is a 80 y o  female patient who originally presented to the hospital on 3/2/2020 due to palpitations  Patient with past medical history of CAD, hypertension, hyperlipidemia, history of Takotsubo's cardiomyopathy and prior type 2 MI presented to the hospital secondary to palpitations  Patient was brought in by EMS, she felt she was stressed, anxious and did not feel well  Felt palpitations in her stomach  ER found patient to have EKG changes with inferolateral flipped Twaves  No jaw, neck pain, has chronic back pain  troponins were negative x3     Patient was given Xanax 0 25 mg aspirin 325 mg, metoprolol 25 mg and nitro 2% ointment  Cardiology saw the patient started her on Norvasc, Cozaar, Toprol  And states that the patient is appropriate for discharge home with follow-up Lexiscan stress test in the office  Please see above list of diagnoses and related plan for additional information  Condition at Discharge: fair     Discharge Day Visit / Exam:     Subjective:  Patient is stable and sitting at the bedside  She denies any type of chest discomfort  And states that she feels she is appropriate for discharge  I did speak to Cardiology who also states that the patient is appropriate for discharge and can follow up as an outpatient for a Lexiscan stress test     Vitals: Blood Pressure: (!) 172/76 (03/03/20 1356)  Pulse: 65 (03/03/20 1356)  Temperature: 98 5 °F (36 9 °C) (03/03/20 1356)  Temp Source: Tympanic (03/03/20 1356)  Respirations: 16 (03/03/20 1356)  Height: 5' 2" (157 5 cm) (03/02/20 2020)  Weight - Scale: 54 3 kg (119 lb 11 4 oz) (03/02/20 2020)  SpO2: 99 % (03/03/20 1356)  Exam:   Physical Exam   Constitutional: She is oriented to person, place, and time  She appears cachectic  She is cooperative  HENT:   Head: Normocephalic and atraumatic  Nose: Nose normal    Mouth/Throat: Oropharynx is clear and moist and mucous membranes are normal    Eyes: Conjunctivae and EOM are normal    Neck: Full passive range of motion without pain  Cardiovascular: Normal rate, regular rhythm, normal heart sounds and normal pulses  Patient states she feels occasional palpitations  Pulmonary/Chest: Effort normal and breath sounds normal    Abdominal: Soft  Normal appearance and bowel sounds are normal    Patient denies any abdominal pain  Musculoskeletal: Normal range of motion  Neurological: She is alert and oriented to person, place, and time  Skin: Skin is warm  Psychiatric: Her speech is normal and behavior is normal  Her mood appears anxious  Discussion with Family:  Discussed with the patient    Discharge instructions/Information to patient and family:   See after visit summary for information provided to patient and family  Provisions for Follow-Up Care:  See after visit summary for information related to follow-up care and any pertinent home health orders  Disposition:     Home    For Discharges to [facility]:   · Not Applicable to this Patient - Not Applicable to this Patient    Planned Readmission:    No     Discharge Statement:  I spent 30 minutes discharging the patient  This time was spent on the day of discharge  I had direct contact with the patient on the day of discharge  Greater than 50% of the total time was spent examining patient, answering all patient questions, arranging and discussing plan of care with patient as well as directly providing post-discharge instructions  Additional time then spent on discharge activities  Discharge Medications:  See after visit summary for reconciled discharge medications provided to patient and family        ** Please Note: This note has been constructed using a voice recognition system **

## 2020-03-03 NOTE — ASSESSMENT & PLAN NOTE
· Patient presented with palpitations EKG noted to have inferolateral flipped T-waves which is new compared to prior  · Monitor troponins - negative x3  · Cardiology consult in the a m  - patient is appropriate for discharge home and to follow-up with scheduled outpatient Lexiscan stress test

## 2020-03-03 NOTE — PLAN OF CARE
Problem: Potential for Falls  Goal: Patient will remain free of falls  Description  INTERVENTIONS:  - Assess patient frequently for physical needs  -  Identify cognitive and physical deficits and behaviors that affect risk of falls    -  [place] fall precautions as indicated by assessment   - Educate patient/family on patient safety including physical limitations  - Instruct patient to call for assistance with activity based on assessment  - Modify environment to reduce risk of injury  - Consider OT/PT consult to assist with strengthening/mobility  Outcome: Progressing     Problem: PAIN - ADULT  Goal: Verbalizes/displays adequate comfort level or baseline comfort level  Description  Interventions:  - Encourage patient to monitor pain and request assistance  - Assess pain using appropriate pain scale  - Administer analgesics based on type and severity of pain and evaluate response  - Implement non-pharmacological measures as appropriate and evaluate response  - Consider cultural and social influences on pain and pain management  - Notify physician/advanced practitioner if interventions unsuccessful or patient reports new pain  Outcome: Progressing     Problem: INFECTION - ADULT  Goal: Absence or prevention of progression during hospitalization  Description  INTERVENTIONS:  - Assess and monitor for signs and symptoms of infection  - Monitor lab/diagnostic results  - Monitor all insertion sites, i e  indwelling lines, tubes, and drains  - Monitor endotracheal if appropriate and nasal secretions for changes in amount and color  - [place] appropriate cooling/warming therapies per order  - Administer medications as ordered  - Instruct and encourage patient and family to use good hand hygiene technique  - Identify and instruct in appropriate isolation precautions for identified infection/condition  Outcome: Progressing  Goal: Absence of fever/infection during neutropenic period  Description  INTERVENTIONS:  - Monitor WBC    Outcome: Progressing     Problem: SAFETY ADULT  Goal: Patient will remain free of falls  Description  INTERVENTIONS:  - Assess patient frequently for physical needs  -  Identify cognitive and physical deficits and behaviors that affect risk of falls    -  [place] fall precautions as indicated by assessment   - Educate patient/family on patient safety including physical limitations  - Instruct patient to call for assistance with activity based on assessment  - Modify environment to reduce risk of injury  - Consider OT/PT consult to assist with strengthening/mobility  Outcome: Progressing  Goal: Maintain or return to baseline ADL function  Description  INTERVENTIONS:  -  Assess patient's ability to carry out ADLs; assess patient's baseline for ADL function and identify physical deficits which impact ability to perform ADLs (bathing, care of mouth/teeth, toileting, grooming, dressing, etc )  - Assess/evaluate cause of self-care deficits   - Assess range of motion  - Assess patient's mobility; develop plan if impaired  - Assess patient's need for assistive devices and provide as appropriate  - Encourage maximum independence but intervene and supervise when necessary  - Involve family in performance of ADLs  - Assess for home care needs following discharge   - Consider OT consult to assist with ADL evaluation and planning for discharge  - Provide patient education as appropriate  Outcome: Progressing  Goal: Maintain or return mobility status to optimal level  Description  INTERVENTIONS:  - Assess patient's baseline mobility status (ambulation, transfers, stairs, etc )    - Identify cognitive and physical deficits and behaviors that affect mobility  - Identify mobility aids required to assist with transfers and/or ambulation (gait belt, sit-to-stand, lift, walker, cane, etc )  - [place] fall precautions as indicated by assessment  - Record patient progress and toleration of activity level on Mobility SBAR; progress patient to next Phase/Stage  - Instruct patient to call for assistance with activity based on assessment  - Consider rehabilitation consult to assist with strengthening/weightbearing, etc   Outcome: Progressing     Problem: DISCHARGE PLANNING  Goal: Discharge to home or other facility with appropriate resources  Description  INTERVENTIONS:  - Identify barriers to discharge w/patient and caregiver  - Arrange for needed discharge resources and transportation as appropriate  - Identify discharge learning needs (meds, wound care, etc )  - Arrange for interpretive services to assist at discharge as needed  - Refer to Case Management Department for coordinating discharge planning if the patient needs post-hospital services based on physician/advanced practitioner order or complex needs related to functional status, cognitive ability, or social support system  Outcome: Progressing     Problem: Knowledge Deficit  Goal: Patient/family/caregiver demonstrates understanding of disease process, treatment plan, medications, and discharge instructions  Description  Complete learning assessment and assess knowledge base    Interventions:  - Provide teaching at level of understanding  - Provide teaching via preferred learning methods  Outcome: Progressing

## 2020-03-03 NOTE — SOCIAL WORK
Chart reviewed by cm ,assessmet completed at the bedside, pt lives alone in a 2 story home with a  basement and she does not use, 12-14 steps inside no steps outside in the back, pt drives and is independent, pt wears glasses and she states that she is able to read the medication labels, rx plan at [address] and mail order, pt has no dme equipment and has had no hhc, pt denies any d/c needs, pt has a hx of anxiety, d/c order was written, pt was in agreement with the d/c and d/ plan home, pt's family transported the pt home, pt will return home and follow up with outpt testing, Patient/caregiver received discharge checklist   Content reviewed  Patient/caregiver encouraged to participate in discharge plan of care prior to discharge home  CM reviewed d/c planning process including the following: identifying help at home, patient preference for d/c planning needs, availability of treatment team to discuss questions or concerns patient and/or family may have regarding understanding medications and recognizing signs and symptoms once discharged  CM also encouraged patient to follow up with all recommended appointments after discharge  Patient advised of importance for patient and family to participate in managing patients medical well being

## 2020-03-06 ENCOUNTER — HOSPITAL ENCOUNTER (OUTPATIENT)
Dept: NUCLEAR MEDICINE | Facility: HOSPITAL | Age: 85
Discharge: HOME | End: 2020-03-06
Payer: COMMERCIAL

## 2020-03-06 ENCOUNTER — HOSPITAL ENCOUNTER (OUTPATIENT)
Dept: NON INVASIVE DIAGNOSTICS | Facility: HOSPITAL | Age: 85
Discharge: HOME | End: 2020-03-06
Payer: COMMERCIAL

## 2020-03-06 DIAGNOSIS — I21.A1 TYPE 2 MI (MYOCARDIAL INFARCTION) (HCC): ICD-10-CM

## 2020-03-06 DIAGNOSIS — I16.1 HYPERTENSIVE EMERGENCY: ICD-10-CM

## 2020-03-06 DIAGNOSIS — R94.31 EKG ABNORMALITY: ICD-10-CM

## 2020-03-06 PROCEDURE — 93018 CV STRESS TEST I&R ONLY: CPT | Performed by: INTERNAL MEDICINE

## 2020-03-06 PROCEDURE — 93017 CV STRESS TEST TRACING ONLY: CPT

## 2020-03-06 PROCEDURE — 78452 HT MUSCLE IMAGE SPECT MULT: CPT | Performed by: INTERNAL MEDICINE

## 2020-03-06 PROCEDURE — 93016 CV STRESS TEST SUPVJ ONLY: CPT | Performed by: INTERNAL MEDICINE

## 2020-03-06 PROCEDURE — A9502 TC99M TETROFOSMIN: HCPCS

## 2020-03-06 PROCEDURE — 78452 HT MUSCLE IMAGE SPECT MULT: CPT

## 2020-03-06 RX ADMIN — REGADENOSON 0.4 MG: 0.08 INJECTION, SOLUTION INTRAVENOUS at 09:45

## 2020-03-09 LAB
ARRHY DURING EX: NORMAL
CHEST PAIN STATEMENT: NORMAL
MAX DIASTOLIC BP: 86 MMHG
MAX HEART RATE: 93 BPM
MAX PREDICTED HEART RATE: 136 BPM
MAX. SYSTOLIC BP: 156 MMHG
PROTOCOL NAME: NORMAL
REASON FOR TERMINATION: NORMAL
TARGET HR FORMULA: NORMAL
TEST INDICATION: NORMAL
TIME IN EXERCISE PHASE: NORMAL

## 2020-03-11 ENCOUNTER — OFFICE VISIT (OUTPATIENT)
Dept: CARDIOLOGY CLINIC | Facility: CLINIC | Age: 85
End: 2020-03-11
Payer: COMMERCIAL

## 2020-03-11 VITALS
BODY MASS INDEX: 21.9 KG/M2 | HEART RATE: 72 BPM | WEIGHT: 119 LBS | DIASTOLIC BLOOD PRESSURE: 62 MMHG | SYSTOLIC BLOOD PRESSURE: 116 MMHG | HEIGHT: 62 IN

## 2020-03-11 DIAGNOSIS — I10 BENIGN ESSENTIAL HTN: Primary | Chronic | ICD-10-CM

## 2020-03-11 DIAGNOSIS — I21.A1 TYPE 2 MI (MYOCARDIAL INFARCTION) (HCC): ICD-10-CM

## 2020-03-11 PROCEDURE — 99214 OFFICE O/P EST MOD 30 MIN: CPT | Performed by: PHYSICIAN ASSISTANT

## 2020-03-11 NOTE — PROGRESS NOTES
[facility]     Aryan Monroy / 80 y o  female / : 1935 / MRN: [id number]  Date of Visit: 20    ASSESSMENT/PLAN  1  HTN- controlled   · Norvasc increased to 10mg during recent admission - appears to have new edema but it is not bothersome to her   · Continue losartan 50mg BID, Toprol 25mg BID  · She will continue to check BP 1-2x/week and call us if it is consistently elevated     2  Recent type 2 MI  · 2/10/20 - trop peak 1 22 in the setting of uncontrolled HTN (SBP >200)   · Lexiscan 3/6/20 was nonischemic    3  Hx of takotsubo CM - resolved, recent EF 60%    4  Anxiety   5  Hypothyroidism     Follow up in 6 months unless BP becomes uncontrolled  SUBJECTIVE:  HPI: Aryan Monroy is a 80 y o  female who presents for follow-up regarding recent admission for chest discomfort following an argument with her daughter-in-law  BP on presentation was 212/95  New T wave inversions were noted on EKG and she was sent for outpatient Lexiscan which was normal  Norvasc was increased to 10mg and she was continued on prior doses of Toprol and losartan  She has a history of very labile blood pressure, often associated with anxiety  I had advised her to check BP less frequently as it often made her feel anxious  She has checked it a couple of times in the last week and it has been controlled  It was 130/64 at the ENT office yesterday and 116/62 in our office  I noticed new edema on exam today which she had not noticed  No associated dyspnea or orthopnea  No further chest discomfort since discharge  Cardiac testing:   Lexiscan 3/6/20 - No ischemia  Echo 19 - EF 60%  Mild TR/MR/AI   Small pericardial effusion w/o evidence of hemodynamic compromise       Allergies   Allergen Reactions    No Known Allergies          Current Outpatient Medications:     ALPRAZolam (XANAX) 0 25 mg tablet, Use 1 tab 2x/day as needed for anxiety, Disp: , Rfl:     amLODIPine (NORVASC) 5 mg tablet, Take 2 tablets (10 mg total) by mouth daily, Disp: 90 tablet, Rfl: 0    aspirin 81 mg chewable tablet, Chew 81 mg daily, Disp: , Rfl:     cholecalciferol (VITAMIN D3) 1,000 units tablet, Take 1,000 Units by mouth daily, Disp: , Rfl:     lansoprazole (PREVACID) 30 mg capsule, Take 30 mg by mouth daily, Disp: , Rfl:     levothyroxine 50 mcg tablet, Take 50 mcg by mouth 3 (three) times a week, Disp: , Rfl:     levothyroxine 75 mcg tablet, tuesday, thursday, saturday, sunday, Disp: , Rfl:     losartan (COZAAR) 50 mg tablet, Take 1 tablet (50 mg total) by mouth 2 (two) times a day, Disp: 60 tablet, Rfl: 0    metoprolol succinate (TOPROL-XL) 25 mg 24 hr tablet, Take 1 tablet (25 mg total) by mouth every 12 (twelve) hours, Disp: 180 tablet, Rfl: 3    Multiple Vitamin (MULTIVITAMIN) capsule, Take 1 capsule by mouth daily, Disp: , Rfl:     polyethylene glycol (MIRALAX) 17 g packet, Take 17 g by mouth daily, Disp: , Rfl:     prednisoLONE acetate (PRED FORTE) 1 % ophthalmic suspension, Apply 1 drop to eye, Disp: , Rfl:     Past Medical History:   Diagnosis Date    Anxiety     CAD (coronary artery disease)     Carotid Duplex 03/06/2018    Plaque formation was prominent bilaterally    Depression     Disease of thyroid gland     Diverticulitis     Dyslipidemia     ECHO 09/14/2017    EF 55%, mild mitral regurg, small pericardial effusion   Hypertension     Mitral regurgitation     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 infarction)     Shingles     Takotsubo cardiomyopathy        Family History   Problem Relation Age of Onset    Cancer Father     Prostate cancer Father        Past Surgical History:   Procedure Laterality Date    CARDIAC CATHETERIZATION  08/27/2012    EF 35%, 70% stenosis of diagonal, Otherwise all OK apart from myopathy    CARDIAC CATHETERIZATION  10/10/2012    EF 55%, no significant CAD    Mild stress induced cardiomyopathy    CATARACT EXTRACTION Bilateral     EYE SURGERY      bilateral cataract    TONSILLECTOMY         Social History     Socioeconomic History    Marital status:      Spouse name: Not on file    Number of children: Not on file    Years of education: Not on file    Highest education level: Not on file   Occupational History    Not on file   Social Needs    Financial resource strain: Not on file    Food insecurity:     Worry: Not on file     Inability: Not on file    Transportation needs:     Medical: Not on file     Non-medical: Not on file   Tobacco Use    Smoking status: Never Smoker    Smokeless tobacco: Never Used   Substance and Sexual Activity    Alcohol use: Never     Frequency: Never    Drug use: Never    Sexual activity: Not Currently   Lifestyle    Physical activity:     Days per week: Not on file     Minutes per session: Not on file    Stress: Not on file   Relationships    Social connections:     Talks on phone: Not on file     Gets together: Not on file     Attends Shinto service: Not on file     Active member of club or organization: Not on file     Attends meetings of clubs or organizations: Not on file     Relationship status: Not on file    Intimate partner violence:     Fear of current or ex partner: Not on file     Emotionally abused: Not on file     Physically abused: Not on file     Forced sexual activity: Not on file   Other Topics Concern    Not on file   Social History Narrative    Caffeine - 2 cups hot tea/day       Review of Systems   Constitution: Negative  HENT: Negative  Eyes: Negative  Cardiovascular: Negative for chest pain, claudication, dyspnea on exertion, irregular heartbeat, leg swelling, near-syncope, orthopnea, palpitations, paroxysmal nocturnal dyspnea and syncope  Respiratory: Negative for cough, shortness of breath and wheezing  Endocrine: Negative  Skin: Negative  Musculoskeletal: Negative  Gastrointestinal: Negative  Genitourinary: Negative      Neurological: Negative for dizziness and light-headedness  Psychiatric/Behavioral: The patient is nervous/anxious  OBJECTIVE:  Vitals: /62   Pulse 72   Ht 5' 2" (1 575 m)   Wt 54 kg (119 lb)   BMI 21 77 kg/m²     Physical exam:   Physical Exam   Constitutional: She is oriented to person, place, and time  She appears well-developed and well-nourished  No distress  HENT:   Head: Normocephalic and atraumatic  Eyes: EOM are normal  No scleral icterus  Neck: Normal range of motion  Neck supple  Cardiovascular: Normal rate, regular rhythm, S1 normal and S2 normal    No murmur heard  Pulmonary/Chest: Effort normal and breath sounds normal  She has no wheezes  She has no rales  Abdominal: Soft  Bowel sounds are normal    Musculoskeletal: She exhibits edema (mild, soft, nonpitting)  Neurological: She is alert and oriented to person, place, and time  Skin: Skin is warm and dry  Psychiatric: She has a normal mood and affect  Her behavior is normal    Nursing note and vitals reviewed        Lab Results:   No results found for: HGBA1C  No results found for: CHOL  Lab Results   Component Value Date    HDL 61 02/10/2020     Lab Results   Component Value Date    LDLCALC 93 02/10/2020     Lab Results   Component Value Date    TRIG 75 02/10/2020     No results found for: CHOLHDL

## 2020-04-04 ENCOUNTER — HOSPITAL ENCOUNTER (EMERGENCY)
Facility: HOSPITAL | Age: 85
Discharge: HOME | End: 2020-04-04
Attending: INTERNAL MEDICINE | Admitting: INTERNAL MEDICINE
Payer: COMMERCIAL

## 2020-04-04 VITALS
TEMPERATURE: 97.3 F | DIASTOLIC BLOOD PRESSURE: 69 MMHG | HEIGHT: 62 IN | WEIGHT: 117 LBS | BODY MASS INDEX: 21.53 KG/M2 | RESPIRATION RATE: 16 BRPM | OXYGEN SATURATION: 98 % | HEART RATE: 75 BPM | SYSTOLIC BLOOD PRESSURE: 153 MMHG

## 2020-04-04 DIAGNOSIS — R42 DIZZINESS: ICD-10-CM

## 2020-04-04 DIAGNOSIS — K59.01 SLOW TRANSIT CONSTIPATION: Primary | ICD-10-CM

## 2020-04-04 DIAGNOSIS — E86.0 DEHYDRATION: ICD-10-CM

## 2020-04-04 LAB
ALBUMIN SERPL BCP-MCNC: 4.2 G/DL (ref 3.5–5.7)
ALP SERPL-CCNC: 70 U/L (ref 55–165)
ALT SERPL W P-5'-P-CCNC: 10 U/L (ref 7–52)
ANION GAP SERPL CALCULATED.3IONS-SCNC: 10 MMOL/L (ref 4–13)
AST SERPL W P-5'-P-CCNC: 17 U/L (ref 13–39)
BACTERIA UR QL AUTO: ABNORMAL /HPF
BASOPHILS # BLD AUTO: 0 THOUSANDS/ΜL (ref 0–0.1)
BASOPHILS NFR BLD AUTO: 0 % (ref 0–2)
BILIRUB SERPL-MCNC: 0.6 MG/DL (ref 0.2–1)
BILIRUB UR QL STRIP: NEGATIVE
BUN SERPL-MCNC: 12 MG/DL (ref 7–25)
CALCIUM SERPL-MCNC: 9.4 MG/DL (ref 8.6–10.5)
CHLORIDE SERPL-SCNC: 91 MMOL/L (ref 98–107)
CLARITY UR: CLEAR
CO2 SERPL-SCNC: 25 MMOL/L (ref 21–31)
COLOR UR: YELLOW
CREAT SERPL-MCNC: 1.09 MG/DL (ref 0.6–1.2)
EOSINOPHIL # BLD AUTO: 0.1 THOUSAND/ΜL (ref 0–0.61)
EOSINOPHIL NFR BLD AUTO: 1 % (ref 0–5)
ERYTHROCYTE [DISTWIDTH] IN BLOOD BY AUTOMATED COUNT: 13.2 % (ref 11.5–14.5)
GFR SERPL CREATININE-BSD FRML MDRD: 47 ML/MIN/1.73SQ M
GLUCOSE SERPL-MCNC: 107 MG/DL (ref 65–99)
GLUCOSE UR STRIP-MCNC: NEGATIVE MG/DL
HCT VFR BLD AUTO: 36.4 % (ref 42–47)
HGB BLD-MCNC: 12.4 G/DL (ref 12–16)
HGB UR QL STRIP.AUTO: ABNORMAL
KETONES UR STRIP-MCNC: ABNORMAL MG/DL
LEUKOCYTE ESTERASE UR QL STRIP: ABNORMAL
LIPASE SERPL-CCNC: 41 U/L (ref 11–82)
LYMPHOCYTES # BLD AUTO: 0.8 THOUSANDS/ΜL (ref 0.6–4.47)
LYMPHOCYTES NFR BLD AUTO: 11 % (ref 21–51)
MCH RBC QN AUTO: 31.8 PG (ref 26–34)
MCHC RBC AUTO-ENTMCNC: 34.1 G/DL (ref 31–37)
MCV RBC AUTO: 93 FL (ref 81–99)
MONOCYTES # BLD AUTO: 0.9 THOUSAND/ΜL (ref 0.17–1.22)
MONOCYTES NFR BLD AUTO: 12 % (ref 2–12)
NEUTROPHILS # BLD AUTO: 5.6 THOUSANDS/ΜL (ref 1.4–6.5)
NEUTS SEG NFR BLD AUTO: 76 % (ref 42–75)
NITRITE UR QL STRIP: NEGATIVE
NON-SQ EPI CELLS URNS QL MICRO: ABNORMAL /HPF
PH UR STRIP.AUTO: 5.5 [PH]
PLATELET # BLD AUTO: 214 THOUSANDS/UL (ref 149–390)
PMV BLD AUTO: 6.7 FL (ref 8.6–11.7)
POTASSIUM SERPL-SCNC: 4 MMOL/L (ref 3.5–5.5)
PROT SERPL-MCNC: 7 G/DL (ref 6.4–8.9)
PROT UR STRIP-MCNC: NEGATIVE MG/DL
RBC # BLD AUTO: 3.91 MILLION/UL (ref 3.9–5.2)
RBC #/AREA URNS AUTO: ABNORMAL /HPF
SODIUM SERPL-SCNC: 126 MMOL/L (ref 134–143)
SP GR UR STRIP.AUTO: 1.01 (ref 1–1.03)
UROBILINOGEN UR QL STRIP.AUTO: 0.2 E.U./DL
WBC # BLD AUTO: 7.4 THOUSAND/UL (ref 4.8–10.8)
WBC #/AREA URNS AUTO: ABNORMAL /HPF

## 2020-04-04 PROCEDURE — 99282 EMERGENCY DEPT VISIT SF MDM: CPT | Performed by: INTERNAL MEDICINE

## 2020-04-04 PROCEDURE — 83690 ASSAY OF LIPASE: CPT | Performed by: INTERNAL MEDICINE

## 2020-04-04 PROCEDURE — 96361 HYDRATE IV INFUSION ADD-ON: CPT

## 2020-04-04 PROCEDURE — 99284 EMERGENCY DEPT VISIT MOD MDM: CPT

## 2020-04-04 PROCEDURE — 81003 URINALYSIS AUTO W/O SCOPE: CPT | Performed by: INTERNAL MEDICINE

## 2020-04-04 PROCEDURE — 96360 HYDRATION IV INFUSION INIT: CPT

## 2020-04-04 PROCEDURE — 81001 URINALYSIS AUTO W/SCOPE: CPT | Performed by: INTERNAL MEDICINE

## 2020-04-04 PROCEDURE — 85025 COMPLETE CBC W/AUTO DIFF WBC: CPT | Performed by: INTERNAL MEDICINE

## 2020-04-04 PROCEDURE — 36415 COLL VENOUS BLD VENIPUNCTURE: CPT | Performed by: INTERNAL MEDICINE

## 2020-04-04 PROCEDURE — 80053 COMPREHEN METABOLIC PANEL: CPT | Performed by: INTERNAL MEDICINE

## 2020-04-04 RX ORDER — SODIUM CHLORIDE 9 MG/ML
125 INJECTION, SOLUTION INTRAVENOUS CONTINUOUS
Status: DISCONTINUED | OUTPATIENT
Start: 2020-04-04 | End: 2020-04-04 | Stop reason: HOSPADM

## 2020-04-04 RX ADMIN — SODIUM CHLORIDE 125 ML/HR: 0.9 INJECTION, SOLUTION INTRAVENOUS at 15:25

## 2020-04-06 ENCOUNTER — TELEPHONE (OUTPATIENT)
Dept: CARDIOLOGY CLINIC | Facility: CLINIC | Age: 85
End: 2020-04-06

## 2020-04-27 DIAGNOSIS — E03.9 HYPOTHYROIDISM: ICD-10-CM

## 2020-04-27 RX ORDER — AMLODIPINE BESYLATE 5 MG/1
5 TABLET ORAL DAILY
Qty: 90 TABLET | Refills: 3 | Status: SHIPPED | OUTPATIENT
Start: 2020-04-27 | End: 2020-05-20 | Stop reason: HOSPADM

## 2020-04-29 ENCOUNTER — TELEMEDICINE (OUTPATIENT)
Dept: CARDIOLOGY CLINIC | Facility: CLINIC | Age: 85
End: 2020-04-29
Payer: COMMERCIAL

## 2020-04-29 ENCOUNTER — TELEPHONE (OUTPATIENT)
Dept: CARDIOLOGY CLINIC | Facility: CLINIC | Age: 85
End: 2020-04-29

## 2020-04-29 VITALS
BODY MASS INDEX: 21.53 KG/M2 | HEIGHT: 62 IN | SYSTOLIC BLOOD PRESSURE: 114 MMHG | DIASTOLIC BLOOD PRESSURE: 60 MMHG | HEART RATE: 70 BPM | WEIGHT: 117 LBS

## 2020-04-29 DIAGNOSIS — I10 BENIGN ESSENTIAL HTN: Primary | Chronic | ICD-10-CM

## 2020-04-29 DIAGNOSIS — I51.81 TAKOTSUBO CARDIOMYOPATHY: Chronic | ICD-10-CM

## 2020-04-29 DIAGNOSIS — F41.9 ANXIETY: ICD-10-CM

## 2020-04-29 DIAGNOSIS — R53.83 OTHER FATIGUE: ICD-10-CM

## 2020-04-29 PROCEDURE — 99442 PR PHYS/QHP TELEPHONE EVALUATION 11-20 MIN: CPT | Performed by: PHYSICIAN ASSISTANT

## 2020-04-29 RX ORDER — LUBIPROSTONE 24 UG/1
24 CAPSULE, GELATIN COATED ORAL DAILY
COMMUNITY
Start: 2020-04-02 | End: 2020-06-07

## 2020-05-08 ENCOUNTER — TELEPHONE (OUTPATIENT)
Dept: CARDIOLOGY CLINIC | Facility: CLINIC | Age: 85
End: 2020-05-08

## 2020-05-19 ENCOUNTER — HOSPITAL ENCOUNTER (OUTPATIENT)
Facility: HOSPITAL | Age: 85
Setting detail: OBSERVATION
Discharge: HOME | End: 2020-05-20
Attending: EMERGENCY MEDICINE | Admitting: INTERNAL MEDICINE
Payer: COMMERCIAL

## 2020-05-19 ENCOUNTER — APPOINTMENT (EMERGENCY)
Dept: RADIOLOGY | Facility: HOSPITAL | Age: 85
End: 2020-05-19
Payer: COMMERCIAL

## 2020-05-19 DIAGNOSIS — R07.9 CHEST PAIN: ICD-10-CM

## 2020-05-19 DIAGNOSIS — R10.9 ABDOMINAL PAIN: Primary | ICD-10-CM

## 2020-05-19 DIAGNOSIS — I16.0 HYPERTENSIVE URGENCY: ICD-10-CM

## 2020-05-19 DIAGNOSIS — I16.1 HYPERTENSIVE EMERGENCY: ICD-10-CM

## 2020-05-19 DIAGNOSIS — I10 ESSENTIAL HYPERTENSION: ICD-10-CM

## 2020-05-19 LAB
BASOPHILS # BLD AUTO: 0 THOUSANDS/ΜL (ref 0–0.1)
BASOPHILS NFR BLD AUTO: 1 % (ref 0–2)
EOSINOPHIL # BLD AUTO: 0.1 THOUSAND/ΜL (ref 0–0.61)
EOSINOPHIL NFR BLD AUTO: 2 % (ref 0–5)
ERYTHROCYTE [DISTWIDTH] IN BLOOD BY AUTOMATED COUNT: 13.7 % (ref 11.5–14.5)
HCT VFR BLD AUTO: 37.6 % (ref 42–47)
HGB BLD-MCNC: 12.7 G/DL (ref 12–16)
LYMPHOCYTES # BLD AUTO: 1.1 THOUSANDS/ΜL (ref 0.6–4.47)
LYMPHOCYTES NFR BLD AUTO: 24 % (ref 21–51)
MCH RBC QN AUTO: 31.8 PG (ref 26–34)
MCHC RBC AUTO-ENTMCNC: 33.9 G/DL (ref 31–37)
MCV RBC AUTO: 94 FL (ref 81–99)
MONOCYTES # BLD AUTO: 0.6 THOUSAND/ΜL (ref 0.17–1.22)
MONOCYTES NFR BLD AUTO: 12 % (ref 2–12)
NEUTROPHILS # BLD AUTO: 2.9 THOUSANDS/ΜL (ref 1.4–6.5)
NEUTS SEG NFR BLD AUTO: 62 % (ref 42–75)
PLATELET # BLD AUTO: 218 THOUSANDS/UL (ref 149–390)
PMV BLD AUTO: 6.7 FL (ref 8.6–11.7)
RBC # BLD AUTO: 4.01 MILLION/UL (ref 3.9–5.2)
WBC # BLD AUTO: 4.6 THOUSAND/UL (ref 4.8–10.8)

## 2020-05-19 PROCEDURE — 96375 TX/PRO/DX INJ NEW DRUG ADDON: CPT

## 2020-05-19 PROCEDURE — 83690 ASSAY OF LIPASE: CPT | Performed by: EMERGENCY MEDICINE

## 2020-05-19 PROCEDURE — 83735 ASSAY OF MAGNESIUM: CPT | Performed by: EMERGENCY MEDICINE

## 2020-05-19 PROCEDURE — 85025 COMPLETE CBC W/AUTO DIFF WBC: CPT | Performed by: EMERGENCY MEDICINE

## 2020-05-19 PROCEDURE — 80053 COMPREHEN METABOLIC PANEL: CPT | Performed by: EMERGENCY MEDICINE

## 2020-05-19 PROCEDURE — 99285 EMERGENCY DEPT VISIT HI MDM: CPT

## 2020-05-19 PROCEDURE — 36415 COLL VENOUS BLD VENIPUNCTURE: CPT | Performed by: EMERGENCY MEDICINE

## 2020-05-19 PROCEDURE — 84484 ASSAY OF TROPONIN QUANT: CPT | Performed by: EMERGENCY MEDICINE

## 2020-05-19 PROCEDURE — 96361 HYDRATE IV INFUSION ADD-ON: CPT

## 2020-05-19 PROCEDURE — 93005 ELECTROCARDIOGRAM TRACING: CPT

## 2020-05-19 PROCEDURE — 71045 X-RAY EXAM CHEST 1 VIEW: CPT

## 2020-05-19 PROCEDURE — 99285 EMERGENCY DEPT VISIT HI MDM: CPT | Performed by: EMERGENCY MEDICINE

## 2020-05-19 PROCEDURE — 96374 THER/PROPH/DIAG INJ IV PUSH: CPT

## 2020-05-19 RX ORDER — SODIUM CHLORIDE 9 MG/ML
125 INJECTION, SOLUTION INTRAVENOUS CONTINUOUS
Status: DISCONTINUED | OUTPATIENT
Start: 2020-05-19 | End: 2020-05-20 | Stop reason: HOSPADM

## 2020-05-19 RX ORDER — ONDANSETRON 2 MG/ML
4 INJECTION INTRAMUSCULAR; INTRAVENOUS ONCE
Status: COMPLETED | OUTPATIENT
Start: 2020-05-19 | End: 2020-05-19

## 2020-05-19 RX ADMIN — MORPHINE SULFATE 2 MG: 2 INJECTION, SOLUTION INTRAMUSCULAR; INTRAVENOUS at 23:36

## 2020-05-19 RX ADMIN — SODIUM CHLORIDE 125 ML/HR: 0.9 INJECTION, SOLUTION INTRAVENOUS at 23:40

## 2020-05-19 RX ADMIN — ONDANSETRON 4 MG: 2 INJECTION INTRAMUSCULAR; INTRAVENOUS at 23:36

## 2020-05-20 ENCOUNTER — APPOINTMENT (EMERGENCY)
Dept: CT IMAGING | Facility: HOSPITAL | Age: 85
End: 2020-05-20
Payer: COMMERCIAL

## 2020-05-20 VITALS
WEIGHT: 118.83 LBS | BODY MASS INDEX: 21.87 KG/M2 | HEIGHT: 62 IN | TEMPERATURE: 96.3 F | OXYGEN SATURATION: 97 % | RESPIRATION RATE: 17 BRPM | DIASTOLIC BLOOD PRESSURE: 63 MMHG | SYSTOLIC BLOOD PRESSURE: 137 MMHG | HEART RATE: 74 BPM

## 2020-05-20 PROBLEM — R07.9 CHEST PAIN: Status: ACTIVE | Noted: 2020-05-20

## 2020-05-20 PROBLEM — I16.0 HYPERTENSIVE URGENCY: Status: ACTIVE | Noted: 2020-05-20

## 2020-05-20 PROBLEM — S32.000D COMPRESSION FRACTURE OF LUMBAR VERTEBRA WITH ROUTINE HEALING: Status: ACTIVE | Noted: 2020-05-20

## 2020-05-20 PROBLEM — R07.89 OTHER CHEST PAIN: Status: ACTIVE | Noted: 2020-05-20

## 2020-05-20 LAB
ALBUMIN SERPL BCP-MCNC: 4.3 G/DL (ref 3.5–5.7)
ALP SERPL-CCNC: 83 U/L (ref 55–165)
ALT SERPL W P-5'-P-CCNC: 14 U/L (ref 7–52)
ANION GAP SERPL CALCULATED.3IONS-SCNC: 4 MMOL/L (ref 4–13)
ANION GAP SERPL CALCULATED.3IONS-SCNC: 7 MMOL/L (ref 4–13)
AST SERPL W P-5'-P-CCNC: 19 U/L (ref 13–39)
ATRIAL RATE: 74 BPM
ATRIAL RATE: 77 BPM
BILIRUB SERPL-MCNC: 0.6 MG/DL (ref 0.2–1)
BUN SERPL-MCNC: 7 MG/DL (ref 7–25)
BUN SERPL-MCNC: 9 MG/DL (ref 7–25)
CALCIUM SERPL-MCNC: 9.2 MG/DL (ref 8.6–10.5)
CALCIUM SERPL-MCNC: 9.7 MG/DL (ref 8.6–10.5)
CHLORIDE SERPL-SCNC: 101 MMOL/L (ref 98–107)
CHLORIDE SERPL-SCNC: 98 MMOL/L (ref 98–107)
CO2 SERPL-SCNC: 29 MMOL/L (ref 21–31)
CO2 SERPL-SCNC: 29 MMOL/L (ref 21–31)
CREAT SERPL-MCNC: 0.8 MG/DL (ref 0.6–1.2)
CREAT SERPL-MCNC: 0.83 MG/DL (ref 0.6–1.2)
ERYTHROCYTE [DISTWIDTH] IN BLOOD BY AUTOMATED COUNT: 13.5 % (ref 11.5–14.5)
GFR SERPL CREATININE-BSD FRML MDRD: 65 ML/MIN/1.73SQ M
GFR SERPL CREATININE-BSD FRML MDRD: 67 ML/MIN/1.73SQ M
GLUCOSE SERPL-MCNC: 86 MG/DL (ref 65–99)
GLUCOSE SERPL-MCNC: 97 MG/DL (ref 65–99)
HCT VFR BLD AUTO: 36.1 % (ref 42–47)
HGB BLD-MCNC: 12.2 G/DL (ref 12–16)
LIPASE SERPL-CCNC: 47 U/L (ref 11–82)
MAGNESIUM SERPL-MCNC: 2.2 MG/DL (ref 1.9–2.7)
MCH RBC QN AUTO: 31.6 PG (ref 26–34)
MCHC RBC AUTO-ENTMCNC: 33.7 G/DL (ref 31–37)
MCV RBC AUTO: 94 FL (ref 81–99)
P AXIS: 80 DEGREES
P AXIS: 81 DEGREES
PLATELET # BLD AUTO: 208 THOUSANDS/UL (ref 149–390)
PMV BLD AUTO: 6.8 FL (ref 8.6–11.7)
POTASSIUM SERPL-SCNC: 3.8 MMOL/L (ref 3.5–5.5)
POTASSIUM SERPL-SCNC: 4 MMOL/L (ref 3.5–5.5)
PR INTERVAL: 248 MS
PR INTERVAL: 256 MS
PROT SERPL-MCNC: 7.4 G/DL (ref 6.4–8.9)
QRS AXIS: 78 DEGREES
QRS AXIS: 79 DEGREES
QRSD INTERVAL: 86 MS
QRSD INTERVAL: 92 MS
QT INTERVAL: 390 MS
QT INTERVAL: 400 MS
QTC INTERVAL: 432 MS
QTC INTERVAL: 452 MS
RBC # BLD AUTO: 3.85 MILLION/UL (ref 3.9–5.2)
SODIUM SERPL-SCNC: 134 MMOL/L (ref 134–143)
SODIUM SERPL-SCNC: 134 MMOL/L (ref 134–143)
T WAVE AXIS: 77 DEGREES
T WAVE AXIS: 81 DEGREES
TROPONIN I SERPL-MCNC: <0.03 NG/ML
TROPONIN I SERPL-MCNC: <0.03 NG/ML
VENTRICULAR RATE: 74 BPM
VENTRICULAR RATE: 77 BPM
WBC # BLD AUTO: 3.8 THOUSAND/UL (ref 4.8–10.8)

## 2020-05-20 PROCEDURE — 93005 ELECTROCARDIOGRAM TRACING: CPT

## 2020-05-20 PROCEDURE — 99220 PR INITIAL OBSERVATION CARE/DAY 70 MINUTES: CPT | Performed by: PHYSICIAN ASSISTANT

## 2020-05-20 PROCEDURE — 99217 PR OBSERVATION CARE DISCHARGE MANAGEMENT: CPT | Performed by: INTERNAL MEDICINE

## 2020-05-20 PROCEDURE — 74177 CT ABD & PELVIS W/CONTRAST: CPT

## 2020-05-20 PROCEDURE — 93010 ELECTROCARDIOGRAM REPORT: CPT | Performed by: INTERNAL MEDICINE

## 2020-05-20 PROCEDURE — 85027 COMPLETE CBC AUTOMATED: CPT | Performed by: PHYSICIAN ASSISTANT

## 2020-05-20 PROCEDURE — 99215 OFFICE O/P EST HI 40 MIN: CPT | Performed by: PHYSICIAN ASSISTANT

## 2020-05-20 PROCEDURE — 84484 ASSAY OF TROPONIN QUANT: CPT | Performed by: PHYSICIAN ASSISTANT

## 2020-05-20 PROCEDURE — 80048 BASIC METABOLIC PNL TOTAL CA: CPT | Performed by: PHYSICIAN ASSISTANT

## 2020-05-20 PROCEDURE — 96361 HYDRATE IV INFUSION ADD-ON: CPT

## 2020-05-20 RX ORDER — METOPROLOL SUCCINATE 25 MG/1
25 TABLET, EXTENDED RELEASE ORAL EVERY 12 HOURS
Status: DISCONTINUED | OUTPATIENT
Start: 2020-05-20 | End: 2020-05-20 | Stop reason: HOSPADM

## 2020-05-20 RX ORDER — ALPRAZOLAM 0.25 MG/1
0.25 TABLET ORAL 2 TIMES DAILY PRN
Status: DISCONTINUED | OUTPATIENT
Start: 2020-05-20 | End: 2020-05-20 | Stop reason: HOSPADM

## 2020-05-20 RX ORDER — LEVOTHYROXINE SODIUM 0.03 MG/1
25 TABLET ORAL
Status: DISCONTINUED | OUTPATIENT
Start: 2020-05-21 | End: 2020-05-20 | Stop reason: HOSPADM

## 2020-05-20 RX ORDER — POLYETHYLENE GLYCOL 3350 17 G/17G
17 POWDER, FOR SOLUTION ORAL DAILY
Status: DISCONTINUED | OUTPATIENT
Start: 2020-05-20 | End: 2020-05-20 | Stop reason: HOSPADM

## 2020-05-20 RX ORDER — ALPRAZOLAM 0.5 MG/1
0.25 TABLET ORAL ONCE
Status: COMPLETED | OUTPATIENT
Start: 2020-05-20 | End: 2020-05-20

## 2020-05-20 RX ORDER — PREDNISOLONE ACETATE 10 MG/ML
1 SUSPENSION/ DROPS OPHTHALMIC 2 TIMES DAILY
Status: DISCONTINUED | OUTPATIENT
Start: 2020-05-20 | End: 2020-05-20 | Stop reason: HOSPADM

## 2020-05-20 RX ORDER — MELATONIN
1000 DAILY
Status: DISCONTINUED | OUTPATIENT
Start: 2020-05-20 | End: 2020-05-20 | Stop reason: HOSPADM

## 2020-05-20 RX ORDER — AMLODIPINE BESYLATE 10 MG/1
10 TABLET ORAL DAILY
Qty: 30 TABLET | Refills: 0 | Status: SHIPPED | OUTPATIENT
Start: 2020-05-21 | End: 2020-06-26 | Stop reason: HOSPADM

## 2020-05-20 RX ORDER — AMLODIPINE BESYLATE 5 MG/1
5 TABLET ORAL DAILY
Status: DISCONTINUED | OUTPATIENT
Start: 2020-05-20 | End: 2020-05-20

## 2020-05-20 RX ORDER — LOSARTAN POTASSIUM 50 MG/1
50 TABLET ORAL 2 TIMES DAILY
Status: DISCONTINUED | OUTPATIENT
Start: 2020-05-20 | End: 2020-05-20 | Stop reason: HOSPADM

## 2020-05-20 RX ORDER — LEVOTHYROXINE SODIUM 0.05 MG/1
50 TABLET ORAL
Status: DISCONTINUED | OUTPATIENT
Start: 2020-05-20 | End: 2020-05-20 | Stop reason: HOSPADM

## 2020-05-20 RX ORDER — ONDANSETRON 2 MG/ML
4 INJECTION INTRAMUSCULAR; INTRAVENOUS EVERY 6 HOURS PRN
Status: DISCONTINUED | OUTPATIENT
Start: 2020-05-20 | End: 2020-05-20 | Stop reason: HOSPADM

## 2020-05-20 RX ORDER — HEPARIN SODIUM 5000 [USP'U]/ML
5000 INJECTION, SOLUTION INTRAVENOUS; SUBCUTANEOUS EVERY 12 HOURS SCHEDULED
Status: DISCONTINUED | OUTPATIENT
Start: 2020-05-20 | End: 2020-05-20 | Stop reason: HOSPADM

## 2020-05-20 RX ORDER — ASPIRIN 81 MG/1
81 TABLET, CHEWABLE ORAL DAILY
Status: DISCONTINUED | OUTPATIENT
Start: 2020-05-20 | End: 2020-05-20 | Stop reason: HOSPADM

## 2020-05-20 RX ORDER — PANTOPRAZOLE SODIUM 40 MG/1
40 TABLET, DELAYED RELEASE ORAL
Status: DISCONTINUED | OUTPATIENT
Start: 2020-05-20 | End: 2020-05-20 | Stop reason: HOSPADM

## 2020-05-20 RX ORDER — METOPROLOL SUCCINATE 25 MG/1
25 TABLET, EXTENDED RELEASE ORAL EVERY 12 HOURS
Qty: 60 TABLET | Refills: 0 | Status: SHIPPED | OUTPATIENT
Start: 2020-05-20 | End: 2020-06-26 | Stop reason: HOSPADM

## 2020-05-20 RX ORDER — METOPROLOL SUCCINATE 25 MG/1
12.5 TABLET, EXTENDED RELEASE ORAL EVERY 12 HOURS
Status: DISCONTINUED | OUTPATIENT
Start: 2020-05-20 | End: 2020-05-20

## 2020-05-20 RX ORDER — HYDROCODONE BITARTRATE AND ACETAMINOPHEN 5; 325 MG/1; MG/1
1 TABLET ORAL EVERY 4 HOURS PRN
Status: DISCONTINUED | OUTPATIENT
Start: 2020-05-20 | End: 2020-05-20 | Stop reason: HOSPADM

## 2020-05-20 RX ORDER — ACETAMINOPHEN 325 MG/1
650 TABLET ORAL EVERY 4 HOURS PRN
Status: DISCONTINUED | OUTPATIENT
Start: 2020-05-20 | End: 2020-05-20 | Stop reason: HOSPADM

## 2020-05-20 RX ORDER — AMLODIPINE BESYLATE 5 MG/1
10 TABLET ORAL DAILY
Status: DISCONTINUED | OUTPATIENT
Start: 2020-05-20 | End: 2020-05-20 | Stop reason: HOSPADM

## 2020-05-20 RX ADMIN — AMLODIPINE BESYLATE 10 MG: 5 TABLET ORAL at 08:43

## 2020-05-20 RX ADMIN — IOHEXOL 100 ML: 350 INJECTION, SOLUTION INTRAVENOUS at 00:57

## 2020-05-20 RX ADMIN — NITROGLYCERIN 0.5 INCH: 20 OINTMENT TOPICAL at 08:42

## 2020-05-20 RX ADMIN — LEVOTHYROXINE SODIUM 50 MCG: 50 TABLET ORAL at 05:20

## 2020-05-20 RX ADMIN — PANTOPRAZOLE SODIUM 40 MG: 40 TABLET, DELAYED RELEASE ORAL at 05:20

## 2020-05-20 RX ADMIN — PREDNISOLONE ACETATE 1 DROP: 10 SUSPENSION/ DROPS OPHTHALMIC at 08:44

## 2020-05-20 RX ADMIN — SODIUM CHLORIDE 125 ML/HR: 0.9 INJECTION, SOLUTION INTRAVENOUS at 05:17

## 2020-05-20 RX ADMIN — ALPRAZOLAM 0.25 MG: 0.25 TABLET ORAL at 08:40

## 2020-05-20 RX ADMIN — METOPROLOL SUCCINATE 12.5 MG: 25 TABLET, EXTENDED RELEASE ORAL at 06:56

## 2020-05-20 RX ADMIN — LOSARTAN POTASSIUM 50 MG: 50 TABLET, FILM COATED ORAL at 08:41

## 2020-05-20 RX ADMIN — POLYETHYLENE GLYCOL 3350 17 G: 17 POWDER, FOR SOLUTION ORAL at 08:41

## 2020-05-20 RX ADMIN — HEPARIN SODIUM 5000 UNITS: 5000 INJECTION INTRAVENOUS; SUBCUTANEOUS at 08:39

## 2020-05-20 RX ADMIN — ASPIRIN 81 MG 81 MG: 81 TABLET ORAL at 08:41

## 2020-05-20 RX ADMIN — LUBIPROSTONE 24 MCG: 24 CAPSULE, GELATIN COATED ORAL at 08:44

## 2020-05-20 RX ADMIN — MELATONIN 1000 UNITS: at 08:41

## 2020-05-20 RX ADMIN — ALPRAZOLAM 0.25 MG: 0.5 TABLET ORAL at 02:27

## 2020-06-07 ENCOUNTER — HOSPITAL ENCOUNTER (INPATIENT)
Facility: HOSPITAL | Age: 85
LOS: 1 days | Discharge: HOME | DRG: 313 | End: 2020-06-09
Attending: FAMILY MEDICINE | Admitting: HOSPITALIST
Payer: COMMERCIAL

## 2020-06-07 ENCOUNTER — APPOINTMENT (EMERGENCY)
Dept: RADIOLOGY | Facility: HOSPITAL | Age: 85
DRG: 313 | End: 2020-06-07
Payer: COMMERCIAL

## 2020-06-07 DIAGNOSIS — I10 BENIGN ESSENTIAL HTN: Chronic | ICD-10-CM

## 2020-06-07 DIAGNOSIS — R07.9 CHEST PAIN: Primary | ICD-10-CM

## 2020-06-07 DIAGNOSIS — F41.9 ANXIETY: ICD-10-CM

## 2020-06-07 DIAGNOSIS — R62.7 FAILURE TO THRIVE IN ADULT: ICD-10-CM

## 2020-06-07 LAB
ANION GAP SERPL CALCULATED.3IONS-SCNC: 5 MMOL/L (ref 4–13)
BASOPHILS # BLD AUTO: 0 THOUSANDS/ΜL (ref 0–0.1)
BASOPHILS NFR BLD AUTO: 1 % (ref 0–2)
BUN SERPL-MCNC: 9 MG/DL (ref 7–25)
CALCIUM SERPL-MCNC: 9.1 MG/DL (ref 8.6–10.5)
CHLORIDE SERPL-SCNC: 93 MMOL/L (ref 98–107)
CO2 SERPL-SCNC: 29 MMOL/L (ref 21–31)
CREAT SERPL-MCNC: 0.9 MG/DL (ref 0.6–1.2)
EOSINOPHIL # BLD AUTO: 0 THOUSAND/ΜL (ref 0–0.61)
EOSINOPHIL NFR BLD AUTO: 1 % (ref 0–5)
ERYTHROCYTE [DISTWIDTH] IN BLOOD BY AUTOMATED COUNT: 13.3 % (ref 11.5–14.5)
GFR SERPL CREATININE-BSD FRML MDRD: 58 ML/MIN/1.73SQ M
GLUCOSE SERPL-MCNC: 104 MG/DL (ref 65–99)
HCT VFR BLD AUTO: 38.3 % (ref 42–47)
HGB BLD-MCNC: 13.2 G/DL (ref 12–16)
LYMPHOCYTES # BLD AUTO: 0.8 THOUSANDS/ΜL (ref 0.6–4.47)
LYMPHOCYTES NFR BLD AUTO: 15 % (ref 21–51)
MCH RBC QN AUTO: 32 PG (ref 26–34)
MCHC RBC AUTO-ENTMCNC: 34.4 G/DL (ref 31–37)
MCV RBC AUTO: 93 FL (ref 81–99)
MONOCYTES # BLD AUTO: 0.6 THOUSAND/ΜL (ref 0.17–1.22)
MONOCYTES NFR BLD AUTO: 10 % (ref 2–12)
NEUTROPHILS # BLD AUTO: 3.9 THOUSANDS/ΜL (ref 1.4–6.5)
NEUTS SEG NFR BLD AUTO: 73 % (ref 42–75)
PLATELET # BLD AUTO: 245 THOUSANDS/UL (ref 149–390)
PMV BLD AUTO: 6.7 FL (ref 8.6–11.7)
POTASSIUM SERPL-SCNC: 3.9 MMOL/L (ref 3.5–5.5)
RBC # BLD AUTO: 4.12 MILLION/UL (ref 3.9–5.2)
SODIUM SERPL-SCNC: 127 MMOL/L (ref 134–143)
TROPONIN I SERPL-MCNC: <0.03 NG/ML
WBC # BLD AUTO: 5.4 THOUSAND/UL (ref 4.8–10.8)

## 2020-06-07 PROCEDURE — 84484 ASSAY OF TROPONIN QUANT: CPT | Performed by: NURSE PRACTITIONER

## 2020-06-07 PROCEDURE — 85025 COMPLETE CBC W/AUTO DIFF WBC: CPT | Performed by: PHYSICIAN ASSISTANT

## 2020-06-07 PROCEDURE — 99220 PR INITIAL OBSERVATION CARE/DAY 70 MINUTES: CPT | Performed by: NURSE PRACTITIONER

## 2020-06-07 PROCEDURE — 93005 ELECTROCARDIOGRAM TRACING: CPT

## 2020-06-07 PROCEDURE — 99285 EMERGENCY DEPT VISIT HI MDM: CPT | Performed by: PHYSICIAN ASSISTANT

## 2020-06-07 PROCEDURE — 80048 BASIC METABOLIC PNL TOTAL CA: CPT | Performed by: PHYSICIAN ASSISTANT

## 2020-06-07 PROCEDURE — 71045 X-RAY EXAM CHEST 1 VIEW: CPT

## 2020-06-07 PROCEDURE — 99284 EMERGENCY DEPT VISIT MOD MDM: CPT

## 2020-06-07 PROCEDURE — 84484 ASSAY OF TROPONIN QUANT: CPT | Performed by: PHYSICIAN ASSISTANT

## 2020-06-07 PROCEDURE — 36415 COLL VENOUS BLD VENIPUNCTURE: CPT | Performed by: PHYSICIAN ASSISTANT

## 2020-06-07 RX ORDER — PANTOPRAZOLE SODIUM 40 MG/1
40 TABLET, DELAYED RELEASE ORAL
Status: DISCONTINUED | OUTPATIENT
Start: 2020-06-08 | End: 2020-06-09 | Stop reason: HOSPADM

## 2020-06-07 RX ORDER — MELATONIN
1000 DAILY
Status: DISCONTINUED | OUTPATIENT
Start: 2020-06-07 | End: 2020-06-09 | Stop reason: HOSPADM

## 2020-06-07 RX ORDER — LEVOTHYROXINE SODIUM 0.07 MG/1
75 TABLET ORAL
Status: DISCONTINUED | OUTPATIENT
Start: 2020-06-08 | End: 2020-06-07

## 2020-06-07 RX ORDER — LEVOTHYROXINE SODIUM 0.05 MG/1
50 TABLET ORAL 3 TIMES WEEKLY
Status: DISCONTINUED | OUTPATIENT
Start: 2020-06-08 | End: 2020-06-09 | Stop reason: HOSPADM

## 2020-06-07 RX ORDER — POLYETHYLENE GLYCOL 3350 17 G/17G
17 POWDER, FOR SOLUTION ORAL DAILY
Status: DISCONTINUED | OUTPATIENT
Start: 2020-06-07 | End: 2020-06-09 | Stop reason: HOSPADM

## 2020-06-07 RX ORDER — ASPIRIN 81 MG/1
81 TABLET, CHEWABLE ORAL DAILY
Status: DISCONTINUED | OUTPATIENT
Start: 2020-06-07 | End: 2020-06-09 | Stop reason: HOSPADM

## 2020-06-07 RX ORDER — ALPRAZOLAM 0.25 MG/1
0.25 TABLET ORAL 2 TIMES DAILY PRN
Status: DISCONTINUED | OUTPATIENT
Start: 2020-06-07 | End: 2020-06-08

## 2020-06-07 RX ORDER — LOSARTAN POTASSIUM 50 MG/1
50 TABLET ORAL 2 TIMES DAILY
Status: DISCONTINUED | OUTPATIENT
Start: 2020-06-07 | End: 2020-06-08

## 2020-06-07 RX ORDER — METOPROLOL SUCCINATE 25 MG/1
25 TABLET, EXTENDED RELEASE ORAL EVERY 12 HOURS
Status: DISCONTINUED | OUTPATIENT
Start: 2020-06-07 | End: 2020-06-07

## 2020-06-07 RX ORDER — SODIUM CHLORIDE 9 MG/ML
75 INJECTION, SOLUTION INTRAVENOUS CONTINUOUS
Status: DISCONTINUED | OUTPATIENT
Start: 2020-06-07 | End: 2020-06-08

## 2020-06-07 RX ORDER — SODIUM CHLORIDE 9 MG/ML
3 INJECTION INTRAVENOUS
Status: DISCONTINUED | OUTPATIENT
Start: 2020-06-07 | End: 2020-06-09 | Stop reason: HOSPADM

## 2020-06-07 RX ORDER — LEVOTHYROXINE SODIUM 0.05 MG/1
50 TABLET ORAL 3 TIMES WEEKLY
Status: DISCONTINUED | OUTPATIENT
Start: 2020-06-08 | End: 2020-06-07

## 2020-06-07 RX ORDER — METOPROLOL SUCCINATE 25 MG/1
12.5 TABLET, EXTENDED RELEASE ORAL EVERY 12 HOURS
Status: DISCONTINUED | OUTPATIENT
Start: 2020-06-08 | End: 2020-06-08

## 2020-06-07 RX ORDER — LEVOTHYROXINE SODIUM 0.07 MG/1
75 TABLET ORAL
Status: DISCONTINUED | OUTPATIENT
Start: 2020-06-08 | End: 2020-06-09 | Stop reason: HOSPADM

## 2020-06-07 RX ORDER — ACETAMINOPHEN 325 MG/1
650 TABLET ORAL EVERY 4 HOURS PRN
Status: DISCONTINUED | OUTPATIENT
Start: 2020-06-07 | End: 2020-06-09 | Stop reason: HOSPADM

## 2020-06-07 RX ORDER — ESCITALOPRAM OXALATE 10 MG/1
5 TABLET ORAL DAILY
Status: DISCONTINUED | OUTPATIENT
Start: 2020-06-08 | End: 2020-06-09 | Stop reason: HOSPADM

## 2020-06-07 RX ORDER — AMLODIPINE BESYLATE 5 MG/1
10 TABLET ORAL DAILY
Status: DISCONTINUED | OUTPATIENT
Start: 2020-06-07 | End: 2020-06-09 | Stop reason: HOSPADM

## 2020-06-07 RX ADMIN — ALPRAZOLAM 0.25 MG: 0.25 TABLET ORAL at 22:39

## 2020-06-07 RX ADMIN — ASPIRIN 81 MG 81 MG: 81 TABLET ORAL at 15:58

## 2020-06-07 RX ADMIN — SODIUM CHLORIDE 75 ML/HR: 0.9 INJECTION, SOLUTION INTRAVENOUS at 16:00

## 2020-06-07 RX ADMIN — MELATONIN 1000 UNITS: at 15:58

## 2020-06-07 RX ADMIN — POLYETHYLENE GLYCOL 3350 17 G: 17 POWDER, FOR SOLUTION ORAL at 15:58

## 2020-06-07 RX ADMIN — ENOXAPARIN SODIUM 40 MG: 40 INJECTION SUBCUTANEOUS at 15:57

## 2020-06-08 PROBLEM — K59.00 CONSTIPATION: Chronic | Status: ACTIVE | Noted: 2019-06-10

## 2020-06-08 PROBLEM — F41.9 ANXIETY: Chronic | Status: ACTIVE | Noted: 2020-03-03

## 2020-06-08 PROBLEM — E44.1 MILD PROTEIN-CALORIE MALNUTRITION (HCC): Status: ACTIVE | Noted: 2020-06-08

## 2020-06-08 PROBLEM — E87.1 HYPONATREMIA: Status: ACTIVE | Noted: 2020-06-08

## 2020-06-08 LAB
ANION GAP SERPL CALCULATED.3IONS-SCNC: 4 MMOL/L (ref 4–13)
ATRIAL RATE: 66 BPM
BUN SERPL-MCNC: 8 MG/DL (ref 7–25)
CALCIUM SERPL-MCNC: 8.9 MG/DL (ref 8.6–10.5)
CHLORIDE SERPL-SCNC: 99 MMOL/L (ref 98–107)
CHOLEST SERPL-MCNC: 171 MG/DL (ref 0–200)
CO2 SERPL-SCNC: 29 MMOL/L (ref 21–31)
CREAT SERPL-MCNC: 0.86 MG/DL (ref 0.6–1.2)
ERYTHROCYTE [DISTWIDTH] IN BLOOD BY AUTOMATED COUNT: 13.6 % (ref 11.5–14.5)
GFR SERPL CREATININE-BSD FRML MDRD: 62 ML/MIN/1.73SQ M
GLUCOSE SERPL-MCNC: 85 MG/DL (ref 65–99)
HCT VFR BLD AUTO: 35.7 % (ref 42–47)
HDLC SERPL-MCNC: 68 MG/DL
HGB BLD-MCNC: 12.5 G/DL (ref 12–16)
LDLC SERPL CALC-MCNC: 93 MG/DL (ref 0–100)
MAGNESIUM SERPL-MCNC: 2 MG/DL (ref 1.9–2.7)
MCH RBC QN AUTO: 32.6 PG (ref 26–34)
MCHC RBC AUTO-ENTMCNC: 35 G/DL (ref 31–37)
MCV RBC AUTO: 93 FL (ref 81–99)
NONHDLC SERPL-MCNC: 103 MG/DL
P AXIS: 71 DEGREES
PLATELET # BLD AUTO: 212 THOUSANDS/UL (ref 149–390)
PMV BLD AUTO: 6.8 FL (ref 8.6–11.7)
POTASSIUM SERPL-SCNC: 4 MMOL/L (ref 3.5–5.5)
PR INTERVAL: 246 MS
QRS AXIS: 86 DEGREES
QRSD INTERVAL: 100 MS
QT INTERVAL: 396 MS
QTC INTERVAL: 415 MS
RBC # BLD AUTO: 3.84 MILLION/UL (ref 3.9–5.2)
SODIUM SERPL-SCNC: 132 MMOL/L (ref 134–143)
T WAVE AXIS: 70 DEGREES
TRIGL SERPL-MCNC: 51 MG/DL (ref 44–166)
VENTRICULAR RATE: 66 BPM
WBC # BLD AUTO: 4.6 THOUSAND/UL (ref 4.8–10.8)

## 2020-06-08 PROCEDURE — 80061 LIPID PANEL: CPT | Performed by: NURSE PRACTITIONER

## 2020-06-08 PROCEDURE — 99232 SBSQ HOSP IP/OBS MODERATE 35: CPT | Performed by: PHYSICIAN ASSISTANT

## 2020-06-08 PROCEDURE — 99221 1ST HOSP IP/OBS SF/LOW 40: CPT | Performed by: NURSE PRACTITIONER

## 2020-06-08 PROCEDURE — 97530 THERAPEUTIC ACTIVITIES: CPT

## 2020-06-08 PROCEDURE — 83735 ASSAY OF MAGNESIUM: CPT | Performed by: NURSE PRACTITIONER

## 2020-06-08 PROCEDURE — 93010 ELECTROCARDIOGRAM REPORT: CPT | Performed by: INTERNAL MEDICINE

## 2020-06-08 PROCEDURE — 85027 COMPLETE CBC AUTOMATED: CPT | Performed by: NURSE PRACTITIONER

## 2020-06-08 PROCEDURE — 80048 BASIC METABOLIC PNL TOTAL CA: CPT | Performed by: NURSE PRACTITIONER

## 2020-06-08 PROCEDURE — 97163 PT EVAL HIGH COMPLEX 45 MIN: CPT

## 2020-06-08 PROCEDURE — 97167 OT EVAL HIGH COMPLEX 60 MIN: CPT

## 2020-06-08 PROCEDURE — 99222 1ST HOSP IP/OBS MODERATE 55: CPT | Performed by: INTERNAL MEDICINE

## 2020-06-08 RX ORDER — PREDNISOLONE ACETATE 10 MG/ML
1 SUSPENSION/ DROPS OPHTHALMIC
Status: DISCONTINUED | OUTPATIENT
Start: 2020-06-08 | End: 2020-06-09 | Stop reason: HOSPADM

## 2020-06-08 RX ORDER — CLONAZEPAM 0.5 MG/1
0.25 TABLET ORAL 2 TIMES DAILY
Status: DISCONTINUED | OUTPATIENT
Start: 2020-06-08 | End: 2020-06-09 | Stop reason: HOSPADM

## 2020-06-08 RX ORDER — LOSARTAN POTASSIUM 50 MG/1
50 TABLET ORAL 2 TIMES DAILY
Status: DISCONTINUED | OUTPATIENT
Start: 2020-06-08 | End: 2020-06-09 | Stop reason: HOSPADM

## 2020-06-08 RX ORDER — METOPROLOL SUCCINATE 25 MG/1
12.5 TABLET, EXTENDED RELEASE ORAL 2 TIMES DAILY
Status: DISCONTINUED | OUTPATIENT
Start: 2020-06-08 | End: 2020-06-09

## 2020-06-08 RX ORDER — LOSARTAN POTASSIUM 50 MG/1
50 TABLET ORAL ONCE
Status: COMPLETED | OUTPATIENT
Start: 2020-06-08 | End: 2020-06-08

## 2020-06-08 RX ORDER — LOSARTAN POTASSIUM 100 MG/1
100 TABLET ORAL 2 TIMES DAILY
Qty: 60 TABLET | Refills: 0 | Status: CANCELLED | OUTPATIENT
Start: 2020-06-08 | End: 2020-07-08

## 2020-06-08 RX ORDER — LOSARTAN POTASSIUM 50 MG/1
100 TABLET ORAL 2 TIMES DAILY
Status: DISCONTINUED | OUTPATIENT
Start: 2020-06-08 | End: 2020-06-08

## 2020-06-08 RX ADMIN — LOSARTAN POTASSIUM 50 MG: 50 TABLET, FILM COATED ORAL at 08:02

## 2020-06-08 RX ADMIN — MELATONIN 1000 UNITS: at 08:02

## 2020-06-08 RX ADMIN — LOSARTAN POTASSIUM 50 MG: 50 TABLET, FILM COATED ORAL at 09:37

## 2020-06-08 RX ADMIN — METOPROLOL SUCCINATE 12.5 MG: 25 TABLET, EXTENDED RELEASE ORAL at 17:13

## 2020-06-08 RX ADMIN — ENOXAPARIN SODIUM 40 MG: 40 INJECTION SUBCUTANEOUS at 08:03

## 2020-06-08 RX ADMIN — CLONAZEPAM 0.25 MG: 0.5 TABLET ORAL at 12:53

## 2020-06-08 RX ADMIN — ALPRAZOLAM 0.25 MG: 0.25 TABLET ORAL at 08:02

## 2020-06-08 RX ADMIN — POLYETHYLENE GLYCOL 3350 17 G: 17 POWDER, FOR SOLUTION ORAL at 08:04

## 2020-06-08 RX ADMIN — LEVOTHYROXINE SODIUM 50 MCG: 50 TABLET ORAL at 06:13

## 2020-06-08 RX ADMIN — LOSARTAN POTASSIUM 50 MG: 50 TABLET, FILM COATED ORAL at 17:14

## 2020-06-08 RX ADMIN — SODIUM CHLORIDE 75 ML/HR: 0.9 INJECTION, SOLUTION INTRAVENOUS at 06:14

## 2020-06-08 RX ADMIN — CLONAZEPAM 0.25 MG: 0.5 TABLET ORAL at 17:12

## 2020-06-08 RX ADMIN — PREDNISOLONE ACETATE 1 DROP: 10 SUSPENSION/ DROPS OPHTHALMIC at 18:19

## 2020-06-08 RX ADMIN — ESCITALOPRAM OXALATE 5 MG: 10 TABLET ORAL at 08:02

## 2020-06-08 RX ADMIN — PANTOPRAZOLE SODIUM 40 MG: 40 TABLET, DELAYED RELEASE ORAL at 06:12

## 2020-06-08 RX ADMIN — METOPROLOL SUCCINATE 12.5 MG: 25 TABLET, EXTENDED RELEASE ORAL at 08:02

## 2020-06-08 RX ADMIN — AMLODIPINE BESYLATE 10 MG: 5 TABLET ORAL at 08:03

## 2020-06-08 RX ADMIN — ASPIRIN 81 MG 81 MG: 81 TABLET ORAL at 08:02

## 2020-06-09 VITALS
WEIGHT: 115.85 LBS | RESPIRATION RATE: 18 BRPM | DIASTOLIC BLOOD PRESSURE: 57 MMHG | SYSTOLIC BLOOD PRESSURE: 110 MMHG | BODY MASS INDEX: 21.32 KG/M2 | OXYGEN SATURATION: 99 % | HEIGHT: 62 IN | HEART RATE: 73 BPM | TEMPERATURE: 99.4 F

## 2020-06-09 LAB
ANION GAP SERPL CALCULATED.3IONS-SCNC: 4 MMOL/L (ref 4–13)
BUN SERPL-MCNC: 11 MG/DL (ref 7–25)
CALCIUM SERPL-MCNC: 9 MG/DL (ref 8.6–10.5)
CHLORIDE SERPL-SCNC: 98 MMOL/L (ref 98–107)
CO2 SERPL-SCNC: 29 MMOL/L (ref 21–31)
CREAT SERPL-MCNC: 0.78 MG/DL (ref 0.6–1.2)
GFR SERPL CREATININE-BSD FRML MDRD: 70 ML/MIN/1.73SQ M
GLUCOSE SERPL-MCNC: 108 MG/DL (ref 65–99)
POTASSIUM SERPL-SCNC: 3.7 MMOL/L (ref 3.5–5.5)
SODIUM SERPL-SCNC: 131 MMOL/L (ref 134–143)

## 2020-06-09 PROCEDURE — 97535 SELF CARE MNGMENT TRAINING: CPT

## 2020-06-09 PROCEDURE — 99232 SBSQ HOSP IP/OBS MODERATE 35: CPT | Performed by: INTERNAL MEDICINE

## 2020-06-09 PROCEDURE — 97530 THERAPEUTIC ACTIVITIES: CPT

## 2020-06-09 PROCEDURE — 99239 HOSP IP/OBS DSCHRG MGMT >30: CPT | Performed by: PHYSICIAN ASSISTANT

## 2020-06-09 PROCEDURE — 80048 BASIC METABOLIC PNL TOTAL CA: CPT | Performed by: PHYSICIAN ASSISTANT

## 2020-06-09 RX ORDER — CLONAZEPAM 0.5 MG/1
0.25 TABLET ORAL 2 TIMES DAILY
Qty: 30 TABLET | Refills: 0 | Status: ON HOLD | OUTPATIENT
Start: 2020-06-09 | End: 2020-06-26 | Stop reason: SDUPTHER

## 2020-06-09 RX ORDER — METOPROLOL SUCCINATE 25 MG/1
25 TABLET, EXTENDED RELEASE ORAL 2 TIMES DAILY
Status: DISCONTINUED | OUTPATIENT
Start: 2020-06-09 | End: 2020-06-09 | Stop reason: HOSPADM

## 2020-06-09 RX ORDER — ESCITALOPRAM OXALATE 5 MG/1
5 TABLET ORAL DAILY
Qty: 30 TABLET | Refills: 0 | Status: CANCELLED
Start: 2020-06-09 | End: 2020-07-09

## 2020-06-09 RX ORDER — ESCITALOPRAM OXALATE 5 MG/1
5 TABLET ORAL DAILY
COMMUNITY
End: 2020-06-26 | Stop reason: HOSPADM

## 2020-06-09 RX ADMIN — PANTOPRAZOLE SODIUM 40 MG: 40 TABLET, DELAYED RELEASE ORAL at 06:06

## 2020-06-09 RX ADMIN — CLONAZEPAM 0.25 MG: 0.5 TABLET ORAL at 09:08

## 2020-06-09 RX ADMIN — ESCITALOPRAM OXALATE 5 MG: 10 TABLET ORAL at 09:08

## 2020-06-09 RX ADMIN — LEVOTHYROXINE SODIUM 75 MCG: 75 TABLET ORAL at 06:05

## 2020-06-09 RX ADMIN — ENOXAPARIN SODIUM 40 MG: 40 INJECTION SUBCUTANEOUS at 09:07

## 2020-06-09 RX ADMIN — METOPROLOL SUCCINATE 12.5 MG: 25 TABLET, EXTENDED RELEASE ORAL at 09:08

## 2020-06-09 RX ADMIN — LOSARTAN POTASSIUM 50 MG: 50 TABLET, FILM COATED ORAL at 09:08

## 2020-06-09 RX ADMIN — POLYETHYLENE GLYCOL 3350 17 G: 17 POWDER, FOR SOLUTION ORAL at 09:09

## 2020-06-09 RX ADMIN — ASPIRIN 81 MG 81 MG: 81 TABLET ORAL at 09:08

## 2020-06-09 RX ADMIN — AMLODIPINE BESYLATE 10 MG: 5 TABLET ORAL at 09:09

## 2020-06-09 RX ADMIN — MELATONIN 1000 UNITS: at 09:07

## 2020-06-14 DIAGNOSIS — I10 ESSENTIAL HYPERTENSION: ICD-10-CM

## 2020-06-16 ENCOUNTER — HOSPITAL ENCOUNTER (EMERGENCY)
Facility: HOSPITAL | Age: 85
End: 2020-06-16
Attending: FAMILY MEDICINE
Payer: COMMERCIAL

## 2020-06-16 ENCOUNTER — HOSPITAL ENCOUNTER (INPATIENT)
Facility: HOSPITAL | Age: 85
LOS: 10 days | Discharge: HOME | DRG: 885 | End: 2020-06-26
Attending: PSYCHIATRY & NEUROLOGY | Admitting: PSYCHIATRY & NEUROLOGY
Payer: COMMERCIAL

## 2020-06-16 VITALS
BODY MASS INDEX: 21.03 KG/M2 | DIASTOLIC BLOOD PRESSURE: 72 MMHG | TEMPERATURE: 97.6 F | HEART RATE: 68 BPM | SYSTOLIC BLOOD PRESSURE: 149 MMHG | WEIGHT: 115 LBS | RESPIRATION RATE: 18 BRPM | OXYGEN SATURATION: 98 %

## 2020-06-16 DIAGNOSIS — I51.81 TAKOTSUBO CARDIOMYOPATHY: Chronic | ICD-10-CM

## 2020-06-16 DIAGNOSIS — F41.9 ANXIETY: ICD-10-CM

## 2020-06-16 DIAGNOSIS — F33.2 SEVERE EPISODE OF RECURRENT MAJOR DEPRESSIVE DISORDER, WITHOUT PSYCHOTIC FEATURES (HCC): Primary | ICD-10-CM

## 2020-06-16 DIAGNOSIS — I51.81 TAKOTSUBO CARDIOMYOPATHY: Primary | Chronic | ICD-10-CM

## 2020-06-16 DIAGNOSIS — E03.9 ACQUIRED HYPOTHYROIDISM: Chronic | ICD-10-CM

## 2020-06-16 DIAGNOSIS — F32.A DEPRESSION: ICD-10-CM

## 2020-06-16 DIAGNOSIS — H10.9 CONJUNCTIVITIS: ICD-10-CM

## 2020-06-16 DIAGNOSIS — F41.1 GAD (GENERALIZED ANXIETY DISORDER): Chronic | ICD-10-CM

## 2020-06-16 DIAGNOSIS — I10 ESSENTIAL HYPERTENSION: Chronic | ICD-10-CM

## 2020-06-16 DIAGNOSIS — E87.1 HYPONATREMIA: ICD-10-CM

## 2020-06-16 DIAGNOSIS — K21.9 GERD (GASTROESOPHAGEAL REFLUX DISEASE): ICD-10-CM

## 2020-06-16 LAB
ALBUMIN SERPL BCP-MCNC: 3.9 G/DL (ref 3.5–5.7)
ALP SERPL-CCNC: 59 U/L (ref 55–165)
ALT SERPL W P-5'-P-CCNC: 10 U/L (ref 7–52)
AMPHETAMINES SERPL QL SCN: NEGATIVE
ANION GAP SERPL CALCULATED.3IONS-SCNC: 8 MMOL/L (ref 4–13)
AST SERPL W P-5'-P-CCNC: 16 U/L (ref 13–39)
BACTERIA UR QL AUTO: ABNORMAL /HPF
BARBITURATES UR QL: NEGATIVE
BASOPHILS # BLD AUTO: 0 THOUSANDS/ΜL (ref 0–0.1)
BASOPHILS NFR BLD AUTO: 1 % (ref 0–2)
BENZODIAZ UR QL: POSITIVE
BILIRUB SERPL-MCNC: 0.5 MG/DL (ref 0.2–1)
BILIRUB UR QL STRIP: NEGATIVE
BUN SERPL-MCNC: 12 MG/DL (ref 7–25)
CALCIUM SERPL-MCNC: 9.2 MG/DL (ref 8.6–10.5)
CHLORIDE SERPL-SCNC: 97 MMOL/L (ref 98–107)
CLARITY UR: CLEAR
CO2 SERPL-SCNC: 27 MMOL/L (ref 21–31)
COCAINE UR QL: NEGATIVE
COLOR UR: YELLOW
CREAT SERPL-MCNC: 0.8 MG/DL (ref 0.6–1.2)
EOSINOPHIL # BLD AUTO: 0.1 THOUSAND/ΜL (ref 0–0.61)
EOSINOPHIL NFR BLD AUTO: 1 % (ref 0–5)
ERYTHROCYTE [DISTWIDTH] IN BLOOD BY AUTOMATED COUNT: 13.2 % (ref 11.5–14.5)
ETHANOL SERPL-MCNC: <10 MG/DL
GFR SERPL CREATININE-BSD FRML MDRD: 67 ML/MIN/1.73SQ M
GLUCOSE SERPL-MCNC: 89 MG/DL (ref 65–99)
GLUCOSE UR STRIP-MCNC: NEGATIVE MG/DL
HCT VFR BLD AUTO: 36.4 % (ref 42–47)
HGB BLD-MCNC: 12.4 G/DL (ref 12–16)
HGB UR QL STRIP.AUTO: ABNORMAL
KETONES UR STRIP-MCNC: NEGATIVE MG/DL
LEUKOCYTE ESTERASE UR QL STRIP: ABNORMAL
LYMPHOCYTES # BLD AUTO: 1.2 THOUSANDS/ΜL (ref 0.6–4.47)
LYMPHOCYTES NFR BLD AUTO: 22 % (ref 21–51)
MCH RBC QN AUTO: 31.7 PG (ref 26–34)
MCHC RBC AUTO-ENTMCNC: 34.1 G/DL (ref 31–37)
MCV RBC AUTO: 93 FL (ref 81–99)
METHADONE UR QL: NEGATIVE
MONOCYTES # BLD AUTO: 0.6 THOUSAND/ΜL (ref 0.17–1.22)
MONOCYTES NFR BLD AUTO: 11 % (ref 2–12)
MUCOUS THREADS UR QL AUTO: ABNORMAL
NEUTROPHILS # BLD AUTO: 3.4 THOUSANDS/ΜL (ref 1.4–6.5)
NEUTS SEG NFR BLD AUTO: 65 % (ref 42–75)
NITRITE UR QL STRIP: NEGATIVE
NON-SQ EPI CELLS URNS QL MICRO: ABNORMAL /HPF
OPIATES UR QL SCN: NEGATIVE
PCP UR QL: NEGATIVE
PH UR STRIP.AUTO: 6 [PH]
PLATELET # BLD AUTO: 193 THOUSANDS/UL (ref 149–390)
PMV BLD AUTO: 6.5 FL (ref 8.6–11.7)
POTASSIUM SERPL-SCNC: 3.9 MMOL/L (ref 3.5–5.5)
PROT SERPL-MCNC: 6.9 G/DL (ref 6.4–8.9)
PROT UR STRIP-MCNC: ABNORMAL MG/DL
RBC # BLD AUTO: 3.91 MILLION/UL (ref 3.9–5.2)
RBC #/AREA URNS AUTO: ABNORMAL /HPF
SARS-COV-2 RNA RESP QL NAA+PROBE: NEGATIVE
SODIUM SERPL-SCNC: 132 MMOL/L (ref 134–143)
SP GR UR STRIP.AUTO: 1.01 (ref 1–1.03)
THC UR QL: NEGATIVE
TSH SERPL DL<=0.05 MIU/L-ACNC: 3.84 UIU/ML (ref 0.45–5.33)
UROBILINOGEN UR QL STRIP.AUTO: 0.2 E.U./DL
WBC # BLD AUTO: 5.3 THOUSAND/UL (ref 4.8–10.8)
WBC #/AREA URNS AUTO: ABNORMAL /HPF

## 2020-06-16 PROCEDURE — 84443 ASSAY THYROID STIM HORMONE: CPT | Performed by: FAMILY MEDICINE

## 2020-06-16 PROCEDURE — 93005 ELECTROCARDIOGRAM TRACING: CPT

## 2020-06-16 PROCEDURE — 99285 EMERGENCY DEPT VISIT HI MDM: CPT

## 2020-06-16 PROCEDURE — 85025 COMPLETE CBC W/AUTO DIFF WBC: CPT | Performed by: FAMILY MEDICINE

## 2020-06-16 PROCEDURE — 80307 DRUG TEST PRSMV CHEM ANLYZR: CPT | Performed by: FAMILY MEDICINE

## 2020-06-16 PROCEDURE — 36415 COLL VENOUS BLD VENIPUNCTURE: CPT | Performed by: FAMILY MEDICINE

## 2020-06-16 PROCEDURE — 80053 COMPREHEN METABOLIC PANEL: CPT | Performed by: FAMILY MEDICINE

## 2020-06-16 PROCEDURE — 80320 DRUG SCREEN QUANTALCOHOLS: CPT | Performed by: FAMILY MEDICINE

## 2020-06-16 PROCEDURE — 81001 URINALYSIS AUTO W/SCOPE: CPT | Performed by: FAMILY MEDICINE

## 2020-06-16 PROCEDURE — 87635 SARS-COV-2 COVID-19 AMP PRB: CPT | Performed by: FAMILY MEDICINE

## 2020-06-16 PROCEDURE — 99285 EMERGENCY DEPT VISIT HI MDM: CPT | Performed by: FAMILY MEDICINE

## 2020-06-16 RX ORDER — LEVOTHYROXINE SODIUM 0.07 MG/1
75 TABLET ORAL ONCE
Status: CANCELLED | OUTPATIENT
Start: 2020-06-16 | End: 2020-06-16

## 2020-06-16 RX ORDER — ACETAMINOPHEN 325 MG/1
650 TABLET ORAL EVERY 6 HOURS PRN
Status: DISCONTINUED | OUTPATIENT
Start: 2020-06-16 | End: 2020-06-26 | Stop reason: HOSPADM

## 2020-06-16 RX ORDER — OLANZAPINE 10 MG/1
2.5 INJECTION, POWDER, LYOPHILIZED, FOR SOLUTION INTRAMUSCULAR EVERY 8 HOURS PRN
Status: CANCELLED | OUTPATIENT
Start: 2020-06-16

## 2020-06-16 RX ORDER — LEVOTHYROXINE SODIUM 0.07 MG/1
75 TABLET ORAL ONCE
Status: COMPLETED | OUTPATIENT
Start: 2020-06-16 | End: 2020-06-16

## 2020-06-16 RX ORDER — ACETAMINOPHEN 325 MG/1
650 TABLET ORAL EVERY 4 HOURS PRN
Status: DISCONTINUED | OUTPATIENT
Start: 2020-06-16 | End: 2020-06-26 | Stop reason: HOSPADM

## 2020-06-16 RX ORDER — ACETAMINOPHEN 325 MG/1
975 TABLET ORAL EVERY 6 HOURS PRN
Status: DISCONTINUED | OUTPATIENT
Start: 2020-06-16 | End: 2020-06-26 | Stop reason: HOSPADM

## 2020-06-16 RX ORDER — CLONAZEPAM 0.5 MG/1
0.5 TABLET ORAL ONCE
Status: DISCONTINUED | OUTPATIENT
Start: 2020-06-16 | End: 2020-06-16

## 2020-06-16 RX ORDER — ACETAMINOPHEN 325 MG/1
975 TABLET ORAL EVERY 6 HOURS PRN
Status: CANCELLED | OUTPATIENT
Start: 2020-06-16

## 2020-06-16 RX ORDER — TRAZODONE HYDROCHLORIDE 50 MG/1
50 TABLET ORAL
Status: DISCONTINUED | OUTPATIENT
Start: 2020-06-16 | End: 2020-06-26 | Stop reason: HOSPADM

## 2020-06-16 RX ORDER — ESCITALOPRAM OXALATE 5 MG/1
5 TABLET ORAL DAILY
Status: DISCONTINUED | OUTPATIENT
Start: 2020-06-16 | End: 2020-06-16 | Stop reason: HOSPADM

## 2020-06-16 RX ORDER — ESCITALOPRAM OXALATE 5 MG/1
5 TABLET ORAL DAILY
Status: CANCELLED | OUTPATIENT
Start: 2020-06-17

## 2020-06-16 RX ORDER — ACETAMINOPHEN 325 MG/1
650 TABLET ORAL EVERY 6 HOURS PRN
Status: CANCELLED | OUTPATIENT
Start: 2020-06-16

## 2020-06-16 RX ORDER — OLANZAPINE 2.5 MG/1
2.5 TABLET ORAL EVERY 8 HOURS PRN
Status: DISCONTINUED | OUTPATIENT
Start: 2020-06-16 | End: 2020-06-26 | Stop reason: HOSPADM

## 2020-06-16 RX ORDER — OLANZAPINE 10 MG/1
2.5 INJECTION, POWDER, LYOPHILIZED, FOR SOLUTION INTRAMUSCULAR EVERY 8 HOURS PRN
Status: DISCONTINUED | OUTPATIENT
Start: 2020-06-16 | End: 2020-06-16

## 2020-06-16 RX ORDER — OLANZAPINE 10 MG/1
2.5 INJECTION, POWDER, LYOPHILIZED, FOR SOLUTION INTRAMUSCULAR EVERY 8 HOURS PRN
Status: DISCONTINUED | OUTPATIENT
Start: 2020-06-16 | End: 2020-06-26 | Stop reason: HOSPADM

## 2020-06-16 RX ORDER — TRAZODONE HYDROCHLORIDE 50 MG/1
50 TABLET ORAL
Status: CANCELLED | OUTPATIENT
Start: 2020-06-16

## 2020-06-16 RX ORDER — ALPRAZOLAM 0.5 MG/1
0.5 TABLET ORAL ONCE
Status: COMPLETED | OUTPATIENT
Start: 2020-06-16 | End: 2020-06-16

## 2020-06-16 RX ORDER — AMLODIPINE BESYLATE 5 MG/1
10 TABLET ORAL ONCE
Status: COMPLETED | OUTPATIENT
Start: 2020-06-16 | End: 2020-06-16

## 2020-06-16 RX ORDER — ESCITALOPRAM OXALATE 5 MG/1
5 TABLET ORAL DAILY
Status: DISCONTINUED | OUTPATIENT
Start: 2020-06-17 | End: 2020-06-18

## 2020-06-16 RX ORDER — OLANZAPINE 2.5 MG/1
2.5 TABLET ORAL EVERY 8 HOURS PRN
Status: CANCELLED | OUTPATIENT
Start: 2020-06-16

## 2020-06-16 RX ORDER — LORAZEPAM 0.5 MG/1
0.5 TABLET ORAL EVERY 6 HOURS PRN
Status: CANCELLED | OUTPATIENT
Start: 2020-06-16

## 2020-06-16 RX ORDER — LEVOTHYROXINE SODIUM 0.07 MG/1
75 TABLET ORAL ONCE
Status: DISCONTINUED | OUTPATIENT
Start: 2020-06-16 | End: 2020-06-17

## 2020-06-16 RX ORDER — LOSARTAN POTASSIUM 50 MG/1
50 TABLET ORAL ONCE
Status: COMPLETED | OUTPATIENT
Start: 2020-06-16 | End: 2020-06-16

## 2020-06-16 RX ORDER — ASPIRIN 81 MG/1
81 TABLET, CHEWABLE ORAL ONCE
Status: COMPLETED | OUTPATIENT
Start: 2020-06-16 | End: 2020-06-16

## 2020-06-16 RX ORDER — RISPERIDONE 0.25 MG/1
0.25 TABLET, FILM COATED ORAL EVERY 8 HOURS PRN
Status: DISCONTINUED | OUTPATIENT
Start: 2020-06-16 | End: 2020-06-26 | Stop reason: HOSPADM

## 2020-06-16 RX ORDER — LORAZEPAM 0.5 MG/1
0.5 TABLET ORAL EVERY 6 HOURS PRN
Status: DISCONTINUED | OUTPATIENT
Start: 2020-06-16 | End: 2020-06-26 | Stop reason: HOSPADM

## 2020-06-16 RX ORDER — ACETAMINOPHEN 325 MG/1
650 TABLET ORAL EVERY 4 HOURS PRN
Status: CANCELLED | OUTPATIENT
Start: 2020-06-16

## 2020-06-16 RX ORDER — RISPERIDONE 0.25 MG/1
0.25 TABLET, FILM COATED ORAL EVERY 8 HOURS PRN
Status: CANCELLED | OUTPATIENT
Start: 2020-06-16

## 2020-06-16 RX ADMIN — LOSARTAN POTASSIUM 50 MG: 50 TABLET, FILM COATED ORAL at 13:35

## 2020-06-16 RX ADMIN — ALPRAZOLAM 0.5 MG: 0.5 TABLET ORAL at 13:35

## 2020-06-16 RX ADMIN — ASPIRIN 81 MG 81 MG: 81 TABLET ORAL at 11:35

## 2020-06-16 RX ADMIN — LEVOTHYROXINE SODIUM 75 MCG: 75 TABLET ORAL at 11:35

## 2020-06-16 RX ADMIN — ESCITALOPRAM 5 MG: 5 TABLET, FILM COATED ORAL at 11:34

## 2020-06-16 RX ADMIN — AMLODIPINE BESYLATE 10 MG: 5 TABLET ORAL at 11:36

## 2020-06-17 PROBLEM — F41.1 GAD (GENERALIZED ANXIETY DISORDER): Chronic | Status: ACTIVE | Noted: 2020-06-17

## 2020-06-17 PROBLEM — F33.2 SEVERE EPISODE OF RECURRENT MAJOR DEPRESSIVE DISORDER, WITHOUT PSYCHOTIC FEATURES (HCC): Status: ACTIVE | Noted: 2020-06-17

## 2020-06-17 LAB
ATRIAL RATE: 63 BPM
P AXIS: 90 DEGREES
PR INTERVAL: 236 MS
QRS AXIS: 92 DEGREES
QRSD INTERVAL: 96 MS
QT INTERVAL: 404 MS
QTC INTERVAL: 413 MS
T WAVE AXIS: 84 DEGREES
VENTRICULAR RATE: 63 BPM

## 2020-06-17 PROCEDURE — 93010 ELECTROCARDIOGRAM REPORT: CPT | Performed by: INTERNAL MEDICINE

## 2020-06-17 PROCEDURE — 99221 1ST HOSP IP/OBS SF/LOW 40: CPT | Performed by: PSYCHIATRY & NEUROLOGY

## 2020-06-17 PROCEDURE — 84300 ASSAY OF URINE SODIUM: CPT | Performed by: INTERNAL MEDICINE

## 2020-06-17 PROCEDURE — 99253 IP/OBS CNSLTJ NEW/EST LOW 45: CPT | Performed by: INTERNAL MEDICINE

## 2020-06-17 RX ORDER — PREDNISOLONE ACETATE 10 MG/ML
1 SUSPENSION/ DROPS OPHTHALMIC 2 TIMES DAILY
Status: DISCONTINUED | OUTPATIENT
Start: 2020-06-17 | End: 2020-06-26 | Stop reason: HOSPADM

## 2020-06-17 RX ORDER — MULTIVITAMIN
1 CAPSULE ORAL DAILY
Status: DISCONTINUED | OUTPATIENT
Start: 2020-06-17 | End: 2020-06-17 | Stop reason: CLARIF

## 2020-06-17 RX ORDER — POLYETHYLENE GLYCOL 3350 17 G/17G
17 POWDER, FOR SOLUTION ORAL DAILY
Status: DISCONTINUED | OUTPATIENT
Start: 2020-06-17 | End: 2020-06-26 | Stop reason: HOSPADM

## 2020-06-17 RX ORDER — LOSARTAN POTASSIUM 50 MG/1
50 TABLET ORAL DAILY
Status: DISCONTINUED | OUTPATIENT
Start: 2020-06-17 | End: 2020-06-26 | Stop reason: HOSPADM

## 2020-06-17 RX ORDER — LEVOTHYROXINE SODIUM 0.05 MG/1
50 TABLET ORAL 3 TIMES WEEKLY
Status: DISCONTINUED | OUTPATIENT
Start: 2020-06-17 | End: 2020-06-26 | Stop reason: HOSPADM

## 2020-06-17 RX ORDER — ASPIRIN 81 MG/1
81 TABLET, CHEWABLE ORAL DAILY
Status: DISCONTINUED | OUTPATIENT
Start: 2020-06-17 | End: 2020-06-26 | Stop reason: HOSPADM

## 2020-06-17 RX ORDER — PANTOPRAZOLE SODIUM 20 MG/1
20 TABLET, DELAYED RELEASE ORAL
Status: DISCONTINUED | OUTPATIENT
Start: 2020-06-18 | End: 2020-06-26 | Stop reason: HOSPADM

## 2020-06-17 RX ORDER — METOPROLOL SUCCINATE 25 MG/1
25 TABLET, EXTENDED RELEASE ORAL EVERY 12 HOURS
Status: DISCONTINUED | OUTPATIENT
Start: 2020-06-17 | End: 2020-06-26 | Stop reason: HOSPADM

## 2020-06-17 RX ORDER — AMLODIPINE BESYLATE 5 MG/1
5 TABLET ORAL DAILY
Status: DISCONTINUED | OUTPATIENT
Start: 2020-06-17 | End: 2020-06-26 | Stop reason: HOSPADM

## 2020-06-17 RX ORDER — MELATONIN
1000 DAILY
Status: DISCONTINUED | OUTPATIENT
Start: 2020-06-17 | End: 2020-06-26 | Stop reason: HOSPADM

## 2020-06-17 RX ORDER — CLONAZEPAM 0.5 MG/1
0.25 TABLET ORAL 2 TIMES DAILY
Status: DISCONTINUED | OUTPATIENT
Start: 2020-06-17 | End: 2020-06-26 | Stop reason: HOSPADM

## 2020-06-17 RX ADMIN — ASPIRIN 81 MG 81 MG: 81 TABLET ORAL at 14:51

## 2020-06-17 RX ADMIN — LOSARTAN POTASSIUM 50 MG: 50 TABLET, FILM COATED ORAL at 17:29

## 2020-06-17 RX ADMIN — CLONAZEPAM 0.25 MG: 0.5 TABLET ORAL at 17:29

## 2020-06-17 RX ADMIN — POLYETHYLENE GLYCOL (3350) 17 G: 17 POWDER, FOR SOLUTION ORAL at 15:08

## 2020-06-17 RX ADMIN — PREDNISOLONE ACETATE 1 DROP: 10 SUSPENSION/ DROPS OPHTHALMIC at 21:42

## 2020-06-17 RX ADMIN — ESCITALOPRAM OXALATE 5 MG: 5 TABLET, FILM COATED ORAL at 08:24

## 2020-06-17 RX ADMIN — AMLODIPINE BESYLATE 5 MG: 5 TABLET ORAL at 17:28

## 2020-06-17 RX ADMIN — METOPROLOL SUCCINATE 25 MG: 25 TABLET, EXTENDED RELEASE ORAL at 14:51

## 2020-06-17 RX ADMIN — CHOLECALCIFEROL TAB 25 MCG (1000 UNIT) 1000 UNITS: 25 TAB at 14:51

## 2020-06-17 RX ADMIN — LEVOTHYROXINE SODIUM 50 MCG: 50 TABLET ORAL at 14:51

## 2020-06-18 LAB — SODIUM 24H UR-SCNC: 38 MOL/L

## 2020-06-18 PROCEDURE — 99232 SBSQ HOSP IP/OBS MODERATE 35: CPT | Performed by: PSYCHIATRY & NEUROLOGY

## 2020-06-18 RX ORDER — LEVOTHYROXINE SODIUM 0.07 MG/1
75 TABLET ORAL
Status: DISCONTINUED | OUTPATIENT
Start: 2020-06-18 | End: 2020-06-26 | Stop reason: HOSPADM

## 2020-06-18 RX ORDER — ESCITALOPRAM OXALATE 10 MG/1
10 TABLET ORAL DAILY
Status: DISCONTINUED | OUTPATIENT
Start: 2020-06-19 | End: 2020-06-19

## 2020-06-18 RX ADMIN — POLYETHYLENE GLYCOL (3350) 17 G: 17 POWDER, FOR SOLUTION ORAL at 08:57

## 2020-06-18 RX ADMIN — CLONAZEPAM 0.25 MG: 0.5 TABLET ORAL at 17:09

## 2020-06-18 RX ADMIN — CHOLECALCIFEROL TAB 25 MCG (1000 UNIT) 1000 UNITS: 25 TAB at 08:54

## 2020-06-18 RX ADMIN — ACETAMINOPHEN 650 MG: 325 TABLET ORAL at 06:36

## 2020-06-18 RX ADMIN — AMLODIPINE BESYLATE 5 MG: 5 TABLET ORAL at 08:55

## 2020-06-18 RX ADMIN — PANTOPRAZOLE SODIUM 20 MG: 20 TABLET, DELAYED RELEASE ORAL at 06:17

## 2020-06-18 RX ADMIN — LEVOTHYROXINE SODIUM 75 MCG: 75 TABLET ORAL at 08:04

## 2020-06-18 RX ADMIN — CLONAZEPAM 0.25 MG: 0.5 TABLET ORAL at 08:54

## 2020-06-18 RX ADMIN — Medication 1 TABLET: at 08:54

## 2020-06-18 RX ADMIN — METOPROLOL SUCCINATE 25 MG: 25 TABLET, EXTENDED RELEASE ORAL at 06:17

## 2020-06-18 RX ADMIN — ASPIRIN 81 MG 81 MG: 81 TABLET ORAL at 08:55

## 2020-06-18 RX ADMIN — ESCITALOPRAM OXALATE 5 MG: 5 TABLET, FILM COATED ORAL at 08:54

## 2020-06-18 RX ADMIN — LOSARTAN POTASSIUM 50 MG: 50 TABLET, FILM COATED ORAL at 08:54

## 2020-06-19 PROCEDURE — 99232 SBSQ HOSP IP/OBS MODERATE 35: CPT | Performed by: PSYCHIATRY & NEUROLOGY

## 2020-06-19 RX ORDER — AMLODIPINE BESYLATE 10 MG/1
TABLET ORAL
Qty: 30 TABLET | Refills: 0 | OUTPATIENT
Start: 2020-06-19

## 2020-06-19 RX ADMIN — ASPIRIN 81 MG 81 MG: 81 TABLET ORAL at 08:37

## 2020-06-19 RX ADMIN — CLONAZEPAM 0.25 MG: 0.5 TABLET ORAL at 08:37

## 2020-06-19 RX ADMIN — CHOLECALCIFEROL TAB 25 MCG (1000 UNIT) 1000 UNITS: 25 TAB at 08:38

## 2020-06-19 RX ADMIN — METOPROLOL SUCCINATE 25 MG: 25 TABLET, EXTENDED RELEASE ORAL at 17:07

## 2020-06-19 RX ADMIN — TRAZODONE HYDROCHLORIDE 50 MG: 50 TABLET ORAL at 21:45

## 2020-06-19 RX ADMIN — METOPROLOL SUCCINATE 25 MG: 25 TABLET, EXTENDED RELEASE ORAL at 06:26

## 2020-06-19 RX ADMIN — LORAZEPAM 0.5 MG: 0.5 TABLET ORAL at 06:40

## 2020-06-19 RX ADMIN — AMLODIPINE BESYLATE 5 MG: 5 TABLET ORAL at 08:37

## 2020-06-19 RX ADMIN — CLONAZEPAM 0.25 MG: 0.5 TABLET ORAL at 17:08

## 2020-06-19 RX ADMIN — LOSARTAN POTASSIUM 50 MG: 50 TABLET, FILM COATED ORAL at 08:37

## 2020-06-19 RX ADMIN — LEVOTHYROXINE SODIUM 50 MCG: 50 TABLET ORAL at 06:25

## 2020-06-19 RX ADMIN — Medication 1 TABLET: at 08:37

## 2020-06-19 RX ADMIN — ESCITALOPRAM OXALATE 10 MG: 10 TABLET ORAL at 08:37

## 2020-06-19 RX ADMIN — PANTOPRAZOLE SODIUM 20 MG: 20 TABLET, DELAYED RELEASE ORAL at 06:24

## 2020-06-19 RX ADMIN — POLYETHYLENE GLYCOL (3350) 17 G: 17 POWDER, FOR SOLUTION ORAL at 08:38

## 2020-06-20 PROCEDURE — 99232 SBSQ HOSP IP/OBS MODERATE 35: CPT | Performed by: PHYSICIAN ASSISTANT

## 2020-06-20 RX ADMIN — CLONAZEPAM 0.25 MG: 0.5 TABLET ORAL at 08:28

## 2020-06-20 RX ADMIN — CHOLECALCIFEROL TAB 25 MCG (1000 UNIT) 1000 UNITS: 25 TAB at 08:28

## 2020-06-20 RX ADMIN — METOPROLOL SUCCINATE 25 MG: 25 TABLET, EXTENDED RELEASE ORAL at 17:16

## 2020-06-20 RX ADMIN — PANTOPRAZOLE SODIUM 20 MG: 20 TABLET, DELAYED RELEASE ORAL at 05:24

## 2020-06-20 RX ADMIN — LOSARTAN POTASSIUM 50 MG: 50 TABLET, FILM COATED ORAL at 08:27

## 2020-06-20 RX ADMIN — Medication 1 TABLET: at 08:28

## 2020-06-20 RX ADMIN — LEVOTHYROXINE SODIUM 75 MCG: 75 TABLET ORAL at 05:23

## 2020-06-20 RX ADMIN — AMLODIPINE BESYLATE 5 MG: 5 TABLET ORAL at 08:28

## 2020-06-20 RX ADMIN — ASPIRIN 81 MG 81 MG: 81 TABLET ORAL at 08:28

## 2020-06-20 RX ADMIN — CLONAZEPAM 0.25 MG: 0.5 TABLET ORAL at 17:17

## 2020-06-20 RX ADMIN — POLYETHYLENE GLYCOL (3350) 17 G: 17 POWDER, FOR SOLUTION ORAL at 08:30

## 2020-06-20 RX ADMIN — ESCITALOPRAM OXALATE 15 MG: 5 TABLET, FILM COATED ORAL at 08:28

## 2020-06-20 RX ADMIN — METOPROLOL SUCCINATE 25 MG: 25 TABLET, EXTENDED RELEASE ORAL at 05:24

## 2020-06-21 PROCEDURE — 99232 SBSQ HOSP IP/OBS MODERATE 35: CPT | Performed by: PHYSICIAN ASSISTANT

## 2020-06-21 RX ADMIN — CLONAZEPAM 0.25 MG: 0.5 TABLET ORAL at 08:08

## 2020-06-21 RX ADMIN — AMLODIPINE BESYLATE 5 MG: 5 TABLET ORAL at 08:08

## 2020-06-21 RX ADMIN — LEVOTHYROXINE SODIUM 75 MCG: 75 TABLET ORAL at 06:31

## 2020-06-21 RX ADMIN — ESCITALOPRAM OXALATE 15 MG: 5 TABLET, FILM COATED ORAL at 08:08

## 2020-06-21 RX ADMIN — CHOLECALCIFEROL TAB 25 MCG (1000 UNIT) 1000 UNITS: 25 TAB at 08:08

## 2020-06-21 RX ADMIN — METOPROLOL SUCCINATE 25 MG: 25 TABLET, EXTENDED RELEASE ORAL at 17:05

## 2020-06-21 RX ADMIN — METOPROLOL SUCCINATE 25 MG: 25 TABLET, EXTENDED RELEASE ORAL at 06:31

## 2020-06-21 RX ADMIN — Medication 1 TABLET: at 08:08

## 2020-06-21 RX ADMIN — LOSARTAN POTASSIUM 50 MG: 50 TABLET, FILM COATED ORAL at 08:08

## 2020-06-21 RX ADMIN — PANTOPRAZOLE SODIUM 20 MG: 20 TABLET, DELAYED RELEASE ORAL at 06:31

## 2020-06-21 RX ADMIN — ASPIRIN 81 MG 81 MG: 81 TABLET ORAL at 08:08

## 2020-06-21 RX ADMIN — CLONAZEPAM 0.25 MG: 0.5 TABLET ORAL at 17:06

## 2020-06-22 PROCEDURE — 99232 SBSQ HOSP IP/OBS MODERATE 35: CPT | Performed by: PSYCHIATRY & NEUROLOGY

## 2020-06-22 RX ADMIN — METOPROLOL SUCCINATE 25 MG: 25 TABLET, EXTENDED RELEASE ORAL at 17:00

## 2020-06-22 RX ADMIN — ESCITALOPRAM OXALATE 15 MG: 5 TABLET, FILM COATED ORAL at 08:16

## 2020-06-22 RX ADMIN — CLONAZEPAM 0.25 MG: 0.5 TABLET ORAL at 17:10

## 2020-06-22 RX ADMIN — LOSARTAN POTASSIUM 50 MG: 50 TABLET, FILM COATED ORAL at 08:16

## 2020-06-22 RX ADMIN — CHOLECALCIFEROL TAB 25 MCG (1000 UNIT) 1000 UNITS: 25 TAB at 08:16

## 2020-06-22 RX ADMIN — PANTOPRAZOLE SODIUM 20 MG: 20 TABLET, DELAYED RELEASE ORAL at 06:07

## 2020-06-22 RX ADMIN — Medication 1 TABLET: at 08:16

## 2020-06-22 RX ADMIN — CLONAZEPAM 0.25 MG: 0.5 TABLET ORAL at 08:20

## 2020-06-22 RX ADMIN — AMLODIPINE BESYLATE 5 MG: 5 TABLET ORAL at 08:16

## 2020-06-22 RX ADMIN — ASPIRIN 81 MG 81 MG: 81 TABLET ORAL at 08:16

## 2020-06-22 RX ADMIN — METOPROLOL SUCCINATE 25 MG: 25 TABLET, EXTENDED RELEASE ORAL at 06:07

## 2020-06-22 RX ADMIN — LEVOTHYROXINE SODIUM 50 MCG: 50 TABLET ORAL at 06:07

## 2020-06-23 PROCEDURE — 97167 OT EVAL HIGH COMPLEX 60 MIN: CPT

## 2020-06-23 PROCEDURE — 99232 SBSQ HOSP IP/OBS MODERATE 35: CPT | Performed by: PSYCHIATRY & NEUROLOGY

## 2020-06-23 PROCEDURE — 97535 SELF CARE MNGMENT TRAINING: CPT

## 2020-06-23 RX ADMIN — CHOLECALCIFEROL TAB 25 MCG (1000 UNIT) 1000 UNITS: 25 TAB at 08:58

## 2020-06-23 RX ADMIN — ESCITALOPRAM OXALATE 15 MG: 5 TABLET, FILM COATED ORAL at 09:00

## 2020-06-23 RX ADMIN — LORAZEPAM 0.5 MG: 0.5 TABLET ORAL at 21:25

## 2020-06-23 RX ADMIN — AMLODIPINE BESYLATE 5 MG: 5 TABLET ORAL at 08:57

## 2020-06-23 RX ADMIN — METOPROLOL SUCCINATE 25 MG: 25 TABLET, EXTENDED RELEASE ORAL at 06:05

## 2020-06-23 RX ADMIN — Medication 1 TABLET: at 09:01

## 2020-06-23 RX ADMIN — LOSARTAN POTASSIUM 50 MG: 50 TABLET, FILM COATED ORAL at 09:00

## 2020-06-23 RX ADMIN — METOPROLOL SUCCINATE 25 MG: 25 TABLET, EXTENDED RELEASE ORAL at 17:17

## 2020-06-23 RX ADMIN — LEVOTHYROXINE SODIUM 75 MCG: 75 TABLET ORAL at 06:04

## 2020-06-23 RX ADMIN — CLONAZEPAM 0.25 MG: 0.5 TABLET ORAL at 17:17

## 2020-06-23 RX ADMIN — CLONAZEPAM 0.25 MG: 0.5 TABLET ORAL at 08:59

## 2020-06-23 RX ADMIN — ASPIRIN 81 MG 81 MG: 81 TABLET ORAL at 08:58

## 2020-06-23 RX ADMIN — PANTOPRAZOLE SODIUM 20 MG: 20 TABLET, DELAYED RELEASE ORAL at 06:04

## 2020-06-24 LAB
ANION GAP SERPL CALCULATED.3IONS-SCNC: 6 MMOL/L (ref 5–14)
BUN SERPL-MCNC: 17 MG/DL (ref 5–25)
CALCIUM SERPL-MCNC: 8.8 MG/DL (ref 8.4–10.2)
CHLORIDE SERPL-SCNC: 96 MMOL/L (ref 97–108)
CO2 SERPL-SCNC: 26 MMOL/L (ref 22–30)
CREAT SERPL-MCNC: 0.83 MG/DL (ref 0.6–1.2)
GFR SERPL CREATININE-BSD FRML MDRD: 65 ML/MIN/1.73SQ M
GLUCOSE P FAST SERPL-MCNC: 84 MG/DL (ref 70–99)
GLUCOSE SERPL-MCNC: 84 MG/DL (ref 70–99)
POTASSIUM SERPL-SCNC: 4.8 MMOL/L (ref 3.6–5)
SODIUM SERPL-SCNC: 128 MMOL/L (ref 137–147)

## 2020-06-24 PROCEDURE — 99232 SBSQ HOSP IP/OBS MODERATE 35: CPT | Performed by: PSYCHIATRY & NEUROLOGY

## 2020-06-24 PROCEDURE — 80048 BASIC METABOLIC PNL TOTAL CA: CPT | Performed by: INTERNAL MEDICINE

## 2020-06-24 PROCEDURE — 99231 SBSQ HOSP IP/OBS SF/LOW 25: CPT | Performed by: INTERNAL MEDICINE

## 2020-06-24 RX ORDER — LANOLIN ALCOHOL/MO/W.PET/CERES
3 CREAM (GRAM) TOPICAL
Status: DISCONTINUED | OUTPATIENT
Start: 2020-06-24 | End: 2020-06-26 | Stop reason: HOSPADM

## 2020-06-24 RX ADMIN — LOSARTAN POTASSIUM 50 MG: 50 TABLET, FILM COATED ORAL at 09:20

## 2020-06-24 RX ADMIN — MELATONIN 3 MG: at 21:28

## 2020-06-24 RX ADMIN — CHOLECALCIFEROL TAB 25 MCG (1000 UNIT) 1000 UNITS: 25 TAB at 09:19

## 2020-06-24 RX ADMIN — METOPROLOL SUCCINATE 25 MG: 25 TABLET, EXTENDED RELEASE ORAL at 05:45

## 2020-06-24 RX ADMIN — LEVOTHYROXINE SODIUM 50 MCG: 50 TABLET ORAL at 05:45

## 2020-06-24 RX ADMIN — ASPIRIN 81 MG 81 MG: 81 TABLET ORAL at 09:18

## 2020-06-24 RX ADMIN — PANTOPRAZOLE SODIUM 20 MG: 20 TABLET, DELAYED RELEASE ORAL at 05:45

## 2020-06-24 RX ADMIN — CLONAZEPAM 0.25 MG: 0.5 TABLET ORAL at 09:20

## 2020-06-24 RX ADMIN — CLONAZEPAM 0.25 MG: 0.5 TABLET ORAL at 17:14

## 2020-06-24 RX ADMIN — Medication 1 TABLET: at 09:21

## 2020-06-24 RX ADMIN — AMLODIPINE BESYLATE 5 MG: 5 TABLET ORAL at 09:22

## 2020-06-24 RX ADMIN — ESCITALOPRAM OXALATE 15 MG: 5 TABLET, FILM COATED ORAL at 09:20

## 2020-06-25 PROCEDURE — 99232 SBSQ HOSP IP/OBS MODERATE 35: CPT | Performed by: NURSE PRACTITIONER

## 2020-06-25 RX ORDER — GABAPENTIN 100 MG/1
100 CAPSULE ORAL 2 TIMES DAILY
Status: DISCONTINUED | OUTPATIENT
Start: 2020-06-25 | End: 2020-06-26 | Stop reason: HOSPADM

## 2020-06-25 RX ADMIN — GABAPENTIN 100 MG: 100 CAPSULE ORAL at 22:11

## 2020-06-25 RX ADMIN — ASPIRIN 81 MG 81 MG: 81 TABLET ORAL at 08:57

## 2020-06-25 RX ADMIN — MELATONIN 3 MG: at 22:11

## 2020-06-25 RX ADMIN — GABAPENTIN 100 MG: 100 CAPSULE ORAL at 12:29

## 2020-06-25 RX ADMIN — PANTOPRAZOLE SODIUM 20 MG: 20 TABLET, DELAYED RELEASE ORAL at 05:35

## 2020-06-25 RX ADMIN — METOPROLOL SUCCINATE 25 MG: 25 TABLET, EXTENDED RELEASE ORAL at 05:36

## 2020-06-25 RX ADMIN — Medication 1 TABLET: at 09:00

## 2020-06-25 RX ADMIN — LOSARTAN POTASSIUM 50 MG: 50 TABLET, FILM COATED ORAL at 08:59

## 2020-06-25 RX ADMIN — ESCITALOPRAM OXALATE 15 MG: 5 TABLET, FILM COATED ORAL at 08:59

## 2020-06-25 RX ADMIN — CLONAZEPAM 0.25 MG: 0.5 TABLET ORAL at 17:56

## 2020-06-25 RX ADMIN — AMLODIPINE BESYLATE 5 MG: 5 TABLET ORAL at 08:56

## 2020-06-25 RX ADMIN — CLONAZEPAM 0.25 MG: 0.5 TABLET ORAL at 08:58

## 2020-06-25 RX ADMIN — METOPROLOL SUCCINATE 25 MG: 25 TABLET, EXTENDED RELEASE ORAL at 17:56

## 2020-06-25 RX ADMIN — LEVOTHYROXINE SODIUM 75 MCG: 75 TABLET ORAL at 05:35

## 2020-06-25 RX ADMIN — CHOLECALCIFEROL TAB 25 MCG (1000 UNIT) 1000 UNITS: 25 TAB at 08:57

## 2020-06-26 VITALS
OXYGEN SATURATION: 94 % | BODY MASS INDEX: 23.57 KG/M2 | TEMPERATURE: 98.9 F | DIASTOLIC BLOOD PRESSURE: 65 MMHG | HEIGHT: 62 IN | WEIGHT: 128.09 LBS | HEART RATE: 58 BPM | SYSTOLIC BLOOD PRESSURE: 147 MMHG | RESPIRATION RATE: 16 BRPM

## 2020-06-26 LAB
ANION GAP SERPL CALCULATED.3IONS-SCNC: 5 MMOL/L (ref 5–14)
BUN SERPL-MCNC: 17 MG/DL (ref 5–25)
CALCIUM SERPL-MCNC: 8.9 MG/DL (ref 8.4–10.2)
CHLORIDE SERPL-SCNC: 95 MMOL/L (ref 97–108)
CO2 SERPL-SCNC: 28 MMOL/L (ref 22–30)
CREAT SERPL-MCNC: 0.84 MG/DL (ref 0.6–1.2)
GFR SERPL CREATININE-BSD FRML MDRD: 64 ML/MIN/1.73SQ M
GLUCOSE P FAST SERPL-MCNC: 85 MG/DL (ref 70–99)
GLUCOSE SERPL-MCNC: 85 MG/DL (ref 70–99)
POTASSIUM SERPL-SCNC: 4.5 MMOL/L (ref 3.6–5)
SODIUM SERPL-SCNC: 128 MMOL/L (ref 137–147)

## 2020-06-26 PROCEDURE — 99238 HOSP IP/OBS DSCHRG MGMT 30/<: CPT | Performed by: PSYCHIATRY & NEUROLOGY

## 2020-06-26 PROCEDURE — 80048 BASIC METABOLIC PNL TOTAL CA: CPT | Performed by: INTERNAL MEDICINE

## 2020-06-26 RX ORDER — CLONAZEPAM 0.5 MG/1
0.25 TABLET ORAL 2 TIMES DAILY
Qty: 10 TABLET | Refills: 1 | Status: ON HOLD | OUTPATIENT
Start: 2020-06-26 | End: 2022-02-14 | Stop reason: SDUPTHER

## 2020-06-26 RX ORDER — LANOLIN ALCOHOL/MO/W.PET/CERES
3 CREAM (GRAM) TOPICAL
Qty: 30 TABLET | Refills: 0 | Status: SHIPPED | OUTPATIENT
Start: 2020-06-26

## 2020-06-26 RX ORDER — LOSARTAN POTASSIUM 50 MG/1
50 TABLET ORAL DAILY
Qty: 30 TABLET | Refills: 0 | Status: SHIPPED | OUTPATIENT
Start: 2020-06-27 | End: 2020-07-28 | Stop reason: SDUPTHER

## 2020-06-26 RX ORDER — GABAPENTIN 100 MG/1
100 CAPSULE ORAL 2 TIMES DAILY
Qty: 60 CAPSULE | Refills: 0 | Status: SHIPPED | OUTPATIENT
Start: 2020-06-26 | End: 2020-10-13 | Stop reason: ALTCHOICE

## 2020-06-26 RX ORDER — LEVOTHYROXINE SODIUM 0.07 MG/1
75 TABLET ORAL
Qty: 16 TABLET | Refills: 0 | Status: SHIPPED | OUTPATIENT
Start: 2020-06-27

## 2020-06-26 RX ORDER — LANSOPRAZOLE 30 MG/1
30 CAPSULE, DELAYED RELEASE ORAL DAILY
Qty: 30 CAPSULE | Refills: 0 | Status: SHIPPED | OUTPATIENT
Start: 2020-06-26

## 2020-06-26 RX ORDER — AMLODIPINE BESYLATE 5 MG/1
5 TABLET ORAL DAILY
Qty: 30 TABLET | Refills: 0 | Status: SHIPPED | OUTPATIENT
Start: 2020-06-27 | End: 2020-07-01

## 2020-06-26 RX ORDER — METOPROLOL SUCCINATE 25 MG/1
25 TABLET, EXTENDED RELEASE ORAL EVERY 12 HOURS
Qty: 30 TABLET | Refills: 0 | Status: SHIPPED | OUTPATIENT
Start: 2020-06-26 | End: 2021-01-21

## 2020-06-26 RX ORDER — LEVOTHYROXINE SODIUM 0.05 MG/1
50 TABLET ORAL 3 TIMES WEEKLY
Qty: 12 TABLET | Refills: 0 | Status: SHIPPED | OUTPATIENT
Start: 2020-06-26 | End: 2020-07-23

## 2020-06-26 RX ORDER — ESCITALOPRAM OXALATE 10 MG/1
15 TABLET ORAL DAILY
Qty: 45 TABLET | Refills: 0 | Status: ON HOLD | OUTPATIENT
Start: 2020-06-27 | End: 2022-02-14 | Stop reason: SDUPTHER

## 2020-06-26 RX ORDER — PREDNISOLONE ACETATE 10 MG/ML
1 SUSPENSION/ DROPS OPHTHALMIC 2 TIMES DAILY
Qty: 5 ML | Refills: 0 | Status: SHIPPED | OUTPATIENT
Start: 2020-06-26

## 2020-06-26 RX ADMIN — METOPROLOL SUCCINATE 25 MG: 25 TABLET, EXTENDED RELEASE ORAL at 05:52

## 2020-06-26 RX ADMIN — PANTOPRAZOLE SODIUM 20 MG: 20 TABLET, DELAYED RELEASE ORAL at 05:52

## 2020-06-26 RX ADMIN — CLONAZEPAM 0.25 MG: 0.5 TABLET ORAL at 08:55

## 2020-06-26 RX ADMIN — Medication 1 TABLET: at 08:55

## 2020-06-26 RX ADMIN — LEVOTHYROXINE SODIUM 50 MCG: 50 TABLET ORAL at 05:52

## 2020-06-26 RX ADMIN — ESCITALOPRAM OXALATE 15 MG: 5 TABLET, FILM COATED ORAL at 08:55

## 2020-06-26 RX ADMIN — LOSARTAN POTASSIUM 50 MG: 50 TABLET, FILM COATED ORAL at 08:56

## 2020-06-26 RX ADMIN — LORAZEPAM 0.5 MG: 0.5 TABLET ORAL at 10:41

## 2020-06-26 RX ADMIN — AMLODIPINE BESYLATE 5 MG: 5 TABLET ORAL at 08:55

## 2020-06-26 RX ADMIN — ASPIRIN 81 MG 81 MG: 81 TABLET ORAL at 08:55

## 2020-06-26 RX ADMIN — GABAPENTIN 100 MG: 100 CAPSULE ORAL at 12:43

## 2020-06-26 RX ADMIN — CHOLECALCIFEROL TAB 25 MCG (1000 UNIT) 1000 UNITS: 25 TAB at 08:56

## 2020-07-01 ENCOUNTER — OFFICE VISIT (OUTPATIENT)
Dept: CARDIOLOGY CLINIC | Facility: CLINIC | Age: 85
End: 2020-07-01
Payer: COMMERCIAL

## 2020-07-01 VITALS
HEIGHT: 62 IN | SYSTOLIC BLOOD PRESSURE: 108 MMHG | BODY MASS INDEX: 21.71 KG/M2 | DIASTOLIC BLOOD PRESSURE: 58 MMHG | HEART RATE: 68 BPM | WEIGHT: 118 LBS

## 2020-07-01 DIAGNOSIS — F41.9 ANXIETY: ICD-10-CM

## 2020-07-01 DIAGNOSIS — I10 ESSENTIAL HYPERTENSION: Primary | Chronic | ICD-10-CM

## 2020-07-01 PROCEDURE — 99213 OFFICE O/P EST LOW 20 MIN: CPT | Performed by: INTERNAL MEDICINE

## 2020-07-01 RX ORDER — AMLODIPINE BESYLATE 5 MG/1
5 TABLET ORAL
Qty: 30 TABLET | Refills: 0
Start: 2020-07-01 | End: 2020-09-14 | Stop reason: SDUPTHER

## 2020-07-01 NOTE — PROGRESS NOTES
[facility], [address]    [employer], [address]   [phone]                                              Cardiology Office Follow Up  Riana Milligan, 80 y o  female  YOB: 1935  MRN: [id number] Encounter: [id number]      PCP - Leonora Almendarez MD    Assessment and Plan  1  Hypertension  2  Recent type 2 MI (2/10/2020)  · Troponin peak 1 22  · Nuclear stress negative for ischemia in 3/2020  3  Syncope (11/2019)  · Event monitor - 12/2019 - NSR, no Afib, occasional PACs & PVCs, 1st degree AV block, asymptomatic 2 3 second pauses, lowest HR 38 bpm  · Echo - 11/2019 - [facility] - LVEF 60%,   4  Hypothyroidism  5  H/o diverticulitis  6  Anxiety  7   H/o takotsubo cardiomyopathy    Plan  Hypertension  · BP has been previously very up & down with various medications changes with PCP & ED  · Originally had been stable on losartan 25 + metoprolol XL 25 for many years but then recently has had uncontrolled BP  · Had orthostatic hypotension with clonidine 0 2 mg from hospital ED  · Subsequently, with increase of losartan to 50, and continuing metoprolol XL 25 bid, it was again well controlled for months, before she ended up in the hospital with uncontrolled BP in 200s & type 2 MI in 2/2020  · She had amlodipine 5 mg added during that hospital stay  · She continues to be very anxious about everything  · She was again stable on this regimen for the past couple of months, but with the endemic, she has been at home and has been feeling lonely and has gotten increasingly anxious about everything over the past month prompting her to be in the hospital, and subsequently being in psychiatric rehab for about 10 days to help with anxiety  · Blood pressure through most of this has been in the 110s to 120s, and only seems to go up to 170 to 200s when she is very anxious  · Conselled her to avoid checking blood pressures on her own, and not check it when she is anxious  · Continue losartan 50, metoprolol XL 25 bid, amlodipine 5  · Patient's daughter wanted some better meters to help them manage blood pressure at home  · Recommended BP regimen:  · Regular meds:  · Morning: Losartan 50 mg, metoprolol XL 25 mg  · Evening: Amlodipine 5 mg, metoprolol XL 25 mg  · Adjustments  · If systolic BP <91, decrease metoprolol to 1/2 tablet (12 5 mg)  · If feeling dizzy, hold a dose of metoprolol  · If systolic BP persistently >454, after 1 hour, then take extra dose of losartan 25 mg (1/2 tablet)    Type 2 MI  · Currently no symptoms/chest pain  · Echo - 11/2019 - LVEF 60%, mild TR/MR/AI  · Nuclear stress 3/2020 - normal LV function, no significant perfusion defects be    Syncope  · Single episode on black Friday while she was trying to do shopping in the afternoon in a crowded store  · No significant AV block or pauses on event monitor  · Likely was reflex syncope  · No further episodes  · Monitor    Hypothyroidism  · Continue levothyroxine 75  · Previously was missing medications due to trying to avoid taking other medications with levothyroxine    Follow-up in 3 months    History of Present Illness   72-year-old female comes in for short term follow up evaluation of hypertension  She has longstanding history of hypertension, and has been losartan and metoprolol for many years  Recently her blood pressure has been intermittently very elevated to 170-665 systolic  As a result, her losartan was increased from 25-50 mg weeks ago  But despite that her blood pressure seems to have remained elevated intermittently  Per patient, her blood pressure is usually well controlled during the day in the range of 130s  But her blood pressure is usually elevated in the middle of the night  A couple of days ago around 2:00 a m  She woke up and had a blood pressure of 210  She was recently and diagnosed and treated for diverticulitis    Since then she has continued to have some abdominal discomfort  She was scheduled to undergo an EGD for the same  Mom but when she presented for the same, she was found to have a blood pressure of 063 systolic, and as a result this was canceled  Next a again she went to the ED with elevated blood pressures  She was given a dose of clonidine 0 2 mg, and discharged home with follow-up with Cardiology  Since and getting the clonidine 0 2 mg, she has been feeling lightheaded and dizzy, and particularly with standing up  Interval History: 7/31/19  Has been doing relatively well  Continues to have a lot of anxiety  She continues to check her BP 2-3 times/day, and has been keeping a log  Her dizziness improved the same day after the effects of clonidine wore off  Interval history - 11/4/19  She comes back for 2 month follow-up of her blood pressure  No more symptoms of dizziness or lightheadedness after having been off clonidine  She continues to be very anxious about her blood pressure, and addressed to make adjustments on her own based on these blood pressure readings, and also forgets to take blood pressure medications at times due to trying to avoid taking it along with levothyroxine  She had called the office about 10 days ago stating that her blood pressure was 108, and she felt it was too low and as a result was not taking her blood pressure medications  But after that her blood pressure went in the 180s, and as a result she started taking it again    Interval history - 2/19/2020  Since my last follow up visit, she had an episode of syncope on black Friday for which she followed up with Dr Butch Chaudhry, and was ordered an event monitor  This did not reveal any significant findings  Subsequently about a week ago, and she ended up in the hospital with complains of abdominal pain and fullness after eating some fried sweet potatoes and fish for dinner    She was unable to sleep as a result and got very anxious and her blood pressure was very elevated in the 200s   As a result she was brought into the ED for evaluation  Her symptoms resolved after Myl[place]  But during this she was found to have troponin elevation to 1, and Cardiology was consulted  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, related to her elevated blood pressures  Her medications were adjusted and she was set up for outpatient follow-up with me here today  She reports no further symptoms since discharge, and is currently feeling well, although continues to be very anxious about her blood pressure  Interval history - 7/1/2020  She comes back in to the office for an early visit due to again ongoing blood pressure fluctuations  Her medication regimen has been fairly stable over the past 2 months with losartan amlodipine and metoprolol  But at home, she tries to adjust medications based on her blood pressure and her perception of high blood pressure  She continues to be very anxious despite her ten-day psychiatric rehab  She states that she feels lonely at times in the pandemic has made her worry a lot regarding things like not having done enough for her mother 15 years ago, when she passed away  Historical Information   Past Medical History:   Diagnosis Date    Anxiety     CAD (coronary artery disease)     Carotid Duplex 03/06/2018    Plaque formation was prominent bilaterally    Depression     Disease of thyroid gland     Diverticulitis     Dyslipidemia     ECHO 09/14/2017    EF 55%, mild mitral regurg, small pericardial effusion   Hypertension     Mitral regurgitation     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 infarction)     Shingles     Takotsubo cardiomyopathy      Past Surgical History:   Procedure Laterality Date    CARDIAC CATHETERIZATION  08/27/2012    EF 35%, 70% stenosis of diagonal, Otherwise all OK apart from myopathy    CARDIAC CATHETERIZATION  10/10/2012    EF 55%, no significant CAD    Mild stress induced cardiomyopathy    CATARACT EXTRACTION Bilateral     EYE SURGERY      bilateral cataract    TONSILLECTOMY       Family History   Problem Relation Age of Onset    Cancer Father     Prostate cancer Father      Current Outpatient Medications on File Prior to Visit   Medication Sig Dispense Refill    amLODIPine (NORVASC) 5 mg tablet Take 1 tablet (5 mg total) by mouth daily 30 tablet 0    aspirin 81 mg chewable tablet Chew 81 mg daily      cholecalciferol (VITAMIN D3) 1,000 units tablet Take 1,000 Units by mouth daily      clonazePAM (KlonoPIN) 0 5 mg tablet Take 0 5 tablets (0 25 mg total) by mouth 2 (two) times a day for 10 days (Patient taking differently: Take 1/2 tablet (0 25-mg) by oral route 2 times a day ) 10 tablet 1    escitalopram (LEXAPRO) 10 mg tablet Take 1 5 tablets (15 mg total) by mouth daily 45 tablet 0    lansoprazole (PREVACID) 30 mg capsule Take 1 capsule (30 mg total) by mouth daily 30 capsule 0    levothyroxine 50 mcg tablet Take 1 tablet (50 mcg total) by mouth 3 (three) times a week On Mondays, Wednesdays and Fridays 12 tablet 0    levothyroxine 75 mcg tablet Take 1 tablet (75 mcg total) by mouth 4 (four) times a week On Tuesdays, Thursdays and Saturdays and Sundays 16 tablet 0    losartan (COZAAR) 50 mg tablet Take 1 tablet (50 mg total) by mouth daily 30 tablet 0    metoprolol succinate (TOPROL-XL) 25 mg 24 hr tablet Take 1 tablet (25 mg total) by mouth every 12 (twelve) hours 30 tablet 0    Multiple Vitamin (MULTIVITAMIN) capsule Take 1 capsule by mouth daily      polyethylene glycol (MIRALAX) 17 g packet Take 17 g by mouth daily      prednisoLONE acetate (PRED FORTE) 1 % ophthalmic suspension Administer 1 drop to both eyes 2 (two) times a day 5 mL 0    gabapentin (NEURONTIN) 100 mg capsule Take 1 capsule (100 mg total) by mouth 2 (two) times a day (Patient not taking: Reported on 7/1/2020) 60 capsule 0    melatonin 3 mg Take 1 tablet (3 mg total) by mouth daily at bedtime (Patient not taking: Reported on 7/1/2020) 30 tablet 0     No current facility-administered medications on file prior to visit  Allergies   Allergen Reactions    No Known Allergies      Social History     Socioeconomic History    Marital status:      Spouse name: Not on file    Number of children: Not on file    Years of education: Not on file    Highest education level: Not on file   Occupational History    Not on file   Social Needs    Financial resource strain: Not on file    Food insecurity:     Worry: Not on file     Inability: Not on file    Transportation needs:     Medical: Not on file     Non-medical: Not on file   Tobacco Use    Smoking status: Never Smoker    Smokeless tobacco: Never Used   Substance and Sexual Activity    Alcohol use: Never     Frequency: Never    Drug use: Never    Sexual activity: Not Currently   Lifestyle    Physical activity:     Days per week: Not on file     Minutes per session: Not on file    Stress: Not on file   Relationships    Social connections:     Talks on phone: Not on file     Gets together: Not on file     Attends Sikhism service: Not on file     Active member of club or organization: Not on file     Attends meetings of clubs or organizations: Not on file     Relationship status: Not on file    Intimate partner violence:     Fear of current or ex partner: Not on file     Emotionally abused: Not on file     Physically abused: Not on file     Forced sexual activity: Not on file   Other Topics Concern    Not on file   Social History Narrative    Caffeine - 2 cups hot tea/day        Review of Systems  No c/o chest pain, No c/o palpitations, No c/o shortness of breath, No c/o dizziness or light-headedness   All other systems negative, except as noted in HPI    Vitals:  Vitals:    07/01/20 1234   BP: 108/58   Pulse: 68   Weight: 53 5 kg (118 lb)   Height: 5' 2" (1 575 m)     BMI - Body mass index is 21 58 kg/m²    Wt Readings from Last 7 Encounters:   07/01/20 53 5 kg (118 lb)   06/16/20 52 2 kg (115 lb) 06/07/20 52 6 kg (115 lb 13 6 oz)   05/20/20 53 9 kg (118 lb 13 3 oz)   04/29/20 53 1 kg (117 lb)   04/04/20 53 1 kg (117 lb)   03/11/20 54 kg (119 lb)       Physical Exam   Constitutional: She is oriented to person, place, and time  She appears well-developed and well-nourished  No distress  HENT:   Head: Normocephalic and atraumatic  Eyes: No scleral icterus  Neck: No JVD present  Cardiovascular: Normal rate, regular rhythm and normal heart sounds  Exam reveals no gallop and no friction rub  No murmur heard  Pulmonary/Chest: Effort normal and breath sounds normal  No respiratory distress  She has no rales  Abdominal: Soft  She exhibits no distension  There is no tenderness  Musculoskeletal: She exhibits no edema  Neurological: She is alert and oriented to person, place, and time  Skin: Skin is warm  Psychiatric: She has a normal mood and affect  Her behavior is normal    Nursing note and vitals reviewed  Labs:  CBC:   Lab Results   Component Value Date    WBC 5 30 06/16/2020    RBC 3 91 06/16/2020    HGB 12 4 06/16/2020    HCT 36 4 (L) 06/16/2020    MCV 93 06/16/2020     06/16/2020    RDW 13 2 06/16/2020       CMP:   Lab Results   Component Value Date    K 4 5 06/26/2020    CL 95 (L) 06/26/2020    CO2 28 06/26/2020    BUN 17 06/26/2020    CREATININE 0 84 06/26/2020    EGFR 64 06/26/2020    CALCIUM 8 9 06/26/2020    AST 16 06/16/2020    ALT 10 06/16/2020    ALKPHOS 59 06/16/2020       Magnesium:  Lab Results   Component Value Date    MG 2 0 06/08/2020       Lipid Profile:   Lab Results   Component Value Date    HDL 68 06/08/2020    TRIG 51 06/08/2020    LDLCALC 93 06/08/2020       Thyroid Studies:   Lab Results   Component Value Date    [id number] 3 840 06/16/2020       No components found for: [employer] [facility]    Lab Results   Component Value Date    INR 1 15 06/09/2019    INR 1 12 06/02/2019   5    Imaging: No results found      Cardiac testing:   No results found for this or any previous visit  No results found for this or any previous visit  No results found for this or any previous visit  No results found for this or any previous visit

## 2020-07-01 NOTE — PATIENT INSTRUCTIONS
· Regular medication  · Take Losartan 50 mg in morning  · Take amlodipine 5 mg in evening  · Take metoprolol 25 mg every morning and evening   · If systolic BP <46, decrease metoprolol to 1/2 tablet (12 5 mg)  · If feeling dizzy, hold a dose of metoprolol  · If systolic BP persistently >575, after 1 hour, then take extra dose of losartan 25 mg (1/2 tablet)

## 2020-07-19 DIAGNOSIS — F41.1 GAD (GENERALIZED ANXIETY DISORDER): Chronic | ICD-10-CM

## 2020-07-20 RX ORDER — GABAPENTIN 100 MG/1
CAPSULE ORAL
Qty: 60 CAPSULE | Refills: 0 | OUTPATIENT
Start: 2020-07-20

## 2020-07-23 DIAGNOSIS — E03.9 ACQUIRED HYPOTHYROIDISM: Chronic | ICD-10-CM

## 2020-07-23 RX ORDER — LEVOTHYROXINE SODIUM 0.05 MG/1
TABLET ORAL
Qty: 12 TABLET | Refills: 0 | Status: SHIPPED | OUTPATIENT
Start: 2020-07-23

## 2020-07-28 DIAGNOSIS — E87.1 HYPONATREMIA: ICD-10-CM

## 2020-07-28 RX ORDER — LOSARTAN POTASSIUM 50 MG/1
50 TABLET ORAL DAILY
Qty: 90 TABLET | Refills: 3 | Status: SHIPPED | OUTPATIENT
Start: 2020-07-28 | End: 2020-12-04 | Stop reason: SDUPTHER

## 2020-07-28 NOTE — TELEPHONE ENCOUNTER
Rebecca GARCIA [facility]             Needs Losartan 50 mg daily     30 days called to [employer]     And     90 days to [employer] order

## 2020-07-30 ENCOUNTER — APPOINTMENT (EMERGENCY)
Dept: CT IMAGING | Facility: HOSPITAL | Age: 85
End: 2020-07-30
Payer: COMMERCIAL

## 2020-07-30 ENCOUNTER — HOSPITAL ENCOUNTER (EMERGENCY)
Facility: HOSPITAL | Age: 85
Discharge: HOME | End: 2020-07-30
Attending: EMERGENCY MEDICINE
Payer: COMMERCIAL

## 2020-07-30 VITALS
RESPIRATION RATE: 18 BRPM | DIASTOLIC BLOOD PRESSURE: 79 MMHG | HEART RATE: 66 BPM | OXYGEN SATURATION: 98 % | WEIGHT: 116 LBS | TEMPERATURE: 98.5 F | SYSTOLIC BLOOD PRESSURE: 181 MMHG | BODY MASS INDEX: 21.35 KG/M2 | HEIGHT: 62 IN

## 2020-07-30 DIAGNOSIS — R10.13 EPIGASTRIC BURNING SENSATION: ICD-10-CM

## 2020-07-30 DIAGNOSIS — R25.1 OCCASIONAL TREMORS: ICD-10-CM

## 2020-07-30 DIAGNOSIS — R10.84 GENERALIZED ABDOMINAL PAIN: Primary | ICD-10-CM

## 2020-07-30 DIAGNOSIS — E87.1 HYPONATREMIA: ICD-10-CM

## 2020-07-30 LAB
ALBUMIN SERPL BCP-MCNC: 4.7 G/DL (ref 3.5–5.7)
ALP SERPL-CCNC: 67 U/L (ref 55–165)
ALT SERPL W P-5'-P-CCNC: 9 U/L (ref 7–52)
ANION GAP SERPL CALCULATED.3IONS-SCNC: 10 MMOL/L (ref 4–13)
AST SERPL W P-5'-P-CCNC: 18 U/L (ref 13–39)
ATRIAL RATE: 69 BPM
BACTERIA UR QL AUTO: ABNORMAL /HPF
BASOPHILS # BLD AUTO: 0 THOUSANDS/ΜL (ref 0–0.1)
BASOPHILS NFR BLD AUTO: 1 % (ref 0–2)
BILIRUB SERPL-MCNC: 0.6 MG/DL (ref 0.2–1)
BILIRUB UR QL STRIP: NEGATIVE
BUN SERPL-MCNC: 14 MG/DL (ref 7–25)
CALCIUM SERPL-MCNC: 9.6 MG/DL (ref 8.6–10.5)
CHLORIDE SERPL-SCNC: 89 MMOL/L (ref 98–107)
CLARITY UR: CLEAR
CO2 SERPL-SCNC: 24 MMOL/L (ref 21–31)
COLOR UR: ABNORMAL
CREAT SERPL-MCNC: 0.87 MG/DL (ref 0.6–1.2)
EOSINOPHIL # BLD AUTO: 0.1 THOUSAND/ΜL (ref 0–0.61)
EOSINOPHIL NFR BLD AUTO: 1 % (ref 0–5)
ERYTHROCYTE [DISTWIDTH] IN BLOOD BY AUTOMATED COUNT: 13.3 % (ref 11.5–14.5)
GFR SERPL CREATININE-BSD FRML MDRD: 61 ML/MIN/1.73SQ M
GLUCOSE SERPL-MCNC: 99 MG/DL (ref 65–99)
GLUCOSE UR STRIP-MCNC: NEGATIVE MG/DL
HCT VFR BLD AUTO: 38.4 % (ref 42–47)
HGB BLD-MCNC: 13.3 G/DL (ref 12–16)
HGB UR QL STRIP.AUTO: ABNORMAL
KETONES UR STRIP-MCNC: NEGATIVE MG/DL
LEUKOCYTE ESTERASE UR QL STRIP: ABNORMAL
LIPASE SERPL-CCNC: 70 U/L (ref 11–82)
LYMPHOCYTES # BLD AUTO: 1 THOUSANDS/ΜL (ref 0.6–4.47)
LYMPHOCYTES NFR BLD AUTO: 15 % (ref 21–51)
MCH RBC QN AUTO: 32.2 PG (ref 26–34)
MCHC RBC AUTO-ENTMCNC: 34.5 G/DL (ref 31–37)
MCV RBC AUTO: 93 FL (ref 81–99)
MONOCYTES # BLD AUTO: 0.7 THOUSAND/ΜL (ref 0.17–1.22)
MONOCYTES NFR BLD AUTO: 11 % (ref 2–12)
NEUTROPHILS # BLD AUTO: 4.8 THOUSANDS/ΜL (ref 1.4–6.5)
NEUTS SEG NFR BLD AUTO: 73 % (ref 42–75)
NITRITE UR QL STRIP: NEGATIVE
NON-SQ EPI CELLS URNS QL MICRO: ABNORMAL /HPF
P AXIS: 86 DEGREES
PH UR STRIP.AUTO: 7 [PH]
PLATELET # BLD AUTO: 248 THOUSANDS/UL (ref 149–390)
PMV BLD AUTO: 6.4 FL (ref 8.6–11.7)
POTASSIUM SERPL-SCNC: 4.2 MMOL/L (ref 3.5–5.5)
PR INTERVAL: 238 MS
PROT SERPL-MCNC: 7.6 G/DL (ref 6.4–8.9)
PROT UR STRIP-MCNC: NEGATIVE MG/DL
QRS AXIS: 84 DEGREES
QRSD INTERVAL: 108 MS
QT INTERVAL: 422 MS
QTC INTERVAL: 452 MS
RBC # BLD AUTO: 4.12 MILLION/UL (ref 3.9–5.2)
RBC #/AREA URNS AUTO: ABNORMAL /HPF
SODIUM SERPL-SCNC: 123 MMOL/L (ref 134–143)
SP GR UR STRIP.AUTO: 1.01 (ref 1–1.03)
T WAVE AXIS: 69 DEGREES
TROPONIN I SERPL-MCNC: <0.03 NG/ML
UROBILINOGEN UR QL STRIP.AUTO: 0.2 E.U./DL
VENTRICULAR RATE: 69 BPM
WBC # BLD AUTO: 6.6 THOUSAND/UL (ref 4.8–10.8)
WBC #/AREA URNS AUTO: ABNORMAL /HPF

## 2020-07-30 PROCEDURE — 96360 HYDRATION IV INFUSION INIT: CPT

## 2020-07-30 PROCEDURE — 81003 URINALYSIS AUTO W/O SCOPE: CPT | Performed by: EMERGENCY MEDICINE

## 2020-07-30 PROCEDURE — 99285 EMERGENCY DEPT VISIT HI MDM: CPT

## 2020-07-30 PROCEDURE — 36415 COLL VENOUS BLD VENIPUNCTURE: CPT | Performed by: EMERGENCY MEDICINE

## 2020-07-30 PROCEDURE — 80053 COMPREHEN METABOLIC PANEL: CPT | Performed by: EMERGENCY MEDICINE

## 2020-07-30 PROCEDURE — 93005 ELECTROCARDIOGRAM TRACING: CPT

## 2020-07-30 PROCEDURE — 84484 ASSAY OF TROPONIN QUANT: CPT | Performed by: EMERGENCY MEDICINE

## 2020-07-30 PROCEDURE — 74177 CT ABD & PELVIS W/CONTRAST: CPT

## 2020-07-30 PROCEDURE — 96361 HYDRATE IV INFUSION ADD-ON: CPT

## 2020-07-30 PROCEDURE — 81001 URINALYSIS AUTO W/SCOPE: CPT | Performed by: EMERGENCY MEDICINE

## 2020-07-30 PROCEDURE — 93010 ELECTROCARDIOGRAM REPORT: CPT | Performed by: INTERNAL MEDICINE

## 2020-07-30 PROCEDURE — 99285 EMERGENCY DEPT VISIT HI MDM: CPT | Performed by: EMERGENCY MEDICINE

## 2020-07-30 PROCEDURE — 85025 COMPLETE CBC W/AUTO DIFF WBC: CPT | Performed by: EMERGENCY MEDICINE

## 2020-07-30 PROCEDURE — 83690 ASSAY OF LIPASE: CPT | Performed by: EMERGENCY MEDICINE

## 2020-07-30 RX ORDER — SUCRALFATE ORAL 1 G/10ML
1000 SUSPENSION ORAL ONCE
Status: COMPLETED | OUTPATIENT
Start: 2020-07-30 | End: 2020-07-30

## 2020-07-30 RX ORDER — FAMOTIDINE 20 MG/1
20 TABLET, FILM COATED ORAL ONCE
Status: COMPLETED | OUTPATIENT
Start: 2020-07-30 | End: 2020-07-30

## 2020-07-30 RX ORDER — SUCRALFATE ORAL 1 G/10ML
1 SUSPENSION ORAL 4 TIMES DAILY
Qty: 420 ML | Refills: 0 | Status: SHIPPED | OUTPATIENT
Start: 2020-07-30

## 2020-07-30 RX ORDER — BUPROPION HYDROCHLORIDE 150 MG/1
150 TABLET ORAL DAILY
COMMUNITY
End: 2021-07-07 | Stop reason: ALTCHOICE

## 2020-07-30 RX ORDER — LIDOCAINE HYDROCHLORIDE 20 MG/ML
15 SOLUTION OROPHARYNGEAL ONCE
Status: COMPLETED | OUTPATIENT
Start: 2020-07-30 | End: 2020-07-30

## 2020-07-30 RX ADMIN — IOHEXOL 100 ML: 350 INJECTION, SOLUTION INTRAVENOUS at 19:43

## 2020-07-30 RX ADMIN — LIDOCAINE HYDROCHLORIDE 15 ML: 20 SOLUTION ORAL; TOPICAL at 19:25

## 2020-07-30 RX ADMIN — SUCRALFATE 1000 MG: 1 SUSPENSION ORAL at 19:23

## 2020-07-30 RX ADMIN — FAMOTIDINE 20 MG: 20 TABLET, FILM COATED ORAL at 19:23

## 2020-07-30 RX ADMIN — SODIUM CHLORIDE 1000 ML: 0.9 INJECTION, SOLUTION INTRAVENOUS at 18:50

## 2020-07-30 NOTE — ED PROVIDER NOTES
History  Chief Complaint   Patient presents with    Abdominal Pain     burning pain started yesterday, has been constant since onset  admits to intermittent nausea  pt was started on wellbutrin 2 weeks ago    Tremors     right upper extremity tremor started 1 week ago     HPI    80 y o  F presents to the ED for eval of abd pain  Onset: yesterday  Duration: constant  Pain currently: yes  Pain meds taken: tried, pepcid and Mylanta with no relief  Prior similar pain: no  Where: periumbilical and epigastric  Radiation: no  Associated N/V: no  Associated with food: unchanged, but states what ever she eats, feels like it is sitting in stomach  Emesis: no  Last BM: today, normal, no blood  Urinary complaints:  No    Patient was admitted 6/26/20 for anxiety depression, was placed on Wellbutrin, two weeks ago,but patient concerned Wellbutrin is causing burning sensation  Vaginal Discharge no    Prior Surgeries none    Previous Imaging     CT abd pelv w/ contrast  5/20/20     Please note there are 6 nonrib lumbar vertebral bodies  Interval development of an L1 compression deformity with minimal loss of height not seen on the prior exam and an L2 compression deformity not seen on the prior exam with approximately 20% loss of   height        Other nonacute findings as above  Last Colonsocopy/Endoscopy/GI visit    For the past week patient has also noticed a tremor in her hand, it is intermittent  It wasn't shaking at time of ED history taking, but patient states it comes and goes  No falls  Lives alone        Prior to Admission Medications   Prescriptions Last Dose Informant Patient Reported? Taking?    Multiple Vitamin (MULTIVITAMIN) capsule 7/30/2020 at Unknown time Self Yes Yes   Sig: Take 1 capsule by mouth daily   amLODIPine (NORVASC) 5 mg tablet 7/30/2020 at Unknown time  No Yes   Sig: Take 1 tablet (5 mg total) by mouth daily at bedtime   aspirin 81 mg chewable tablet 7/30/2020 at Unknown time Self Yes Yes   Sig: Chew 81 mg daily   buPROPion (Wellbutrin XL) 150 mg 24 hr tablet 7/30/2020 at Unknown time  Yes Yes   Sig: Take 150 mg by mouth daily   cholecalciferol (VITAMIN D3) 1,000 units tablet 7/30/2020 at Unknown time Self Yes Yes   Sig: Take 1,000 Units by mouth daily   clonazePAM (KlonoPIN) 0 5 mg tablet 7/30/2020 at Unknown time  No Yes   Sig: Take 0 5 tablets (0 25 mg total) by mouth 2 (two) times a day for 10 days   Patient taking differently: 0 25 mg 2 (two) times a day as needed Take 1/2 tablet (0 25-mg) by oral route 2 times a day     escitalopram (LEXAPRO) 10 mg tablet 7/30/2020 at Unknown time  No Yes   Sig: Take 1 5 tablets (15 mg total) by mouth daily   Patient taking differently: Take 20 mg by mouth daily    gabapentin (NEURONTIN) 100 mg capsule Not Taking at Unknown time  No No   Sig: Take 1 capsule (100 mg total) by mouth 2 (two) times a day   Patient not taking: Reported on 7/1/2020   lansoprazole (PREVACID) 30 mg capsule 7/30/2020 at Unknown time  No Yes   Sig: Take 1 capsule (30 mg total) by mouth daily   levothyroxine 50 mcg tablet 7/30/2020 at Unknown time  No Yes   Sig: take 1 tablet by mouth ON MONDAY, WEDNESDAY, AND FRIDAY   levothyroxine 75 mcg tablet 7/30/2020 at Unknown time  No Yes   Sig: Take 1 tablet (75 mcg total) by mouth 4 (four) times a week On Tuesdays, Thursdays and Saturdays and Sundays   losartan (COZAAR) 50 mg tablet 7/30/2020 at Unknown time  No Yes   Sig: Take 1 tablet (50 mg total) by mouth daily   melatonin 3 mg Not Taking at Unknown time  No No   Sig: Take 1 tablet (3 mg total) by mouth daily at bedtime   Patient not taking: Reported on 7/1/2020   metoprolol succinate (TOPROL-XL) 25 mg 24 hr tablet 7/30/2020 at Unknown time  No Yes   Sig: Take 1 tablet (25 mg total) by mouth every 12 (twelve) hours   polyethylene glycol (MIRALAX) 17 g packet 7/30/2020 at Unknown time Self Yes Yes   Sig: Take 17 g by mouth daily   prednisoLONE acetate (PRED FORTE) 1 % ophthalmic suspension More than a month at Unknown time  No No   Sig: Administer 1 drop to both eyes 2 (two) times a day      Facility-Administered Medications: None       Past Medical History:   Diagnosis Date    Anxiety     CAD (coronary artery disease)     Carotid Duplex 03/06/2018    Plaque formation was prominent bilaterally    Depression     Disease of thyroid gland     Diverticulitis     Dyslipidemia     ECHO 09/14/2017    EF 55%, mild mitral regurg, small pericardial effusion   Hypertension     Mitral regurgitation     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 infarction)     Shingles     Takotsubo cardiomyopathy        Past Surgical History:   Procedure Laterality Date    CARDIAC CATHETERIZATION  08/27/2012    EF 35%, 70% stenosis of diagonal, Otherwise all OK apart from myopathy    CARDIAC CATHETERIZATION  10/10/2012    EF 55%, no significant CAD  Mild stress induced cardiomyopathy    CATARACT EXTRACTION Bilateral     EYE SURGERY      bilateral cataract    TONSILLECTOMY         Family History   Problem Relation Age of Onset    Cancer Father     Prostate cancer Father      I have reviewed and agree with the history as documented  E-Cigarette/Vaping    E-Cigarette Use Never User      E-Cigarette/Vaping Substances    Nicotine No     THC No     CBD No     Flavoring No     Other No     Unknown No      Social History     Tobacco Use    Smoking status: Never Smoker    Smokeless tobacco: Never Used   Substance Use Topics    Alcohol use: Never     Frequency: Never    Drug use: Never       Review of Systems   Constitutional: Negative for chills, fatigue and fever  HENT: Negative for sore throat  Eyes: Negative for redness and visual disturbance  Respiratory: Negative for cough and shortness of breath  Cardiovascular: Negative for chest pain  Gastrointestinal: Positive for abdominal pain  Negative for diarrhea and nausea  Genitourinary: Negative for difficulty urinating, dysuria and pelvic pain  Musculoskeletal: Negative for back pain  Skin: Negative for rash  Neurological: Negative for syncope, weakness and headaches  All other systems reviewed and are negative  Physical Exam  Physical Exam   Constitutional: She is oriented to person, place, and time  She appears well-developed and well-nourished  No distress  HENT:   Head: Normocephalic and atraumatic  Eyes: Conjunctivae are normal    Neck: Normal range of motion  Cardiovascular: Normal rate, regular rhythm and normal heart sounds  Pulmonary/Chest: Effort normal and breath sounds normal  No respiratory distress  Abdominal: Soft  Bowel sounds are normal  There is no tenderness  Abdomen soft nontender in all quadrants   Musculoskeletal: Normal range of motion  Neurological: She is alert and oriented to person, place, and time  No cranial nerve deficit or sensory deficit  She exhibits normal muscle tone  Coordination normal    Skin: Skin is warm and dry  No rash noted  Psychiatric: She has a normal mood and affect  Nursing note and vitals reviewed        Vital Signs  ED Triage Vitals   Temperature Pulse Respirations Blood Pressure SpO2   07/30/20 1829 07/30/20 1829 07/30/20 1829 07/30/20 1829 07/30/20 1829   98 5 °F (36 9 °C) 70 18 (!) 197/93 98 %      Temp Source Heart Rate Source Patient Position - Orthostatic VS BP Location FiO2 (%)   07/30/20 1829 07/30/20 2054 07/30/20 2054 07/30/20 2054 --   Temporal Monitor Sitting Right arm       Pain Score       07/30/20 1829       6           Vitals:    07/30/20 1829 07/30/20 2054   BP: (!) 197/93 (!) 181/79   Pulse: 70 66   Patient Position - Orthostatic VS:  Sitting         Visual Acuity      ED Medications  Medications   sodium chloride 0 9 % bolus 1,000 mL (0 mL Intravenous Stopped 7/30/20 2050)   famotidine (PEPCID) tablet 20 mg (20 mg Oral Given 7/30/20 1923)   Lidocaine Viscous HCl (XYLOCAINE) 2 % mucosal solution 15 mL (15 mL Swish & Swallow Given 7/30/20 1925)   sucralfate (CARAFATE) oral suspension 1,000 mg (1,000 mg Oral Given 7/30/20 1923)   iohexol (OMNIPAQUE) 350 MG/ML injection (MULTI-DOSE) 100 mL (100 mL Intravenous Given 7/30/20 1943)       Diagnostic Studies  Results Reviewed     Procedure Component Value Units Date/Time    Urine Microscopic [[id number]]  (Abnormal) Collected:  07/30/20 2010    Lab Status:  Final result Specimen:  Urine, Clean Catch Updated:  07/30/20 2045     RBC, UA 4-10 /hpf      WBC, UA 1-2 /hpf      Epithelial Cells Occasional /hpf      Bacteria, UA None Seen /hpf     UA w Reflex to Microscopic w Reflex to Culture [[id number]]  (Abnormal) Collected:  07/30/20 2010    Lab Status:  Final result Specimen:  Urine, Clean Catch Updated:  07/30/20 2031     Color, UA Straw     Clarity, UA Clear     Specific Gravity, UA 1 010     pH, UA 7 0     Leukocytes, UA 1+     Nitrite, UA Negative     Protein, UA Negative mg/dl      Glucose, UA Negative mg/dl      Ketones, UA Negative mg/dl      Urobilinogen, UA 0 2 E U /dl      Bilirubin, UA Negative     Blood, UA 1+    Troponin I [[id number]]  (Normal) Collected:  07/30/20 1846    Lab Status:  Final result Specimen:  Blood from Arm, Left Updated:  07/30/20 1913     Troponin I <0 03 ng/mL     Comprehensive metabolic panel [[id number]]  (Abnormal) Collected:  07/30/20 1846    Lab Status:  Final result Specimen:  Blood from Arm, Left Updated:  07/30/20 1910     Sodium 123 mmol/L      Potassium 4 2 mmol/L      Chloride 89 mmol/L      CO2 24 mmol/L      ANION GAP 10 mmol/L      BUN 14 mg/dL      Creatinine 0 87 mg/dL      Glucose 99 mg/dL      Calcium 9 6 mg/dL      AST 18 U/L      ALT 9 U/L      Alkaline Phosphatase 67 U/L      Total Protein 7 6 g/dL      Albumin 4 7 g/dL      Total Bilirubin 0 60 mg/dL      eGFR 61 ml/min/1 73sq m     Narrative:       [place] guidelines for Chronic Kidney Disease (CKD):     Stage 1 with normal or high GFR (GFR > 90 mL/min/1 73 square meters)    Stage 2 Mild CKD (GFR = 60-89 mL/min/1 73 square meters)    Stage 3A Moderate CKD (GFR = 45-59 mL/min/1 73 square meters)    Stage 3B Moderate CKD (GFR = 30-44 mL/min/1 73 square meters)    Stage 4 Severe CKD (GFR = 15-29 mL/min/1 73 square meters)    Stage 5 End Stage CKD (GFR <15 mL/min/1 73 square meters)  Note: GFR calculation is accurate only with a steady state creatinine    Lipase [[id number]]  (Normal) Collected:  07/30/20 1846    Lab Status:  Final result Specimen:  Blood from Arm, Left Updated:  07/30/20 1910     Lipase 70 u/L     CBC and differential [[id number]]  (Abnormal) Collected:  07/30/20 1846    Lab Status:  Final result Specimen:  Blood from Arm, Left Updated:  07/30/20 1857     WBC 6 60 Thousand/uL      RBC 4 12 Million/uL      Hemoglobin 13 3 g/dL      Hematocrit 38 4 %      MCV 93 fL      MCH 32 2 pg      MCHC 34 5 g/dL      RDW 13 3 %      MPV 6 4 fL      Platelets 588 Thousands/uL      Neutrophils Relative 73 %      Lymphocytes Relative 15 %      Monocytes Relative 11 %      Eosinophils Relative 1 %      Basophils Relative 1 %      Neutrophils Absolute 4 80 Thousands/µL      Lymphocytes Absolute 1 00 Thousands/µL      Monocytes Absolute 0 70 Thousand/µL      Eosinophils Absolute 0 10 Thousand/µL      Basophils Absolute 0 00 Thousands/µL                  CT abdomen pelvis with contrast   Final Result by Mary Anderson MD (07/30 1958)      No acute abnormality  No evidence of bowel obstruction  Unchanged chronic findings as described  Workstation performed: [id number]                    Procedures  Procedures         ED Course  ED Course as of Jul 30 2254   Thu Jul 30, 2020   1903 ECG 12 Lead Documentation  Date/Time: today/date: 7/30/2020  Performed by: Christiano Morrison  Authorized by:  Christiano Morrison    ECG reviewed by me, the ED Provider: yes    Patient location:  ED   Previous ECG: sinus with prolonged WI  Rate:  ECG rate assessment: 69    Rhythm: sinus rhythm    Ectopy: none    QRS axis: Normal  Intervals:    Q waves: None   ST segments:  No acute ST changes  T waves: normal      Impression: sinus with prolonged HI          1933 Troponin I: <0 03   2019 Na low 123   Sodium(!): 123   2023 Baseline hyponatremia, her previous levels 126, otherwise patient runs at 130 when she is in hospital being cared for          US AUDIT      Most Recent Value   Initial Alcohol Screen: US AUDIT-C    1  How often do you have a drink containing alcohol?  0 Filed at: 07/30/2020 1832   2  How many drinks containing alcohol do you have on a typical day you are drinking? 0 Filed at: 07/30/2020 1832   3a  Male UNDER 65: How often do you have five or more drinks on one occasion? 0 Filed at: 07/30/2020 1832   3b  FEMALE Any Age, or MALE 65+: How often do you have 4 or more drinks on one occassion? 0 Filed at: 07/30/2020 1832   Audit-C Score  0 Filed at: 07/30/2020 1832                  ANEESH/DAST-10      Most Recent Value   How many times in the past year have you    Used an illegal drug or used a prescription medication for non-medical reasons? Never Filed at: 07/30/2020 200                                MDM  78-year-old female who presents to the ER for evaluation of epigastric burning sensation as well as abdominal pain which she states is the periumbilical region  The patient had CBC CMP lipase looking for any acute elevation on LFTs, or any other acute intra-abdominal pathology  Also had a CT abdomen pelvis with contrast which did not show any acute pathology  The patient also had a cardiac workup which did not show any acute EKG changes or troponin negative  The patient did have some relief with the Carafate, she does have a history of reflux  The patient will follow-up with GI regarding her epigastric burning sensation, will likely need endoscopy    The patient also was given referral for her intermittent tremors, which were not as present in the ER for the patient, as she states that she is significantly worse tremoring  The patient will also follow-up with psychiatry for her Wellbutrin medication discussing her side effects  The patient was instructed to follow up as documented  Strict return precautions were discussed with the patient and the patient was instructed to return to the emergency department immediately if symptoms worsen  The patient/patient family member acknowledged and were in agreement with plan  Daughter at bedside in agreement  Disposition  Final diagnoses:   Generalized abdominal pain   Epigastric burning sensation   Occasional tremors   Hyponatremia     Time reflects when diagnosis was documented in both MDM as applicable and the Disposition within this note     Time User Action Codes Description Comment    7/30/2020  8:38 PM Bobbette Rian Add [R10 84] Generalized abdominal pain     7/30/2020  8:38 PM Bobbette Rian Add [R10 13] Epigastric burning sensation     7/30/2020  8:38 PM Bobbette Rian Add [R25 1] Occasional tremors     7/30/2020  8:38 PM Bobbette Rian Add [E87 1] Hyponatremia       ED Disposition     ED Disposition Condition Date/Time Comment    Discharge Stable Thu Jul 30, 2020  8:38 PM Bette Gomez discharge to home/self care  Follow-up Information     Follow up With Specialties Details Why Contact Info Additional [facility] Glenna Mckeon Neurology Schedule an appointment as soon as possible for a visit in 1 day For follow up regarding your tremors [address] [place] [address]  [id number]  [address] [place] [address] Neurology [facility] Glenna Mckeon, [address] [place] [address] Glenna Mckeon[address]    Yolande Barros MD  Schedule an appointment as soon as possible for a visit today Schedule appointment with your primary care doctor for follow up regarding your hyponatremia lab results and further work up  Psychiatry  Call today Schedule appointment with your psychiatrist regarding your Wellbutrin        St  [address] [facility]  Emergency Department Emergency Medicine Go to  If symptoms worsen [address] [facility] [id number] [phone]     [facility] Gastro[facility]ology Schedule an appointment as soon as possible for a visit in 1 week For follow up regarding your Epigastric burning sensation  You may need an endoscopy   [address] [facility] [address]             Discharge Medication List as of 7/30/2020  8:47 PM      START taking these medications    Details   sucralfate (CARAFATE) 1 g/10 mL suspension Take 10 mL (1 g total) by mouth 4 (four) times a day, Starting Thu 7/30/2020, Normal         CONTINUE these medications which have NOT CHANGED    Details   amLODIPine (NORVASC) 5 mg tablet Take 1 tablet (5 mg total) by mouth daily at bedtime, Starting Wed 7/1/2020, No Print      aspirin 81 mg chewable tablet Chew 81 mg daily, Starting Mon 8/10/2015, Historical Med      buPROPion (Wellbutrin XL) 150 mg 24 hr tablet Take 150 mg by mouth daily, Historical Med      cholecalciferol (VITAMIN D3) 1,000 units tablet Take 1,000 Units by mouth daily, Historical Med      clonazePAM (KlonoPIN) 0 5 mg tablet Take 0 5 tablets (0 25 mg total) by mouth 2 (two) times a day for 10 days, Starting Fri 6/26/2020, Until Thu 7/30/2020, Normal      escitalopram (LEXAPRO) 10 mg tablet Take 1 5 tablets (15 mg total) by mouth daily, Starting Sat 6/27/2020, Normal      lansoprazole (PREVACID) 30 mg capsule Take 1 capsule (30 mg total) by mouth daily, Starting Fri 6/26/2020, Normal      !! levothyroxine 50 mcg tablet take 1 tablet by mouth ON MONDAY, WEDNESDAY, AND FRIDAY, Normal      !! levothyroxine 75 mcg tablet Take 1 tablet (75 mcg total) by mouth 4 (four) times a week On Tuesdays, Thursdays and Saturdays and Sundays, Starting Sat 6/27/2020, Normal      losartan (COZAAR) 50 mg tablet Take 1 tablet (50 mg total) by mouth daily, Starting Tue 7/28/2020, Normal      metoprolol succinate (TOPROL-XL) 25 mg 24 hr tablet Take 1 tablet (25 mg total) by mouth every 12 (twelve) hours, Starting Fri 6/26/2020, Normal      Multiple Vitamin (MULTIVITAMIN) capsule Take 1 capsule by mouth daily, Historical Med      polyethylene glycol (MIRALAX) 17 g packet Take 17 g by mouth daily, Historical Med      gabapentin (NEURONTIN) 100 mg capsule Take 1 capsule (100 mg total) by mouth 2 (two) times a day, Starting Fri 6/26/2020, Normal      melatonin 3 mg Take 1 tablet (3 mg total) by mouth daily at bedtime, Starting Fri 6/26/2020, Normal      prednisoLONE acetate (PRED FORTE) 1 % ophthalmic suspension Administer 1 drop to both eyes 2 (two) times a day, Starting Fri 6/26/2020, Normal       !! - Potential duplicate medications found  Please discuss with provider  Outpatient Discharge Orders   Basic metabolic panel   Standing Status: Future Standing Exp   Date: 07/30/21       PDMP Review       Value Time User    PDMP Reviewed  Yes 6/8/2020  9:46 AM Anabelle Castillo PA-C          ED Provider  Electronically Signed by           Ilana Ospina MD  07/30/20 3818

## 2020-07-31 NOTE — ED NOTES
Patient and daughter verbalized understanding of discharge instructions      Flavio De Anda RN  07/30/20 1820

## 2020-08-02 ENCOUNTER — HOSPITAL ENCOUNTER (EMERGENCY)
Facility: HOSPITAL | Age: 85
Discharge: HOME | End: 2020-08-02
Attending: INTERNAL MEDICINE | Admitting: INTERNAL MEDICINE
Payer: COMMERCIAL

## 2020-08-02 VITALS
HEIGHT: 62 IN | BODY MASS INDEX: 21.35 KG/M2 | HEART RATE: 58 BPM | SYSTOLIC BLOOD PRESSURE: 173 MMHG | RESPIRATION RATE: 19 BRPM | OXYGEN SATURATION: 96 % | DIASTOLIC BLOOD PRESSURE: 79 MMHG | WEIGHT: 116 LBS

## 2020-08-02 DIAGNOSIS — N39.0 UTI (URINARY TRACT INFECTION): Primary | ICD-10-CM

## 2020-08-02 DIAGNOSIS — K29.70 GASTRITIS: ICD-10-CM

## 2020-08-02 LAB
ALBUMIN SERPL BCP-MCNC: 4.3 G/DL (ref 3.5–5.7)
ALP SERPL-CCNC: 59 U/L (ref 55–165)
ALT SERPL W P-5'-P-CCNC: 9 U/L (ref 7–52)
ANION GAP SERPL CALCULATED.3IONS-SCNC: 4 MMOL/L (ref 4–13)
AST SERPL W P-5'-P-CCNC: 16 U/L (ref 13–39)
ATRIAL RATE: 61 BPM
BACTERIA UR QL AUTO: ABNORMAL /HPF
BASOPHILS # BLD AUTO: 0 THOUSANDS/ΜL (ref 0–0.1)
BASOPHILS NFR BLD AUTO: 1 % (ref 0–2)
BILIRUB SERPL-MCNC: 0.4 MG/DL (ref 0.2–1)
BILIRUB UR QL STRIP: NEGATIVE
BUN SERPL-MCNC: 14 MG/DL (ref 7–25)
CALCIUM SERPL-MCNC: 9.3 MG/DL (ref 8.6–10.5)
CHLORIDE SERPL-SCNC: 92 MMOL/L (ref 98–107)
CLARITY UR: CLEAR
CO2 SERPL-SCNC: 30 MMOL/L (ref 21–31)
COLOR UR: YELLOW
CREAT SERPL-MCNC: 0.84 MG/DL (ref 0.6–1.2)
EOSINOPHIL # BLD AUTO: 0.1 THOUSAND/ΜL (ref 0–0.61)
EOSINOPHIL NFR BLD AUTO: 1 % (ref 0–5)
ERYTHROCYTE [DISTWIDTH] IN BLOOD BY AUTOMATED COUNT: 13.3 % (ref 11.5–14.5)
GFR SERPL CREATININE-BSD FRML MDRD: 64 ML/MIN/1.73SQ M
GLUCOSE SERPL-MCNC: 66 MG/DL (ref 65–99)
GLUCOSE UR STRIP-MCNC: NEGATIVE MG/DL
HCT VFR BLD AUTO: 36.8 % (ref 42–47)
HGB BLD-MCNC: 12.8 G/DL (ref 12–16)
HGB UR QL STRIP.AUTO: ABNORMAL
KETONES UR STRIP-MCNC: NEGATIVE MG/DL
LEUKOCYTE ESTERASE UR QL STRIP: ABNORMAL
LIPASE SERPL-CCNC: 87 U/L (ref 11–82)
LYMPHOCYTES # BLD AUTO: 1.3 THOUSANDS/ΜL (ref 0.6–4.47)
LYMPHOCYTES NFR BLD AUTO: 20 % (ref 21–51)
MCH RBC QN AUTO: 32.3 PG (ref 26–34)
MCHC RBC AUTO-ENTMCNC: 34.7 G/DL (ref 31–37)
MCV RBC AUTO: 93 FL (ref 81–99)
MONOCYTES # BLD AUTO: 0.9 THOUSAND/ΜL (ref 0.17–1.22)
MONOCYTES NFR BLD AUTO: 14 % (ref 2–12)
NEUTROPHILS # BLD AUTO: 4.2 THOUSANDS/ΜL (ref 1.4–6.5)
NEUTS SEG NFR BLD AUTO: 64 % (ref 42–75)
NITRITE UR QL STRIP: NEGATIVE
NON-SQ EPI CELLS URNS QL MICRO: ABNORMAL /HPF
P AXIS: 82 DEGREES
PH UR STRIP.AUTO: 6 [PH]
PLATELET # BLD AUTO: 244 THOUSANDS/UL (ref 149–390)
PMV BLD AUTO: 6.6 FL (ref 8.6–11.7)
POTASSIUM SERPL-SCNC: 3.7 MMOL/L (ref 3.5–5.5)
PR INTERVAL: 250 MS
PROT SERPL-MCNC: 7.1 G/DL (ref 6.4–8.9)
PROT UR STRIP-MCNC: NEGATIVE MG/DL
QRS AXIS: 72 DEGREES
QRSD INTERVAL: 102 MS
QT INTERVAL: 426 MS
QTC INTERVAL: 428 MS
RBC # BLD AUTO: 3.95 MILLION/UL (ref 3.9–5.2)
RBC #/AREA URNS AUTO: ABNORMAL /HPF
SODIUM SERPL-SCNC: 126 MMOL/L (ref 134–143)
SP GR UR STRIP.AUTO: 1.01 (ref 1–1.03)
T WAVE AXIS: 70 DEGREES
TROPONIN I SERPL-MCNC: <0.03 NG/ML
UROBILINOGEN UR QL STRIP.AUTO: 0.2 E.U./DL
VENTRICULAR RATE: 61 BPM
WBC # BLD AUTO: 6.5 THOUSAND/UL (ref 4.8–10.8)
WBC #/AREA URNS AUTO: ABNORMAL /HPF

## 2020-08-02 PROCEDURE — 87086 URINE CULTURE/COLONY COUNT: CPT | Performed by: INTERNAL MEDICINE

## 2020-08-02 PROCEDURE — 80053 COMPREHEN METABOLIC PANEL: CPT | Performed by: INTERNAL MEDICINE

## 2020-08-02 PROCEDURE — 99285 EMERGENCY DEPT VISIT HI MDM: CPT | Performed by: INTERNAL MEDICINE

## 2020-08-02 PROCEDURE — 93005 ELECTROCARDIOGRAM TRACING: CPT

## 2020-08-02 PROCEDURE — 84484 ASSAY OF TROPONIN QUANT: CPT | Performed by: INTERNAL MEDICINE

## 2020-08-02 PROCEDURE — 85025 COMPLETE CBC W/AUTO DIFF WBC: CPT | Performed by: INTERNAL MEDICINE

## 2020-08-02 PROCEDURE — 81001 URINALYSIS AUTO W/SCOPE: CPT | Performed by: INTERNAL MEDICINE

## 2020-08-02 PROCEDURE — 83690 ASSAY OF LIPASE: CPT | Performed by: INTERNAL MEDICINE

## 2020-08-02 PROCEDURE — 93010 ELECTROCARDIOGRAM REPORT: CPT | Performed by: INTERNAL MEDICINE

## 2020-08-02 PROCEDURE — 99285 EMERGENCY DEPT VISIT HI MDM: CPT

## 2020-08-02 PROCEDURE — 81003 URINALYSIS AUTO W/O SCOPE: CPT | Performed by: INTERNAL MEDICINE

## 2020-08-02 PROCEDURE — 36415 COLL VENOUS BLD VENIPUNCTURE: CPT | Performed by: INTERNAL MEDICINE

## 2020-08-02 RX ORDER — SULFAMETHOXAZOLE AND TRIMETHOPRIM 800; 160 MG/1; MG/1
1 TABLET ORAL ONCE
Status: COMPLETED | OUTPATIENT
Start: 2020-08-02 | End: 2020-08-02

## 2020-08-02 RX ORDER — SULFAMETHOXAZOLE AND TRIMETHOPRIM 800; 160 MG/1; MG/1
1 TABLET ORAL 2 TIMES DAILY
Qty: 14 TABLET | Refills: 0 | Status: SHIPPED | OUTPATIENT
Start: 2020-08-02 | End: 2020-08-09

## 2020-08-02 RX ORDER — PANTOPRAZOLE SODIUM 40 MG/1
40 TABLET, DELAYED RELEASE ORAL ONCE
Status: COMPLETED | OUTPATIENT
Start: 2020-08-02 | End: 2020-08-02

## 2020-08-02 RX ADMIN — PANTOPRAZOLE SODIUM 40 MG: 40 TABLET, DELAYED RELEASE ORAL at 22:22

## 2020-08-02 RX ADMIN — SULFAMETHOXAZOLE AND TRIMETHOPRIM 1 TABLET: 800; 160 TABLET ORAL at 22:22

## 2020-08-03 NOTE — ED PROVIDER NOTES
History  Chief Complaint   Patient presents with    Abdominal Pain     burning abdominal pain x4 days  seen here for same   Chest Pain     started while in waiting room described as "discomfort"     78-year-old female accompanied by her daughter presents emergency room complaining of epigastric pain and decreased appetite  Patient was seen in the emergency room on July 30th had laboratory studies and a CT scan done for the same pain as she is having now  She was placed on Carafate she states is not helping she is still decreased appetite as well as discomfort and pain in the epigastric area  She denies any nausea or vomiting, she has no chest pain chest pressure shortness of breath  She denies any urinary difficulty dysuria pyuria frequency urgency  She does complain of constipation it is an intermittent problem she has not really had a bowel movement 2 days  Patient denies any fever chills  She denies any CVA tenderness  Patient notices no change in the pain associated with position or with food  Patient feels as if this problem started after they took her off her Xanax and placed her on clonazepam   The patient's daughter is present states that she has Dr Jasen Chapman number and was to call him tomorrow to set up an EGD  Prior to Admission Medications   Prescriptions Last Dose Informant Patient Reported? Taking?    Multiple Vitamin (MULTIVITAMIN) capsule  Self Yes No   Sig: Take 1 capsule by mouth daily   amLODIPine (NORVASC) 5 mg tablet   No No   Sig: Take 1 tablet (5 mg total) by mouth daily at bedtime   aspirin 81 mg chewable tablet  Self Yes No   Sig: Chew 81 mg daily   buPROPion (Wellbutrin XL) 150 mg 24 hr tablet   Yes No   Sig: Take 150 mg by mouth daily   cholecalciferol (VITAMIN D3) 1,000 units tablet  Self Yes No   Sig: Take 1,000 Units by mouth daily   clonazePAM (KlonoPIN) 0 5 mg tablet   No No   Sig: Take 0 5 tablets (0 25 mg total) by mouth 2 (two) times a day for 10 days Patient taking differently: 0 25 mg 2 (two) times a day as needed Take 1/2 tablet (0 25-mg) by oral route 2 times a day  escitalopram (LEXAPRO) 10 mg tablet   No No   Sig: Take 1 5 tablets (15 mg total) by mouth daily   Patient taking differently: Take 20 mg by mouth daily    gabapentin (NEURONTIN) 100 mg capsule   No No   Sig: Take 1 capsule (100 mg total) by mouth 2 (two) times a day   Patient not taking: Reported on 7/1/2020   lansoprazole (PREVACID) 30 mg capsule   No No   Sig: Take 1 capsule (30 mg total) by mouth daily   levothyroxine 50 mcg tablet   No No   Sig: take 1 tablet by mouth ON MONDAY, WEDNESDAY, AND FRIDAY   levothyroxine 75 mcg tablet   No No   Sig: Take 1 tablet (75 mcg total) by mouth 4 (four) times a week On Tuesdays, Thursdays and Saturdays and Sundays   losartan (COZAAR) 50 mg tablet   No No   Sig: Take 1 tablet (50 mg total) by mouth daily   melatonin 3 mg   No No   Sig: Take 1 tablet (3 mg total) by mouth daily at bedtime   Patient not taking: Reported on 7/1/2020   metoprolol succinate (TOPROL-XL) 25 mg 24 hr tablet   No No   Sig: Take 1 tablet (25 mg total) by mouth every 12 (twelve) hours   polyethylene glycol (MIRALAX) 17 g packet  Self Yes No   Sig: Take 17 g by mouth daily   prednisoLONE acetate (PRED FORTE) 1 % ophthalmic suspension   No No   Sig: Administer 1 drop to both eyes 2 (two) times a day   sucralfate (CARAFATE) 1 g/10 mL suspension   No No   Sig: Take 10 mL (1 g total) by mouth 4 (four) times a day      Facility-Administered Medications: None       Past Medical History:   Diagnosis Date    Anxiety     CAD (coronary artery disease)     Carotid Duplex 03/06/2018    Plaque formation was prominent bilaterally    Depression     Disease of thyroid gland     Diverticulitis     Dyslipidemia     ECHO 09/14/2017    EF 55%, mild mitral regurg, small pericardial effusion      Hypertension     Mitral regurgitation     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 infarction)     Shingles     Takotsubo cardiomyopathy        Past Surgical History:   Procedure Laterality Date    CARDIAC CATHETERIZATION  08/27/2012    EF 35%, 70% stenosis of diagonal, Otherwise all OK apart from myopathy    CARDIAC CATHETERIZATION  10/10/2012    EF 55%, no significant CAD  Mild stress induced cardiomyopathy    CATARACT EXTRACTION Bilateral     EYE SURGERY      bilateral cataract    TONSILLECTOMY         Family History   Problem Relation Age of Onset    Cancer Father     Prostate cancer Father      I have reviewed and agree with the history as documented  E-Cigarette/Vaping    E-Cigarette Use Never User      E-Cigarette/Vaping Substances    Nicotine No     THC No     CBD No     Flavoring No     Other No     Unknown No      Social History     Tobacco Use    Smoking status: Never Smoker    Smokeless tobacco: Never Used   Substance Use Topics    Alcohol use: Never     Frequency: Never    Drug use: Never       Review of Systems   Constitutional: Negative  Respiratory: Negative  Cardiovascular: Negative  Gastrointestinal: Positive for abdominal pain and constipation  Genitourinary: Negative  Musculoskeletal: Negative  Skin: Negative  Neurological: Negative  Hematological: Negative  Psychiatric/Behavioral: The patient is nervous/anxious  Physical Exam  Physical Exam  Vitals signs and nursing note reviewed  Exam conducted with a chaperone present  Constitutional:       General: She is not in acute distress  Appearance: She is well-developed  She is not ill-appearing, toxic-appearing or diaphoretic  HENT:      Head: Normocephalic and atraumatic  Cardiovascular:      Rate and Rhythm: Normal rate and regular rhythm  Pulmonary:      Effort: Pulmonary effort is normal       Breath sounds: Normal breath sounds  Abdominal:      General: Abdomen is flat  Bowel sounds are normal       Palpations: Abdomen is soft  Tenderness:  There is abdominal tenderness in the epigastric area  Hernia: No hernia is present  Skin:     General: Skin is warm and dry  Neurological:      Mental Status: She is alert  Psychiatric:         Mood and Affect: Mood is anxious  Vital Signs  ED Triage Vitals [08/02/20 2022]   Temp Pulse Respirations Blood Pressure SpO2   -- 62 18 (!) 200/92 97 %      Temp src Heart Rate Source Patient Position - Orthostatic VS BP Location FiO2 (%)   -- Monitor Lying Right arm --      Pain Score       --           Vitals:    08/02/20 2115 08/02/20 2130 08/02/20 2145 08/02/20 2200   BP: (!) 183/82 (!) 179/84  (!) 173/79   Pulse: 57 59 58 58   Patient Position - Orthostatic VS: Lying            Visual Acuity      ED Medications  Medications   sulfamethoxazole-trimethoprim (BACTRIM DS) 800-160 mg per tablet 1 tablet (1 tablet Oral Given 8/2/20 2222)   pantoprazole (PROTONIX) EC tablet 40 mg (40 mg Oral Given 8/2/20 2222)       Diagnostic Studies  Results Reviewed     Procedure Component Value Units Date/Time    Urine Microscopic [[id number]]  (Abnormal) Collected:  08/02/20 2126    Lab Status:  Final result Specimen:  Urine, Clean Catch Updated:  08/02/20 2144     RBC, UA 10-20 /hpf      WBC, UA 10-20 /hpf      Epithelial Cells Occasional /hpf      Bacteria, UA Occasional /hpf     Urine culture [[id number]] Collected:  08/02/20 2126    Lab Status:   In process Specimen:  Urine, Clean Catch Updated:  08/02/20 2144    UA w Reflex to Microscopic w Reflex to Culture [[id number]]  (Abnormal) Collected:  08/02/20 2126    Lab Status:  Final result Specimen:  Urine, Clean Catch Updated:  08/02/20 2133     Color, UA Yellow     Clarity, UA Clear     Specific Gravity, UA 1 015     pH, UA 6 0     Leukocytes, UA 2+     Nitrite, UA Negative     Protein, UA Negative mg/dl      Glucose, UA Negative mg/dl      Ketones, UA Negative mg/dl      Urobilinogen, UA 0 2 E U /dl      Bilirubin, UA Negative     Blood, UA 2+    Lipase [[id number]]  (Abnormal) Collected:  08/02/20 2104 Lab Status:  Final result Specimen:  Blood from Arm, Left Updated:  08/02/20 2128     Lipase 87 u/L     CMP [[id number]]  (Abnormal) Collected:  08/02/20 2104    Lab Status:  Final result Specimen:  Blood from Arm, Left Updated:  08/02/20 2127     Sodium 126 mmol/L      Potassium 3 7 mmol/L      Chloride 92 mmol/L      CO2 30 mmol/L      ANION GAP 4 mmol/L      BUN 14 mg/dL      Creatinine 0 84 mg/dL      Glucose 66 mg/dL      Calcium 9 3 mg/dL      AST 16 U/L      ALT 9 U/L      Alkaline Phosphatase 59 U/L      Total Protein 7 1 g/dL      Albumin 4 3 g/dL      Total Bilirubin 0 40 mg/dL      eGFR 64 ml/min/1 73sq m     Narrative:       [place] guidelines for Chronic Kidney Disease (CKD):     Stage 1 with normal or high GFR (GFR > 90 mL/min/1 73 square meters)    Stage 2 Mild CKD (GFR = 60-89 mL/min/1 73 square meters)    Stage 3A Moderate CKD (GFR = 45-59 mL/min/1 73 square meters)    Stage 3B Moderate CKD (GFR = 30-44 mL/min/1 73 square meters)    Stage 4 Severe CKD (GFR = 15-29 mL/min/1 73 square meters)    Stage 5 End Stage CKD (GFR <15 mL/min/1 73 square meters)  Note: GFR calculation is accurate only with a steady state creatinine    Troponin I [[id number]]  (Normal) Collected:  08/02/20 2104    Lab Status:  Final result Specimen:  Blood from Arm, Left Updated:  08/02/20 2126     Troponin I <0 03 ng/mL     CBC and differential [[id number]]  (Abnormal) Collected:  08/02/20 2104    Lab Status:  Final result Specimen:  Blood from Arm, Left Updated:  08/02/20 2110     WBC 6 50 Thousand/uL      RBC 3 95 Million/uL      Hemoglobin 12 8 g/dL      Hematocrit 36 8 %      MCV 93 fL      MCH 32 3 pg      MCHC 34 7 g/dL      RDW 13 3 %      MPV 6 6 fL      Platelets 408 Thousands/uL      Neutrophils Relative 64 %      Lymphocytes Relative 20 %      Monocytes Relative 14 %      Eosinophils Relative 1 %      Basophils Relative 1 %      Neutrophils Absolute 4 20 Thousands/µL      Lymphocytes Absolute 1 30 Thousands/µL      Monocytes Absolute 0 90 Thousand/µL      Eosinophils Absolute 0 10 Thousand/µL      Basophils Absolute 0 00 Thousands/µL                  No orders to display              Procedures  ECG 12 Lead Documentation Only    Date/Time: 8/2/2020 9:09 PM  Performed by: Sho Gustafson MD  Authorized by: Sho Gustafson MD     Indications / Diagnosis:  Abdominal pain ( epigastric)  ECG reviewed by me, the ED Provider: yes    Patient location:  ED  Interpretation:     Interpretation: abnormal    Rate:     ECG rate:  60    ECG rate assessment: normal    Rhythm:     Rhythm: sinus rhythm and A-V block    Ectopy:     Ectopy: none    QRS:     QRS axis:  Normal  Conduction:     Conduction: abnormal      Abnormal conduction: incomplete RBBB    ST segments:     ST segments:  Normal  T waves:     T waves: flattening and inverted      Flattening:  AVL, V1 and V2             ED Course  ED Course as of Aug 02 2245   Sun Aug 02, 2020   2243 Patient states she is feeling somewhat better at this time  Discussed her urinary findings as well as her epigastric symptoms  Will treat for urinary tract infection at this time and recommended following up with Dr Bradley Wood in the a   For possible scheduling of an EGD  US AUDIT      Most Recent Value   Initial Alcohol Screen: US AUDIT-C    1  How often do you have a drink containing alcohol?  0 Filed at: 08/02/2020 2008   2  How many drinks containing alcohol do you have on a typical day you are drinking? 0 Filed at: 08/02/2020 2008   3a  Male UNDER 65: How often do you have five or more drinks on one occasion? 0 Filed at: 08/02/2020 2008   3b  FEMALE Any Age, or MALE 65+: How often do you have 4 or more drinks on one occassion? 0 Filed at: 08/02/2020 2008   Audit-C Score  0 Filed at: 08/02/2020 2008                  ANEESH/DAST-10      Most Recent Value   How many times in the past year have you       Used an illegal drug or used a prescription medication for non-medical reasons? Never Filed at: 08/02/2020 2008                                MDM      Disposition  Final diagnoses:   UTI (urinary tract infection)   Gastritis     Time reflects when diagnosis was documented in both MDM as applicable and the Disposition within this note     Time User Action Codes Description Comment    8/2/2020 10:44 PM [place] Sick Add [N39 0] UTI (urinary tract infection)     8/2/2020 10:44 PM [place] Sick Add [K29 70] Gastritis       ED Disposition     ED Disposition Condition Date/Time Comment    Discharge Stable Sun Aug 2, 2020 10:43 PM Fabby Weathers discharge to home/self care  Follow-up Information    None         Patient's Medications   Discharge Prescriptions    SULFAMETHOXAZOLE-TRIMETHOPRIM (BACTRIM DS) 800-160 MG PER TABLET    Take 1 tablet by mouth 2 (two) times a day for 7 days smx-tmp DS (BACTRIM) 800-160 mg tabs (1tab q12 D10)       Start Date: 8/2/2020  End Date: 8/9/2020       Order Dose: 1 tablet       Quantity: 14 tablet    Refills: 0     No discharge procedures on file      PDMP Review       Value Time User    PDMP Reviewed  Yes 6/8/2020  9:46 AM Sherice dAame PA-C          ED Provider  Electronically Signed by           Yang Finley MD  08/02/20 8116       Yang Finley MD  08/02/20 1616

## 2020-08-03 NOTE — H&P (VIEW-ONLY)
History  Chief Complaint   Patient presents with    Abdominal Pain     burning abdominal pain x4 days  seen here for same   Chest Pain     started while in waiting room described as "discomfort"     70-year-old female accompanied by her daughter presents emergency room complaining of epigastric pain and decreased appetite  Patient was seen in the emergency room on July 30th had laboratory studies and a CT scan done for the same pain as she is having now  She was placed on Carafate she states is not helping she is still decreased appetite as well as discomfort and pain in the epigastric area  She denies any nausea or vomiting, she has no chest pain chest pressure shortness of breath  She denies any urinary difficulty dysuria pyuria frequency urgency  She does complain of constipation it is an intermittent problem she has not really had a bowel movement 2 days  Patient denies any fever chills  She denies any CVA tenderness  Patient notices no change in the pain associated with position or with food  Patient feels as if this problem started after they took her off her Xanax and placed her on clonazepam   The patient's daughter is present states that she has Dr Anette winchester and was to call him tomorrow to set up an EGD  Prior to Admission Medications   Prescriptions Last Dose Informant Patient Reported? Taking?    Multiple Vitamin (MULTIVITAMIN) capsule  Self Yes No   Sig: Take 1 capsule by mouth daily   amLODIPine (NORVASC) 5 mg tablet   No No   Sig: Take 1 tablet (5 mg total) by mouth daily at bedtime   aspirin 81 mg chewable tablet  Self Yes No   Sig: Chew 81 mg daily   buPROPion (Wellbutrin XL) 150 mg 24 hr tablet   Yes No   Sig: Take 150 mg by mouth daily   cholecalciferol (VITAMIN D3) 1,000 units tablet  Self Yes No   Sig: Take 1,000 Units by mouth daily   clonazePAM (KlonoPIN) 0 5 mg tablet   No No   Sig: Take 0 5 tablets (0 25 mg total) by mouth 2 (two) times a day for 10 days Patient taking differently: 0 25 mg 2 (two) times a day as needed Take 1/2 tablet (0 25-mg) by oral route 2 times a day  escitalopram (LEXAPRO) 10 mg tablet   No No   Sig: Take 1 5 tablets (15 mg total) by mouth daily   Patient taking differently: Take 20 mg by mouth daily    gabapentin (NEURONTIN) 100 mg capsule   No No   Sig: Take 1 capsule (100 mg total) by mouth 2 (two) times a day   Patient not taking: Reported on 7/1/2020   lansoprazole (PREVACID) 30 mg capsule   No No   Sig: Take 1 capsule (30 mg total) by mouth daily   levothyroxine 50 mcg tablet   No No   Sig: take 1 tablet by mouth ON MONDAY, WEDNESDAY, AND FRIDAY   levothyroxine 75 mcg tablet   No No   Sig: Take 1 tablet (75 mcg total) by mouth 4 (four) times a week On Tuesdays, Thursdays and Saturdays and Sundays   losartan (COZAAR) 50 mg tablet   No No   Sig: Take 1 tablet (50 mg total) by mouth daily   melatonin 3 mg   No No   Sig: Take 1 tablet (3 mg total) by mouth daily at bedtime   Patient not taking: Reported on 7/1/2020   metoprolol succinate (TOPROL-XL) 25 mg 24 hr tablet   No No   Sig: Take 1 tablet (25 mg total) by mouth every 12 (twelve) hours   polyethylene glycol (MIRALAX) 17 g packet  Self Yes No   Sig: Take 17 g by mouth daily   prednisoLONE acetate (PRED FORTE) 1 % ophthalmic suspension   No No   Sig: Administer 1 drop to both eyes 2 (two) times a day   sucralfate (CARAFATE) 1 g/10 mL suspension   No No   Sig: Take 10 mL (1 g total) by mouth 4 (four) times a day      Facility-Administered Medications: None       Past Medical History:   Diagnosis Date    Anxiety     CAD (coronary artery disease)     Carotid Duplex 03/06/2018    Plaque formation was prominent bilaterally    Depression     Disease of thyroid gland     Diverticulitis     Dyslipidemia     ECHO 09/14/2017    EF 55%, mild mitral regurg, small pericardial effusion      Hypertension     Mitral regurgitation     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 infarction)     Shingles     Takotsubo cardiomyopathy        Past Surgical History:   Procedure Laterality Date    CARDIAC CATHETERIZATION  08/27/2012    EF 35%, 70% stenosis of diagonal, Otherwise all OK apart from myopathy    CARDIAC CATHETERIZATION  10/10/2012    EF 55%, no significant CAD  Mild stress induced cardiomyopathy    CATARACT EXTRACTION Bilateral     EYE SURGERY      bilateral cataract    TONSILLECTOMY         Family History   Problem Relation Age of Onset    Cancer Father     Prostate cancer Father      I have reviewed and agree with the history as documented  E-Cigarette/Vaping    E-Cigarette Use Never User      E-Cigarette/Vaping Substances    Nicotine No     THC No     CBD No     Flavoring No     Other No     Unknown No      Social History     Tobacco Use    Smoking status: Never Smoker    Smokeless tobacco: Never Used   Substance Use Topics    Alcohol use: Never     Frequency: Never    Drug use: Never       Review of Systems   Constitutional: Negative  Respiratory: Negative  Cardiovascular: Negative  Gastrointestinal: Positive for abdominal pain and constipation  Genitourinary: Negative  Musculoskeletal: Negative  Skin: Negative  Neurological: Negative  Hematological: Negative  Psychiatric/Behavioral: The patient is nervous/anxious  Physical Exam  Physical Exam  Vitals signs and nursing note reviewed  Exam conducted with a chaperone present  Constitutional:       General: She is not in acute distress  Appearance: She is well-developed  She is not ill-appearing, toxic-appearing or diaphoretic  HENT:      Head: Normocephalic and atraumatic  Cardiovascular:      Rate and Rhythm: Normal rate and regular rhythm  Pulmonary:      Effort: Pulmonary effort is normal       Breath sounds: Normal breath sounds  Abdominal:      General: Abdomen is flat  Bowel sounds are normal       Palpations: Abdomen is soft  Tenderness:  There is abdominal tenderness in the epigastric area  Hernia: No hernia is present  Skin:     General: Skin is warm and dry  Neurological:      Mental Status: She is alert  Psychiatric:         Mood and Affect: Mood is anxious  Vital Signs  ED Triage Vitals [08/02/20 2022]   Temp Pulse Respirations Blood Pressure SpO2   -- 62 18 (!) 200/92 97 %      Temp src Heart Rate Source Patient Position - Orthostatic VS BP Location FiO2 (%)   -- Monitor Lying Right arm --      Pain Score       --           Vitals:    08/02/20 2115 08/02/20 2130 08/02/20 2145 08/02/20 2200   BP: (!) 183/82 (!) 179/84  (!) 173/79   Pulse: 57 59 58 58   Patient Position - Orthostatic VS: Lying            Visual Acuity      ED Medications  Medications   sulfamethoxazole-trimethoprim (BACTRIM DS) 800-160 mg per tablet 1 tablet (1 tablet Oral Given 8/2/20 2222)   pantoprazole (PROTONIX) EC tablet 40 mg (40 mg Oral Given 8/2/20 2222)       Diagnostic Studies  Results Reviewed     Procedure Component Value Units Date/Time    Urine Microscopic [[id number]]  (Abnormal) Collected:  08/02/20 2126    Lab Status:  Final result Specimen:  Urine, Clean Catch Updated:  08/02/20 2144     RBC, UA 10-20 /hpf      WBC, UA 10-20 /hpf      Epithelial Cells Occasional /hpf      Bacteria, UA Occasional /hpf     Urine culture [[id number]] Collected:  08/02/20 2126    Lab Status:   In process Specimen:  Urine, Clean Catch Updated:  08/02/20 2144    UA w Reflex to Microscopic w Reflex to Culture [[id number]]  (Abnormal) Collected:  08/02/20 2126    Lab Status:  Final result Specimen:  Urine, Clean Catch Updated:  08/02/20 2133     Color, UA Yellow     Clarity, UA Clear     Specific Gravity, UA 1 015     pH, UA 6 0     Leukocytes, UA 2+     Nitrite, UA Negative     Protein, UA Negative mg/dl      Glucose, UA Negative mg/dl      Ketones, UA Negative mg/dl      Urobilinogen, UA 0 2 E U /dl      Bilirubin, UA Negative     Blood, UA 2+    Lipase [[id number]]  (Abnormal) Collected:  08/02/20 2104 Lab Status:  Final result Specimen:  Blood from Arm, Left Updated:  08/02/20 2128     Lipase 87 u/L     CMP [[id number]]  (Abnormal) Collected:  08/02/20 2104    Lab Status:  Final result Specimen:  Blood from Arm, Left Updated:  08/02/20 2127     Sodium 126 mmol/L      Potassium 3 7 mmol/L      Chloride 92 mmol/L      CO2 30 mmol/L      ANION GAP 4 mmol/L      BUN 14 mg/dL      Creatinine 0 84 mg/dL      Glucose 66 mg/dL      Calcium 9 3 mg/dL      AST 16 U/L      ALT 9 U/L      Alkaline Phosphatase 59 U/L      Total Protein 7 1 g/dL      Albumin 4 3 g/dL      Total Bilirubin 0 40 mg/dL      eGFR 64 ml/min/1 73sq m     Narrative:       [place] guidelines for Chronic Kidney Disease (CKD):     Stage 1 with normal or high GFR (GFR > 90 mL/min/1 73 square meters)    Stage 2 Mild CKD (GFR = 60-89 mL/min/1 73 square meters)    Stage 3A Moderate CKD (GFR = 45-59 mL/min/1 73 square meters)    Stage 3B Moderate CKD (GFR = 30-44 mL/min/1 73 square meters)    Stage 4 Severe CKD (GFR = 15-29 mL/min/1 73 square meters)    Stage 5 End Stage CKD (GFR <15 mL/min/1 73 square meters)  Note: GFR calculation is accurate only with a steady state creatinine    Troponin I [[id number]]  (Normal) Collected:  08/02/20 2104    Lab Status:  Final result Specimen:  Blood from Arm, Left Updated:  08/02/20 2126     Troponin I <0 03 ng/mL     CBC and differential [[id number]]  (Abnormal) Collected:  08/02/20 2104    Lab Status:  Final result Specimen:  Blood from Arm, Left Updated:  08/02/20 2110     WBC 6 50 Thousand/uL      RBC 3 95 Million/uL      Hemoglobin 12 8 g/dL      Hematocrit 36 8 %      MCV 93 fL      MCH 32 3 pg      MCHC 34 7 g/dL      RDW 13 3 %      MPV 6 6 fL      Platelets 297 Thousands/uL      Neutrophils Relative 64 %      Lymphocytes Relative 20 %      Monocytes Relative 14 %      Eosinophils Relative 1 %      Basophils Relative 1 %      Neutrophils Absolute 4 20 Thousands/µL      Lymphocytes Absolute 1 30 Thousands/µL      Monocytes Absolute 0 90 Thousand/µL      Eosinophils Absolute 0 10 Thousand/µL      Basophils Absolute 0 00 Thousands/µL                  No orders to display              Procedures  ECG 12 Lead Documentation Only    Date/Time: 8/2/2020 9:09 PM  Performed by: Sunday Ward MD  Authorized by: Sunday Ward MD     Indications / Diagnosis:  Abdominal pain ( epigastric)  ECG reviewed by me, the ED Provider: yes    Patient location:  ED  Interpretation:     Interpretation: abnormal    Rate:     ECG rate:  60    ECG rate assessment: normal    Rhythm:     Rhythm: sinus rhythm and A-V block    Ectopy:     Ectopy: none    QRS:     QRS axis:  Normal  Conduction:     Conduction: abnormal      Abnormal conduction: incomplete RBBB    ST segments:     ST segments:  Normal  T waves:     T waves: flattening and inverted      Flattening:  AVL, V1 and V2             ED Course  ED Course as of Aug 02 2245   Sun Aug 02, 2020   2243 Patient states she is feeling somewhat better at this time  Discussed her urinary findings as well as her epigastric symptoms  Will treat for urinary tract infection at this time and recommended following up with Dr Heriberto Hanson in the a   For possible scheduling of an EGD  US AUDIT      Most Recent Value   Initial Alcohol Screen: US AUDIT-C    1  How often do you have a drink containing alcohol?  0 Filed at: 08/02/2020 2008   2  How many drinks containing alcohol do you have on a typical day you are drinking? 0 Filed at: 08/02/2020 2008   3a  Male UNDER 65: How often do you have five or more drinks on one occasion? 0 Filed at: 08/02/2020 2008   3b  FEMALE Any Age, or MALE 65+: How often do you have 4 or more drinks on one occassion? 0 Filed at: 08/02/2020 2008   Audit-C Score  0 Filed at: 08/02/2020 2008                  ANEESH/DAST-10      Most Recent Value   How many times in the past year have you       Used an illegal drug or used a prescription medication for non-medical reasons? Never Filed at: 08/02/2020 2008                                MDM      Disposition  Final diagnoses:   UTI (urinary tract infection)   Gastritis     Time reflects when diagnosis was documented in both MDM as applicable and the Disposition within this note     Time User Action Codes Description Comment    8/2/2020 10:44 PM Neva Cameron [N39 [id number]] UTI (urinary tract infection)     8/2/2020 10:44 PM Neva Cameron [K29 [id number]] Gastritis       ED Disposition     ED Disposition Condition Date/Time Comment    Discharge Stable Sun Aug 2, 2020 10:43 PM Oswaldo Badillo discharge to home/self care  Follow-up Information    None         Patient's Medications   Discharge Prescriptions    SULFAMETHOXAZOLE-TRIMETHOPRIM (BACTRIM DS) 800-160 MG PER TABLET    Take 1 tablet by mouth 2 (two) times a day for 7 days smx-tmp DS (BACTRIM) 800-160 mg tabs (1tab q12 D10)       Start Date: 8/2/2020  End Date: 8/9/2020       Order Dose: 1 tablet       Quantity: 14 tablet    Refills: 0     No discharge procedures on file      PDMP Review       Value Time User    PDMP Reviewed  Yes 6/8/2020  9:46 AM Priyanka Tejada PA-C          ED Provider  Electronically Signed by           Sunday Ward MD  08/02/20 4773       Sunday Ward MD  08/02/20 [id number]

## 2020-08-04 LAB — BACTERIA UR CULT: NORMAL

## 2020-08-05 ENCOUNTER — ANESTHESIA EVENT (OUTPATIENT)
Dept: GASTROENTEROLOGY | Facility: HOSPITAL | Age: 85
End: 2020-08-05

## 2020-08-05 DIAGNOSIS — E03.9 ACQUIRED HYPOTHYROIDISM: Chronic | ICD-10-CM

## 2020-08-05 RX ORDER — LEVOTHYROXINE SODIUM 0.05 MG/1
TABLET ORAL
Qty: 12 TABLET | Refills: 0 | OUTPATIENT
Start: 2020-08-05

## 2020-08-06 ENCOUNTER — ANESTHESIA (OUTPATIENT)
Dept: GASTROENTEROLOGY | Facility: HOSPITAL | Age: 85
End: 2020-08-06

## 2020-08-06 ENCOUNTER — HOSPITAL ENCOUNTER (OUTPATIENT)
Dept: GASTROENTEROLOGY | Facility: HOSPITAL | Age: 85
Setting detail: OUTPATIENT SURGERY
Discharge: HOME | End: 2020-08-06
Attending: INTERNAL MEDICINE
Payer: COMMERCIAL

## 2020-08-06 VITALS
DIASTOLIC BLOOD PRESSURE: 78 MMHG | SYSTOLIC BLOOD PRESSURE: 166 MMHG | RESPIRATION RATE: 16 BRPM | HEIGHT: 62 IN | HEART RATE: 77 BPM | WEIGHT: 116 LBS | OXYGEN SATURATION: 92 % | BODY MASS INDEX: 21.35 KG/M2 | TEMPERATURE: 97.3 F

## 2020-08-06 DIAGNOSIS — R10.13 EPIGASTRIC PAIN: ICD-10-CM

## 2020-08-06 DIAGNOSIS — R68.81 EARLY SATIETY: ICD-10-CM

## 2020-08-06 DIAGNOSIS — R63.4 ABNORMAL WEIGHT LOSS: ICD-10-CM

## 2020-08-06 DIAGNOSIS — K59.00 CONSTIPATION, UNSPECIFIED: ICD-10-CM

## 2020-08-06 PROCEDURE — 88305 TISSUE EXAM BY PATHOLOGIST: CPT | Performed by: PATHOLOGY

## 2020-08-06 RX ORDER — LIDOCAINE HYDROCHLORIDE 20 MG/ML
INJECTION, SOLUTION EPIDURAL; INFILTRATION; INTRACAUDAL; PERINEURAL AS NEEDED
Status: DISCONTINUED | OUTPATIENT
Start: 2020-08-06 | End: 2020-08-06

## 2020-08-06 RX ORDER — PROPOFOL 10 MG/ML
INJECTION, EMULSION INTRAVENOUS AS NEEDED
Status: DISCONTINUED | OUTPATIENT
Start: 2020-08-06 | End: 2020-08-06

## 2020-08-06 RX ORDER — SODIUM CHLORIDE 9 MG/ML
75 INJECTION, SOLUTION INTRAVENOUS CONTINUOUS
Status: DISCONTINUED | OUTPATIENT
Start: 2020-08-06 | End: 2020-08-10 | Stop reason: HOSPADM

## 2020-08-06 RX ORDER — SODIUM CHLORIDE, SODIUM LACTATE, POTASSIUM CHLORIDE, CALCIUM CHLORIDE 600; 310; 30; 20 MG/100ML; MG/100ML; MG/100ML; MG/100ML
125 INJECTION, SOLUTION INTRAVENOUS CONTINUOUS
Status: CANCELLED | OUTPATIENT
Start: 2020-08-06

## 2020-08-06 RX ADMIN — PROPOFOL 30 MG: 10 INJECTION, EMULSION INTRAVENOUS at 12:20

## 2020-08-06 RX ADMIN — SODIUM CHLORIDE 75 ML/HR: 0.9 INJECTION, SOLUTION INTRAVENOUS at 10:16

## 2020-08-06 RX ADMIN — LIDOCAINE HYDROCHLORIDE 100 MG: 20 INJECTION, SOLUTION EPIDURAL; INFILTRATION; INTRACAUDAL; PERINEURAL at 12:16

## 2020-08-06 RX ADMIN — PROPOFOL 50 MG: 10 INJECTION, EMULSION INTRAVENOUS at 12:16

## 2020-08-06 NOTE — INTERVAL H&P NOTE
H&P reviewed  After examining the patient I find no changes in the patients condition since the H&P had been written      Vitals:    08/06/20 1003   BP: (!) 204/88   Pulse: 61   Resp: 18   Temp: 98 6 °F (37 °C)   SpO2: 98%

## 2020-08-06 NOTE — ANESTHESIA POSTPROCEDURE EVALUATION
Post-Op Assessment Note    CV Status:  Stable  Pain Score: 0    Pain management: adequate     Mental Status:  Alert and awake   Hydration Status:  Euvolemic   PONV Controlled:  Controlled   Airway Patency:  Patent      Post Op Vitals Reviewed: Yes      Staff: CRNA         No complications documented      BP  178/75   Temp      Pulse  59   Resp   16   SpO2   98

## 2020-08-06 NOTE — ANESTHESIA PREPROCEDURE EVALUATION
Procedure:  EGD    Relevant Problems   ANESTHESIA (within normal limits)      CARDIO  Hx of takotsubo's CMP in past  Carotid stenosis  NM Stress test 3/2020 with no perfusion defects   (+) Chest pain   (+) Essential hypertension (poorly controlled HTN at baseline and has labile BPs that seem to fluctuate greatly with pain/anxiety, recently saw cardiology 7/1/2020 and advised to cut dose of metop versus take additional 1/2 tab losartan depending on AM BP (see note from Dr Humaira Najera dated 7/1/2020))   (+) Hypertensive emergency   (+) Hypertensive urgency   (+) Type 2 MI (myocardial infarction) ([address] )   (-) Angina of effort ([address] )      ENDO   (+) Acquired hypothyroidism      GI/HEPATIC  Epigastric pain  Confirmed NPO appropriate      /RENAL (within normal limits)      GYN (within normal limits)      MUSCULOSKELETAL (within normal limits)      NEURO/PSYCH   (+) Anxiety   (+) Depression   (+) IFEANYI (generalized anxiety disorder)   (+) Severe episode of recurrent major depressive disorder, without psychotic features ([address] )      PULMONARY (within normal limits)      Other   (+) Hyponatremia   (+) Takotsubo cardiomyopathy        Physical Exam    Airway    Mallampati score: II  TM Distance: >3 FB  Neck ROM: full     Dental   Comment: edentulous,     Cardiovascular  Rhythm: regular, Rate: normal,     Pulmonary  Breath sounds clear to auscultation,     Other Findings        Anesthesia Plan  ASA Score- 3     Anesthesia Type- IV sedation with anesthesia with ASA Monitors  Additional Monitors:   Airway Plan:     Comment: I discussed the risks and benefits of IV sedation anesthesia including the possibility of the need to convert to general anesthesia and the potential risk of awareness  The patient was given the opportunity to ask questions, which were answered          Plan Factors-    Chart reviewed  EKG reviewed  Existing labs reviewed  Patient is not a current smoker  Induction- intravenous      Postoperative Plan-     Informed Consent- Anesthetic plan and risks discussed with patient  I personally reviewed this patient with the CRNA  Discussed and agreed on the Anesthesia Plan with the CRNA  Zeny Grande

## 2020-08-27 ENCOUNTER — TELEPHONE (OUTPATIENT)
Dept: CARDIOLOGY CLINIC | Facility: CLINIC | Age: 85
End: 2020-08-27

## 2020-08-27 NOTE — TELEPHONE ENCOUNTER
Patient called stating she "just does not feel right anymore" she is having more fatique lately and her blood pressure is 111/64 with a pulse of 64 this morning before taking her pills    She stated her PCP told her to call us to let us know  Patient is not dizzy she just feels like it "knocks her down" and she does not have the energy anymore  Blood pressure was 114/69 and pulse is 62 at 4pm  Any new recs for her?

## 2020-08-28 NOTE — TELEPHONE ENCOUNTER
called patient with totoa recs to cut metoprolol in 1/2 BID  Patient will monitor BP and pulses and let us know if any concerning numbers

## 2020-09-14 DIAGNOSIS — I10 ESSENTIAL HYPERTENSION: Chronic | ICD-10-CM

## 2020-09-14 RX ORDER — AMLODIPINE BESYLATE 5 MG/1
5 TABLET ORAL
Qty: 90 TABLET | Refills: 3 | Status: SHIPPED | OUTPATIENT
Start: 2020-09-14 | End: 2020-12-23 | Stop reason: SDUPTHER

## 2020-10-13 ENCOUNTER — OFFICE VISIT (OUTPATIENT)
Dept: CARDIOLOGY CLINIC | Facility: CLINIC | Age: 85
End: 2020-10-13
Payer: COMMERCIAL

## 2020-10-13 VITALS
BODY MASS INDEX: 22.08 KG/M2 | WEIGHT: 120 LBS | SYSTOLIC BLOOD PRESSURE: 126 MMHG | DIASTOLIC BLOOD PRESSURE: 60 MMHG | HEART RATE: 76 BPM | HEIGHT: 62 IN

## 2020-10-13 DIAGNOSIS — F32.A ANXIETY AND DEPRESSION: ICD-10-CM

## 2020-10-13 DIAGNOSIS — I10 ESSENTIAL HYPERTENSION: Primary | ICD-10-CM

## 2020-10-13 DIAGNOSIS — Z86.79 H/O CARDIOMYOPATHY: ICD-10-CM

## 2020-10-13 DIAGNOSIS — F41.9 ANXIETY AND DEPRESSION: ICD-10-CM

## 2020-10-13 DIAGNOSIS — E03.9 HYPOTHYROIDISM: ICD-10-CM

## 2020-10-13 PROCEDURE — 99213 OFFICE O/P EST LOW 20 MIN: CPT | Performed by: INTERNAL MEDICINE

## 2020-11-27 NOTE — PATIENT INSTRUCTIONS
Problem: Skin Integrity:  Goal: Will show no infection signs and symptoms  Description: Will show no infection signs and symptoms  Outcome: Ongoing     Problem: Falls - Risk of:  Goal: Will remain free from falls  Description: Will remain free from falls  Outcome: Ongoing     Problem: DAILY CARE  Goal: Daily care needs are met  Outcome: Ongoing     Problem: PAIN  Goal: Patient's pain/discomfort is manageable  Outcome: Ongoing     Problem: KNOWLEDGE DEFICIT  Goal: Patient/S.O. demonstrates understanding of disease process, treatment plan, medications, and discharge instructions.   Outcome: Ongoing Your sore throat appears viral   The rapid strep was negative, and we will send the other swab for a culture  If it is positive, we will call you  Drink warm tea with honey, gargle salt water, drink cool beverages and use over the counter medications for pain  Take the medrol dose pack (steroid) as ordered until completed  This will help decrease some of the pain and swelling in your throat  Pharyngitis, Ambulatory Care   GENERAL INFORMATION:   Pharyngitis  is swelling of the inside of your throat, called the pharynx  Pharyngitis is often caused by a cold or flu virus  It may also be caused by bacteria such as strep  Other causes include smoking, a runny nose, allergies, or acid reflux  Common symptoms include the following:   · Sore throat or pain when you swallow    · Fever, chills, and body aches    · Hoarse or raspy voice    · Cough, runny or stuffy nose, itchy or watery eyes    · Headache    · Upset stomach and loss of appetite    · Mild neck stiffness    · Swollen glands that feel like hard lumps when you touch your neck    · White and yellow pus-filled blisters in the back of your throat  Seek immediate care for the following symptoms:   · A painful lump in your throat that does not go away after 5 days    · Blood in your throat or ear    · Fever higher than 102? F (39?C) or lasts longer than 3 days    · Confusion    · Trouble breathing or swallowing  Treatment for pharyngitis  may include antibiotics if your sore throat is caused by bacteria  Viral pharyngitis will go away on its own without treatment  Your sore throat should start to feel better within 3 to 5 days for both viral and bacterial infections  Pain medicine may also be given to decrease your sore throat pain  Manage your symptoms:   · Gargle with salt water  to help reduce swelling in your throat  Mix ¼ teaspoon salt with 1 cup of warm water and gargle  · Drink more liquids  to help prevent dehydration   Cold or warm drinks may help soothe your throat  · Humidify your room  with a cool-steam humidifier to help moisten the air in your room and calm your cough  · Soothe your throat  with cough drops, ice, soft foods, or popsicles  · Rest your throat as much as possible  Try not to use your voice to decrease throat irritation  Prevent the spread of germs  by washing your hands with soap and warm water  Pharyngitis spreads easily from one person to another  Do not share food or drinks  Follow up with your healthcare provider as directed:  Write down your questions so you remember to ask them during your visits  CARE AGREEMENT:   You have the right to help plan your care  Learn about your health condition and how it may be treated  Discuss treatment options with your caregivers to decide what care you want to receive  You always have the right to refuse treatment  The above information is an  only  It is not intended as medical advice for individual conditions or treatments  Talk to your doctor, nurse or pharmacist before following any medical regimen to see if it is safe and effective for you  © 2014 [address] is for End User's use only and may not be sold, redistributed or otherwise used for commercial purposes  All illustrations and images included in CareNotes® are the copyrighted property of A [employer]  or Jitendra Love

## 2020-12-04 DIAGNOSIS — E87.1 HYPONATREMIA: ICD-10-CM

## 2020-12-04 RX ORDER — LOSARTAN POTASSIUM 50 MG/1
50 TABLET ORAL DAILY
Qty: 90 TABLET | Refills: 1 | Status: SHIPPED | OUTPATIENT
Start: 2020-12-04 | End: 2021-03-08 | Stop reason: SDUPTHER

## 2020-12-23 DIAGNOSIS — I10 ESSENTIAL HYPERTENSION: Chronic | ICD-10-CM

## 2020-12-23 RX ORDER — AMLODIPINE BESYLATE 5 MG/1
5 TABLET ORAL
Qty: 90 TABLET | Refills: 3 | Status: SHIPPED | OUTPATIENT
Start: 2020-12-23 | End: 2021-12-13 | Stop reason: SDUPTHER

## 2021-01-21 ENCOUNTER — OFFICE VISIT (OUTPATIENT)
Dept: CARDIOLOGY CLINIC | Facility: CLINIC | Age: 86
End: 2021-01-21
Payer: COMMERCIAL

## 2021-01-21 VITALS
SYSTOLIC BLOOD PRESSURE: 132 MMHG | HEIGHT: 62 IN | BODY MASS INDEX: 21.91 KG/M2 | WEIGHT: 119.05 LBS | DIASTOLIC BLOOD PRESSURE: 64 MMHG | HEART RATE: 64 BPM

## 2021-01-21 DIAGNOSIS — F41.9 ANXIETY: ICD-10-CM

## 2021-01-21 DIAGNOSIS — I51.81 TAKOTSUBO CARDIOMYOPATHY: ICD-10-CM

## 2021-01-21 DIAGNOSIS — I10 ESSENTIAL HYPERTENSION: Primary | ICD-10-CM

## 2021-01-21 DIAGNOSIS — E03.9 HYPOTHYROIDISM: ICD-10-CM

## 2021-01-21 DIAGNOSIS — E87.1 HYPONATREMIA: ICD-10-CM

## 2021-01-21 PROCEDURE — 99213 OFFICE O/P EST LOW 20 MIN: CPT | Performed by: INTERNAL MEDICINE

## 2021-01-21 RX ORDER — METOPROLOL SUCCINATE 25 MG/1
12.5 TABLET, EXTENDED RELEASE ORAL EVERY 12 HOURS
Qty: 30 TABLET | Refills: 0
Start: 2021-01-21 | End: 2022-02-14 | Stop reason: HOSPADM

## 2021-01-21 RX ORDER — VALACYCLOVIR HYDROCHLORIDE 500 MG/1
500 TABLET, FILM COATED ORAL DAILY
COMMUNITY
Start: 2020-10-30

## 2021-01-21 NOTE — PROGRESS NOTES
[facility] [place], [address]    [place], [address]   [phone]                                              Cardiology Office Follow Up  Malia Childers, 80 y o  female  YOB: 1935  MRN: [id number] Encounter: [id number]      PCP - Kyle Nolan MD    Assessment and Plan  1  Hypertension  2  H/o takotsubo cardiomyopathy  · Troponin peak 1 22  · Nuclear stress negative for ischemia in 3/2020  · Echo - 8/2015 - ([facility]) - LVEF was down to 25% with suggestions of takotsubo cardiomyopathy  · [facility] - [facility]) - 70% stenosis of diag, rest normal, LVEF 35%  3  H/o Syncope (11/2019)  · Event monitor - 12/2019 - NSR, no Afib, occasional PACs & PVCs, 1st degree AV block, asymptomatic 2 3 second pauses, lowest HR 38 bpm  · Echo - 11/2019 ([facility]) - LVEF 60%, mild TR/MR/AI  4  Hypothyroidism  5  Anxiety-depression    Plan  Hypertension  · Initially had a lot of fluctuations in blood pressure, and her anxiety was making things worse for her with frequent checking of blood pressure   · She has currently away with checking her blood pressure at home and has been keeping herself occupied with other activities and has been doing well overall  · Current regimen:  · Morning: Losartan 50 mg, metoprolol XL 12 5 mg  · Evening: Amlodipine 5 mg, metoprolol XL 12 5 mg  · She states her night metoprolol dose makes her fall asleep  · Continue    H/o takotsubo cardiomyopathy, Type 2 MI  · Currently no symptoms/chest pain  · Nuclear stress 3/2020 - normal LV function, no significant perfusion defects  · No complains of chest pain or shortness of breath   · Continue aspirin, metoprolol succinate 12 5 mg bid, losartan 50 mg    ? PAD  · She recently had her annual [employer] home nurse visit for an annual checkup and was reportedly diagnosed with severe PAD and asked to follow-up  · Unclear if she was truly diagnosed with PAD or she might have misunderstood  · She does not report any active complains of claudication and is able to walk around on level ground while shopping without any symptoms   · I have asked her to bring the paperwork that was provided to her regarding the same  · Further testing considerations based on review of same    Hypothyroidism  · Previously was missing medications due to trying to avoid taking other medications with levothyroxine  · TSH 3 84 (6/16/2020)  · Continue current levothyroxine dosing and follow up with PCP    Hyponatremia  · Her sodium was down to 120 t i d  to 126 in July August 2020, but has improved to 135 in September 2020  · Not on any diuretics currently  · Monitor    Return in about 4 months (around 5/21/2021), or if symptoms worsen or fail to improve  History of Present Illness   80-year-old female comes in for short term follow up evaluation of hypertension  She has longstanding history of hypertension, and has been losartan and metoprolol for many years  Recently her blood pressure has been intermittently very elevated to 781-412 systolic  As a result, her losartan was increased from 25-50 mg weeks ago  But despite that her blood pressure seems to have remained elevated intermittently  Per patient, her blood pressure is usually well controlled during the day in the range of 130s  But her blood pressure is usually elevated in the middle of the night  A couple of days ago around 2:00 a m  She woke up and had a blood pressure of 210  She was recently and diagnosed and treated for diverticulitis  Since then she has continued to have some abdominal discomfort  She was scheduled to undergo an EGD for the same  Mom but when she presented for the same, she was found to have a blood pressure of 305 systolic, and as a result this was canceled  Next a again she went to the ED with elevated blood pressures  She was given a dose of clonidine 0 2 mg, and discharged home with follow-up with Cardiology      Since and getting the clonidine 0 2 mg, she has been feeling lightheaded and dizzy, and particularly with standing up  Interval History: 7/31/19  Has been doing relatively well  Continues to have a lot of anxiety  She continues to check her BP 2-3 times/day, and has been keeping a log  Her dizziness improved the same day after the effects of clonidine wore off  Interval history - 11/4/19  She comes back for 2 month follow-up of her blood pressure  No more symptoms of dizziness or lightheadedness after having been off clonidine  She continues to be very anxious about her blood pressure, and addressed to make adjustments on her own based on these blood pressure readings, and also forgets to take blood pressure medications at times due to trying to avoid taking it along with levothyroxine  She had called the office about 10 days ago stating that her blood pressure was 108, and she felt it was too low and as a result was not taking her blood pressure medications  But after that her blood pressure went in the 180s, and as a result she started taking it again    Interval history - 2/19/2020  Since my last follow up visit, she had an episode of syncope on black Friday for which she followed up with Dr Brooke Feliciano, and was ordered an event monitor  This did not reveal any significant findings  Subsequently about a week ago, and she ended up in the hospital with complains of abdominal pain and fullness after eating some fried sweet potatoes and fish for dinner  She was unable to sleep as a result and got very anxious and her blood pressure was very elevated in the 200s  As a result she was brought into the ED for evaluation  Her symptoms resolved after M[place]  But during this she was found to have troponin elevation to 1, and Cardiology was consulted  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, related to her elevated blood pressures  Her medications were adjusted and she was set up for outpatient follow-up with me here today    She reports no further symptoms since discharge, and is currently feeling well, although continues to be very anxious about her blood pressure  Interval history - 7/1/2020  She comes back in to the office for an early visit due to again ongoing blood pressure fluctuations  Her medication regimen has been fairly stable over the past 2 months with losartan amlodipine and metoprolol  But at home, she tries to adjust medications based on her blood pressure and her perception of high blood pressure  She continues to be very anxious despite her ten-day psychiatric rehab  She states that she feels lonely at times in the pandemic has made her worry a lot regarding things like not having done enough for her mother 15 years ago, when she passed away  Interval history - 10/13/2020  He comes back for follow-up after 3 months  She has been doing fairly well recently, and does not have any active complains today  Admits to having significant anxiety and depression, and has been maintained on Lexapro, with which she is doing better  Interval history - 1/21/2021  She comes back for follow-up after about 3 months  She has been doing well overall and is happy with her current status  She remains reasonably active and  Is able to walk on level ground while shopping and does not report any symptoms with the same  Her granddaughter comes to visit her every day for lunch, which she enjoys as well  Historical Information   Past Medical History:   Diagnosis Date    Anxiety     CAD (coronary artery disease)     Carotid Duplex 03/06/2018    Plaque formation was prominent bilaterally    Depression     Disease of thyroid gland     Diverticulitis     Dyslipidemia     ECHO 09/14/2017    EF 55%, mild mitral regurg, small pericardial effusion      Hypertension     Mitral regurgitation     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 infarction)     Shingles     Takotsubo cardiomyopathy      Past Surgical History:   Procedure Laterality Date    CARDIAC CATHETERIZATION  08/27/2012    EF 35%, 70% stenosis of diagonal, Otherwise all OK apart from myopathy    CARDIAC CATHETERIZATION  10/10/2012    EF 55%, no significant CAD    Mild stress induced cardiomyopathy    CATARACT EXTRACTION Bilateral     EYE SURGERY      bilateral cataract    TONSILLECTOMY  childhood     Family History   Problem Relation Age of Onset    Cancer Father     Prostate cancer Father      Current Outpatient Medications on File Prior to Visit   Medication Sig Dispense Refill    amLODIPine (NORVASC) 5 mg tablet Take 1 tablet (5 mg total) by mouth daily at bedtime 90 tablet 3    aspirin 81 mg chewable tablet Chew 81 mg daily      cholecalciferol (VITAMIN D3) 1,000 units tablet Take 1,000 Units by mouth daily      clonazePAM (KlonoPIN) 0 5 mg tablet Take 0 5 tablets (0 25 mg total) by mouth 2 (two) times a day for 10 days (Patient taking differently: 0 25 mg 2 (two) times a day as needed Take 1/2 tablet (0 25-mg) by oral route 2 times a day ) 10 tablet 1    escitalopram (LEXAPRO) 10 mg tablet Take 1 5 tablets (15 mg total) by mouth daily (Patient taking differently: Take 20 mg by mouth daily ) 45 tablet 0    lansoprazole (PREVACID) 30 mg capsule Take 1 capsule (30 mg total) by mouth daily 30 capsule 0    levothyroxine 50 mcg tablet take 1 tablet by mouth ON MONDAY, WEDNESDAY, AND FRIDAY 12 tablet 0    levothyroxine 75 mcg tablet Take 1 tablet (75 mcg total) by mouth 4 (four) times a week On Tuesdays, Thursdays and Saturdays and Sundays 16 tablet 0    losartan (COZAAR) 50 mg tablet Take 1 tablet (50 mg total) by mouth daily 90 tablet 1    Multiple Vitamin (MULTIVITAMIN) capsule Take 1 capsule by mouth daily      polyethylene glycol (MIRALAX) 17 g packet Take 17 g by mouth daily      prednisoLONE acetate (PRED FORTE) 1 % ophthalmic suspension Administer 1 drop to both eyes 2 (two) times a day 5 mL 0    sucralfate (CARAFATE) 1 g/10 mL suspension Take 10 mL (1 g total) by mouth 4 (four) times a day 420 mL 0    valACYclovir (VALTREX) 500 mg tablet Take 500 mg by mouth daily      [DISCONTINUED] metoprolol succinate (TOPROL-XL) 25 mg 24 hr tablet Take 1 tablet (25 mg total) by mouth every 12 (twelve) hours 30 tablet 0    buPROPion (Wellbutrin XL) 150 mg 24 hr tablet Take 150 mg by mouth daily      melatonin 3 mg Take 1 tablet (3 mg total) by mouth daily at bedtime (Patient not taking: Reported on 9/4/2020) 30 tablet 0     No current facility-administered medications on file prior to visit  Allergies   Allergen Reactions    No Known Allergies      Social History     Socioeconomic History    Marital status:      Spouse name: None    Number of children: None    Years of education: None    Highest education level: None   Occupational History    None   Social Needs    Financial resource strain: None    Food insecurity     Worry: None     Inability: None    Transportation needs     Medical: None     Non-medical: None   Tobacco Use    Smoking status: Never Smoker    Smokeless tobacco: Never Used   Substance and Sexual Activity    Alcohol use: Never     Frequency: Never    Drug use: Never    Sexual activity: Not Currently   Lifestyle    Physical activity     Days per week: None     Minutes per session: None    Stress: None   Relationships    Social connections     Talks on phone: None     Gets together: None     Attends [employer] service: None     Active member of club or organization: None     Attends meetings of clubs or organizations: None     Relationship status: None    Intimate partner violence     Fear of current or ex partner: None     Emotionally abused: None     Physically abused: None     Forced sexual activity: None   Other Topics Concern    None   Social History Narrative    Caffeine - 2 cups hot tea/day        Review of Systems   All other systems reviewed and are negative      Vitals:  Vitals:    01/21/21 1356   BP: 132/64   Pulse: 64   Weight: 54 kg (119 lb 0 8 oz)   Height: 5' 2" (1 575 m)     BMI - Body mass index is 21 77 kg/m²  Wt Readings from Last 7 Encounters:   01/21/21 54 kg (119 lb 0 8 oz)   10/13/20 54 4 kg (120 lb)   09/04/20 53 5 kg (118 lb)   08/06/20 52 6 kg (116 lb)   08/02/20 52 6 kg (116 lb)   07/30/20 52 6 kg (116 lb)   07/01/20 53 5 kg (118 lb)       Physical Exam  Vitals signs and nursing note reviewed  Constitutional:       General: She is not in acute distress  Appearance: Normal appearance  She is well-developed  She is not ill-appearing  HENT:      Head: Normocephalic and atraumatic  Nose: No congestion  Eyes:      General: No scleral icterus  Conjunctiva/sclera: Conjunctivae normal    Neck:      Musculoskeletal: Neck supple  Vascular: No carotid bruit or JVD  Cardiovascular:      Rate and Rhythm: Normal rate and regular rhythm  Pulses: Normal pulses  Heart sounds: Normal heart sounds  No murmur  No friction rub  No gallop  Pulmonary:      Effort: Pulmonary effort is normal  No respiratory distress  Breath sounds: Normal breath sounds  No rales  Abdominal:      General: There is no distension  Palpations: Abdomen is soft  Tenderness: There is no abdominal tenderness  Musculoskeletal:         General: No swelling or tenderness  Right lower leg: No edema  Left lower leg: No edema  Skin:     General: Skin is warm  Neurological:      General: No focal deficit present  Mental Status: She is alert and oriented to person, place, and time  Mental status is at baseline  Psychiatric:         Mood and Affect: Mood normal          Behavior: Behavior normal          Thought Content:  Thought content normal            Labs:  CBC:   Lab Results   Component Value Date    WBC 6 50 08/02/2020    RBC 3 95 08/02/2020    HGB 12 8 08/02/2020    HCT 36 8 (L) 08/02/2020    MCV 93 08/02/2020     08/02/2020    RDW 13 3 08/02/2020       CMP:   Lab Results   Component Value Date    K 3 7 08/02/2020    CL 92 (L) 08/02/2020    CO2 30 08/02/2020    BUN 14 08/02/2020    CREATININE 0 84 08/02/2020    EGFR 64 08/02/2020    CALCIUM 9 3 08/02/2020    AST 16 08/02/2020    ALT 9 08/02/2020    ALKPHOS 59 08/02/2020       Magnesium:  Lab Results   Component Value Date    MG 2 0 06/08/2020       Lipid Profile:   Lab Results   Component Value Date    HDL 68 06/08/2020    TRIG 51 06/08/2020    LDLCALC 93 06/08/2020       Thyroid Studies:   Lab Results   Component Value Date    AIW1SVOAHKVL 3 840 06/16/2020       No components found for: [facility]    Lab Results   Component Value Date    INR 1 15 06/09/2019    INR 1 12 06/02/2019   5    Imaging: No results found  Cardiac testing:   No results found for this or any previous visit  No results found for this or any previous visit  No results found for this or any previous visit  No results found for this or any previous visit

## 2021-01-26 ENCOUNTER — APPOINTMENT (OUTPATIENT)
Dept: NON INVASIVE DIAGNOSTICS | Facility: HOSPITAL | Age: 86
End: 2021-01-26
Payer: COMMERCIAL

## 2021-01-26 ENCOUNTER — HOSPITAL ENCOUNTER (OUTPATIENT)
Facility: HOSPITAL | Age: 86
Setting detail: OBSERVATION
Discharge: HOME | End: 2021-01-27
Attending: EMERGENCY MEDICINE | Admitting: FAMILY MEDICINE
Payer: COMMERCIAL

## 2021-01-26 ENCOUNTER — APPOINTMENT (EMERGENCY)
Dept: RADIOLOGY | Facility: HOSPITAL | Age: 86
End: 2021-01-26
Payer: COMMERCIAL

## 2021-01-26 ENCOUNTER — APPOINTMENT (EMERGENCY)
Dept: CT IMAGING | Facility: HOSPITAL | Age: 86
End: 2021-01-26
Payer: COMMERCIAL

## 2021-01-26 DIAGNOSIS — R00.2 PALPITATIONS: Primary | ICD-10-CM

## 2021-01-26 DIAGNOSIS — R10.9 ABDOMINAL PAIN: ICD-10-CM

## 2021-01-26 DIAGNOSIS — I16.0 HYPERTENSIVE URGENCY: ICD-10-CM

## 2021-01-26 PROBLEM — I73.9 PAD (PERIPHERAL ARTERY DISEASE) (HCC): Status: ACTIVE | Noted: 2021-01-26

## 2021-01-26 LAB
ALBUMIN SERPL BCP-MCNC: 4.5 G/DL (ref 3.5–5.7)
ALP SERPL-CCNC: 78 U/L (ref 55–165)
ALT SERPL W P-5'-P-CCNC: 9 U/L (ref 7–52)
ANION GAP SERPL CALCULATED.3IONS-SCNC: 6 MMOL/L (ref 4–13)
APTT PPP: 29 SECONDS (ref 23–37)
AST SERPL W P-5'-P-CCNC: 17 U/L (ref 13–39)
ATRIAL RATE: 71 BPM
ATRIAL RATE: 72 BPM
BASOPHILS # BLD AUTO: 0 THOUSANDS/ΜL (ref 0–0.1)
BASOPHILS NFR BLD AUTO: 1 % (ref 0–2)
BILIRUB SERPL-MCNC: 0.5 MG/DL (ref 0.2–1)
BUN SERPL-MCNC: 13 MG/DL (ref 7–25)
CALCIUM SERPL-MCNC: 9.5 MG/DL (ref 8.6–10.5)
CHLORIDE SERPL-SCNC: 97 MMOL/L (ref 98–107)
CO2 SERPL-SCNC: 29 MMOL/L (ref 21–31)
CREAT SERPL-MCNC: 0.75 MG/DL (ref 0.6–1.2)
EOSINOPHIL # BLD AUTO: 0.1 THOUSAND/ΜL (ref 0–0.61)
EOSINOPHIL NFR BLD AUTO: 1 % (ref 0–5)
ERYTHROCYTE [DISTWIDTH] IN BLOOD BY AUTOMATED COUNT: 14.2 % (ref 11.5–14.5)
GFR SERPL CREATININE-BSD FRML MDRD: 73 ML/MIN/1.73SQ M
GLUCOSE SERPL-MCNC: 88 MG/DL (ref 65–99)
HCT VFR BLD AUTO: 36.8 % (ref 42–47)
HGB BLD-MCNC: 12.6 G/DL (ref 12–16)
INR PPP: 1.01 (ref 0.84–1.19)
LIPASE SERPL-CCNC: 59 U/L (ref 11–82)
LYMPHOCYTES # BLD AUTO: 1.1 THOUSANDS/ΜL (ref 0.6–4.47)
LYMPHOCYTES NFR BLD AUTO: 25 % (ref 21–51)
MAGNESIUM SERPL-MCNC: 2 MG/DL (ref 1.9–2.7)
MCH RBC QN AUTO: 32.3 PG (ref 26–34)
MCHC RBC AUTO-ENTMCNC: 34.2 G/DL (ref 31–37)
MCV RBC AUTO: 94 FL (ref 81–99)
MONOCYTES # BLD AUTO: 0.5 THOUSAND/ΜL (ref 0.17–1.22)
MONOCYTES NFR BLD AUTO: 11 % (ref 2–12)
NEUTROPHILS # BLD AUTO: 2.7 THOUSANDS/ΜL (ref 1.4–6.5)
NEUTS SEG NFR BLD AUTO: 62 % (ref 42–75)
P AXIS: 73 DEGREES
P AXIS: 89 DEGREES
PLATELET # BLD AUTO: 220 THOUSANDS/UL (ref 149–390)
PMV BLD AUTO: 6.4 FL (ref 8.6–11.7)
POTASSIUM SERPL-SCNC: 3.8 MMOL/L (ref 3.5–5.5)
PR INTERVAL: 266 MS
PR INTERVAL: 270 MS
PROT SERPL-MCNC: 7.5 G/DL (ref 6.4–8.9)
PROTHROMBIN TIME: 13.2 SECONDS (ref 11.6–14.5)
QRS AXIS: 53 DEGREES
QRS AXIS: 97 DEGREES
QRSD INTERVAL: 102 MS
QRSD INTERVAL: 96 MS
QT INTERVAL: 404 MS
QT INTERVAL: 412 MS
QTC INTERVAL: 442 MS
QTC INTERVAL: 447 MS
RBC # BLD AUTO: 3.9 MILLION/UL (ref 3.9–5.2)
SODIUM SERPL-SCNC: 132 MMOL/L (ref 134–143)
T WAVE AXIS: 51 DEGREES
T WAVE AXIS: 92 DEGREES
TROPONIN I SERPL-MCNC: <0.03 NG/ML
VENTRICULAR RATE: 71 BPM
VENTRICULAR RATE: 72 BPM
WBC # BLD AUTO: 4.4 THOUSAND/UL (ref 4.8–10.8)

## 2021-01-26 PROCEDURE — G1004 CDSM NDSC: HCPCS

## 2021-01-26 PROCEDURE — 99285 EMERGENCY DEPT VISIT HI MDM: CPT | Performed by: EMERGENCY MEDICINE

## 2021-01-26 PROCEDURE — 84484 ASSAY OF TROPONIN QUANT: CPT | Performed by: NURSE PRACTITIONER

## 2021-01-26 PROCEDURE — 93923 UPR/LXTR ART STDY 3+ LVLS: CPT

## 2021-01-26 PROCEDURE — 83690 ASSAY OF LIPASE: CPT | Performed by: EMERGENCY MEDICINE

## 2021-01-26 PROCEDURE — 83735 ASSAY OF MAGNESIUM: CPT | Performed by: EMERGENCY MEDICINE

## 2021-01-26 PROCEDURE — 93922 UPR/L XTREMITY ART 2 LEVELS: CPT | Performed by: SURGERY

## 2021-01-26 PROCEDURE — 93010 ELECTROCARDIOGRAM REPORT: CPT | Performed by: INTERNAL MEDICINE

## 2021-01-26 PROCEDURE — 85610 PROTHROMBIN TIME: CPT | Performed by: EMERGENCY MEDICINE

## 2021-01-26 PROCEDURE — 85730 THROMBOPLASTIN TIME PARTIAL: CPT | Performed by: EMERGENCY MEDICINE

## 2021-01-26 PROCEDURE — 84484 ASSAY OF TROPONIN QUANT: CPT | Performed by: EMERGENCY MEDICINE

## 2021-01-26 PROCEDURE — 99285 EMERGENCY DEPT VISIT HI MDM: CPT

## 2021-01-26 PROCEDURE — 85025 COMPLETE CBC W/AUTO DIFF WBC: CPT | Performed by: EMERGENCY MEDICINE

## 2021-01-26 PROCEDURE — 99220 PR INITIAL OBSERVATION CARE/DAY 70 MINUTES: CPT | Performed by: NURSE PRACTITIONER

## 2021-01-26 PROCEDURE — 93925 LOWER EXTREMITY STUDY: CPT | Performed by: SURGERY

## 2021-01-26 PROCEDURE — 80053 COMPREHEN METABOLIC PANEL: CPT | Performed by: EMERGENCY MEDICINE

## 2021-01-26 PROCEDURE — 36415 COLL VENOUS BLD VENIPUNCTURE: CPT | Performed by: EMERGENCY MEDICINE

## 2021-01-26 PROCEDURE — 93005 ELECTROCARDIOGRAM TRACING: CPT

## 2021-01-26 PROCEDURE — 93925 LOWER EXTREMITY STUDY: CPT

## 2021-01-26 PROCEDURE — 71045 X-RAY EXAM CHEST 1 VIEW: CPT

## 2021-01-26 PROCEDURE — 74177 CT ABD & PELVIS W/CONTRAST: CPT

## 2021-01-26 RX ORDER — LEVOTHYROXINE SODIUM 0.07 MG/1
75 TABLET ORAL
Status: DISCONTINUED | OUTPATIENT
Start: 2021-01-26 | End: 2021-01-27 | Stop reason: HOSPADM

## 2021-01-26 RX ORDER — CLONAZEPAM 0.5 MG/1
0.25 TABLET ORAL 3 TIMES DAILY PRN
Status: DISCONTINUED | OUTPATIENT
Start: 2021-01-26 | End: 2021-01-27 | Stop reason: HOSPADM

## 2021-01-26 RX ORDER — BUPROPION HYDROCHLORIDE 150 MG/1
150 TABLET ORAL DAILY
Status: DISCONTINUED | OUTPATIENT
Start: 2021-01-26 | End: 2021-01-27 | Stop reason: HOSPADM

## 2021-01-26 RX ORDER — CLONAZEPAM 0.5 MG/1
0.25 TABLET ORAL 2 TIMES DAILY PRN
Status: DISCONTINUED | OUTPATIENT
Start: 2021-01-26 | End: 2021-01-26

## 2021-01-26 RX ORDER — AMLODIPINE BESYLATE 5 MG/1
5 TABLET ORAL
Status: DISCONTINUED | OUTPATIENT
Start: 2021-01-26 | End: 2021-01-27 | Stop reason: HOSPADM

## 2021-01-26 RX ORDER — LEVOTHYROXINE SODIUM 0.05 MG/1
50 TABLET ORAL 3 TIMES WEEKLY
Status: DISCONTINUED | OUTPATIENT
Start: 2021-01-27 | End: 2021-01-27 | Stop reason: HOSPADM

## 2021-01-26 RX ORDER — MELATONIN
1000 DAILY
Status: DISCONTINUED | OUTPATIENT
Start: 2021-01-26 | End: 2021-01-27 | Stop reason: HOSPADM

## 2021-01-26 RX ORDER — VALACYCLOVIR HYDROCHLORIDE 500 MG/1
500 TABLET, FILM COATED ORAL DAILY
Status: DISCONTINUED | OUTPATIENT
Start: 2021-01-26 | End: 2021-01-27 | Stop reason: HOSPADM

## 2021-01-26 RX ORDER — NITROGLYCERIN 0.4 MG/1
0.4 TABLET SUBLINGUAL
Status: DISCONTINUED | OUTPATIENT
Start: 2021-01-26 | End: 2021-01-27 | Stop reason: HOSPADM

## 2021-01-26 RX ORDER — SUCRALFATE ORAL 1 G/10ML
1000 SUSPENSION ORAL
Status: DISCONTINUED | OUTPATIENT
Start: 2021-01-26 | End: 2021-01-26

## 2021-01-26 RX ORDER — ASPIRIN 81 MG/1
162 TABLET, CHEWABLE ORAL ONCE
Status: COMPLETED | OUTPATIENT
Start: 2021-01-26 | End: 2021-01-26

## 2021-01-26 RX ORDER — ESCITALOPRAM OXALATE 10 MG/1
20 TABLET ORAL DAILY
Status: DISCONTINUED | OUTPATIENT
Start: 2021-01-26 | End: 2021-01-27 | Stop reason: HOSPADM

## 2021-01-26 RX ORDER — POLYETHYLENE GLYCOL 3350 17 G/17G
17 POWDER, FOR SOLUTION ORAL DAILY
Status: DISCONTINUED | OUTPATIENT
Start: 2021-01-26 | End: 2021-01-27 | Stop reason: HOSPADM

## 2021-01-26 RX ORDER — LOSARTAN POTASSIUM 50 MG/1
50 TABLET ORAL DAILY
Status: DISCONTINUED | OUTPATIENT
Start: 2021-01-26 | End: 2021-01-27 | Stop reason: HOSPADM

## 2021-01-26 RX ORDER — PANTOPRAZOLE SODIUM 40 MG/1
40 TABLET, DELAYED RELEASE ORAL
Status: DISCONTINUED | OUTPATIENT
Start: 2021-01-26 | End: 2021-01-27 | Stop reason: HOSPADM

## 2021-01-26 RX ORDER — SUCRALFATE 1 G/1
1 TABLET ORAL
Status: DISCONTINUED | OUTPATIENT
Start: 2021-01-26 | End: 2021-01-27 | Stop reason: HOSPADM

## 2021-01-26 RX ORDER — ASPIRIN 81 MG/1
81 TABLET, CHEWABLE ORAL DAILY
Status: DISCONTINUED | OUTPATIENT
Start: 2021-01-27 | End: 2021-01-27 | Stop reason: HOSPADM

## 2021-01-26 RX ORDER — PREDNISOLONE ACETATE 10 MG/ML
1 SUSPENSION/ DROPS OPHTHALMIC 2 TIMES DAILY
Status: DISCONTINUED | OUTPATIENT
Start: 2021-01-26 | End: 2021-01-27 | Stop reason: HOSPADM

## 2021-01-26 RX ORDER — ACETAMINOPHEN 325 MG/1
650 TABLET ORAL EVERY 6 HOURS PRN
Status: DISCONTINUED | OUTPATIENT
Start: 2021-01-26 | End: 2021-01-27 | Stop reason: HOSPADM

## 2021-01-26 RX ORDER — METOPROLOL SUCCINATE 25 MG/1
12.5 TABLET, EXTENDED RELEASE ORAL EVERY 12 HOURS
Status: DISCONTINUED | OUTPATIENT
Start: 2021-01-26 | End: 2021-01-27 | Stop reason: HOSPADM

## 2021-01-26 RX ADMIN — LEVOTHYROXINE SODIUM 75 MCG: 75 TABLET ORAL at 13:12

## 2021-01-26 RX ADMIN — IOHEXOL 100 ML: 350 INJECTION, SOLUTION INTRAVENOUS at 06:26

## 2021-01-26 RX ADMIN — ENOXAPARIN SODIUM 40 MG: 40 INJECTION SUBCUTANEOUS at 13:12

## 2021-01-26 RX ADMIN — METOPROLOL SUCCINATE 12.5 MG: 25 TABLET, EXTENDED RELEASE ORAL at 13:12

## 2021-01-26 RX ADMIN — BUPROPION HYDROCHLORIDE 150 MG: 150 TABLET, EXTENDED RELEASE ORAL at 13:12

## 2021-01-26 RX ADMIN — PREDNISOLONE ACETATE 1 DROP: 10 SUSPENSION/ DROPS OPHTHALMIC at 14:54

## 2021-01-26 RX ADMIN — Medication 1 TABLET: at 13:12

## 2021-01-26 RX ADMIN — ESCITALOPRAM OXALATE 20 MG: 10 TABLET ORAL at 13:12

## 2021-01-26 RX ADMIN — POLYETHYLENE GLYCOL 3350 17 G: 17 POWDER, FOR SOLUTION ORAL at 13:12

## 2021-01-26 RX ADMIN — AMLODIPINE BESYLATE 5 MG: 5 TABLET ORAL at 22:12

## 2021-01-26 RX ADMIN — CLONAZEPAM 0.25 MG: 0.5 TABLET ORAL at 13:13

## 2021-01-26 RX ADMIN — VALACYCLOVIR HYDROCHLORIDE 500 MG: 500 TABLET, FILM COATED ORAL at 13:12

## 2021-01-26 RX ADMIN — Medication 1000 UNITS: at 13:12

## 2021-01-26 RX ADMIN — NITROGLYCERIN 1 INCH: 20 OINTMENT TOPICAL at 07:11

## 2021-01-26 RX ADMIN — ASPIRIN 162 MG: 81 TABLET, CHEWABLE ORAL at 07:11

## 2021-01-26 RX ADMIN — LOSARTAN POTASSIUM 50 MG: 50 TABLET, FILM COATED ORAL at 13:12

## 2021-01-26 RX ADMIN — PANTOPRAZOLE SODIUM 40 MG: 40 TABLET, DELAYED RELEASE ORAL at 13:12

## 2021-01-26 NOTE — ASSESSMENT & PLAN NOTE
· This was told by visiting nurse that the patient has PAD   · The patient denies any claudication symptoms and any pain during rest or ambulation, no increasing swelling  · Will obtain arterial duplex

## 2021-01-26 NOTE — PLAN OF CARE
Problem: Potential for Falls  Goal: Patient will remain free of falls  Description: INTERVENTIONS:  - Assess patient frequently for physical needs  -  Identify cognitive and physical deficits and behaviors that affect risk of falls    -  Dickey fall precautions as indicated by assessment   - Educate patient/family on patient safety including physical limitations  - Instruct patient to call for assistance with activity based on assessment  - Modify environment to reduce risk of injury  - Consider OT/PT consult to assist with strengthening/mobility  Outcome: Progressing

## 2021-01-26 NOTE — ASSESSMENT & PLAN NOTE
· Noted to have elevated BP upon arrival to the ED  · This is much improved  · Will continue with current regimen for now monitor blood pressure closely

## 2021-01-26 NOTE — ED PROVIDER NOTES
History  Chief Complaint   Patient presents with    Rapid Heart Rate     According to the patient, she had woke up with a rapid heart rate  80YEAR-OLD FEMALE      PMH:    Chief complaint:  PATIENT IS HERE FOR FOR ACUTE PALPITATIONS and generalized left-sided abdominal pain    HPI:  Patient states symptoms came on about an hour ago  She denies any chest pain or shortness of breath  SHE HAS NO PLEURISY, NO DIAPHORESIS    NO RECENT LONG CAR RIDES OR PLAN RIDES  NO H/O PE OR DVT  NO BIRTH CONTROL USE  NO RECENT TRAUMA OR SURGERY  NO HEMOPTYSIS      DENIES ETOH OR DRUG USE, DENIES STIMULANT USE, DENIES EXCESSIVE CAFFEINE USE      ASSOCIATED SYMPTOMS:  URINARY  SYMPTOMS: THERE IS NO DYSURIA, NO HEMATURIA, NO FREQUENCY  SHE DENIES NAUSEA, DENIES VOMITING        DENIES FEVERS, CHILLS    DENIES LOOSE STOOLS, NO DIARRHEA, NO BLOODY STOOLS - NOT BLACK OR BLOODY      ALLEVIATING OR EXACERBATING FACTORS:  UNCERTAIN       INTERVENTIONS: NONE      PATIENT HAS NO OTHER COMPLAINTS      History provided by:  Patient  Palpitations  Palpitations quality:  Regular  Onset quality:  Sudden  Progression:  Improving  Context: not appetite suppressants, not blood loss, not bronchodilators, not caffeine, not dehydration, not exercise, not hyperventilation, not illicit drugs, not nicotine and not stimulant use    Relieved by:  Nothing  Worsened by:  Nothing  Ineffective treatments:  None tried  Associated symptoms: no back pain, no chest pain, no cough, no diaphoresis, no dizziness, no leg pain, no lower extremity edema, no nausea, no near-syncope, no shortness of breath, no syncope, no vomiting and no weakness    Abdominal Pain  Pain location:  LLQ  Pain radiates to:  Does not radiate  Pain severity:  Moderate  Onset quality:  Gradual  Chronicity:  New  Relieved by:  Nothing  Worsened by:  Nothing  Ineffective treatments:  None tried  Associated symptoms: no chest pain, no chills, no constipation, no cough, no dysuria, no fatigue, no fever, no hematemesis, no hematochezia, no hematuria, no melena, no nausea, no shortness of breath and no vomiting        Prior to Admission Medications   Prescriptions Last Dose Informant Patient Reported? Taking? Multiple Vitamin (MULTIVITAMIN) capsule 1/25/2021 at Unknown time Self Yes Yes   Sig: Take 1 capsule by mouth daily   amLODIPine (NORVASC) 5 mg tablet 1/25/2021 at Unknown time  No Yes   Sig: Take 1 tablet (5 mg total) by mouth daily at bedtime   aspirin 81 mg chewable tablet 1/26/2021 at Unknown time Self Yes Yes   Sig: Chew 81 mg daily   buPROPion (Wellbutrin XL) 150 mg 24 hr tablet Not Taking at Unknown time Self Yes No   Sig: Take 150 mg by mouth daily   cholecalciferol (VITAMIN D3) 1,000 units tablet 1/25/2021 at Unknown time Self Yes Yes   Sig: Take 1,000 Units by mouth daily   clonazePAM (KlonoPIN) 0 5 mg tablet 1/25/2021 at Unknown time Self No Yes   Sig: Take 0 5 tablets (0 25 mg total) by mouth 2 (two) times a day for 10 days   Patient taking differently: 0 25 mg 2 (two) times a day as needed Take 1/2 tablet (0 25-mg) by oral route 2 times a day     escitalopram (LEXAPRO) 10 mg tablet 1/25/2021 at Unknown time Self No Yes   Sig: Take 1 5 tablets (15 mg total) by mouth daily   Patient taking differently: Take 20 mg by mouth daily    lansoprazole (PREVACID) 30 mg capsule 1/25/2021 at Unknown time Self No Yes   Sig: Take 1 capsule (30 mg total) by mouth daily   levothyroxine 50 mcg tablet 1/25/2021 at Unknown time Self No Yes   Sig: take 1 tablet by mouth ON MONDAY, WEDNESDAY, AND FRIDAY   levothyroxine 75 mcg tablet 1/25/2021 at Unknown time Self No Yes   Sig: Take 1 tablet (75 mcg total) by mouth 4 (four) times a week On Tuesdays, Thursdays and Saturdays and Sundays   losartan (COZAAR) 50 mg tablet 1/25/2021 at Unknown time  No Yes   Sig: Take 1 tablet (50 mg total) by mouth daily   melatonin 3 mg Not Taking at Unknown time Self No No   Sig: Take 1 tablet (3 mg total) by mouth daily at bedtime Patient not taking: Reported on 9/4/2020   metoprolol succinate (TOPROL-XL) 25 mg 24 hr tablet 1/25/2021 at Unknown time  No Yes   Sig: Take 0 5 tablets (12 5 mg total) by mouth every 12 (twelve) hours   polyethylene glycol (MIRALAX) 17 g packet 1/25/2021 at Unknown time Self Yes Yes   Sig: Take 17 g by mouth daily   prednisoLONE acetate (PRED FORTE) 1 % ophthalmic suspension 1/25/2021 at Unknown time Self No Yes   Sig: Administer 1 drop to both eyes 2 (two) times a day   sucralfate (CARAFATE) 1 g/10 mL suspension 1/25/2021 at Unknown time Self No Yes   Sig: Take 10 mL (1 g total) by mouth 4 (four) times a day   valACYclovir (VALTREX) 500 mg tablet 1/25/2021 at Unknown time  Yes Yes   Sig: Take 500 mg by mouth daily      Facility-Administered Medications: None       Past Medical History:   Diagnosis Date    Anxiety     CAD (coronary artery disease)     Carotid Duplex 03/06/2018    Plaque formation was prominent bilaterally    Depression     Disease of thyroid gland     Diverticulitis     Dyslipidemia     ECHO 09/14/2017    EF 55%, mild mitral regurg, small pericardial effusion   Hypertension     Mitral regurgitation     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 infarction)     Shingles     Takotsubo cardiomyopathy        Past Surgical History:   Procedure Laterality Date    CARDIAC CATHETERIZATION  08/27/2012    EF 35%, 70% stenosis of diagonal, Otherwise all OK apart from myopathy    CARDIAC CATHETERIZATION  10/10/2012    EF 55%, no significant CAD  Mild stress induced cardiomyopathy    CATARACT EXTRACTION Bilateral     EYE SURGERY      bilateral cataract    TONSILLECTOMY  childhood       Family History   Problem Relation Age of Onset    Cancer Father     Prostate cancer Father      I have reviewed and agree with the history as documented      E-Cigarette/Vaping    E-Cigarette Use Never User      E-Cigarette/Vaping Substances    Nicotine No     THC No     CBD No     Flavoring No     Other No     Unknown No Social History     Tobacco Use    Smoking status: Never Smoker    Smokeless tobacco: Never Used   Substance Use Topics    Alcohol use: Never     Frequency: Never    Drug use: Never       Review of Systems   Constitutional: Negative for chills, diaphoresis, fatigue and fever  Respiratory: Negative for cough, shortness of breath, wheezing and stridor  Cardiovascular: Positive for palpitations  Negative for chest pain, leg swelling, syncope and near-syncope  Gastrointestinal: Positive for abdominal pain (upper)  Negative for blood in stool, constipation, hematemesis, hematochezia, melena, nausea and vomiting  Genitourinary: Negative for difficulty urinating, dysuria, flank pain, frequency and hematuria  Musculoskeletal: Negative for arthralgias, back pain, gait problem, joint swelling, myalgias, neck pain and neck stiffness  Skin: Negative for rash and wound  Neurological: Negative for dizziness, weakness, light-headedness and headaches  All other systems reviewed and are negative  Physical Exam  Physical Exam  Constitutional:       General: She is not in acute distress  Appearance: She is well-developed  She is not diaphoretic  HENT:      Head: Normocephalic and atraumatic  Nose: Nose normal       Mouth/Throat:      Pharynx: No oropharyngeal exudate  Eyes:      General: No scleral icterus  Right eye: No discharge  Left eye: No discharge  Conjunctiva/sclera: Conjunctivae normal       Pupils: Pupils are equal, round, and reactive to light  Neck:      Musculoskeletal: Normal range of motion and neck supple  Vascular: No JVD  Trachea: No tracheal deviation  Cardiovascular:      Rate and Rhythm: Normal rate and regular rhythm  Heart sounds: Normal heart sounds  No murmur  No friction rub  No gallop  Pulmonary:      Effort: Pulmonary effort is normal  No respiratory distress  Breath sounds: Normal breath sounds  No stridor   No wheezing or rales  Chest:      Chest wall: No tenderness  Abdominal:      General: Bowel sounds are normal  There is no distension  Palpations: Abdomen is soft  There is no mass  Tenderness: There is no abdominal tenderness  There is no guarding or rebound  Hernia: No hernia is present  Musculoskeletal: Normal range of motion  General: No tenderness or deformity  Lymphadenopathy:      Cervical: No cervical adenopathy  Skin:     General: Skin is warm  Capillary Refill: Capillary refill takes less than 2 seconds  Coloration: Skin is not pale  Findings: No erythema or rash  Neurological:      Mental Status: She is alert and oriented to person, place, and time  Cranial Nerves: No cranial nerve deficit  Sensory: No sensory deficit  Motor: No abnormal muscle tone  Coordination: Coordination normal    Psychiatric:         Behavior: Behavior normal          Thought Content:  Thought content normal          Judgment: Judgment normal          Vital Signs  ED Triage Vitals   Temperature Pulse Respirations Blood Pressure SpO2   01/26/21 0500 01/26/21 0500 01/26/21 0500 01/26/21 0500 01/26/21 0500   97 7 °F (36 5 °C) 71 18 (!) 204/91 97 %      Temp Source Heart Rate Source Patient Position - Orthostatic VS BP Location FiO2 (%)   01/26/21 0500 01/26/21 0500 01/26/21 0500 01/26/21 0500 --   Temporal Monitor Sitting Left arm       Pain Score       01/26/21 1202       No Pain           Vitals:    01/27/21 0041 01/27/21 0300 01/27/21 0729 01/27/21 1137   BP: 140/71 156/72 169/74 124/68   Pulse: 64 71 62 70   Patient Position - Orthostatic VS:  Lying Lying Lying         Visual Acuity      ED Medications  Medications   iohexol (OMNIPAQUE) 350 MG/ML injection (SINGLE-DOSE) 100 mL (100 mL Intravenous Given 1/26/21 0626)   aspirin chewable tablet 162 mg (162 mg Oral Given 1/26/21 0711)   nitroglycerin (NITRO-BID) 2 % TD ointment 1 inch (1 inch Topical Given 1/26/21 0711) Diagnostic Studies  Results Reviewed     Procedure Component Value Units Date/Time    Troponin I [[id number]]  (Normal) Collected: 01/26/21 1556    Lab Status: Final result Specimen: Blood from Arm, Left Updated: 01/26/21 1624     Troponin I <0 03 ng/mL     Troponin I [[id number]]  (Normal) Collected: 01/26/21 1208    Lab Status: Final result Specimen: Blood from Arm, Left Updated: 01/26/21 1238     Troponin I <0 03 ng/mL     Protime-INR [[id number]]  (Normal) Collected: 01/26/21 0511    Lab Status: Final result Specimen: Blood from Arm, Right Updated: 01/26/21 0608     Protime 13 2 seconds      INR 1 01    APTT [[id number]]  (Normal) Collected: 01/26/21 0511    Lab Status: Final result Specimen: Blood from Arm, Right Updated: 01/26/21 0608     PTT 29 seconds     Comprehensive metabolic panel [[id number]]  (Abnormal) Collected: 01/26/21 0511    Lab Status: Final result Specimen: Blood from Arm, Right Updated: 01/26/21 0601     Sodium 132 mmol/L      Potassium 3 8 mmol/L      Chloride 97 mmol/L      CO2 29 mmol/L      ANION GAP 6 mmol/L      BUN 13 mg/dL      Creatinine 0 75 mg/dL      Glucose 88 mg/dL      Calcium 9 5 mg/dL      AST 17 U/L      ALT 9 U/L      Alkaline Phosphatase 78 U/L      Total Protein 7 5 g/dL      Albumin 4 5 g/dL      Total Bilirubin 0 50 mg/dL      eGFR 73 ml/min/1 73sq m     Narrative:      [place] guidelines for Chronic Kidney Disease (CKD):     Stage 1 with normal or high GFR (GFR > 90 mL/min/1 73 square meters)    Stage 2 Mild CKD (GFR = 60-89 mL/min/1 73 square meters)    Stage 3A Moderate CKD (GFR = 45-59 mL/min/1 73 square meters)    Stage 3B Moderate CKD (GFR = 30-44 mL/min/1 73 square meters)    Stage 4 Severe CKD (GFR = 15-29 mL/min/1 73 square meters)    Stage 5 End Stage CKD (GFR <15 mL/min/1 73 square meters)  Note: GFR calculation is accurate only with a steady state creatinine    Troponin I [[id number]]  (Normal) Collected: 01/26/21 0511    Lab Status: Final result Specimen: Blood from Arm, Right Updated: 01/26/21 0540     Troponin I <0 03 ng/mL     Magnesium [[id number]]  (Normal) Collected: 01/26/21 0511    Lab Status: Final result Specimen: Blood from Arm, Right Updated: 01/26/21 0536     Magnesium 2 0 mg/dL     Lipase [[id number]]  (Normal) Collected: 01/26/21 0511    Lab Status: Final result Specimen: Blood from Arm, Right Updated: 01/26/21 0536     Lipase 59 u/L     CBC and differential [[id number]]  (Abnormal) Collected: 01/26/21 0511    Lab Status: Final result Specimen: Blood from Arm, Right Updated: 01/26/21 0532     WBC 4 40 Thousand/uL      RBC 3 90 Million/uL      Hemoglobin 12 6 g/dL      Hematocrit 36 8 %      MCV 94 fL      MCH 32 3 pg      MCHC 34 2 g/dL      RDW 14 2 %      MPV 6 4 fL      Platelets 363 Thousands/uL      Neutrophils Relative 62 %      Lymphocytes Relative 25 %      Monocytes Relative 11 %      Eosinophils Relative 1 %      Basophils Relative 1 %      Neutrophils Absolute 2 70 Thousands/µL      Lymphocytes Absolute 1 10 Thousands/µL      Monocytes Absolute 0 50 Thousand/µL      Eosinophils Absolute 0 10 Thousand/µL      Basophils Absolute 0 00 Thousands/µL                  VAS lower limb arterial duplex, complete bilateral   Final Result by Cori Malcolm MD (01/26 1722)      CT abdomen pelvis with contrast   Final Result by Dora Molina MD (01/26 0645)      No evidence of acute abdominopelvic process  Colonic diverticulosis  Workstation performed: [id number]         XR chest 1 view portable   Final Result by Kayla Cerda MD (01/26 9766)      No acute cardiopulmonary disease                    Workstation performed: [id number]                    Procedures  Procedures         ED Course  ED Course as of Feb 01 2129   Tue Jan 26, 2021   0551 Troponin I: <0 03   0551 Lipase: 59   0551 Magnesium: 2 0   0551 CBC and differential(!)   0552 CXR: stable      0603 Comprehensive metabolic panel(!)   7130 Pt stable  Vss  CT scan pending       0654 CT ABDOMEN AND PELVIS WITH IV CONTRAST     LOWER CHEST:  Minimal bibasilar scar and left basilar subsegmental atelectasis similar to July 2020  Heart is enlarged  Pectus escavatum      LIVER/BILIARY TREE:  Unremarkable      GALLBLADDER:  No calcified gallstones  No pericholecystic inflammatory change      SPLEEN:  Unremarkable      PANCREAS:  Unremarkable      ADRENAL GLANDS:  Unremarkable      KIDNEYS/URETERS: One or more sharply circumscribed subcentimeter renal hypodensities are noted  These lesions are too small to accurately characterize, but are statistically most likely to represent benign cortical renal cyst(s)  According to the   guidelines published in the [facility] Paper of the ACR Incidental Findings Committee (Radiology 2010), no further workup of these lesions is recommended  Bilateral renal simple cysts, the largest of which measures 3 6 cm on the left  Kidneys otherwise   unremarkable  No perinephric collection      APPENDIX:  A normal appendix was visualized      ABDOMINOPELVIC CAVITY:  No ascites  No pneumoperitoneum  No lymphadenopathy      VESSELS:  Aortoiliac calcification  No aneurysm  Prominent bilateral adnexal and ovarian veins left greater than right unchanged  This may be sequela of chronic pelvic vascular congestion syndrome or can be seen as a normal variant in asymptomatic   patients      PELVIS     REPRODUCTIVE ORGANS:  Unremarkable for patient's age      URINARY BLADDER:  Unremarkable for degree of distention      ABDOMINAL WALL/INGUINAL REGIONS:  Unremarkable      OSSEOUS STRUCTURES:  No acute fracture or osseous destructive lesion identified  Mild degenerative changes of the spine  Dysplastic left hip  Stable mild chronic compression fracture superior endplate of L1  Stable Schmorl's node superior endplate of   T28  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   No spondylolisthesis      IMPRESSION:     No evidence of acute abdominopelvic process      Colonic diverticulosis  0796 D/w Dr Haroldo Mccabe  Will obs for r/o ACS                HEART Risk Score      Most Recent Value   Heart Score Risk Calculator   History  1 Filed at: 01/26/2021 2309   ECG  1 Filed at: 01/26/2021 2309   Age  2 Filed at: 01/26/2021 2309   Risk Factors  2 Filed at: 01/26/2021 2309   Troponin  0 Filed at: 01/26/2021 2309   HEART Score  6 Filed at: 01/26/2021 2309                        SHILOH Risk Score      Most Recent Value   Age >= 72  1 Filed at: 01/26/2021 1146   Known CAD (stenosis >= 50%)  1 Filed at: 01/26/2021 1146   Recent (<=24 hrs) Service Angina  0 Filed at: 01/26/2021 1146   ST Deviation >= 0 5 mm  0 Filed at: 01/26/2021 1146   3+ CAD Risk Factors (FHx, HTN, HLP, DM, Smoker)  1 Filed at: 01/26/2021 1146   Aspirin Use Past 7 Days  0 Filed at: 01/26/2021 1146   Elevated Cardiac Markers  0 Filed at: 01/26/2021 1146   SHILOH Risk Score (Calculated)  3 Filed at: 01/26/2021 1146                  MDM    Disposition  Final diagnoses:   Palpitations   Abdominal pain   Hypertensive urgency     Time reflects when diagnosis was documented in both MDM as applicable and the Disposition within this note     Time User Action Codes Description Comment    1/26/2021  6:55 AM Zoya Sprawls Add [R00 2] Palpitations     1/26/2021  6:55 AM Zoya Sprawls Add [R10 9] Abdominal pain     1/26/2021  6:55 AM Zoya Sprawls Add [I16 0] Hypertensive urgency       ED Disposition     ED Disposition Condition Date/Time Comment    Admit Stable Tue Jan 26, 2021  7:20 AM Case was discussed with Dr Rosi Griffith and the patient's admission status was agreed to be Admission Status: observation status to the service of Dr Denton De Dios           Follow-up Information     Follow up With Specialties Details Why Contact Info    PCP  Follow up in 1 week(s)      Patient Psychiatry  Follow up in 1 week(s)            Discharge Medication List as of 1/27/2021 11:41 AM      CONTINUE these medications which have NOT CHANGED Details   amLODIPine (NORVASC) 5 mg tablet Take 1 tablet (5 mg total) by mouth daily at bedtime, Starting Wed 12/23/2020, Normal      aspirin 81 mg chewable tablet Chew 81 mg daily, Starting Mon 8/10/2015, Historical Med      cholecalciferol (VITAMIN D3) 1,000 units tablet Take 1,000 Units by mouth daily, Historical Med      clonazePAM (KlonoPIN) 0 5 mg tablet Take 0 5 tablets (0 25 mg total) by mouth 2 (two) times a day for 10 days, Starting Fri 6/26/2020, Normal      escitalopram (LEXAPRO) 10 mg tablet Take 1 5 tablets (15 mg total) by mouth daily, Starting Sat 6/27/2020, Normal      lansoprazole (PREVACID) 30 mg capsule Take 1 capsule (30 mg total) by mouth daily, Starting Fri 6/26/2020, Normal      !! levothyroxine 50 mcg tablet take 1 tablet by mouth ON MONDAY, WEDNESDAY, AND FRIDAY, Normal      !! levothyroxine 75 mcg tablet Take 1 tablet (75 mcg total) by mouth 4 (four) times a week On Tuesdays, Thursdays and Saturdays and Sundays, Starting Sat 6/27/2020, Normal      losartan (COZAAR) 50 mg tablet Take 1 tablet (50 mg total) by mouth daily, Starting Fri 12/4/2020, Normal      metoprolol succinate (TOPROL-XL) 25 mg 24 hr tablet Take 0 5 tablets (12 5 mg total) by mouth every 12 (twelve) hours, Starting Thu 1/21/2021, No Print      Multiple Vitamin (MULTIVITAMIN) capsule Take 1 capsule by mouth daily, Historical Med      polyethylene glycol (MIRALAX) 17 g packet Take 17 g by mouth daily, Historical Med      prednisoLONE acetate (PRED FORTE) 1 % ophthalmic suspension Administer 1 drop to both eyes 2 (two) times a day, Starting Fri 6/26/2020, Normal      sucralfate (CARAFATE) 1 g/10 mL suspension Take 10 mL (1 g total) by mouth 4 (four) times a day, Starting Thu 7/30/2020, Normal      valACYclovir (VALTREX) 500 mg tablet Take 500 mg by mouth daily, Starting Fri 10/30/2020, Historical Med      buPROPion (Wellbutrin XL) 150 mg 24 hr tablet Take 150 mg by mouth daily, Historical Med      melatonin 3 mg Take 1 tablet (3 mg total) by mouth daily at bedtime, Starting Fri 6/26/2020, Normal       !! - Potential duplicate medications found  Please discuss with provider  No discharge procedures on file      PDMP Review       Value Time User    PDMP Reviewed  Yes 6/8/2020  9:46 AM Rose Reeder PA-C          ED Provider  Electronically Signed by           Kim Fine MD  01/26/21 [address] MD Moi  02/01/21 4410

## 2021-01-26 NOTE — ASSESSMENT & PLAN NOTE
· Patient complains of palpitation which has been happening every night since Friday when she wakes up to go to the bathroom  · No associated symptoms with this palpitation  · Current is resolved  · Will admit for observation  · Monitor on telemetry  · Trend troponin  · The patient recently which is seen by her outpatient cardiologist on 01/21/2021- no new medication changes noted  · Suspected that anxiety can contribute to her symptoms as well

## 2021-01-26 NOTE — H&P
H&P- Kenna Has 1935, 80 y o  female MRN: [id number]    Unit/Bed#: -01 Encounter: [id number]    Primary Care Provider: Lars Bauman MD   Date and time admitted to hospital: 1/26/2021  4:58 AM        * Palpitations  Assessment & Plan  · Patient complains of palpitation which has been happening every night since Friday when she wakes up to go to the bathroom  · No associated symptoms with this palpitation  · Current is resolved  · Will admit for observation  · Monitor on telemetry  · Trend troponin  · The patient recently which is seen by her outpatient cardiologist on 01/21/2021- no new medication changes noted  · Suspected that anxiety can contribute to her symptoms as well    PAD (peripheral artery disease) ([facility] [address] )  Assessment & Plan  · This was told by visiting nurse that the patient has PAD   · The patient denies any claudication symptoms and any pain during rest or ambulation, no increasing swelling  · Will obtain arterial duplex     IFEANYI (generalized anxiety disorder)  Assessment & Plan  · See psychiatrist outpatient  · Continue with Lexapro, Wellbutrin and Klonopin at nighttime  · The patient states that she has been feeling more anxious lately as she is worried because she was told that she has PAD by visiting nurse     Essential hypertension  Assessment & Plan  · Noted to have elevated BP upon arrival to the ED  · This is much improved  · Will continue with current regimen for now monitor blood pressure closely    Takotsubo cardiomyopathy  Assessment & Plan  · History of takotsubo cardiomyopathy  · The most recent nuclear stress is negative for ischemia in 03/2020  · Continue with current cardiac medications     Acquired hypothyroidism  Assessment & Plan  · Check TSH  · Continue with levothyroxine    VTE Prophylaxis: Enoxaparin (Lovenox)  Code Status: Full code   POLST: POLST is not applicable to this patient  Discussion with family: Stanley Stamp [phone]    Anticipated Length of Stay:  Patient will be admitted on an Observation basis with an anticipated length of stay of  < 2 midnights  Justification for Hospital Stay:  Palpitation    Chief Complaint:   Palpitations     History of Present Illness:    Sisi Dong is a 80 y o  female who presents with palpitations  The patient with past medical history takotsubo cardiomyopathy, hypertension, and hypothyroidism  She states that she has been having palpitations starting Friday last week  This mostly happens at night when she gets up to go to the bathroom  The patient is not sure how fast her heart rate was  She denies any lightheadedness, dizziness, chest pain, dyspnea, or diaphoresis related to these palpitations  She denies any recent medication use  The patient states that she has been feeling more anxious recently due to COVID-19 as well as she was recently told by a visiting nurse that she has peripheral artery disease  The patient was recently seen by her outpatient cardiologist on 01/21/2021  Currently her symptom is resolved  Review of Systems:  Review of Systems   Constitutional: Negative for activity change, appetite change, chills, diaphoresis and fever  HENT: Negative for congestion, ear pain, hearing loss, tinnitus and trouble swallowing  Eyes: Negative for photophobia, pain, discharge, itching and visual disturbance  Respiratory: Negative for cough, shortness of breath, wheezing and stridor  Cardiovascular: Positive for palpitations  Negative for chest pain and leg swelling  Gastrointestinal: Negative for abdominal pain, blood in stool, constipation, diarrhea, nausea and vomiting  Endocrine: Negative for cold intolerance, heat intolerance, polydipsia, polyphagia and polyuria  Genitourinary: Negative for difficulty urinating, dysuria, frequency, hematuria and urgency  Musculoskeletal: Negative for back pain, gait problem and neck stiffness  Skin: Negative for pallor, rash and wound     Allergic/Immunologic: Negative for environmental allergies, food allergies and immunocompromised state  Neurological: Negative for dizziness, tremors, speech difficulty, weakness, light-headedness, numbness and headaches  Hematological: Negative for adenopathy  Does not bruise/bleed easily  Psychiatric/Behavioral: Negative for confusion, hallucinations and sleep disturbance  The patient is nervous/anxious  Past Medical and Surgical History:   Past Medical History:   Diagnosis Date    Anxiety     CAD (coronary artery disease)     Carotid Duplex 03/06/2018    Plaque formation was prominent bilaterally    Depression     Disease of thyroid gland     Diverticulitis     Dyslipidemia     ECHO 09/14/2017    EF 55%, mild mitral regurg, small pericardial effusion   Hypertension     Mitral regurgitation     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 infarction)     Shingles     Takotsubo cardiomyopathy        Past Surgical History:   Procedure Laterality Date    CARDIAC CATHETERIZATION  08/27/2012    EF 35%, 70% stenosis of diagonal, Otherwise all OK apart from myopathy    CARDIAC CATHETERIZATION  10/10/2012    EF 55%, no significant CAD  Mild stress induced cardiomyopathy    CATARACT EXTRACTION Bilateral     EYE SURGERY      bilateral cataract    TONSILLECTOMY  childhood       Meds/Allergies:  Prior to Admission medications    Medication Sig Start Date End Date Taking?  Authorizing Provider   amLODIPine (NORVASC) 5 mg tablet Take 1 tablet (5 mg total) by mouth daily at bedtime 12/23/20  Yes Rosa Wesley MD   aspirin 81 mg chewable tablet Chew 81 mg daily 8/10/15  Yes Historical Provider, MD   cholecalciferol (VITAMIN D3) 1,000 units tablet Take 1,000 Units by mouth daily   Yes Historical Provider, MD   clonazePAM (KlonoPIN) 0 5 mg tablet Take 0 5 tablets (0 25 mg total) by mouth 2 (two) times a day for 10 days  Patient taking differently: 0 25 mg 2 (two) times a day as needed Take 1/2 tablet (0 25-mg) by oral route 2 times a day  6/26/20  Yes Tu Maurice MD   escitalopram (LEXAPRO) 10 mg tablet Take 1 5 tablets (15 mg total) by mouth daily  Patient taking differently: Take 20 mg by mouth daily  6/27/20  Yes Tu Maurice MD   lansoprazole (PREVACID) 30 mg capsule Take 1 capsule (30 mg total) by mouth daily 6/26/20  Yes Tu Maurice MD   levothyroxine 50 mcg tablet take 1 tablet by mouth ON MONDAY, [facility], AND FRIDAY 7/23/20  Yes Tu Maurice MD   levothyroxine 75 mcg tablet Take 1 tablet (75 mcg total) by mouth 4 (four) times a week On Tuesdays, Thursdays and Saturdays and Sundays 6/27/20  Yes Tu Maurice MD   losartan (COZAAR) 50 mg tablet Take 1 tablet (50 mg total) by mouth daily 12/4/20  Yes August Ho MD   metoprolol succinate (TOPROL-XL) 25 mg 24 hr tablet Take 0 5 tablets (12 5 mg total) by mouth every 12 (twelve) hours 1/21/21  Yes August Ho MD   Multiple Vitamin (MULTIVITAMIN) capsule Take 1 capsule by mouth daily   Yes Historical Provider, MD   polyethylene glycol (MIRALAX) 17 g packet Take 17 g by mouth daily   Yes Historical Provider, MD   prednisoLONE acetate (PRED FORTE) 1 % ophthalmic suspension Administer 1 drop to both eyes 2 (two) times a day 6/26/20  Yes Tu Maurice MD   sucralfate (CARAFATE) 1 g/10 mL suspension Take 10 mL (1 g total) by mouth 4 (four) times a day 7/30/20  Yes Carolann Rausch MD   valACYclovir (VALTREX) 500 mg tablet Take 500 mg by mouth daily 10/30/20  Yes Historical Provider, MD   buPROPion (Wellbutrin XL) 150 mg 24 hr tablet Take 150 mg by mouth daily    Historical Provider, MD   melatonin 3 mg Take 1 tablet (3 mg total) by mouth daily at bedtime  Patient not taking: Reported on 9/4/2020 6/26/20   Tu Maurice MD     I have reviewed home medications with patient personally  Allergies: Allergies   Allergen Reactions    No Known Allergies        Social History:  Marital Status:     Occupation: retired   Patient Pre-hospital Living Situation: alone   Patient Pre-hospital Level of Mobility:  Independent  Patient Pre-hospital Diet Restrictions:  Cardiac  Substance Use History:   Social History     Substance and Sexual Activity   Alcohol Use Never    Frequency: Never     Social History     Tobacco Use   Smoking Status Never Smoker   Smokeless Tobacco Never Used     Social History     Substance and Sexual Activity   Drug Use Never       Family History:  I have reviewed the patients family history    Physical Exam:   Vitals:   Blood Pressure: 148/65 (01/26/21 1202)  Pulse: 72 (01/26/21 1202)  Temperature: (!) 96 4 °F (35 8 °C) (01/26/21 1202)  Temp Source: Temporal (01/26/21 1202)  Respirations: 18 (01/26/21 1202)  Height: 5' 2" (157 5 cm) (01/26/21 1202)  Weight - Scale: 54 kg (119 lb) (01/26/21 0500)  SpO2: 97 % (01/26/21 1202)    Physical Exam  Vitals signs and nursing note reviewed  Constitutional:       General: She is not in acute distress  Appearance: Normal appearance  She is underweight  HENT:      Head: Normocephalic and atraumatic  Right Ear: External ear normal       Left Ear: External ear normal       Nose: Nose normal  No rhinorrhea  Mouth/Throat:      Mouth: Mucous membranes are moist       Pharynx: Oropharynx is clear  Eyes:      General:         Right eye: No discharge  Left eye: No discharge  Pupils: Pupils are equal, round, and reactive to light  Neck:      Musculoskeletal: Normal range of motion and neck supple  No muscular tenderness  Cardiovascular:      Rate and Rhythm: Normal rate and regular rhythm  Pulses: Normal pulses  Heart sounds: Normal heart sounds  No murmur  Pulmonary:      Effort: Pulmonary effort is normal  No respiratory distress  Breath sounds: Normal breath sounds  Abdominal:      General: Bowel sounds are normal  There is no distension  Palpations: Abdomen is soft  There is no mass  Tenderness: There is no abdominal tenderness  Musculoskeletal: Normal range of motion  General: No swelling or tenderness  Skin:     General: Skin is warm and dry  Capillary Refill: Capillary refill takes less than 2 seconds  Findings: No erythema or rash  Neurological:      General: No focal deficit present  Mental Status: She is alert and oriented to person, place, and time  Mental status is at baseline  Psychiatric:         Mood and Affect: Mood is anxious  Behavior: Behavior normal          Thought Content: Thought content normal          Additional Data:   Lab Results: I have personally reviewed pertinent reports  Results from last 7 days   Lab Units 01/26/21  0511   WBC Thousand/uL 4 40*   HEMOGLOBIN g/dL 12 6   HEMATOCRIT % 36 8*   PLATELETS Thousands/uL 220   NEUTROS PCT % 62   LYMPHS PCT % 25   MONOS PCT % 11   EOS PCT % 1     Results from last 7 days   Lab Units 01/26/21  0511   SODIUM mmol/L 132*   POTASSIUM mmol/L 3 8   CHLORIDE mmol/L 97*   CO2 mmol/L 29   BUN mg/dL 13   CREATININE mg/dL 0 75   CALCIUM mg/dL 9 5   ALK PHOS U/L 78   ALT U/L 9   AST U/L 17     Results from last 7 days   Lab Units 01/26/21  0511   INR  1 01               Imaging: I have personally reviewed pertinent reports  CT abdomen pelvis with contrast   Final Result by Etienne Randolph MD (01/26 6061)      No evidence of acute abdominopelvic process  Colonic diverticulosis  Workstation performed: [id number]         XR chest 1 view portable   Final Result by Favian Buckner MD (01/26 6075)      No acute cardiopulmonary disease  Workstation performed: [id number]             EKG, Pathology, and Other Studies Reviewed on Admission:   · EKG:  Sinus rhythm with first-degree AV block heart rate of 72    Epic Records Reviewed: Yes     ** Please Note: This note has been constructed using a voice recognition system   **

## 2021-01-26 NOTE — ASSESSMENT & PLAN NOTE
· See psychiatrist outpatient  · Continue with Lexapro, Wellbutrin and Klonopin at nighttime  · The patient states that she has been feeling more anxious lately as she is worried because she was told that she has PAD by visiting nurse

## 2021-01-26 NOTE — ED PROCEDURE NOTE
PROCEDURE  ECG 12 Lead Documentation Only    Date/Time: 1/26/2021 5:11 AM  Performed by: Carly Khanna MD  Authorized by: Carly Khanna MD     Indications / Diagnosis:  Palpitations   ECG reviewed by me, the ED Provider: yes    Patient location:  ED  Previous ECG:     Comparison to cardiac monitor: Yes    Interpretation:     Interpretation: normal    Rate:     ECG rate:  72    ECG rate assessment: normal    Rhythm:     Rhythm: sinus rhythm    Ectopy:     Ectopy: none    QRS:     QRS axis:  Right    QRS intervals:  Normal  Conduction:     Conduction: abnormal      Abnormal conduction: incomplete RBBB and 1st degree    ST segments:     ST segments:  Non-specific  T waves:     T waves: non-specific           Carly Khanna MD  01/26/21 4887

## 2021-01-26 NOTE — ASSESSMENT & PLAN NOTE
· History of takotsubo cardiomyopathy  · The most recent nuclear stress is negative for ischemia in 03/2020  · Continue with current cardiac medications

## 2021-01-27 VITALS
OXYGEN SATURATION: 99 % | BODY MASS INDEX: 21.9 KG/M2 | WEIGHT: 119 LBS | HEIGHT: 62 IN | RESPIRATION RATE: 20 BRPM | SYSTOLIC BLOOD PRESSURE: 124 MMHG | HEART RATE: 70 BPM | TEMPERATURE: 98.2 F | DIASTOLIC BLOOD PRESSURE: 68 MMHG

## 2021-01-27 LAB
ANION GAP SERPL CALCULATED.3IONS-SCNC: 6 MMOL/L (ref 4–13)
ATRIAL RATE: 60 BPM
BUN SERPL-MCNC: 14 MG/DL (ref 7–25)
CALCIUM SERPL-MCNC: 9.7 MG/DL (ref 8.6–10.5)
CHLORIDE SERPL-SCNC: 97 MMOL/L (ref 98–107)
CHOLEST SERPL-MCNC: 194 MG/DL (ref 0–200)
CO2 SERPL-SCNC: 31 MMOL/L (ref 21–31)
CREAT SERPL-MCNC: 0.84 MG/DL (ref 0.6–1.2)
ERYTHROCYTE [DISTWIDTH] IN BLOOD BY AUTOMATED COUNT: 13.9 % (ref 11.5–14.5)
GFR SERPL CREATININE-BSD FRML MDRD: 64 ML/MIN/1.73SQ M
GLUCOSE P FAST SERPL-MCNC: 85 MG/DL (ref 65–99)
GLUCOSE SERPL-MCNC: 85 MG/DL (ref 65–99)
HCT VFR BLD AUTO: 38.6 % (ref 42–47)
HDLC SERPL-MCNC: 70 MG/DL
HGB BLD-MCNC: 12.7 G/DL (ref 12–16)
LDLC SERPL CALC-MCNC: 113 MG/DL (ref 0–100)
MCH RBC QN AUTO: 31.3 PG (ref 26–34)
MCHC RBC AUTO-ENTMCNC: 33 G/DL (ref 31–37)
MCV RBC AUTO: 95 FL (ref 81–99)
NONHDLC SERPL-MCNC: 124 MG/DL
P AXIS: 43 DEGREES
PLATELET # BLD AUTO: 235 THOUSANDS/UL (ref 149–390)
PMV BLD AUTO: 6.6 FL (ref 8.6–11.7)
POTASSIUM SERPL-SCNC: 4.2 MMOL/L (ref 3.5–5.5)
PR INTERVAL: 244 MS
QRS AXIS: 71 DEGREES
QRSD INTERVAL: 88 MS
QT INTERVAL: 456 MS
QTC INTERVAL: 456 MS
RBC # BLD AUTO: 4.07 MILLION/UL (ref 3.9–5.2)
SODIUM SERPL-SCNC: 134 MMOL/L (ref 134–143)
T WAVE AXIS: 68 DEGREES
TRIGL SERPL-MCNC: 53 MG/DL (ref 44–166)
TSH SERPL DL<=0.05 MIU/L-ACNC: 2.25 UIU/ML (ref 0.45–5.33)
VENTRICULAR RATE: 60 BPM
WBC # BLD AUTO: 4.3 THOUSAND/UL (ref 4.8–10.8)

## 2021-01-27 PROCEDURE — 84443 ASSAY THYROID STIM HORMONE: CPT | Performed by: FAMILY MEDICINE

## 2021-01-27 PROCEDURE — 99217 PR OBSERVATION CARE DISCHARGE MANAGEMENT: CPT | Performed by: FAMILY MEDICINE

## 2021-01-27 PROCEDURE — 85027 COMPLETE CBC AUTOMATED: CPT | Performed by: NURSE PRACTITIONER

## 2021-01-27 PROCEDURE — 93010 ELECTROCARDIOGRAM REPORT: CPT | Performed by: INTERNAL MEDICINE

## 2021-01-27 PROCEDURE — 80048 BASIC METABOLIC PNL TOTAL CA: CPT | Performed by: NURSE PRACTITIONER

## 2021-01-27 PROCEDURE — 80061 LIPID PANEL: CPT | Performed by: NURSE PRACTITIONER

## 2021-01-27 RX ADMIN — METOPROLOL SUCCINATE 12.5 MG: 25 TABLET, EXTENDED RELEASE ORAL at 11:54

## 2021-01-27 RX ADMIN — BUPROPION HYDROCHLORIDE 150 MG: 150 TABLET, EXTENDED RELEASE ORAL at 08:26

## 2021-01-27 RX ADMIN — METOPROLOL SUCCINATE 12.5 MG: 25 TABLET, EXTENDED RELEASE ORAL at 00:41

## 2021-01-27 RX ADMIN — Medication 1 TABLET: at 08:26

## 2021-01-27 RX ADMIN — SUCRALFATE 1 G: 1 TABLET ORAL at 10:44

## 2021-01-27 RX ADMIN — POLYETHYLENE GLYCOL 3350 17 G: 17 POWDER, FOR SOLUTION ORAL at 08:25

## 2021-01-27 RX ADMIN — LOSARTAN POTASSIUM 50 MG: 50 TABLET, FILM COATED ORAL at 08:26

## 2021-01-27 RX ADMIN — LEVOTHYROXINE SODIUM 50 MCG: 50 TABLET ORAL at 05:54

## 2021-01-27 RX ADMIN — ASPIRIN 81 MG: 81 TABLET, CHEWABLE ORAL at 08:26

## 2021-01-27 RX ADMIN — Medication 1000 UNITS: at 08:26

## 2021-01-27 RX ADMIN — ENOXAPARIN SODIUM 40 MG: 40 INJECTION SUBCUTANEOUS at 08:25

## 2021-01-27 RX ADMIN — PANTOPRAZOLE SODIUM 40 MG: 40 TABLET, DELAYED RELEASE ORAL at 05:54

## 2021-01-27 RX ADMIN — SUCRALFATE 1 G: 1 TABLET ORAL at 06:07

## 2021-01-27 RX ADMIN — PREDNISOLONE ACETATE 1 DROP: 10 SUSPENSION/ DROPS OPHTHALMIC at 08:25

## 2021-01-27 RX ADMIN — CLONAZEPAM 0.25 MG: 0.5 TABLET ORAL at 10:44

## 2021-01-27 RX ADMIN — VALACYCLOVIR HYDROCHLORIDE 500 MG: 500 TABLET, FILM COATED ORAL at 08:26

## 2021-01-27 RX ADMIN — ESCITALOPRAM OXALATE 20 MG: 10 TABLET ORAL at 08:26

## 2021-01-27 NOTE — ASSESSMENT & PLAN NOTE
· Patient complaint of palpitation which has been happening every night since Friday when she wakes up to go to the bathroom  · No associated symptoms with this palpitation  · Resolved  · Telemetry:  No acute events, discussed with cardiology  · Troponins 3 times negative  · The patient recently which is seen by her outpatient cardiologist on 01/21/2021- no new medication changes noted  · Suspected that anxiety can contribute to her symptoms as well

## 2021-01-27 NOTE — PLAN OF CARE
Problem: Potential for Falls  Goal: Patient will remain free of falls  Description: INTERVENTIONS:  - Assess patient frequently for physical needs  -  Identify cognitive and physical deficits and behaviors that affect risk of falls    -  [place] fall precautions as indicated by assessment   - Educate patient/family on patient safety including physical limitations  - Instruct patient to call for assistance with activity based on assessment  - Modify environment to reduce risk of injury  - Consider OT/PT consult to assist with strengthening/mobility  Outcome: Progressing     Problem: PAIN - ADULT  Goal: Verbalizes/displays adequate comfort level or baseline comfort level  Description: Interventions:  - Encourage patient to monitor pain and request assistance  - Assess pain using appropriate pain scale  - Administer analgesics based on type and severity of pain and evaluate response  - Implement non-pharmacological measures as appropriate and evaluate response  - Consider cultural and social influences on pain and pain management  - Notify physician/advanced practitioner if interventions unsuccessful or patient reports new pain  Outcome: Progressing     Problem: INFECTION - ADULT  Goal: Absence or prevention of progression during hospitalization  Description: INTERVENTIONS:  - Assess and monitor for signs and symptoms of infection  - Monitor lab/diagnostic results  - Monitor all insertion sites, i e  indwelling lines, tubes, and drains  - Monitor endotracheal if appropriate and nasal secretions for changes in amount and color  - [place] appropriate cooling/warming therapies per order  - Administer medications as ordered  - Instruct and encourage patient and family to use good hand hygiene technique  - Identify and instruct in appropriate isolation precautions for identified infection/condition  Outcome: Progressing     Problem: SAFETY ADULT  Goal: Patient will remain free of falls  Description: INTERVENTIONS:  - Assess patient frequently for physical needs  -  Identify cognitive and physical deficits and behaviors that affect risk of falls    -  [place] fall precautions as indicated by assessment   - Educate patient/family on patient safety including physical limitations  - Instruct patient to call for assistance with activity based on assessment  - Modify environment to reduce risk of injury  - Consider OT/PT consult to assist with strengthening/mobility  Outcome: Progressing  Goal: Maintain or return to baseline ADL function  Description: INTERVENTIONS:  -  Assess patient's ability to carry out ADLs; assess patient's baseline for ADL function and identify physical deficits which impact ability to perform ADLs (bathing, care of mouth/teeth, toileting, grooming, dressing, etc )  - Assess/evaluate cause of self-care deficits   - Assess range of motion  - Assess patient's mobility; develop plan if impaired  - Assess patient's need for assistive devices and provide as appropriate  - Encourage maximum independence but intervene and supervise when necessary  - Involve family in performance of ADLs  - Assess for home care needs following discharge   - Consider OT consult to assist with ADL evaluation and planning for discharge  - Provide patient education as appropriate  Outcome: Progressing  Goal: Maintain or return mobility status to optimal level  Description: INTERVENTIONS:  - Assess patient's baseline mobility status (ambulation, transfers, stairs, etc )    - Identify cognitive and physical deficits and behaviors that affect mobility  - Identify mobility aids required to assist with transfers and/or ambulation (gait belt, sit-to-stand, lift, walker, cane, etc )  - [place] fall precautions as indicated by assessment  - Record patient progress and toleration of activity level on Mobility SBAR; progress patient to next Phase/Stage  - Instruct patient to call for assistance with activity based on assessment  - Consider rehabilitation consult to assist with strengthening/weightbearing, etc   Outcome: Progressing     Problem: DISCHARGE PLANNING  Goal: Discharge to home or other facility with appropriate resources  Description: INTERVENTIONS:  - Identify barriers to discharge w/patient and caregiver  - Arrange for needed discharge resources and transportation as appropriate  - Identify discharge learning needs (meds, wound care, etc )  - Arrange for interpretive services to assist at discharge as needed  - Refer to Case Management Department for coordinating discharge planning if the patient needs post-hospital services based on physician/advanced practitioner order or complex needs related to functional status, cognitive ability, or social support system  Outcome: Progressing     Problem: Knowledge Deficit  Goal: Patient/family/caregiver demonstrates understanding of disease process, treatment plan, medications, and discharge instructions  Description: Complete learning assessment and assess knowledge base    Interventions:  - Provide teaching at level of understanding  - Provide teaching via preferred learning methods  Outcome: Progressing     Problem: CARDIOVASCULAR - ADULT  Goal: Maintains optimal cardiac output and hemodynamic stability  Description: INTERVENTIONS:  - Monitor I/O, vital signs and rhythm  - Monitor for S/S and trends of decreased cardiac output  - Administer and titrate ordered vasoactive medications to optimize hemodynamic stability  - Assess quality of pulses, skin color and temperature  - Assess for signs of decreased coronary artery perfusion  - Instruct patient to report change in severity of symptoms  Outcome: Progressing  Goal: Absence of cardiac dysrhythmias or at baseline rhythm  Description: INTERVENTIONS:  - Continuous cardiac monitoring, vital signs, obtain 12 lead EKG if ordered  - Administer antiarrhythmic and heart rate control medications as ordered  - Monitor electrolytes and administer replacement therapy as ordered  Outcome: Progressing     Problem: RESPIRATORY - ADULT  Goal: Achieves optimal ventilation and oxygenation  Description: INTERVENTIONS:  - Assess for changes in respiratory status  - Assess for changes in mentation and behavior  - Position to facilitate oxygenation and minimize respiratory effort  - Oxygen administered by appropriate delivery if ordered  - Initiate smoking cessation education as indicated  - Encourage broncho-pulmonary hygiene including cough, deep breathe, Incentive Spirometry  - Assess the need for suctioning and aspirate as needed  - Assess and instruct to report SOB or any respiratory difficulty  - Respiratory Therapy support as indicated  Outcome: Progressing     Problem: GASTROINTESTINAL - ADULT  Goal: Maintains or returns to baseline bowel function  Description: INTERVENTIONS:  - Assess bowel function  - Encourage oral fluids to ensure adequate hydration  - Administer IV fluids if ordered to ensure adequate hydration  - Administer ordered medications as needed  - Encourage mobilization and activity  - Consider nutritional services referral to assist patient with adequate nutrition and appropriate food choices  Outcome: Progressing  Goal: Maintains adequate nutritional intake  Description: INTERVENTIONS:  - Monitor percentage of each meal consumed  - Identify factors contributing to decreased intake, treat as appropriate  - Assist with meals as needed  - Monitor I&O, weight, and lab values if indicated  - Obtain nutrition services referral as needed  Outcome: Progressing     Problem: GENITOURINARY - ADULT  Goal: Maintains or returns to baseline urinary function  Description: INTERVENTIONS:  - Assess urinary function  - Encourage oral fluids to ensure adequate hydration if ordered  - Administer IV fluids as ordered to ensure adequate hydration  - Administer ordered medications as needed  - Offer frequent toileting  - Follow urinary retention protocol if ordered  Outcome: Progressing  Goal: Absence of urinary retention  Description: INTERVENTIONS:  - Assess patients ability to void and empty bladder  - Monitor I/O  - Bladder scan as needed  - Discuss with physician/AP medications to alleviate retention as needed  - Discuss catheterization for long term situations as appropriate  Outcome: Progressing     Problem: METABOLIC, FLUID AND ELECTROLYTES - ADULT  Goal: Electrolytes maintained within normal limits  Description: INTERVENTIONS:  - Monitor labs and assess patient for signs and symptoms of electrolyte imbalances  - Administer electrolyte replacement as ordered  - Monitor response to electrolyte replacements, including repeat lab results as appropriate  - Instruct patient on fluid and nutrition as appropriate  Outcome: Progressing  Goal: Fluid balance maintained  Description: INTERVENTIONS:  - Monitor labs   - Monitor I/O and WT  - Instruct patient on fluid and nutrition as appropriate  - Assess for signs & symptoms of volume excess or deficit  Outcome: Progressing  Goal: Glucose maintained within target range  Description: INTERVENTIONS:  - Monitor Blood Glucose as ordered  - Assess for signs and symptoms of hyperglycemia and hypoglycemia  - Administer ordered medications to maintain glucose within target range  - Assess nutritional intake and initiate nutrition service referral as needed  Outcome: Progressing     Problem: SKIN/TISSUE INTEGRITY - ADULT  Goal: Skin integrity remains intact  Description: INTERVENTIONS  - Identify patients at risk for skin breakdown  - Assess and monitor skin integrity  - Assess and monitor nutrition and hydration status  - Monitor labs (i e  albumin)  - Assess for incontinence   - Turn and reposition patient  - Assist with mobility/ambulation  - Relieve pressure over bony prominences  - Avoid friction and shearing  - Provide appropriate hygiene as needed including keeping skin clean and dry  - Evaluate need for skin moisturizer/barrier cream  - Collaborate with interdisciplinary team (i e  Nutrition, Rehabilitation, etc )   - Patient/family teaching  Outcome: Progressing  Goal: Oral mucous membranes remain intact  Description: INTERVENTIONS  - Assess oral mucosa and hygiene practices  - Implement preventative oral hygiene regimen  - Implement oral medicated treatments as ordered  - Initiate Nutrition services referral as needed  Outcome: Progressing     Problem: HEMATOLOGIC - ADULT  Goal: Maintains hematologic stability  Description: INTERVENTIONS  - Assess for signs and symptoms of bleeding or hemorrhage  - Monitor labs  - Administer supportive blood products/factors as ordered and appropriate  Outcome: Progressing     Problem: MUSCULOSKELETAL - ADULT  Goal: Maintain or return mobility to safest level of function  Description: INTERVENTIONS:  - Assess patient's ability to carry out ADLs; assess patient's baseline for ADL function and identify physical deficits which impact ability to perform ADLs (bathing, care of mouth/teeth, toileting, grooming, dressing, etc )  - Assess/evaluate cause of self-care deficits   - Assess range of motion  - Assess patient's mobility  - Assess patient's need for assistive devices and provide as appropriate  - Encourage maximum independence but intervene and supervise when necessary  - Involve family in performance of ADLs  - Assess for home care needs following discharge   - Consider OT consult to assist with ADL evaluation and planning for discharge  - Provide patient education as appropriate  Outcome: Progressing

## 2021-01-27 NOTE — ASSESSMENT & PLAN NOTE
· This was told by visiting nurse that the patient has PAD   · The patient denies any claudication symptoms and any pain during rest or ambulation, no increasing swelling  · Arterial duplex of bilateral lower extremities:  A teller sclerotic disease throughout, no significant stenosis

## 2021-01-27 NOTE — SOCIAL WORK
Assessment was completed, pt was made aware that she is s here as an obs status, pt was made aware of the cm role, pt lives alone in a 2 story home with also a basement, she does not hany, 12-14 steps inside , br 1st & 2nd floor, pt stays on the 1st floor, no steps outside, pt drives and is independent , RX Plan [employer] [address] [place] [address] AFFILIATED WITH [facility], DME: walker and commode she stated that they are old and  they were her mother's , pt does wear glasses and states she is able to read the medication labels, pt denies any d/c needs, I did get permission to call her daughter, I veloz dher at 12:16 pm to # [phone] and she was made aware of the d/c she states her mother is very independent, she will transport the pt home, pt was eating her lunch and when she was done she would  Call her, she was given the rn"s phone # to call her when she arrives to the hospital and then the pt would be brought down to her, pt and family in agreement with the d/c an d/c plan home, CM reviewed d/c planning process including the following: identifying help at home, patient preference for d/c planning needs, availability of treatment team to discuss questions or concerns patient and/or family may have regarding understanding medications and recognizing signs and symptoms once discharged  CM also encouraged patient to follow up with all recommended appointments after discharge  Patient advised of importance for patient and family to participate in managing patients medical well being

## 2021-01-27 NOTE — PLAN OF CARE
Problem: Potential for Falls  Goal: Patient will remain free of falls  Description: INTERVENTIONS:  - Assess patient frequently for physical needs  -  Identify cognitive and physical deficits and behaviors that affect risk of falls    -  [place] fall precautions as indicated by assessment   - Educate patient/family on patient safety including physical limitations  - Instruct patient to call for assistance with activity based on assessment  - Modify environment to reduce risk of injury  - Consider OT/PT consult to assist with strengthening/mobility  1/26/2021 2252 by Nena Ayala RN  Outcome: Progressing  1/26/2021 2250 by Nena Ayala RN  Outcome: Progressing     Problem: PAIN - ADULT  Goal: Verbalizes/displays adequate comfort level or baseline comfort level  Description: Interventions:  - Encourage patient to monitor pain and request assistance  - Assess pain using appropriate pain scale  - Administer analgesics based on type and severity of pain and evaluate response  - Implement non-pharmacological measures as appropriate and evaluate response  - Consider cultural and social influences on pain and pain management  - Notify physician/advanced practitioner if interventions unsuccessful or patient reports new pain  Outcome: Progressing     Problem: INFECTION - ADULT  Goal: Absence or prevention of progression during hospitalization  Description: INTERVENTIONS:  - Assess and monitor for signs and symptoms of infection  - Monitor lab/diagnostic results  - Monitor all insertion sites, i e  indwelling lines, tubes, and drains  - Monitor endotracheal if appropriate and nasal secretions for changes in amount and color  - [place] appropriate cooling/warming therapies per order  - Administer medications as ordered  - Instruct and encourage patient and family to use good hand hygiene technique  - Identify and instruct in appropriate isolation precautions for identified infection/condition  Outcome: Progressing     Problem: SAFETY ADULT  Goal: Patient will remain free of falls  Description: INTERVENTIONS:  - Assess patient frequently for physical needs  -  Identify cognitive and physical deficits and behaviors that affect risk of falls    -  [place] fall precautions as indicated by assessment   - Educate patient/family on patient safety including physical limitations  - Instruct patient to call for assistance with activity based on assessment  - Modify environment to reduce risk of injury  - Consider OT/PT consult to assist with strengthening/mobility  1/26/2021 2252 by Candida Polanco RN  Outcome: Progressing  1/26/2021 2250 by Candida Polanco RN  Outcome: Progressing  Goal: Maintain or return to baseline ADL function  Description: INTERVENTIONS:  -  Assess patient's ability to carry out ADLs; assess patient's baseline for ADL function and identify physical deficits which impact ability to perform ADLs (bathing, care of mouth/teeth, toileting, grooming, dressing, etc )  - Assess/evaluate cause of self-care deficits   - Assess range of motion  - Assess patient's mobility; develop plan if impaired  - Assess patient's need for assistive devices and provide as appropriate  - Encourage maximum independence but intervene and supervise when necessary  - Involve family in performance of ADLs  - Assess for home care needs following discharge   - Consider OT consult to assist with ADL evaluation and planning for discharge  - Provide patient education as appropriate  Outcome: Progressing  Goal: Maintain or return mobility status to optimal level  Description: INTERVENTIONS:  - Assess patient's baseline mobility status (ambulation, transfers, stairs, etc )    - Identify cognitive and physical deficits and behaviors that affect mobility  - Identify mobility aids required to assist with transfers and/or ambulation (gait belt, sit-to-stand, lift, walker, cane, etc )  - [place] fall precautions as indicated by assessment  - Record patient progress and toleration of activity level on Mobility SBAR; progress patient to next Phase/Stage  - Instruct patient to call for assistance with activity based on assessment  - Consider rehabilitation consult to assist with strengthening/weightbearing, etc   Outcome: Progressing     Problem: DISCHARGE PLANNING  Goal: Discharge to home or other facility with appropriate resources  Description: INTERVENTIONS:  - Identify barriers to discharge w/patient and caregiver  - Arrange for needed discharge resources and transportation as appropriate  - Identify discharge learning needs (meds, wound care, etc )  - Arrange for interpretive services to assist at discharge as needed  - Refer to Case Management Department for coordinating discharge planning if the patient needs post-hospital services based on physician/advanced practitioner order or complex needs related to functional status, cognitive ability, or social support system  Outcome: Progressing     Problem: Knowledge Deficit  Goal: Patient/family/caregiver demonstrates understanding of disease process, treatment plan, medications, and discharge instructions  Description: Complete learning assessment and assess knowledge base    Interventions:  - Provide teaching at level of understanding  - Provide teaching via preferred learning methods  Outcome: Progressing     Problem: CARDIOVASCULAR - ADULT  Goal: Maintains optimal cardiac output and hemodynamic stability  Description: INTERVENTIONS:  - Monitor I/O, vital signs and rhythm  - Monitor for S/S and trends of decreased cardiac output  - Administer and titrate ordered vasoactive medications to optimize hemodynamic stability  - Assess quality of pulses, skin color and temperature  - Assess for signs of decreased coronary artery perfusion  - Instruct patient to report change in severity of symptoms  Outcome: Progressing  Goal: Absence of cardiac dysrhythmias or at baseline rhythm  Description: INTERVENTIONS:  - Continuous cardiac monitoring, vital signs, obtain 12 lead EKG if ordered  - Administer antiarrhythmic and heart rate control medications as ordered  - Monitor electrolytes and administer replacement therapy as ordered  Outcome: Progressing     Problem: RESPIRATORY - ADULT  Goal: Achieves optimal ventilation and oxygenation  Description: INTERVENTIONS:  - Assess for changes in respiratory status  - Assess for changes in mentation and behavior  - Position to facilitate oxygenation and minimize respiratory effort  - Oxygen administered by appropriate delivery if ordered  - Initiate smoking cessation education as indicated  - Encourage broncho-pulmonary hygiene including cough, deep breathe, Incentive Spirometry  - Assess the need for suctioning and aspirate as needed  - Assess and instruct to report SOB or any respiratory difficulty  - Respiratory Therapy support as indicated  Outcome: Progressing     Problem: GASTROINTESTINAL - ADULT  Goal: Maintains or returns to baseline bowel function  Description: INTERVENTIONS:  - Assess bowel function  - Encourage oral fluids to ensure adequate hydration  - Administer IV fluids if ordered to ensure adequate hydration  - Administer ordered medications as needed  - Encourage mobilization and activity  - Consider nutritional services referral to assist patient with adequate nutrition and appropriate food choices  Outcome: Progressing  Goal: Maintains adequate nutritional intake  Description: INTERVENTIONS:  - Monitor percentage of each meal consumed  - Identify factors contributing to decreased intake, treat as appropriate  - Assist with meals as needed  - Monitor I&O, weight, and lab values if indicated  - Obtain nutrition services referral as needed  Outcome: Progressing     Problem: GENITOURINARY - ADULT  Goal: Maintains or returns to baseline urinary function  Description: INTERVENTIONS:  - Assess urinary function  - Encourage oral fluids to ensure adequate hydration if ordered  - Administer IV fluids as ordered to ensure adequate hydration  - Administer ordered medications as needed  - Offer frequent toileting  - Follow urinary retention protocol if ordered  Outcome: Progressing  Goal: Absence of urinary retention  Description: INTERVENTIONS:  - Assess patients ability to void and empty bladder  - Monitor I/O  - Bladder scan as needed  - Discuss with physician/AP medications to alleviate retention as needed  - Discuss catheterization for long term situations as appropriate  Outcome: Progressing     Problem: METABOLIC, FLUID AND ELECTROLYTES - ADULT  Goal: Electrolytes maintained within normal limits  Description: INTERVENTIONS:  - Monitor labs and assess patient for signs and symptoms of electrolyte imbalances  - Administer electrolyte replacement as ordered  - Monitor response to electrolyte replacements, including repeat lab results as appropriate  - Instruct patient on fluid and nutrition as appropriate  Outcome: Progressing  Goal: Fluid balance maintained  Description: INTERVENTIONS:  - Monitor labs   - Monitor I/O and WT  - Instruct patient on fluid and nutrition as appropriate  - Assess for signs & symptoms of volume excess or deficit  Outcome: Progressing  Goal: Glucose maintained within target range  Description: INTERVENTIONS:  - Monitor Blood Glucose as ordered  - Assess for signs and symptoms of hyperglycemia and hypoglycemia  - Administer ordered medications to maintain glucose within target range  - Assess nutritional intake and initiate nutrition service referral as needed  Outcome: Progressing     Problem: SKIN/TISSUE INTEGRITY - ADULT  Goal: Skin integrity remains intact  Description: INTERVENTIONS  - Identify patients at risk for skin breakdown  - Assess and monitor skin integrity  - Assess and monitor nutrition and hydration status  - Monitor labs (i e  albumin)  - Assess for incontinence   - Turn and reposition patient  - Assist with mobility/ambulation  - Relieve pressure over bony prominences  - Avoid friction and shearing  - Provide appropriate hygiene as needed including keeping skin clean and dry  - Evaluate need for skin moisturizer/barrier cream  - Collaborate with interdisciplinary team (i e  Nutrition, Rehabilitation, etc )   - Patient/family teaching  Outcome: Progressing  Goal: Oral mucous membranes remain intact  Description: INTERVENTIONS  - Assess oral mucosa and hygiene practices  - Implement preventative oral hygiene regimen  - Implement oral medicated treatments as ordered  - Initiate Nutrition services referral as needed  Outcome: Progressing     Problem: HEMATOLOGIC - ADULT  Goal: Maintains hematologic stability  Description: INTERVENTIONS  - Assess for signs and symptoms of bleeding or hemorrhage  - Monitor labs  - Administer supportive blood products/factors as ordered and appropriate  Outcome: Progressing     Problem: MUSCULOSKELETAL - ADULT  Goal: Maintain or return mobility to safest level of function  Description: INTERVENTIONS:  - Assess patient's ability to carry out ADLs; assess patient's baseline for ADL function and identify physical deficits which impact ability to perform ADLs (bathing, care of mouth/teeth, toileting, grooming, dressing, etc )  - Assess/evaluate cause of self-care deficits   - Assess range of motion  - Assess patient's mobility  - Assess patient's need for assistive devices and provide as appropriate  - Encourage maximum independence but intervene and supervise when necessary  - Involve family in performance of ADLs  - Assess for home care needs following discharge   - Consider OT consult to assist with ADL evaluation and planning for discharge  - Provide patient education as appropriate  Outcome: Progressing

## 2021-01-27 NOTE — DISCHARGE SUMMARY
Discharge- Marleen Goodell 1935, 80 y o  female MRN: [id number]    Unit/Bed#: -01 Encounter: [id number]    Primary Care Provider: Mario Adams MD   Date and time admitted to hospital: 1/26/2021  4:58 AM        * Palpitations  Assessment & Plan  · Patient complaint of palpitation which has been happening every night since Friday when she wakes up to go to the bathroom  · No associated symptoms with this palpitation  · Resolved  · Telemetry:  No acute events, discussed with cardiology  · Troponins 3 times negative  · The patient recently which is seen by her outpatient cardiologist on 01/21/2021- no new medication changes noted  · Suspected that anxiety can contribute to her symptoms as well    PAD (peripheral artery disease) ([facility] [address] )  Assessment & Plan  · This was told by visiting nurse that the patient has PAD   · The patient denies any claudication symptoms and any pain during rest or ambulation, no increasing swelling  · Arterial duplex of bilateral lower extremities:  A teller sclerotic disease throughout, no significant stenosis    IFEANYI (generalized anxiety disorder)  Assessment & Plan  · See psychiatrist outpatient  · Continue with Lexapro, Wellbutrin and Klonopin at nighttime  · The patient states that she has been feeling more anxious lately as she is worried because she was told that she has PAD by visiting nurse     Essential hypertension  Assessment & Plan  · Noted to have elevated BP upon arrival to the ED  · This is much improved  · Will continue with current regimen for now monitor blood pressure closely    Takotsubo cardiomyopathy  Assessment & Plan  · History of takotsubo cardiomyopathy  · The most recent nuclear stress is negative for ischemia in 03/2020  · Continue with current cardiac medications     Acquired hypothyroidism  Assessment & Plan  · TSH within normal limits  · Continue with levothyroxine        Discharging Physician / Practitioner: Tyrell Lim MD  PCP: Mario Adams MD  Admission Date:   Admission Orders (From admission, onward)     Ordered        01/26/21 0721  Place in Observation  Once                   Discharge Date: 01/27/21    Resolved Problems  Date Reviewed: 1/26/2021    None          Consultations During Hospital Stay:  · None    Procedures Performed:   · None    Significant Findings / Test Results:   · None    Incidental Findings:   · None     Test Results Pending at Discharge (will require follow up): · None     Outpatient Tests Requested:  · None    Complications:  None    Reason for Admission:  Palpitations  See HPI for details  Hospital Course:     Patient presented with, palpitations  EKG normal sinus rhythm nonischemic, telemetry no evidence of arrhythmia, TSH within normal limits  Palpitations likely related to anxiety  Patient follows with Psychiatry as outpatient  No chest pain or palpitations during admission  Patient to follow-up with cardiology as outpatient  Please see above list of diagnoses and related plan for additional information  Condition at Discharge: good     Discharge Day Visit / Exam:     Subjective:  Patient seen examined bedside  No acute events overnight  Denies any chest pain, shortness of breath, palpitations, abdominal pain, nausea, vomiting  Vitals: Blood Pressure: 169/74 (01/27/21 0729)  Pulse: 62 (01/27/21 0729)  Temperature: (!) 97 1 °F (36 2 °C) (01/27/21 0729)  Temp Source: Temporal (01/27/21 0729)  Respirations: 20 (01/27/21 0729)  Height: 5' 2" (157 5 cm) (01/26/21 1202)  Weight - Scale: 54 kg (119 lb) (01/26/21 0500)  SpO2: 96 % (01/27/21 0729)  Exam:   Physical Exam  Vitals signs and nursing note reviewed  Constitutional:       General: She is not in acute distress  HENT:      Head: Normocephalic  Nose: Nose normal       Mouth/Throat:      Pharynx: Oropharynx is clear  Eyes:      Conjunctiva/sclera: Conjunctivae normal    Cardiovascular:      Rate and Rhythm: Normal rate and regular rhythm        Heart sounds: No murmur  Pulmonary:      Effort: Pulmonary effort is normal  No respiratory distress  Breath sounds: Normal breath sounds  No wheezing  Abdominal:      General: There is no distension  Tenderness: There is no abdominal tenderness  There is no guarding  Musculoskeletal: Normal range of motion  General: No swelling  Right lower leg: No edema  Skin:     General: Skin is warm  Capillary Refill: Capillary refill takes less than 2 seconds  Neurological:      General: No focal deficit present  Mental Status: She is alert and oriented to person, place, and time  Cranial Nerves: No cranial nerve deficit  Psychiatric:         Mood and Affect: Mood normal          Discussion with Family:  With patient's daughter Simran Barbosa    Discharge instructions/Information to patient and family:   See after visit summary for information provided to patient and family  Provisions for Follow-Up Care:  See after visit summary for information related to follow-up care and any pertinent home health orders  Disposition:     Home    For Discharges to [facility] SNF:   · Not Applicable to this Patient - Not Applicable to this Patient    Planned Readmission:  No     Discharge Statement:  I spent 35 minutes discharging the patient  This time was spent on the day of discharge  I had direct contact with the patient on the day of discharge  Greater than 50% of the total time was spent examining patient, answering all patient questions, arranging and discussing plan of care with patient as well as directly providing post-discharge instructions  Additional time then spent on discharge activities  Discharge Medications:  See after visit summary for reconciled discharge medications provided to patient and family        ** Please Note: This note has been constructed using a voice recognition system **

## 2021-01-27 NOTE — UTILIZATION REVIEW
Initial Clinical Review    Admission: Date/Time/Statement:   Admission Orders (From admission, onward)     Ordered        01/26/21 0721  Place in Observation  Once                   Orders Placed This Encounter   Procedures    Place in Observation     Standing Status:   Standing     Number of Occurrences:   1     Order Specific Question:   Level of Care     Answer:   Med Surg [16]     ED Arrival Information     Expected Arrival Acuity Means of Arrival Escorted By Service Admission Type    - 1/26/2021 04:54 Urgent [facility]ulance [facility] Urgent    Arrival Complaint    palpitations        Chief Complaint   Patient presents with    Rapid Heart Rate     According to the patient, she had woke up with a rapid heart rate  Assessment/Plan:   80 y o  female who presents with palpitations  The patient with past medical history takotsubo cardiomyopathy, hypertension, and hypothyroidism  She states that she has been having palpitations starting Friday last week  This mostly happens at night when she gets up to go to the bathroom  The patient is not sure how fast her heart rate was  She denies any lightheadedness, dizziness, chest pain, dyspnea, or diaphoresis related to these palpitations  She denies any recent medication use  The patient states that she has been feeling more anxious recently due to COVID-19 as well as she was recently told by a visiting nurse that she has peripheral artery disease  The patient was recently seen by her outpatient cardiologist on 01/21/2021  Currently her symptom is resolved     Palpitations  Assessment & Plan  · Patient complains of palpitation which has been happening every night since Friday when she wakes up to go to the bathroom  · No associated symptoms with this palpitation  · Current is resolved  · Will admit for observation  · Monitor on telemetry  · Trend troponin  · The patient recently which is seen by her outpatient cardiologist on 01/21/2021- no new medication changes noted  · Suspected that anxiety can contribute to her symptoms as well     PAD (peripheral artery disease) ([address] )  Assessment & Plan  · This was told by visiting nurse that the patient has PAD   · The patient denies any claudication symptoms and any pain during rest or ambulation, no increasing swelling  · Will obtain arterial duplex      IFEANYI (generalized anxiety disorder)  Assessment & Plan  · See psychiatrist outpatient  · Continue with Lexapro, Wellbutrin and Klonopin at nighttime  · The patient states that she has been feeling more anxious lately as she is worried because she was told that she has PAD by visiting nurse      Essential hypertension  Assessment & Plan  · Noted to have elevated BP upon arrival to the ED  · This is much improved  · Will continue with current regimen for now monitor blood pressure closely     Takotsubo cardiomyopathy  Assessment & Plan  · History of takotsubo cardiomyopathy  · The most recent nuclear stress is negative for ischemia in 03/2020  · Continue with current cardiac medications      Acquired hypothyroidism  Assessment & Plan  · Check TSH  · Continue with levothyroxine    ED Triage Vitals   Temperature Pulse Respirations Blood Pressure SpO2   01/26/21 0500 01/26/21 0500 01/26/21 0500 01/26/21 0500 01/26/21 0500   97 7 °F (36 5 °C) 71 18 (!) 204/91 97 %      Temp Source Heart Rate Source Patient Position - Orthostatic VS BP Location FiO2 (%)   01/26/21 0500 01/26/21 0500 01/26/21 0500 01/26/21 0500 --   Temporal Monitor Sitting Left arm       Pain Score       01/26/21 1202       No Pain          Wt Readings from Last 1 Encounters:   01/26/21 54 kg (119 lb)     Additional Vital Signs:   Date/Time  Temp  Pulse  Resp  BP  MAP (mmHg)  SpO2  O2 Device  Patient Position - Orthostatic VS   01/27/21 0729  97 1 °F (36 2 °C)Abnormal   62  20  169/74    96 %  None (Room air)  Lying   01/27/21 0300  96 9 °F (36 1 °C)Abnormal   71  18  156/72    96 %  None (Room air)  Lying   01/27/21 0041    64    140/71           01/26/21 2304        189/78Abnormal            01/26/21 2212        210/89Abnormal            01/26/21 2209  96 °F (35 6 °C)Abnormal   78  16      97 %  None (Room air)  Lying   01/26/21 1901  97 6 °F (36 4 °C)  60  17  155/80    97 %  None (Room air)  Lying     Pertinent Labs/Diagnostic Test Results:   Results from last 7 days   Lab Units 01/27/21  0549 01/26/21  0511   WBC Thousand/uL 4 30* 4 40*   HEMOGLOBIN g/dL 12 7 12 6   HEMATOCRIT % 38 6* 36 8*   PLATELETS Thousands/uL 235 220   NEUTROS ABS Thousands/µL  --  2 70     Results from last 7 days   Lab Units 01/27/21  0545 01/26/21  0511   SODIUM mmol/L 134 132*   POTASSIUM mmol/L 4 2 3 8   CHLORIDE mmol/L 97* 97*   CO2 mmol/L 31 29   ANION GAP mmol/L 6 6   BUN mg/dL 14 13   CREATININE mg/dL 0 84 0 75   EGFR ml/min/1 73sq m 64 73   CALCIUM mg/dL 9 7 9 5   MAGNESIUM mg/dL  --  2 0     Results from last 7 days   Lab Units 01/26/21  0511   AST U/L 17   ALT U/L 9   ALK PHOS U/L 78   TOTAL PROTEIN g/dL 7 5   ALBUMIN g/dL 4 5   TOTAL BILIRUBIN mg/dL 0 50     Results from last 7 days   Lab Units 01/27/21  0545 01/26/21  0511   GLUCOSE RANDOM mg/dL 85 88     Results from last 7 days   Lab Units 01/26/21  1556 01/26/21  1208 01/26/21  0511   TROPONIN I ng/mL <0 03 <0 03 <0 03     Results from last 7 days   Lab Units 01/26/21  0511   PROTIME seconds 13 2   INR  1 01   PTT seconds 29     Results from last 7 days   Lab Units 01/26/21  0511   LIPASE u/L 59     1/26  Cxr=No acute cardiopulmonary disease  Ct a/p=No evidence of acute abdominopelvic process  Colonic diverticulosis  BLE arterial duplex=  RIGHT LOWER LIMB:  This resting evaluation shows diffuse atherosclerotic arterial disease with calcification  There is no evidence of significant focal stenosis in the femoro-popliteal arteries    Ankle/Brachial index:  1 2 which is in the normal disease category  PVR/ PPG tracings are normal    Metatarsal pressure of 162 mmHg  Great toe pressure of 157 mmHg, within the healing range  LEFT LOWER LIMB:  This resting evaluation shows diffuse atherosclerotic arteries disease with calcification  There is no evidence of significant focal stenosis in the femoro-popliteal arteries  Findings suggest tibio-peroneal arterial disease  Ankle/Brachial index:  1 19 which is in the normal disease category  PVR/ PPG tracings are dampened  Metatarsal pressure of 172 mmHg  Great toe pressure of 107 mmHg, within the healing range  Ekg=  Sinus rhythm with 1st degree A-V block  Rightward axis    ED Treatment:   Medication Administration from 01/26/2021 0454 to 01/26/2021 1151       Date/Time Order Dose Route Action     01/26/2021 0626 iohexol (OMNIPAQUE) 350 MG/ML injection (SINGLE-DOSE) 100 mL 100 mL Intravenous Given     01/26/2021 8827 aspirin chewable tablet 162 mg 162 mg Oral Given     01/26/2021 0711 nitroglycerin (NITRO-BID) 2 % TD ointment 1 inch 1 inch Topical Given        Past Medical History:   Diagnosis Date    Anxiety     CAD (coronary artery disease)     Carotid Duplex 03/06/2018    Plaque formation was prominent bilaterally    Depression     Disease of thyroid gland     Diverticulitis     Dyslipidemia     ECHO 09/14/2017    EF 55%, mild mitral regurg, small pericardial effusion      Hypertension     Mitral regurgitation     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 infarction)     Shingles     Takotsubo cardiomyopathy      Present on Admission:   Acquired hypothyroidism   Palpitations   Essential hypertension   IFEANYI (generalized anxiety disorder)   Takotsubo cardiomyopathy    Admitting Diagnosis: Palpitations [R00 2]  Rapid heart rate [R00 0]  Abdominal pain [R10 9]  Hypertensive urgency [I16 0]  Age/Sex: 80 y o  female  Admission Orders:  Telemetry  Continuous ST segment monitoring  scd  Cont pulse ox    Scheduled Medications:  amLODIPine, 5 mg, Oral, HS  aspirin, 81 mg, Oral, Daily  buPROPion, 150 mg, Oral, Daily  cholecalciferol, 1,000 Units, Oral, Daily  enoxaparin, 40 mg, Subcutaneous, Daily  escitalopram, 20 mg, Oral, Daily  levothyroxine, 50 mcg, Oral, Once per day on Mon Wed Fri  levothyroxine, 75 mcg, Oral, Once per day on Sun Tue Thu Sat  losartan, 50 mg, Oral, Daily  metoprolol succinate, 12 5 mg, Oral, Q12H  multivitamin-minerals, 1 tablet, Oral, Daily  pantoprazole, 40 mg, Oral, Early Morning  polyethylene glycol, 17 g, Oral, Daily  prednisoLONE acetate, 1 drop, Both Eyes, BID  sucralfate, 1 g, Oral, 4x Daily (AC & HS)  valACYclovir, 500 mg, Oral, Daily    PRN Meds:  acetaminophen, 650 mg, Oral, Q6H PRN  clonazePAM, 0 25 mg, Oral, TID PRN  nitroglycerin, 0 4 mg, Sublingual, Q5 Min PRN    Network Utilization Review Department  ATTENTION: Please call with any questions or concerns to [phone] and carefully listen to the prompts so that you are directed to the right person  All voicemails are confidential   Angela Muss all requests for admission clinical reviews, approved or denied determinations and any other requests to dedicated fax number below belonging to the campus where the patient is receiving treatment   List of dedicated fax numbers for the Facilities:  [address] DENIALS (Administrative/Medical Necessity) [phone]   [address] (Maternity/NICU/Pediatrics) [phone] [address] (  Chuy Guevara "[address]" [address]) [address] [place] [address] [address] [place]) 1014 [facility] [place] [phone]

## 2021-01-27 NOTE — DISCHARGE INSTR - AVS FIRST PAGE
Dear Akash Mario,     It was our pleasure to care for you here at [facility]  It is our hope that we were always able to exceed the expected standards for your care during your stay  You were hospitalized due to palpitations  You were cared for on the medical floor by Talat Laguna MD with the [facility] who covers for your primary care physician (PCP), Maggie Del Toro MD, while you were hospitalized  If you have any questions or concerns related to this hospitalization, you may contact us at [phone]  For follow up as well as any medication refills, we recommend that you follow up with your primary care physician  A registered nurse will reach out to you by phone within a few days after your discharge to answer any additional questions that you may have after going home  However, at this time we provide for you here, the most important instructions / recommendations at discharge:     · Notable Medication Adjustments -   · None  · Testing Required after Discharge -   · None  · Important follow up information -   · Follow-up with PCP and follow-up, follow-up with psychiatry   · Follow-up with cardiology for regular appointment  · Other Instructions -   · None  · Please review this entire after visit summary as additional general instructions including medication list, appointments, activity, diet, any pertinent wound care, and other additional recommendations from your care team that may be provided for you        Sincerely,     Talat Laguna MD

## 2021-01-27 NOTE — DISCHARGE INSTRUCTIONS
Anxiety   WHAT YOU SHOULD KNOW:   Anxiety is a condition that causes you to feel excessive worry, uneasiness, or fear  Family or work stress, smoking, caffeine, and alcohol can increase your risk for anxiety  Certain medicines or health conditions can also increase your risk  Anxiety may begin gradually, and can become a long-term condition if it is not managed or treated  AFTER YOU LEAVE:   Medicines:   · Medicines  can help you feel more calm and relaxed, and decrease your symptoms  · Take your medicine as directed  Contact your healthcare provider if you think your medicine is not helping or if you have side effects  Tell him if you are allergic to any medicine  Keep a list of the medicines, vitamins, and herbs you take  Include the amounts, and when and why you take them  Bring the list or the pill bottles to follow-up visits  Carry your medicine list with you in case of an emergency  Follow up with your healthcare provider within 2 weeks or as directed:  Write down your questions so you remember to ask them during your visits  Manage anxiety:   · Go to counseling as directed  Cognitive behavioral therapy can help you understand and change how you react to events that trigger your symptoms  · Find ways to manage your symptoms  Activities such as exercise, meditation, or listening to music can help you relax  · Practice deep breathing  Breathing can change how your body reacts to stress  Focus on taking slow, deep breaths several times a day, or during an anxiety attack  Breathe in through your nose, and out through your mouth  · Avoid caffeine  Caffeine can make your symptoms worse  Avoid foods or drinks that are meant to increase your energy level  · Limit or avoid alcohol  Ask your healthcare provider if alcohol is safe for you  You may not be able to drink alcohol if you take certain anxiety or depression medicines  Limit alcohol to 1 drink per day if you are a woman   Limit alcohol to 2 drinks per day if you are a man  A drink of alcohol is 12 ounces of beer, 5 ounces of wine, or 1½ ounces of liquor  Contact your healthcare provider if:   · Your symptoms get worse or do not get better with treatment  · You think your medicine may be causing side effects  · Your anxiety keeps you from doing your regular daily activities  · You have new symptoms since your last visit  · You have questions or concerns about your condition or care  Seek care immediately or call 911 if:   · You have chest pain, tightness, or heaviness that may spread to your shoulders, arms, jaw, neck, or back  · You feel like hurting yourself or someone else  · You feel dizzy, lightheaded, or faint  © 2014 [address] is for End User's use only and may not be sold, redistributed or otherwise used for commercial purposes  All illustrations and images included in CareNotes® are the copyrighted property of A [employer]  or Jitendra Love  The above information is an  only  It is not intended as medical advice for individual conditions or treatments  Talk to your doctor, nurse or pharmacist before following any medical regimen to see if it is safe and effective for you

## 2021-01-27 NOTE — NURSING NOTE
Hospitalist in to see patient, ok for d/c at this time, iv site removed, avs given to patient, all questions/concerns answered at this time, patient transported to main entrance at this time with PCA

## 2021-03-01 ENCOUNTER — OFFICE VISIT (OUTPATIENT)
Dept: CARDIOLOGY CLINIC | Facility: CLINIC | Age: 86
End: 2021-03-01
Payer: COMMERCIAL

## 2021-03-01 VITALS
SYSTOLIC BLOOD PRESSURE: 148 MMHG | HEART RATE: 68 BPM | BODY MASS INDEX: 22.68 KG/M2 | DIASTOLIC BLOOD PRESSURE: 70 MMHG | HEIGHT: 62 IN | WEIGHT: 123.24 LBS

## 2021-03-01 DIAGNOSIS — R00.2 PALPITATIONS: Primary | ICD-10-CM

## 2021-03-01 DIAGNOSIS — I10 ESSENTIAL HYPERTENSION: ICD-10-CM

## 2021-03-01 DIAGNOSIS — F41.9 ANXIETY: ICD-10-CM

## 2021-03-01 DIAGNOSIS — E03.9 HYPOTHYROIDISM, UNSPECIFIED TYPE: ICD-10-CM

## 2021-03-01 PROCEDURE — 99213 OFFICE O/P EST LOW 20 MIN: CPT | Performed by: INTERNAL MEDICINE

## 2021-03-01 NOTE — PROGRESS NOTES
[facility]  Pardeep Grier, [address]   [address] [facility], [address]   [phone]                                              Cardiology Office Follow Up  Sadaf Haque, 80 y o  female  YOB: 1935  MRN: [id number] Encounter: [id number]      PCP - Julien Diego MD    Assessment and Plan  1  Hypertension  2  H/o takotsubo cardiomyopathy  · Troponin peak 1 22  · Nuclear stress negative for ischemia in 3/2020  · Echo - 8/2015 - ([facility]) - LVEF was down to 25% with suggestions of takotsubo cardiomyopathy  · [facility] - [facility]) - 70% stenosis of diag, rest normal, LVEF 35%  3  H/o Syncope (11/2019)  · Event monitor - 12/2019 - NSR, no Afib, occasional PACs & PVCs, 1st degree AV block, asymptomatic 2 3 second pauses, lowest HR 38 bpm  · Echo - 11/2019 ([facility]) - LVEF 60%, mild TR/MR/AI  4  Hypothyroidism  5  Anxiety-depression    Plan  Palpitation  · Mild symptoms, mostly related to anxiety   · Remains on metoprolol succinate 12 5 mg b i d    · Continue to monitor clinically for now    Hypertension  · Initially had a lot of fluctuations in blood pressure, and her anxiety was making things worse for her with frequent checking of blood pressure   · She has currently away with checking her blood pressure at home and has been keeping herself occupied with other activities and has been doing well overall  · Current regimen:  · Morning: Losartan 50 mg, metoprolol XL 12 5 mg  · Evening: Amlodipine 5 mg, metoprolol XL 12 5 mg  · She states her night metoprolol dose makes her fall asleep  · Continue    H/o takotsubo cardiomyopathy, Type 2 MI  · Currently no symptoms/chest pain  · Nuclear stress 3/2020 - normal LV function, no significant perfusion defects  · No complains of chest pain or shortness of breath   · Continue aspirin, metoprolol succinate 12 5 mg bid, losartan 50 mg    ? PAD  · She recently had her annual [employer] home nurse visit for an annual checkup and was reportedly diagnosed with severe PAD and asked to follow-up  · She does not report any active complains of claudication and is able to walk around on level ground while shopping without any symptoms   · She had lower extremity vascular duplex completed, which diffuse atherosclerotic disease, but no focal stenosis  · Right HEIDI 1 2, Left HEIDI 1 19  · Continue aspirin    Hypothyroidism  · Previously was missing medications due to trying to avoid taking other medications with levothyroxine  · TSH 3 84 (6/16/2020)  · Continue current levothyroxine dosing and follow up with PCP    No results found for this visit on 03/01/21  No orders of the defined types were placed in this encounter  No follow-ups on file  History of Present Illness   66-year-old female comes in for short term follow up evaluation of hypertension  She has longstanding history of hypertension, and has been losartan and metoprolol for many years  Recently her blood pressure has been intermittently very elevated to 528-557 systolic  As a result, her losartan was increased from 25-50 mg weeks ago  But despite that her blood pressure seems to have remained elevated intermittently  Per patient, her blood pressure is usually well controlled during the day in the range of 130s  But her blood pressure is usually elevated in the middle of the night  A couple of days ago around 2:00 a m  She woke up and had a blood pressure of 210  She was recently and diagnosed and treated for diverticulitis  Since then she has continued to have some abdominal discomfort  She was scheduled to undergo an EGD for the same  Mom but when she presented for the same, she was found to have a blood pressure of 191 systolic, and as a result this was canceled  Next a again she went to the ED with elevated blood pressures  She was given a dose of clonidine 0 2 mg, and discharged home with follow-up with Cardiology      Since and getting the clonidine 0 2 mg, she has been feeling lightheaded and dizzy, and particularly with standing up  Interval History: 7/31/19  Has been doing relatively well  Continues to have a lot of anxiety  She continues to check her BP 2-3 times/day, and has been keeping a log  Her dizziness improved the same day after the effects of clonidine wore off  Interval history - 11/4/19  She comes back for 2 month follow-up of her blood pressure  No more symptoms of dizziness or lightheadedness after having been off clonidine  She continues to be very anxious about her blood pressure, and addressed to make adjustments on her own based on these blood pressure readings, and also forgets to take blood pressure medications at times due to trying to avoid taking it along with levothyroxine  She had called the office about 10 days ago stating that her blood pressure was 108, and she felt it was too low and as a result was not taking her blood pressure medications  But after that her blood pressure went in the 180s, and as a result she started taking it again    Interval history - 2/19/2020  Since my last follow up visit, she had an episode of syncope on black Friday for which she followed up with Dr Raquel Mccabe, and was ordered an event monitor  This did not reveal any significant findings  Subsequently about a week ago, and she ended up in the hospital with complains of abdominal pain and fullness after eating some fried sweet potatoes and fish for dinner  She was unable to sleep as a result and got very anxious and her blood pressure was very elevated in the 200s  As a result she was brought into the ED for evaluation  Her symptoms resolved after Mylanta  But during this she was found to have troponin elevation to 1, and Cardiology was consulted  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, related to her elevated blood pressures  Her medications were adjusted and she was set up for outpatient follow-up with me here today    She reports no further symptoms since discharge, and is currently feeling well, although continues to be very anxious about her blood pressure  Interval history - 7/1/2020  She comes back in to the office for an early visit due to again ongoing blood pressure fluctuations  Her medication regimen has been fairly stable over the past 2 months with losartan amlodipine and metoprolol  But at home, she tries to adjust medications based on her blood pressure and her perception of high blood pressure  She continues to be very anxious despite her ten-day psychiatric rehab  She states that she feels lonely at times in the pandemic has made her worry a lot regarding things like not having done enough for her mother 15 years ago, when she passed away  Interval history - 10/13/2020  He comes back for follow-up after 3 months  She has been doing fairly well recently, and does not have any active complains today  Admits to having significant anxiety and depression, and has been maintained on Lexapro, with which she is doing better  Interval history - 1/21/2021  She comes back for follow-up after about 3 months  She has been doing well overall and is happy with her current status  She remains reasonably active and  Is able to walk on level ground while shopping and does not report any symptoms with the same  Her granddaughter comes to visit her every day for lunch, which she enjoys as well  Interval history - 3/1/2021  She comes back for follow-up after about 6 weeks  After my last office visit, within 1 week she was again in the ED with complains of palpitations, which happened at night and when she were to go to the restroom  ECG and telemetry in the hospital were without any significant arrhythmias  She was diagnosed with anxiety, and treated with medications for the same in again discharged home  She has been doing well overall since then and has not had any major complaints  She continues to take anxiety medications          Historical Information   Past Medical History:   Diagnosis Date    Anxiety     CAD (coronary artery disease)     Carotid Duplex 03/06/2018    Plaque formation was prominent bilaterally    Depression     Disease of thyroid gland     Diverticulitis     Dyslipidemia     ECHO 09/14/2017    EF 55%, mild mitral regurg, small pericardial effusion   Hypertension     Mitral regurgitation     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 infarction)     Shingles     Takotsubo cardiomyopathy      Past Surgical History:   Procedure Laterality Date    CARDIAC CATHETERIZATION  08/27/2012    EF 35%, 70% stenosis of diagonal, Otherwise all OK apart from myopathy    CARDIAC CATHETERIZATION  10/10/2012    EF 55%, no significant CAD    Mild stress induced cardiomyopathy    CATARACT EXTRACTION Bilateral     EYE SURGERY      bilateral cataract    TONSILLECTOMY  childhood     Family History   Problem Relation Age of Onset    Cancer Father     Prostate cancer Father      Current Outpatient Medications on File Prior to Visit   Medication Sig Dispense Refill    amLODIPine (NORVASC) 5 mg tablet Take 1 tablet (5 mg total) by mouth daily at bedtime 90 tablet 3    aspirin 81 mg chewable tablet Chew 81 mg daily      buPROPion (Wellbutrin XL) 150 mg 24 hr tablet Take 150 mg by mouth daily      cholecalciferol (VITAMIN D3) 1,000 units tablet Take 1,000 Units by mouth daily      clonazePAM (KlonoPIN) 0 5 mg tablet Take 0 5 tablets (0 25 mg total) by mouth 2 (two) times a day for 10 days (Patient taking differently: 0 25 mg 2 (two) times a day as needed Take 1/2 tablet (0 25-mg) by oral route 2 times a day ) 10 tablet 1    escitalopram (LEXAPRO) 10 mg tablet Take 1 5 tablets (15 mg total) by mouth daily (Patient taking differently: Take 20 mg by mouth daily ) 45 tablet 0    lansoprazole (PREVACID) 30 mg capsule Take 1 capsule (30 mg total) by mouth daily 30 capsule 0    levothyroxine 50 mcg tablet take 1 tablet by mouth ON MONDAY, WEDNESDAY, AND FRIDAY 12 tablet 0    levothyroxine 75 mcg tablet Take 1 tablet (75 mcg total) by mouth 4 (four) times a week On Tuesdays, Thursdays and Saturdays and Sundays 16 tablet 0    losartan (COZAAR) 50 mg tablet Take 1 tablet (50 mg total) by mouth daily 90 tablet 1    metoprolol succinate (TOPROL-XL) 25 mg 24 hr tablet Take 0 5 tablets (12 5 mg total) by mouth every 12 (twelve) hours 30 tablet 0    Multiple Vitamin (MULTIVITAMIN) capsule Take 1 capsule by mouth daily      polyethylene glycol (MIRALAX) 17 g packet Take 17 g by mouth daily      prednisoLONE acetate (PRED FORTE) 1 % ophthalmic suspension Administer 1 drop to both eyes 2 (two) times a day 5 mL 0    sucralfate (CARAFATE) 1 g/10 mL suspension Take 10 mL (1 g total) by mouth 4 (four) times a day 420 mL 0    valACYclovir (VALTREX) 500 mg tablet Take 500 mg by mouth daily      melatonin 3 mg Take 1 tablet (3 mg total) by mouth daily at bedtime (Patient not taking: Reported on 9/4/2020) 30 tablet 0     No current facility-administered medications on file prior to visit  Allergies   Allergen Reactions    No Known Allergies      Social History     Socioeconomic History    Marital status:       Spouse name: Not on file    Number of children: Not on file    Years of education: Not on file    Highest education level: Not on file   Occupational History    Not on file   Social Needs    Financial resource strain: Not on file    Food insecurity     Worry: Not on file     Inability: Not on file    Transportation needs     Medical: Not on file     Non-medical: Not on file   Tobacco Use    Smoking status: Never Smoker    Smokeless tobacco: Never Used   Substance and Sexual Activity    Alcohol use: Never     Frequency: Never    Drug use: Never    Sexual activity: Not Currently   Lifestyle    Physical activity     Days per week: Not on file     Minutes per session: Not on file    Stress: Not on file   Relationships    Social connections     Talks on phone: Not on file     Gets together: Not on file     Attends Bahai service: Not on file     Active member of club or organization: Not on file     Attends meetings of clubs or organizations: Not on file     Relationship status: Not on file    Intimate partner violence     Fear of current or ex partner: Not on file     Emotionally abused: Not on file     Physically abused: Not on file     Forced sexual activity: Not on file   Other Topics Concern    Not on file   Social History Narrative    Caffeine - 2 cups hot tea/day        Review of Systems   All other systems reviewed and are negative  Vitals:  Vitals:    03/01/21 1450   BP: 148/70   Pulse: 68   Weight: 55 9 kg (123 lb 3 8 oz)   Height: 5' 2" (1 575 m)     BMI - Body mass index is 22 54 kg/m²  Wt Readings from Last 7 Encounters:   03/01/21 55 9 kg (123 lb 3 8 oz)   01/26/21 54 kg (119 lb)   01/21/21 54 kg (119 lb 0 8 oz)   10/13/20 54 4 kg (120 lb)   09/04/20 53 5 kg (118 lb)   08/06/20 52 6 kg (116 lb)   08/02/20 52 6 kg (116 lb)       Physical Exam  Vitals signs and nursing note reviewed  Constitutional:       General: She is not in acute distress  Appearance: Normal appearance  She is well-developed  She is not ill-appearing  HENT:      Head: Normocephalic and atraumatic  Nose: No congestion  Eyes:      General: No scleral icterus  Conjunctiva/sclera: Conjunctivae normal    Neck:      Musculoskeletal: Neck supple  Vascular: No carotid bruit or JVD  Cardiovascular:      Rate and Rhythm: Normal rate and regular rhythm  Pulses: Normal pulses  Heart sounds: Normal heart sounds  No murmur  No friction rub  No gallop  Pulmonary:      Effort: Pulmonary effort is normal  No respiratory distress  Breath sounds: Normal breath sounds  No rales  Abdominal:      General: There is no distension  Palpations: Abdomen is soft  Tenderness: There is no abdominal tenderness     Musculoskeletal:         General: No swelling or tenderness  Right lower leg: No edema  Left lower leg: No edema  Skin:     General: Skin is warm  Neurological:      General: No focal deficit present  Mental Status: She is alert and oriented to person, place, and time  Mental status is at baseline  Psychiatric:         Mood and Affect: Mood normal          Behavior: Behavior normal          Thought Content: Thought content normal            Labs:  CBC:   Lab Results   Component Value Date    WBC 4 30 (L) 01/27/2021    RBC 4 07 01/27/2021    HGB 12 7 01/27/2021    HCT 38 6 (L) 01/27/2021    MCV 95 01/27/2021     01/27/2021    RDW 13 9 01/27/2021       CMP:   Lab Results   Component Value Date    K 4 2 01/27/2021    CL 97 (L) 01/27/2021    CO2 31 01/27/2021    BUN 14 01/27/2021    CREATININE 0 84 01/27/2021    EGFR 64 01/27/2021    CALCIUM 9 7 01/27/2021    AST 17 01/26/2021    ALT 9 01/26/2021    ALKPHOS 78 01/26/2021       Magnesium:  Lab Results   Component Value Date    MG 2 0 01/26/2021       Lipid Profile:   Lab Results   Component Value Date    HDL 70 01/27/2021    TRIG 53 01/27/2021    LDLCALC 113 (H) 01/27/2021       Thyroid Studies:   Lab Results   Component Value Date    OPI0HKLQHJXW 2 250 01/27/2021       No components found for: [employer] [facility]    Lab Results   Component Value Date    INR 1 01 01/26/2021    INR 1 15 06/09/2019    INR 1 12 06/02/2019   5    Imaging: No results found  Cardiac testing:   No results found for this or any previous visit  No results found for this or any previous visit  No results found for this or any previous visit  No results found for this or any previous visit

## 2021-03-08 DIAGNOSIS — E87.1 HYPONATREMIA: ICD-10-CM

## 2021-03-08 RX ORDER — LOSARTAN POTASSIUM 50 MG/1
50 TABLET ORAL DAILY
Qty: 90 TABLET | Refills: 3 | Status: SHIPPED | OUTPATIENT
Start: 2021-03-08 | End: 2021-12-13 | Stop reason: SDUPTHER

## 2021-03-08 NOTE — TELEPHONE ENCOUNTER
Joanna Cushing P  [facility]             Losartan 50 mg daily     30 days to -3 [facility]     And the rest to [facility]

## 2021-03-19 DIAGNOSIS — E03.9 ACQUIRED HYPOTHYROIDISM: Chronic | ICD-10-CM

## 2021-03-19 RX ORDER — LEVOTHYROXINE SODIUM 0.07 MG/1
TABLET ORAL
Qty: 16 TABLET | Refills: 0 | OUTPATIENT
Start: 2021-03-19

## 2021-05-13 ENCOUNTER — TELEPHONE (OUTPATIENT)
Dept: CARDIOLOGY CLINIC | Facility: CLINIC | Age: 86
End: 2021-05-13

## 2021-05-13 NOTE — TELEPHONE ENCOUNTER
Jan Vieira called, she took her BP yesterday morning and it was only 124/65 so she only took half her losartan dose which would be 25mg  She takes her norvasc 5mg at night  Metoprolol 12 5mg BID (morning and evening)    So this morning she took her BP and its 135/65 so she is going to take a 1/2 of losartan again  She's worried if she takes a full, she will drop too much  She is not having any symptoms  She wanted to know if she should continue to just take a 1/2 losartan and keep an eye on things  She will continue taking her metoprolol and norvasc as prescribed

## 2021-07-07 ENCOUNTER — OFFICE VISIT (OUTPATIENT)
Dept: CARDIOLOGY CLINIC | Facility: CLINIC | Age: 86
End: 2021-07-07
Payer: COMMERCIAL

## 2021-07-07 VITALS
HEIGHT: 61 IN | BODY MASS INDEX: 23.98 KG/M2 | SYSTOLIC BLOOD PRESSURE: 126 MMHG | HEART RATE: 80 BPM | WEIGHT: 127 LBS | DIASTOLIC BLOOD PRESSURE: 62 MMHG

## 2021-07-07 DIAGNOSIS — I10 ESSENTIAL HYPERTENSION: Primary | ICD-10-CM

## 2021-07-07 DIAGNOSIS — R00.2 PALPITATIONS: ICD-10-CM

## 2021-07-07 DIAGNOSIS — F41.9 ANXIETY: ICD-10-CM

## 2021-07-07 DIAGNOSIS — E03.9 HYPOTHYROIDISM, UNSPECIFIED TYPE: ICD-10-CM

## 2021-07-07 PROCEDURE — 99214 OFFICE O/P EST MOD 30 MIN: CPT | Performed by: INTERNAL MEDICINE

## 2021-07-07 NOTE — PROGRESS NOTES
7/7/2022), or if symptoms worsen or fail to improve  History of Present Illness   72-year-old female comes in for short term follow up evaluation of hypertension  She has longstanding history of hypertension, and has been losartan and metoprolol for many years  Recently her blood pressure has been intermittently very elevated to 262-270 systolic  As a result, her losartan was increased from 25-50 mg weeks ago  But despite that her blood pressure seems to have remained elevated intermittently  Per patient, her blood pressure is usually well controlled during the day in the range of 130s  But her blood pressure is usually elevated in the middle of the night  A couple of days ago around 2:00 a m  She woke up and had a blood pressure of 210  She was recently and diagnosed and treated for diverticulitis  Since then she has continued to have some abdominal discomfort  She was scheduled to undergo an EGD for the same  Mom but when she presented for the same, she was found to have a blood pressure of 780 systolic, and as a result this was canceled  Next a again she went to the ED with elevated blood pressures  She was given a dose of clonidine 0 2 mg, and discharged home with follow-up with Cardiology  Since and getting the clonidine 0 2 mg, she has been feeling lightheaded and dizzy, and particularly with standing up  Interval History: 7/31/19  Has been doing relatively well  Continues to have a lot of anxiety  She continues to check her BP 2-3 times/day, and has been keeping a log  Her dizziness improved the same day after the effects of clonidine wore off  Interval history - 11/4/19  She comes back for 2 month follow-up of her blood pressure  No more symptoms of dizziness or lightheadedness after having been off clonidine    She continues to be very anxious about her blood pressure, and addressed to make adjustments on her own based on these blood pressure readings, and also forgets to take blood pressure medications at times due to trying to avoid taking it along with levothyroxine  She had called the office about 10 days ago stating that her blood pressure was 108, and she felt it was too low and as a result was not taking her blood pressure medications  But after that her blood pressure went in the 180s, and as a result she started taking it again    Interval history - 2/19/2020  Since my last follow up visit, she had an episode of syncope on black Friday for which she followed up with Dr Rito Arana, and was ordered an event monitor  This did not reveal any significant findings  Subsequently about a week ago, and she ended up in the hospital with complains of abdominal pain and fullness after eating some fried sweet potatoes and fish for dinner  She was unable to sleep as a result and got very anxious and her blood pressure was very elevated in the 200s  As a result she was brought into the ED for evaluation  Her symptoms resolved after M[place]  But during this she was found to have troponin elevation to 1, and Cardiology was consulted  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, related to her elevated blood pressures  Her medications were adjusted and she was set up for outpatient follow-up with me here today  She reports no further symptoms since discharge, and is currently feeling well, although continues to be very anxious about her blood pressure  Interval history - 7/1/2020  She comes back in to the office for an early visit due to again ongoing blood pressure fluctuations  Her medication regimen has been fairly stable over the past 2 months with losartan amlodipine and metoprolol  But at home, she tries to adjust medications based on her blood pressure and her perception of high blood pressure  She continues to be very anxious despite her ten-day psychiatric rehab    She states that she feels lonely at times in the pandemic has made her worry a lot regarding things like not having done enough for her mother 15 years ago, when she passed away  Interval history - 10/13/2020  He comes back for follow-up after 3 months  She has been doing fairly well recently, and does not have any active complains today  Admits to having significant anxiety and depression, and has been maintained on Lexapro, with which she is doing better  Interval history - 1/21/2021  She comes back for follow-up after about 3 months  She has been doing well overall and is happy with her current status  She remains reasonably active and  Is able to walk on level ground while shopping and does not report any symptoms with the same  Her granddaughter comes to visit her every day for lunch, which she enjoys as well  Interval history - 3/1/2021  She comes back for follow-up after about 6 weeks  After my last office visit, within 1 week she was again in the ED with complains of palpitations, which happened at night and when she were to go to the restroom  ECG and telemetry in the hospital were without any significant arrhythmias  She was diagnosed with anxiety, and treated with medications for the same in again discharged home  She has been doing well overall since then and has not had any major complaints  She continues to take anxiety medications  Interval history - 7/7/2021  She comes back for follow up after 4 months  She has been doing well in recent months, and has been able to control her anxiety and stress well  With this her blood pressure is also better controlled  She thinks the metoprolol helps her sleep, and as a result her overall symptoms are under control  No major complains of chest, shortness of breath  She remains active          Historical Information   Past Medical History:   Diagnosis Date    Anxiety     CAD (coronary artery disease)     Carotid Duplex 03/06/2018    Plaque formation was prominent bilaterally    Depression     Disease of thyroid gland     Diverticulitis     Dyslipidemia     ECHO 09/14/2017    EF 55%, mild mitral regurg, small pericardial effusion   Hypertension     Mitral regurgitation     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 infarction)     Shingles     Takotsubo cardiomyopathy      Past Surgical History:   Procedure Laterality Date    CARDIAC CATHETERIZATION  08/27/2012    EF 35%, 70% stenosis of diagonal, Otherwise all OK apart from myopathy    CARDIAC CATHETERIZATION  10/10/2012    EF 55%, no significant CAD    Mild stress induced cardiomyopathy    CATARACT EXTRACTION Bilateral     EYE SURGERY      bilateral cataract    TONSILLECTOMY  childhood     Family History   Problem Relation Age of Onset    Cancer Father     Prostate cancer Father      Current Outpatient Medications on File Prior to Visit   Medication Sig Dispense Refill    amLODIPine (NORVASC) 5 mg tablet Take 1 tablet (5 mg total) by mouth daily at bedtime 90 tablet 3    aspirin 81 mg chewable tablet Chew 81 mg daily      cholecalciferol (VITAMIN D3) 1,000 units tablet Take 1,000 Units by mouth daily      clonazePAM (KlonoPIN) 0 5 mg tablet Take 0 5 tablets (0 25 mg total) by mouth 2 (two) times a day for 10 days (Patient taking differently: 0 25 mg 2 (two) times a day as needed Take 1/2 tablet (0 25-mg) by oral route 2 times a day ) 10 tablet 1    escitalopram (LEXAPRO) 10 mg tablet Take 1 5 tablets (15 mg total) by mouth daily (Patient taking differently: Take 20 mg by mouth daily ) 45 tablet 0    lansoprazole (PREVACID) 30 mg capsule Take 1 capsule (30 mg total) by mouth daily 30 capsule 0    levothyroxine 50 mcg tablet take 1 tablet by mouth ON MONDAY, WEDNESDAY, AND FRIDAY 12 tablet 0    levothyroxine 75 mcg tablet Take 1 tablet (75 mcg total) by mouth 4 (four) times a week On Tuesdays, Thursdays and Saturdays and Sundays 16 tablet 0    losartan (COZAAR) 50 mg tablet Take 1 tablet (50 mg total) by mouth daily 90 tablet 3    metoprolol succinate (TOPROL-XL) 25 mg 24 hr tablet Take 0 5 tablets (12 5 mg total) by mouth every 12 (twelve) hours 30 tablet 0    Multiple Vitamin (MULTIVITAMIN) capsule Take 1 capsule by mouth daily      polyethylene glycol (MIRALAX) 17 g packet Take 17 g by mouth daily      prednisoLONE acetate (PRED FORTE) 1 % ophthalmic suspension Administer 1 drop to both eyes 2 (two) times a day 5 mL 0    sucralfate (CARAFATE) 1 g/10 mL suspension Take 10 mL (1 g total) by mouth 4 (four) times a day 420 mL 0    [DISCONTINUED] buPROPion (Wellbutrin XL) 150 mg 24 hr tablet Take 150 mg by mouth daily      melatonin 3 mg Take 1 tablet (3 mg total) by mouth daily at bedtime (Patient not taking: Reported on 9/4/2020) 30 tablet 0    valACYclovir (VALTREX) 500 mg tablet Take 500 mg by mouth daily       No current facility-administered medications on file prior to visit  Allergies   Allergen Reactions    No Known Allergies      Social History     Socioeconomic History    Marital status:      Spouse name: None    Number of children: None    Years of education: None    Highest education level: None   Occupational History    None   Tobacco Use    Smoking status: Never Smoker    Smokeless tobacco: Never Used   Vaping Use    Vaping Use: Never used   Substance and Sexual Activity    Alcohol use: Never    Drug use: Never    Sexual activity: Not Currently   Other Topics Concern    None   Social History Narrative    Caffeine - 2 cups hot tea/day     Social Determinants of Health     Financial Resource Strain:     Difficulty of Paying Living Expenses:    Food Insecurity:     Worried About Running Out of Food in the Last Year:     Ran Out of Food in the Last Year:    Transportation Needs:     Lack of Transportation (Medical):      Lack of Transportation (Non-Medical):    Physical Activity:     Days of Exercise per Week:     Minutes of Exercise per Session:    Stress:     Feeling of Stress :    Social Connections:     Frequency of Communication with Friends and Family:     Frequency of Social Gatherings with Friends and Family:     Attends Yazdanism Services:     Active Member of Clubs or Organizations:     Attends Club or Organization Meetings:     Marital Status:    Intimate Partner Violence:     Fear of Current or Ex-Partner:     Emotionally Abused:     Physically Abused:     Sexually Abused:         Review of Systems   All other systems reviewed and are negative  Vitals:  Vitals:    07/07/21 1308   BP: 126/62   Pulse: 80   Weight: 57 6 kg (127 lb)   Height: 5' 1" (1 549 m)     BMI - Body mass index is 24 kg/m²  Wt Readings from Last 7 Encounters:   07/07/21 57 6 kg (127 lb)   03/26/21 55 3 kg (122 lb)   03/01/21 55 9 kg (123 lb 3 8 oz)   01/26/21 54 kg (119 lb)   01/21/21 54 kg (119 lb 0 8 oz)   10/13/20 54 4 kg (120 lb)   09/04/20 53 5 kg (118 lb)       Physical Exam  Vitals and nursing note reviewed  Constitutional:       General: She is not in acute distress  Appearance: Normal appearance  She is well-developed  She is not ill-appearing  HENT:      Head: Normocephalic and atraumatic  Nose: No congestion  Eyes:      General: No scleral icterus  Conjunctiva/sclera: Conjunctivae normal    Neck:      Vascular: No carotid bruit or JVD  Cardiovascular:      Rate and Rhythm: Normal rate and regular rhythm  Pulses: Normal pulses  Heart sounds: Normal heart sounds  No murmur heard  No friction rub  No gallop  Pulmonary:      Effort: Pulmonary effort is normal  No respiratory distress  Breath sounds: Normal breath sounds  No rales  Abdominal:      General: There is no distension  Palpations: Abdomen is soft  Tenderness: There is no abdominal tenderness  Musculoskeletal:         General: No swelling or tenderness  Cervical back: Neck supple  Right lower leg: No edema  Left lower leg: No edema  Skin:     General: Skin is warm  Neurological:      General: No focal deficit present        Mental Status: She is alert and oriented to person, place, and time  Mental status is at baseline  Psychiatric:         Mood and Affect: Mood normal          Behavior: Behavior normal          Thought Content: Thought content normal            Labs:  CBC:   Lab Results   Component Value Date    WBC 4 30 (L) 01/27/2021    RBC 4 07 01/27/2021    HGB 12 7 01/27/2021    HCT 38 6 (L) 01/27/2021    MCV 95 01/27/2021     01/27/2021    RDW 13 9 01/27/2021       CMP:   Lab Results   Component Value Date    K 4 2 01/27/2021    CL 97 (L) 01/27/2021    CO2 31 01/27/2021    BUN 14 01/27/2021    CREATININE 0 84 01/27/2021    EGFR 64 01/27/2021    CALCIUM 9 7 01/27/2021    AST 17 01/26/2021    ALT 9 01/26/2021    ALKPHOS 78 01/26/2021       Magnesium:  Lab Results   Component Value Date    MG 2 0 01/26/2021       Lipid Profile:   Lab Results   Component Value Date    HDL 70 01/27/2021    TRIG 53 01/27/2021    LDLCALC 113 (H) 01/27/2021       Thyroid Studies:   Lab Results   Component Value Date    MAN5IHRKGHXR 2 250 01/27/2021       No components found for: [facility]    Lab Results   Component Value Date    INR 1 01 01/26/2021    INR 1 15 06/09/2019    INR 1 12 06/02/2019   5    Imaging: No results found  Cardiac testing:   No results found for this or any previous visit  No results found for this or any previous visit  No results found for this or any previous visit  No results found for this or any previous visit

## 2021-12-13 DIAGNOSIS — E87.1 HYPONATREMIA: ICD-10-CM

## 2021-12-13 DIAGNOSIS — I10 ESSENTIAL HYPERTENSION: Chronic | ICD-10-CM

## 2021-12-13 RX ORDER — AMLODIPINE BESYLATE 5 MG/1
5 TABLET ORAL
Qty: 90 TABLET | Refills: 3 | Status: SHIPPED | OUTPATIENT
Start: 2021-12-13

## 2021-12-13 RX ORDER — LOSARTAN POTASSIUM 50 MG/1
50 TABLET ORAL DAILY
Qty: 90 TABLET | Refills: 3 | Status: SHIPPED | OUTPATIENT
Start: 2021-12-13

## 2021-12-30 ENCOUNTER — OFFICE VISIT (OUTPATIENT)
Dept: URGENT CARE | Facility: CLINIC | Age: 86
End: 2021-12-30
Payer: COMMERCIAL

## 2021-12-30 ENCOUNTER — APPOINTMENT (OUTPATIENT)
Dept: RADIOLOGY | Facility: CLINIC | Age: 86
End: 2021-12-30
Payer: COMMERCIAL

## 2021-12-30 VITALS — RESPIRATION RATE: 16 BRPM | TEMPERATURE: 97.6 F | HEART RATE: 74 BPM | OXYGEN SATURATION: 97 %

## 2021-12-30 DIAGNOSIS — J06.9 VIRAL URI WITH COUGH: ICD-10-CM

## 2021-12-30 DIAGNOSIS — J18.9 COMMUNITY ACQUIRED PNEUMONIA OF RIGHT LOWER LOBE OF LUNG: Primary | ICD-10-CM

## 2021-12-30 PROCEDURE — S9083 URGENT CARE CENTER GLOBAL: HCPCS

## 2021-12-30 PROCEDURE — 71046 X-RAY EXAM CHEST 2 VIEWS: CPT

## 2021-12-30 PROCEDURE — 99213 OFFICE O/P EST LOW 20 MIN: CPT

## 2021-12-30 PROCEDURE — 87636 SARSCOV2 & INF A&B AMP PRB: CPT

## 2021-12-30 RX ORDER — DOXYCYCLINE 100 MG/1
100 CAPSULE ORAL 2 TIMES DAILY
Qty: 14 CAPSULE | Refills: 0 | Status: SHIPPED | OUTPATIENT
Start: 2021-12-30 | End: 2022-01-06

## 2022-01-03 ENCOUNTER — HOSPITAL ENCOUNTER (EMERGENCY)
Facility: HOSPITAL | Age: 87
Discharge: HOME | End: 2022-01-03
Attending: EMERGENCY MEDICINE
Payer: COMMERCIAL

## 2022-01-03 VITALS
HEART RATE: 68 BPM | WEIGHT: 128 LBS | RESPIRATION RATE: 17 BRPM | SYSTOLIC BLOOD PRESSURE: 168 MMHG | OXYGEN SATURATION: 97 % | DIASTOLIC BLOOD PRESSURE: 76 MMHG | TEMPERATURE: 98.1 F | BODY MASS INDEX: 24.17 KG/M2 | HEIGHT: 61 IN

## 2022-01-03 DIAGNOSIS — E87.1 HYPONATREMIA: ICD-10-CM

## 2022-01-03 DIAGNOSIS — R11.0 NAUSEA: Primary | ICD-10-CM

## 2022-01-03 LAB
2HR DELTA HS TROPONIN: 1 NG/L
ALBUMIN SERPL BCP-MCNC: 4.2 G/DL (ref 3.5–5)
ALP SERPL-CCNC: 78 U/L (ref 34–104)
ALT SERPL W P-5'-P-CCNC: 9 U/L (ref 7–52)
ANION GAP SERPL CALCULATED.3IONS-SCNC: 8 MMOL/L (ref 4–13)
AST SERPL W P-5'-P-CCNC: 19 U/L (ref 13–39)
BASOPHILS # BLD AUTO: 0.01 THOUSANDS/ΜL (ref 0–0.1)
BASOPHILS NFR BLD AUTO: 0 % (ref 0–1)
BILIRUB SERPL-MCNC: 0.58 MG/DL (ref 0.2–1)
BUN SERPL-MCNC: 11 MG/DL (ref 5–25)
CALCIUM SERPL-MCNC: 9.1 MG/DL (ref 8.4–10.2)
CARDIAC TROPONIN I PNL SERPL HS: 7 NG/L
CARDIAC TROPONIN I PNL SERPL HS: 8 NG/L
CHLORIDE SERPL-SCNC: 92 MMOL/L (ref 96–108)
CO2 SERPL-SCNC: 28 MMOL/L (ref 21–32)
CREAT SERPL-MCNC: 0.86 MG/DL (ref 0.6–1.3)
EOSINOPHIL # BLD AUTO: 0.03 THOUSAND/ΜL (ref 0–0.61)
EOSINOPHIL NFR BLD AUTO: 1 % (ref 0–6)
ERYTHROCYTE [DISTWIDTH] IN BLOOD BY AUTOMATED COUNT: 12.2 % (ref 11.6–15.1)
GFR SERPL CREATININE-BSD FRML MDRD: 61 ML/MIN/1.73SQ M
GLUCOSE SERPL-MCNC: 99 MG/DL (ref 65–140)
HCT VFR BLD AUTO: 35.6 % (ref 34.8–46.1)
HGB BLD-MCNC: 11.9 G/DL (ref 11.5–15.4)
IMM GRANULOCYTES # BLD AUTO: 0.01 THOUSAND/UL (ref 0–0.2)
IMM GRANULOCYTES NFR BLD AUTO: 0 % (ref 0–2)
LYMPHOCYTES # BLD AUTO: 0.89 THOUSANDS/ΜL (ref 0.6–4.47)
LYMPHOCYTES NFR BLD AUTO: 31 % (ref 14–44)
MCH RBC QN AUTO: 31.1 PG (ref 26.8–34.3)
MCHC RBC AUTO-ENTMCNC: 33.4 G/DL (ref 31.4–37.4)
MCV RBC AUTO: 93 FL (ref 82–98)
MONOCYTES # BLD AUTO: 0.26 THOUSAND/ΜL (ref 0.17–1.22)
MONOCYTES NFR BLD AUTO: 9 % (ref 4–12)
NEUTROPHILS # BLD AUTO: 1.64 THOUSANDS/ΜL (ref 1.85–7.62)
NEUTS SEG NFR BLD AUTO: 59 % (ref 43–75)
NRBC BLD AUTO-RTO: 0 /100 WBCS
PLATELET # BLD AUTO: 174 THOUSANDS/UL (ref 149–390)
PMV BLD AUTO: 8.5 FL (ref 8.9–12.7)
POTASSIUM SERPL-SCNC: 3.8 MMOL/L (ref 3.5–5.3)
PROT SERPL-MCNC: 7.1 G/DL (ref 6.4–8.4)
RBC # BLD AUTO: 3.83 MILLION/UL (ref 3.81–5.12)
SODIUM SERPL-SCNC: 128 MMOL/L (ref 135–147)
WBC # BLD AUTO: 2.84 THOUSAND/UL (ref 4.31–10.16)

## 2022-01-03 PROCEDURE — 99284 EMERGENCY DEPT VISIT MOD MDM: CPT | Performed by: EMERGENCY MEDICINE

## 2022-01-03 PROCEDURE — 96361 HYDRATE IV INFUSION ADD-ON: CPT

## 2022-01-03 PROCEDURE — 99284 EMERGENCY DEPT VISIT MOD MDM: CPT

## 2022-01-03 PROCEDURE — 36415 COLL VENOUS BLD VENIPUNCTURE: CPT | Performed by: EMERGENCY MEDICINE

## 2022-01-03 PROCEDURE — 80053 COMPREHEN METABOLIC PANEL: CPT | Performed by: EMERGENCY MEDICINE

## 2022-01-03 PROCEDURE — 84484 ASSAY OF TROPONIN QUANT: CPT | Performed by: EMERGENCY MEDICINE

## 2022-01-03 PROCEDURE — 96374 THER/PROPH/DIAG INJ IV PUSH: CPT

## 2022-01-03 PROCEDURE — 85025 COMPLETE CBC W/AUTO DIFF WBC: CPT | Performed by: EMERGENCY MEDICINE

## 2022-01-03 RX ORDER — ONDANSETRON 2 MG/ML
4 INJECTION INTRAMUSCULAR; INTRAVENOUS ONCE
Status: COMPLETED | OUTPATIENT
Start: 2022-01-03 | End: 2022-01-03

## 2022-01-03 RX ORDER — ACETAMINOPHEN 325 MG/1
975 TABLET ORAL ONCE
Status: COMPLETED | OUTPATIENT
Start: 2022-01-03 | End: 2022-01-03

## 2022-01-03 RX ORDER — CLONAZEPAM 0.5 MG/1
0.25 TABLET ORAL ONCE
Status: COMPLETED | OUTPATIENT
Start: 2022-01-03 | End: 2022-01-03

## 2022-01-03 RX ORDER — ONDANSETRON 4 MG/1
4 TABLET, ORALLY DISINTEGRATING ORAL EVERY 6 HOURS PRN
Qty: 10 TABLET | Refills: 0 | Status: SHIPPED | OUTPATIENT
Start: 2022-01-03

## 2022-01-03 RX ADMIN — ACETAMINOPHEN 975 MG: 325 TABLET ORAL at 14:03

## 2022-01-03 RX ADMIN — SODIUM CHLORIDE 500 ML: 0.9 INJECTION, SOLUTION INTRAVENOUS at 11:29

## 2022-01-03 RX ADMIN — ONDANSETRON 4 MG: 2 INJECTION INTRAMUSCULAR; INTRAVENOUS at 11:29

## 2022-01-03 RX ADMIN — CLONAZEPAM 0.25 MG: 0.5 TABLET ORAL at 12:05

## 2022-01-03 NOTE — ED PROVIDER NOTES
History  Chief Complaint   Patient presents with    Nausea     Patient reports nausea today  Dx pneumonia 12/30  Cough  Patient is an 59-year-old female with history of CAD, dyslipidemia, hypertension, who presents for evaluation of nausea  Patient says the symptoms started this morning when she woke up  The patient was seen at urgent care on 12/30 for a cough  She was diagnosed with pneumonia and started on doxycycline at that time  The patient says she has been taking the antibiotics as prescribed  She says that her cough for started on Christmas night  She says that the cough has improved  She denies any chest pain, shortness of breath, fevers, chills, sore throat  She denies any abdominal pain, loose stools  The patient had a COVID test sent on 12/30 which is still pending  According to notes, the official x-ray from 12-30 was read as no acute consolidation or concern for pneumonia  Prior to Admission Medications   Prescriptions Last Dose Informant Patient Reported? Taking? Multiple Vitamin (MULTIVITAMIN) capsule  Self Yes No   Sig: Take 1 capsule by mouth daily   amLODIPine (NORVASC) 5 mg tablet   No No   Sig: Take 1 tablet (5 mg total) by mouth daily at bedtime   aspirin 81 mg chewable tablet  Self Yes No   Sig: Chew 81 mg daily   cholecalciferol (VITAMIN D3) 1,000 units tablet  Self Yes No   Sig: Take 1,000 Units by mouth daily   clonazePAM (KlonoPIN) 0 5 mg tablet  Self No No   Sig: Take 0 5 tablets (0 25 mg total) by mouth 2 (two) times a day for 10 days   Patient taking differently: 0 25 mg 2 (two) times a day as needed Take 1/2 tablet (0 25-mg) by oral route 2 times a day     doxycycline monohydrate (MONODOX) 100 mg capsule   No No   Sig: Take 1 capsule (100 mg total) by mouth 2 (two) times a day for 7 days   escitalopram (LEXAPRO) 10 mg tablet  Self No No   Sig: Take 1 5 tablets (15 mg total) by mouth daily   Patient taking differently: Take 20 mg by mouth daily    lansoprazole (PREVACID) 30 mg capsule  Self No No   Sig: Take 1 capsule (30 mg total) by mouth daily   levothyroxine 50 mcg tablet  Self No No   Sig: take 1 tablet by mouth ON MONDAY, WEDNESDAY, AND FRIDAY   levothyroxine 75 mcg tablet  Self No No   Sig: Take 1 tablet (75 mcg total) by mouth 4 (four) times a week On Tuesdays, Thursdays and Saturdays and Sundays   losartan (COZAAR) 50 mg tablet   No No   Sig: Take 1 tablet (50 mg total) by mouth daily   melatonin 3 mg  Self No No   Sig: Take 1 tablet (3 mg total) by mouth daily at bedtime   Patient not taking: Reported on 9/4/2020   metoprolol succinate (TOPROL-XL) 25 mg 24 hr tablet  Self No No   Sig: Take 0 5 tablets (12 5 mg total) by mouth every 12 (twelve) hours   polyethylene glycol (MIRALAX) 17 g packet  Self Yes No   Sig: Take 17 g by mouth daily   prednisoLONE acetate (PRED FORTE) 1 % ophthalmic suspension  Self No No   Sig: Administer 1 drop to both eyes 2 (two) times a day   sucralfate (CARAFATE) 1 g/10 mL suspension  Self No No   Sig: Take 10 mL (1 g total) by mouth 4 (four) times a day   valACYclovir (VALTREX) 500 mg tablet  Self Yes No   Sig: Take 500 mg by mouth daily      Facility-Administered Medications: None       Past Medical History:   Diagnosis Date    Anxiety     CAD (coronary artery disease)     Carotid Duplex 03/06/2018    Plaque formation was prominent bilaterally    Depression     Disease of thyroid gland     Diverticulitis     Dyslipidemia     ECHO 09/14/2017    EF 55%, mild mitral regurg, small pericardial effusion   Hypertension     Mitral regurgitation     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 infarction)     Shingles     Takotsubo cardiomyopathy        Past Surgical History:   Procedure Laterality Date    CARDIAC CATHETERIZATION  08/27/2012    EF 35%, 70% stenosis of diagonal, Otherwise all OK apart from myopathy    CARDIAC CATHETERIZATION  10/10/2012    EF 55%, no significant CAD    Mild stress induced cardiomyopathy    CATARACT EXTRACTION Bilateral  EYE SURGERY      bilateral cataract    TONSILLECTOMY  childhood       Family History   Problem Relation Age of Onset    Cancer Father     Prostate cancer Father      I have reviewed and agree with the history as documented  E-Cigarette/Vaping    E-Cigarette Use Never User      E-Cigarette/Vaping Substances    Nicotine No     THC No     CBD No     Flavoring No     Other No     Unknown No      Social History     Tobacco Use    Smoking status: Never Smoker    Smokeless tobacco: Never Used   Vaping Use    Vaping Use: Never used   Substance Use Topics    Alcohol use: Never    Drug use: Never       Review of Systems   Constitutional: Negative for chills, diaphoresis and fever  HENT: Negative for congestion, sinus pressure, sore throat and trouble swallowing  Eyes: Negative for pain, discharge and itching  Respiratory: Negative for cough, chest tightness, shortness of breath and wheezing  Cardiovascular: Negative for chest pain, palpitations and leg swelling  Gastrointestinal: Positive for nausea  Negative for abdominal distention, abdominal pain, blood in stool, diarrhea and vomiting  Endocrine: Negative for polyphagia and polyuria  Genitourinary: Negative for difficulty urinating, dysuria, flank pain, hematuria, pelvic pain and vaginal bleeding  Musculoskeletal: Negative for arthralgias and back pain  Skin: Negative for color change and rash  Neurological: Negative for dizziness, syncope, weakness, light-headedness and headaches  Physical Exam  Physical Exam  Vitals and nursing note reviewed  Constitutional:       General: She is not in acute distress  Appearance: She is well-developed  HENT:      Head: Normocephalic and atraumatic  Right Ear: External ear normal       Left Ear: External ear normal       Nose: Nose normal       Mouth/Throat:      Mouth: Mucous membranes are moist       Pharynx: No oropharyngeal exudate     Eyes:      Extraocular Movements: Extraocular movements intact  Conjunctiva/sclera: Conjunctivae normal       Pupils: Pupils are equal, round, and reactive to light  Cardiovascular:      Rate and Rhythm: Normal rate and regular rhythm  Heart sounds: Normal heart sounds  No murmur heard  No friction rub  No gallop  Pulmonary:      Effort: Pulmonary effort is normal  No respiratory distress  Breath sounds: Normal breath sounds  No wheezing or rales  Abdominal:      General: There is no distension  Palpations: Abdomen is soft  Tenderness: There is no abdominal tenderness  There is no guarding  Musculoskeletal:         General: No swelling, tenderness or deformity  Normal range of motion  Cervical back: Normal range of motion and neck supple  Lymphadenopathy:      Cervical: No cervical adenopathy  Skin:     General: Skin is warm and dry  Neurological:      General: No focal deficit present  Mental Status: She is alert and oriented to person, place, and time  Mental status is at baseline  Cranial Nerves: No cranial nerve deficit  Sensory: No sensory deficit  Motor: No weakness or abnormal muscle tone        Coordination: Coordination normal          Vital Signs  ED Triage Vitals [01/03/22 1106]   Temperature Pulse Respirations Blood Pressure SpO2   98 1 °F (36 7 °C) 71 17 (!) 202/104 96 %      Temp Source Heart Rate Source Patient Position - Orthostatic VS BP Location FiO2 (%)   Oral Monitor Sitting Left arm --      Pain Score       No Pain           Vitals:    01/03/22 1106 01/03/22 1204   BP: (!) 202/104 (!) 194/86   Pulse: 71 81   Patient Position - Orthostatic VS: Sitting Lying         Visual Acuity      ED Medications  Medications   ondansetron (ZOFRAN) injection 4 mg (4 mg Intravenous Given 1/3/22 1129)   sodium chloride 0 9 % bolus 500 mL (0 mL Intravenous Stopped 1/3/22 1302)   clonazePAM (KlonoPIN) tablet 0 25 mg (0 25 mg Oral Given 1/3/22 1205)   acetaminophen (TYLENOL) tablet 975 mg (975 mg Oral Given 1/3/22 1403)       Diagnostic Studies  Results Reviewed     Procedure Component Value Units Date/Time    HS Troponin I 2hr [[id number]] Collected: 01/03/22 1322    Lab Status: Final result Specimen: Blood from Arm, Left Updated: 01/03/22 1353     hs TnI 2hr 8 ng/L      Delta 2hr hsTnI 1 ng/L     HS Troponin 0hr (reflex protocol) [[id number]]  (Normal) Collected: 01/03/22 1128    Lab Status: Final result Specimen: Blood from Arm, Left Updated: 01/03/22 1213     hs TnI 0hr 7 ng/L     HS Troponin I 4hr [[id number]]     Lab Status: No result Specimen: Blood     Comprehensive metabolic panel [[id number]]  (Abnormal) Collected: 01/03/22 1128    Lab Status: Final result Specimen: Blood from Arm, Left Updated: 01/03/22 1201     Sodium 128 mmol/L      Potassium 3 8 mmol/L      Chloride 92 mmol/L      CO2 28 mmol/L      ANION GAP 8 mmol/L      BUN 11 mg/dL      Creatinine 0 86 mg/dL      Glucose 99 mg/dL      Calcium 9 1 mg/dL      AST 19 U/L      ALT 9 U/L      Alkaline Phosphatase 78 U/L      Total Protein 7 1 g/dL      Albumin 4 2 g/dL      Total Bilirubin 0 58 mg/dL      eGFR 61 ml/min/1 73sq m     Narrative:      [place] guidelines for Chronic Kidney Disease (CKD):     Stage 1 with normal or high GFR (GFR > 90 mL/min/1 73 square meters)    Stage 2 Mild CKD (GFR = 60-89 mL/min/1 73 square meters)    Stage 3A Moderate CKD (GFR = 45-59 mL/min/1 73 square meters)    Stage 3B Moderate CKD (GFR = 30-44 mL/min/1 73 square meters)    Stage 4 Severe CKD (GFR = 15-29 mL/min/1 73 square meters)    Stage 5 End Stage CKD (GFR <15 mL/min/1 73 square meters)  Note: GFR calculation is accurate only with a steady state creatinine    CBC and differential [[id number]]  (Abnormal) Collected: 01/03/22 1128    Lab Status: Final result Specimen: Blood from Arm, Left Updated: 01/03/22 1137     WBC 2 84 Thousand/uL      RBC 3 83 Million/uL      Hemoglobin 11 9 g/dL      Hematocrit 35 6 %      MCV 93 fL      MCH 31 1 pg      MCHC 33 4 g/dL      RDW 12 2 %      MPV 8 5 fL      Platelets 948 Thousands/uL      nRBC 0 /100 WBCs      Neutrophils Relative 59 %      Immat GRANS % 0 %      Lymphocytes Relative 31 %      Monocytes Relative 9 %      Eosinophils Relative 1 %      Basophils Relative 0 %      Neutrophils Absolute 1 64 Thousands/µL      Immature Grans Absolute 0 01 Thousand/uL      Lymphocytes Absolute 0 89 Thousands/µL      Monocytes Absolute 0 26 Thousand/µL      Eosinophils Absolute 0 03 Thousand/µL      Basophils Absolute 0 01 Thousands/µL                  No orders to display              Procedures  Procedures         ED Course  ED Course as of 01/03/22 1423   Mon Jan 03, 2022   1145 Patient is requesting her morning dose of Klonopin  I confirmed that she is prescribed this as an outpatient  1405 Patient tolerating p o  in the department  She is feeling better after the medications  I told her it is important that she follows up with her family doctor in regards to her low sodium  She told me that she "had a history of this in the past "                               SBIRT 22yo+      Most Recent Value   SBIRT (22 yo +)    In order to provide better care to our patients, we are screening all of our patients for alcohol and drug use  Would it be okay to ask you these screening questions? Yes Filed at: 01/03/2022 1110   Initial Alcohol Screen: US AUDIT-C     1  How often do you have a drink containing alcohol? 0 Filed at: 01/03/2022 1110   2  How many drinks containing alcohol do you have on a typical day you are drinking? 0 Filed at: 01/03/2022 1110   3a  Male UNDER 65: How often do you have five or more drinks on one occasion? 0 Filed at: 01/03/2022 1110   3b  FEMALE Any Age, or MALE 65+: How often do you have 4 or more drinks on one occassion? 0 Filed at: 01/03/2022 1110   Audit-C Score 0 Filed at: 01/03/2022 1110   ANEESH: How many times in the past year have you        Used an illegal drug or used a prescription medication for non-medical reasons? Never Filed at: 01/03/2022 1110                    [facility]  Number of Diagnoses or Management Options  Hyponatremia  Nausea  Diagnosis management comments: 71-year-old female presenting for evaluation of nausea  Started earlier this morning  Was recently started on doxycycline for presumed pneumonia after being seen at an urgent care on 12/30  Admits to improvement in cough  Denies chest pain, shortness of breath, abdominal pain, fevers or chills  Vitals are within normal limits  Has no abdominal tenderness on exam   Patient is in no acute distress and is well appearing  Vitals within normal limits except for hypertension which she has a history of  Chest x-ray from 12/30 officially read as: "No focal consolidation, pleural effusion, or pneumothorax "  Will check basic labs, cardiac workup given nausea  Given improvement in cough, no shortness of breath, normal oxygen levels, do not believe repeat chest x-ray is required  I told patient I believe her nausea was secondary to her antibiotic use  Given lack of pneumonia, improvement of symptoms, told patient to discontinue using it  Disposition  Final diagnoses:   Nausea   Hyponatremia     Time reflects when diagnosis was documented in both MDM as applicable and the Disposition within this note     Time User Action Codes Description Comment    1/3/2022  2:05 PM Alberto Castillo [R11 0] Nausea     1/3/2022  2:07 PM Anurag Cameron [E87 1] Hyponatremia       ED Disposition     ED Disposition Condition Date/Time Comment    Discharge Stable Mon Edd 3, 2022  2:05 PM Fabiola Doll discharge to home/self care              Follow-up Information     Follow up With Specialties Details Why Contact Info    your primary care doctor  Schedule an appointment as soon as possible for a visit  For follow up of your nausea and hyponatremia           Patient's Medications   Discharge Prescriptions    ONDANSETRON (ZOFRAN ODT) 4 MG DISINTEGRATING TABLET    Take 1 tablet (4 mg total) by mouth every 6 (six) hours as needed for nausea or vomiting       Start Date: 1/3/2022  End Date: --       Order Dose: 4 mg       Quantity: 10 tablet    Refills: 0       Outpatient Discharge Orders   Basic metabolic panel   Standing Status: Future Standing Exp   Date: 01/03/23       PDMP Review       Value Time User    PDMP Reviewed  Yes 6/8/2020  9:46 AM Carl Bobo PA-C          ED Provider  Electronically Signed by           Capri Guerra DO  01/03/22 1422

## 2022-01-03 NOTE — ED NOTES
Spoke with patient's daughter  Daughter will try and reach out to son for a ride       Lelo Brooks RN  01/03/22 1856

## 2022-01-04 LAB
FLUAV RNA RESP QL NAA+PROBE: POSITIVE
FLUBV RNA RESP QL NAA+PROBE: NEGATIVE
SARS-COV-2 RNA RESP QL NAA+PROBE: NEGATIVE

## 2022-01-05 ENCOUNTER — TELEPHONE (OUTPATIENT)
Dept: URGENT CARE | Facility: CLINIC | Age: 87
End: 2022-01-05

## 2022-01-06 ENCOUNTER — TELEPHONE (OUTPATIENT)
Dept: URGENT CARE | Facility: CLINIC | Age: 87
End: 2022-01-06

## 2022-01-06 NOTE — TELEPHONE ENCOUNTER
Patient aware of positive influenza result  States she is feeling better  Aware to follow-up with PCP or return if symptoms worsen

## 2022-02-12 ENCOUNTER — APPOINTMENT (EMERGENCY)
Dept: CT IMAGING | Facility: HOSPITAL | Age: 87
DRG: 200 | End: 2022-02-12
Payer: COMMERCIAL

## 2022-02-12 ENCOUNTER — HOSPITAL ENCOUNTER (INPATIENT)
Facility: HOSPITAL | Age: 87
LOS: 1 days | Discharge: HOME HEALTH | DRG: 200 | End: 2022-02-14
Attending: INTERNAL MEDICINE | Admitting: STUDENT IN AN ORGANIZED HEALTH CARE EDUCATION/TRAINING PROGRAM
Payer: COMMERCIAL

## 2022-02-12 DIAGNOSIS — R94.31 EKG ABNORMALITY: Chronic | ICD-10-CM

## 2022-02-12 DIAGNOSIS — S27.0XXA TRAUMATIC PNEUMOTHORAX, INITIAL ENCOUNTER: Primary | ICD-10-CM

## 2022-02-12 DIAGNOSIS — F41.9 ANXIETY: ICD-10-CM

## 2022-02-12 DIAGNOSIS — F33.2 SEVERE EPISODE OF RECURRENT MAJOR DEPRESSIVE DISORDER, WITHOUT PSYCHOTIC FEATURES (HCC): ICD-10-CM

## 2022-02-12 DIAGNOSIS — S22.41XA CLOSED FRACTURE OF MULTIPLE RIBS OF RIGHT SIDE, INITIAL ENCOUNTER: ICD-10-CM

## 2022-02-12 DIAGNOSIS — R55 SYNCOPE, UNSPECIFIED SYNCOPE TYPE: ICD-10-CM

## 2022-02-12 DIAGNOSIS — I51.81 TAKOTSUBO CARDIOMYOPATHY: Chronic | ICD-10-CM

## 2022-02-12 DIAGNOSIS — R00.2 PALPITATIONS: ICD-10-CM

## 2022-02-12 PROBLEM — N18.30 BENIGN HYPERTENSION WITH CKD (CHRONIC KIDNEY DISEASE) STAGE III (HCC): Chronic | Status: ACTIVE | Noted: 2022-02-12

## 2022-02-12 PROBLEM — I12.9 BENIGN HYPERTENSION WITH CKD (CHRONIC KIDNEY DISEASE) STAGE III (HCC): Status: ACTIVE | Noted: 2022-02-12

## 2022-02-12 PROBLEM — S22.41XD CLOSED FRACTURE OF MULTIPLE RIBS OF RIGHT SIDE WITH ROUTINE HEALING: Status: ACTIVE | Noted: 2022-02-12

## 2022-02-12 PROBLEM — I12.9 BENIGN HYPERTENSION WITH CKD (CHRONIC KIDNEY DISEASE) STAGE III (HCC): Chronic | Status: ACTIVE | Noted: 2022-02-12

## 2022-02-12 PROBLEM — N18.30 BENIGN HYPERTENSION WITH CKD (CHRONIC KIDNEY DISEASE) STAGE III (HCC): Status: ACTIVE | Noted: 2022-02-12
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
## 2022-02-12 LAB
2HR DELTA HS TROPONIN: 0 NG/L
4HR DELTA HS TROPONIN: 3 NG/L
ANION GAP SERPL CALCULATED.3IONS-SCNC: 6 MMOL/L (ref 4–13)
APTT PPP: 27 SECONDS (ref 23–37)
ATRIAL RATE: 64 BPM
BASOPHILS # BLD AUTO: 0.03 THOUSANDS/ΜL (ref 0–0.1)
BASOPHILS NFR BLD AUTO: 1 % (ref 0–1)
BUN SERPL-MCNC: 15 MG/DL (ref 5–25)
CALCIUM SERPL-MCNC: 9.2 MG/DL (ref 8.4–10.2)
CARDIAC TROPONIN I PNL SERPL HS: 6 NG/L
CARDIAC TROPONIN I PNL SERPL HS: 6 NG/L
CARDIAC TROPONIN I PNL SERPL HS: 9 NG/L
CHLORIDE SERPL-SCNC: 97 MMOL/L (ref 96–108)
CO2 SERPL-SCNC: 29 MMOL/L (ref 21–32)
CREAT SERPL-MCNC: 1.02 MG/DL (ref 0.6–1.3)
EOSINOPHIL # BLD AUTO: 0.05 THOUSAND/ΜL (ref 0–0.61)
EOSINOPHIL NFR BLD AUTO: 1 % (ref 0–6)
ERYTHROCYTE [DISTWIDTH] IN BLOOD BY AUTOMATED COUNT: 12.8 % (ref 11.6–15.1)
GFR SERPL CREATININE-BSD FRML MDRD: 49 ML/MIN/1.73SQ M
GLUCOSE SERPL-MCNC: 99 MG/DL (ref 65–140)
HCT VFR BLD AUTO: 30.6 % (ref 34.8–46.1)
HGB BLD-MCNC: 9.7 G/DL (ref 11.5–15.4)
IMM GRANULOCYTES # BLD AUTO: 0.04 THOUSAND/UL (ref 0–0.2)
IMM GRANULOCYTES NFR BLD AUTO: 1 % (ref 0–2)
INR PPP: 1.06 (ref 0.84–1.19)
LYMPHOCYTES # BLD AUTO: 0.67 THOUSANDS/ΜL (ref 0.6–4.47)
LYMPHOCYTES NFR BLD AUTO: 10 % (ref 14–44)
MCH RBC QN AUTO: 31.1 PG (ref 26.8–34.3)
MCHC RBC AUTO-ENTMCNC: 31.7 G/DL (ref 31.4–37.4)
MCV RBC AUTO: 98 FL (ref 82–98)
MONOCYTES # BLD AUTO: 0.5 THOUSAND/ΜL (ref 0.17–1.22)
MONOCYTES NFR BLD AUTO: 8 % (ref 4–12)
NEUTROPHILS # BLD AUTO: 5.24 THOUSANDS/ΜL (ref 1.85–7.62)
NEUTS SEG NFR BLD AUTO: 79 % (ref 43–75)
NRBC BLD AUTO-RTO: 0 /100 WBCS
P AXIS: 75 DEGREES
PLATELET # BLD AUTO: 166 THOUSANDS/UL (ref 149–390)
PMV BLD AUTO: 8.8 FL (ref 8.9–12.7)
POTASSIUM SERPL-SCNC: 4.1 MMOL/L (ref 3.5–5.3)
PR INTERVAL: 256 MS
PROTHROMBIN TIME: 13.7 SECONDS (ref 11.6–14.5)
QRS AXIS: 81 DEGREES
QRSD INTERVAL: 88 MS
QT INTERVAL: 422 MS
QTC INTERVAL: 435 MS
RBC # BLD AUTO: 3.12 MILLION/UL (ref 3.81–5.12)
SODIUM SERPL-SCNC: 132 MMOL/L (ref 135–147)
T WAVE AXIS: 78 DEGREES
VENTRICULAR RATE: 64 BPM
WBC # BLD AUTO: 6.53 THOUSAND/UL (ref 4.31–10.16)

## 2022-02-12 PROCEDURE — 70450 CT HEAD/BRAIN W/O DYE: CPT

## 2022-02-12 PROCEDURE — 99220 PR INITIAL OBSERVATION CARE/DAY 70 MINUTES: CPT | Performed by: PHYSICIAN ASSISTANT

## 2022-02-12 PROCEDURE — 36415 COLL VENOUS BLD VENIPUNCTURE: CPT | Performed by: INTERNAL MEDICINE

## 2022-02-12 PROCEDURE — G1004 CDSM NDSC: HCPCS

## 2022-02-12 PROCEDURE — 72125 CT NECK SPINE W/O DYE: CPT

## 2022-02-12 PROCEDURE — 80048 BASIC METABOLIC PNL TOTAL CA: CPT | Performed by: INTERNAL MEDICINE

## 2022-02-12 PROCEDURE — 85025 COMPLETE CBC W/AUTO DIFF WBC: CPT | Performed by: INTERNAL MEDICINE

## 2022-02-12 PROCEDURE — 85610 PROTHROMBIN TIME: CPT | Performed by: INTERNAL MEDICINE

## 2022-02-12 PROCEDURE — 74176 CT ABD & PELVIS W/O CONTRAST: CPT

## 2022-02-12 PROCEDURE — 99285 EMERGENCY DEPT VISIT HI MDM: CPT

## 2022-02-12 PROCEDURE — 71250 CT THORAX DX C-: CPT

## 2022-02-12 PROCEDURE — 99285 EMERGENCY DEPT VISIT HI MDM: CPT | Performed by: INTERNAL MEDICINE

## 2022-02-12 PROCEDURE — 84484 ASSAY OF TROPONIN QUANT: CPT | Performed by: INTERNAL MEDICINE

## 2022-02-12 PROCEDURE — 93010 ELECTROCARDIOGRAM REPORT: CPT | Performed by: INTERNAL MEDICINE

## 2022-02-12 PROCEDURE — 85730 THROMBOPLASTIN TIME PARTIAL: CPT | Performed by: INTERNAL MEDICINE

## 2022-02-12 PROCEDURE — 93005 ELECTROCARDIOGRAM TRACING: CPT

## 2022-02-12 RX ORDER — MELATONIN
1000 DAILY
Status: DISCONTINUED | OUTPATIENT
Start: 2022-02-13 | End: 2022-02-14 | Stop reason: HOSPADM

## 2022-02-12 RX ORDER — LOSARTAN POTASSIUM 50 MG/1
50 TABLET ORAL DAILY
Status: DISCONTINUED | OUTPATIENT
Start: 2022-02-13 | End: 2022-02-14 | Stop reason: HOSPADM

## 2022-02-12 RX ORDER — ONDANSETRON 4 MG/1
4 TABLET, ORALLY DISINTEGRATING ORAL EVERY 6 HOURS PRN
Status: DISCONTINUED | OUTPATIENT
Start: 2022-02-12 | End: 2022-02-14 | Stop reason: HOSPADM

## 2022-02-12 RX ORDER — ESCITALOPRAM OXALATE 10 MG/1
20 TABLET ORAL DAILY
Status: DISCONTINUED | OUTPATIENT
Start: 2022-02-13 | End: 2022-02-14 | Stop reason: HOSPADM

## 2022-02-12 RX ORDER — LOSARTAN POTASSIUM 50 MG/1
50 TABLET ORAL ONCE
Status: COMPLETED | OUTPATIENT
Start: 2022-02-12 | End: 2022-02-12

## 2022-02-12 RX ORDER — VALACYCLOVIR HYDROCHLORIDE 500 MG/1
500 TABLET, FILM COATED ORAL DAILY
Status: DISCONTINUED | OUTPATIENT
Start: 2022-02-13 | End: 2022-02-14 | Stop reason: HOSPADM

## 2022-02-12 RX ORDER — AMLODIPINE BESYLATE 5 MG/1
5 TABLET ORAL
Status: DISCONTINUED | OUTPATIENT
Start: 2022-02-12 | End: 2022-02-14 | Stop reason: HOSPADM

## 2022-02-12 RX ORDER — PANTOPRAZOLE SODIUM 40 MG/1
40 TABLET, DELAYED RELEASE ORAL
Status: DISCONTINUED | OUTPATIENT
Start: 2022-02-13 | End: 2022-02-14 | Stop reason: HOSPADM

## 2022-02-12 RX ORDER — ACETAMINOPHEN 325 MG/1
650 TABLET ORAL EVERY 6 HOURS PRN
Status: DISCONTINUED | OUTPATIENT
Start: 2022-02-12 | End: 2022-02-14 | Stop reason: HOSPADM

## 2022-02-12 RX ORDER — PREDNISOLONE ACETATE 10 MG/ML
1 SUSPENSION/ DROPS OPHTHALMIC 2 TIMES DAILY
Status: DISCONTINUED | OUTPATIENT
Start: 2022-02-12 | End: 2022-02-14 | Stop reason: HOSPADM

## 2022-02-12 RX ORDER — CLONAZEPAM 0.5 MG/1
0.25 TABLET ORAL 2 TIMES DAILY PRN
Status: DISCONTINUED | OUTPATIENT
Start: 2022-02-12 | End: 2022-02-14 | Stop reason: HOSPADM

## 2022-02-12 RX ORDER — OXYCODONE HYDROCHLORIDE 5 MG/1
5 TABLET ORAL EVERY 6 HOURS PRN
Status: DISCONTINUED | OUTPATIENT
Start: 2022-02-12 | End: 2022-02-14 | Stop reason: HOSPADM

## 2022-02-12 RX ORDER — LIDOCAINE 50 MG/G
1 PATCH TOPICAL DAILY
Status: DISCONTINUED | OUTPATIENT
Start: 2022-02-12 | End: 2022-02-14 | Stop reason: HOSPADM

## 2022-02-12 RX ORDER — ASPIRIN 81 MG/1
81 TABLET, CHEWABLE ORAL DAILY
Status: DISCONTINUED | OUTPATIENT
Start: 2022-02-13 | End: 2022-02-14 | Stop reason: HOSPADM

## 2022-02-12 RX ORDER — POLYETHYLENE GLYCOL 3350 17 G/17G
17 POWDER, FOR SOLUTION ORAL DAILY
Status: DISCONTINUED | OUTPATIENT
Start: 2022-02-13 | End: 2022-02-14 | Stop reason: HOSPADM

## 2022-02-12 RX ORDER — ACETAMINOPHEN 325 MG/1
650 TABLET ORAL ONCE
Status: COMPLETED | OUTPATIENT
Start: 2022-02-12 | End: 2022-02-12

## 2022-02-12 RX ORDER — LEVOTHYROXINE SODIUM 0.07 MG/1
75 TABLET ORAL
Status: DISCONTINUED | OUTPATIENT
Start: 2022-02-13 | End: 2022-02-14 | Stop reason: HOSPADM

## 2022-02-12 RX ORDER — LEVOTHYROXINE SODIUM 0.05 MG/1
50 TABLET ORAL 3 TIMES WEEKLY
Status: DISCONTINUED | OUTPATIENT
Start: 2022-02-14 | End: 2022-02-14 | Stop reason: HOSPADM

## 2022-02-12 RX ORDER — LANOLIN ALCOHOL/MO/W.PET/CERES
3 CREAM (GRAM) TOPICAL
Status: DISCONTINUED | OUTPATIENT
Start: 2022-02-12 | End: 2022-02-14 | Stop reason: HOSPADM

## 2022-02-12 RX ORDER — HYDRALAZINE HYDROCHLORIDE 20 MG/ML
10 INJECTION INTRAMUSCULAR; INTRAVENOUS EVERY 6 HOURS PRN
Status: DISCONTINUED | OUTPATIENT
Start: 2022-02-12 | End: 2022-02-14 | Stop reason: HOSPADM

## 2022-02-12 RX ORDER — CLONAZEPAM 0.5 MG/1
0.25 TABLET ORAL ONCE
Status: COMPLETED | OUTPATIENT
Start: 2022-02-12 | End: 2022-02-12

## 2022-02-12 RX ORDER — METOPROLOL SUCCINATE 25 MG/1
12.5 TABLET, EXTENDED RELEASE ORAL EVERY 12 HOURS
Status: DISCONTINUED | OUTPATIENT
Start: 2022-02-12 | End: 2022-02-12

## 2022-02-12 RX ORDER — OXYCODONE HYDROCHLORIDE 5 MG/1
2.5 TABLET ORAL EVERY 6 HOURS PRN
Status: DISCONTINUED | OUTPATIENT
Start: 2022-02-12 | End: 2022-02-14 | Stop reason: HOSPADM

## 2022-02-12 RX ADMIN — OXYCODONE HYDROCHLORIDE 5 MG: 5 TABLET ORAL at 21:18

## 2022-02-12 RX ADMIN — LIDOCAINE 1 PATCH: 50 PATCH TOPICAL at 20:17

## 2022-02-12 RX ADMIN — SODIUM CHLORIDE 500 ML: 0.9 INJECTION, SOLUTION INTRAVENOUS at 20:18

## 2022-02-12 RX ADMIN — ACETAMINOPHEN 650 MG: 325 TABLET ORAL at 20:17

## 2022-02-12 RX ADMIN — METOPROLOL TARTRATE 25 MG: 25 TABLET, FILM COATED ORAL at 14:11

## 2022-02-12 RX ADMIN — CLONAZEPAM 0.25 MG: 0.5 TABLET ORAL at 15:08

## 2022-02-12 RX ADMIN — LOSARTAN POTASSIUM 50 MG: 50 TABLET, FILM COATED ORAL at 14:11

## 2022-02-12 RX ADMIN — ACETAMINOPHEN 650 MG: 325 TABLET ORAL at 14:11

## 2022-02-12 RX ADMIN — AMLODIPINE BESYLATE 5 MG: 5 TABLET ORAL at 21:18

## 2022-02-12 NOTE — ED PROVIDER NOTES
History  Chief Complaint   Patient presents with   [facility] Fall     70-year-old female arrives via EMS, she has a trauma alert  Patient was feeling anxious today felt slightly lightheaded and dizzy went to take her Klonopin when she tried to sit on the chair in her kitchen the chair slipped out from underneath her she banged her head and face on the table and fell to the floor  Patient is unsure if she lost consciousness but she does not recall some of the events  She does feel shaky at this time and dizzy she has some mild neck pain  She she states her nose was bleeding  Patient does take baby aspirin daily  Upon arrival she is in no acute distress  Patient now reports right-sided rib pain and abdominal pain  She has no nausea vomiting no chest pain pressure heaviness tightness or shortness of breath  Prior to Admission Medications   Prescriptions Last Dose Informant Patient Reported? Taking? Multiple Vitamin (MULTIVITAMIN) capsule  Self Yes No   Sig: Take 1 capsule by mouth daily   amLODIPine (NORVASC) 5 mg tablet   No No   Sig: Take 1 tablet (5 mg total) by mouth daily at bedtime   aspirin 81 mg chewable tablet  Self Yes No   Sig: Chew 81 mg daily   cholecalciferol (VITAMIN D3) 1,000 units tablet  Self Yes No   Sig: Take 1,000 Units by mouth daily   clonazePAM (KlonoPIN) 0 5 mg tablet  Self No No   Sig: Take 0 5 tablets (0 25 mg total) by mouth 2 (two) times a day for 10 days   Patient taking differently: 0 25 mg 2 (two) times a day as needed Take 1/2 tablet (0 25-mg) by oral route 2 times a day     escitalopram (LEXAPRO) 10 mg tablet  Self No No   Sig: Take 1 5 tablets (15 mg total) by mouth daily   Patient taking differently: Take 20 mg by mouth daily    lansoprazole (PREVACID) 30 mg capsule  Self No No   Sig: Take 1 capsule (30 mg total) by mouth daily   levothyroxine 50 mcg tablet  Self No No   Sig: take 1 tablet by mouth ON MONDAY, WEDNESDAY, AND FRIDAY   levothyroxine 75 mcg tablet  Self No No   Sig: Take 1 tablet (75 mcg total) by mouth 4 (four) times a week On Tuesdays, Thursdays and Saturdays and Sundays   losartan (COZAAR) 50 mg tablet   No No   Sig: Take 1 tablet (50 mg total) by mouth daily   melatonin 3 mg  Self No No   Sig: Take 1 tablet (3 mg total) by mouth daily at bedtime   Patient not taking: Reported on 9/4/2020   metoprolol succinate (TOPROL-XL) 25 mg 24 hr tablet  Self No No   Sig: Take 0 5 tablets (12 5 mg total) by mouth every 12 (twelve) hours   ondansetron (Zofran ODT) 4 mg disintegrating tablet   No No   Sig: Take 1 tablet (4 mg total) by mouth every 6 (six) hours as needed for nausea or vomiting   polyethylene glycol (MIRALAX) 17 g packet  Self Yes No   Sig: Take 17 g by mouth daily   prednisoLONE acetate (PRED FORTE) 1 % ophthalmic suspension  Self No No   Sig: Administer 1 drop to both eyes 2 (two) times a day   sucralfate (CARAFATE) 1 g/10 mL suspension  Self No No   Sig: Take 10 mL (1 g total) by mouth 4 (four) times a day   valACYclovir (VALTREX) 500 mg tablet  Self Yes No   Sig: Take 500 mg by mouth daily      Facility-Administered Medications: None       Past Medical History:   Diagnosis Date    Anxiety     CAD (coronary artery disease)     Carotid Duplex 03/06/2018    Plaque formation was prominent bilaterally    Depression     Disease of thyroid gland     Diverticulitis     Dyslipidemia     ECHO 09/14/2017    EF 55%, mild mitral regurg, small pericardial effusion   Hypertension     Mitral regurgitation     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 infarction)     Shingles     Takotsubo cardiomyopathy        Past Surgical History:   Procedure Laterality Date    CARDIAC CATHETERIZATION  08/27/2012    EF 35%, 70% stenosis of diagonal, Otherwise all OK apart from myopathy    CARDIAC CATHETERIZATION  10/10/2012    EF 55%, no significant CAD    Mild stress induced cardiomyopathy    CATARACT EXTRACTION Bilateral     EYE SURGERY      bilateral cataract    TONSILLECTOMY  childhood Family History   Problem Relation Age of Onset    Cancer Father     Prostate cancer Father      I have reviewed and agree with the history as documented  E-Cigarette/Vaping    E-Cigarette Use Never User      E-Cigarette/Vaping Substances    Nicotine No     THC No     CBD No     Flavoring No     Other No     Unknown No      Social History     Tobacco Use    Smoking status: Never Smoker    Smokeless tobacco: Never Used   Vaping Use    Vaping Use: Never used   Substance Use Topics    Alcohol use: Never    Drug use: Never       Review of Systems   Constitutional: Negative  HENT: Negative  Respiratory: Negative  Cardiovascular: Negative  Gastrointestinal: Negative  Genitourinary: Negative  Musculoskeletal: Negative  Skin: Negative  Neurological: Positive for dizziness and light-headedness  Negative for tremors, syncope, facial asymmetry, speech difficulty, weakness and headaches  Psychiatric/Behavioral: Negative  Physical Exam  Physical Exam  Vitals and nursing note reviewed  Constitutional:       General: She is not in acute distress  Appearance: Normal appearance  She is not ill-appearing, toxic-appearing or diaphoretic  HENT:      Right Ear: Tympanic membrane normal       Nose:      Comments: Patient has in a brace chin on her right nasal labial fold which had been bleeding at this time is stopped  There is no laceration appreciated  Her nasal septum is midline  Nontender over the nasal septum  Mouth/Throat:      Comments: Patient's mouth and oral cavity are intact  There is no laceration to the tongue her teeth appear to be intact  Eyes:      Extraocular Movements: Extraocular movements intact  Conjunctiva/sclera: Conjunctivae normal       Pupils: Pupils are equal, round, and reactive to light  Neck:      Comments: Patient has some mild tenderness mostly in the trapezius area right-sided    Placed her on a collar at this time   Cardiovascular:      Rate and Rhythm: Normal rate and regular rhythm  Pulses: Normal pulses  Heart sounds: Normal heart sounds  Pulmonary:      Effort: Pulmonary effort is normal       Breath sounds: Normal breath sounds  Abdominal:      General: Abdomen is flat  Bowel sounds are normal       Palpations: Abdomen is soft  Musculoskeletal:         General: No swelling, tenderness, deformity or signs of injury  Normal range of motion  Cervical back: Normal range of motion  Right lower leg: No edema  Left lower leg: No edema  Skin:     General: Skin is warm and dry  Capillary Refill: Capillary refill takes less than 2 seconds  Neurological:      General: No focal deficit present  Mental Status: She is alert  Psychiatric:         Mood and Affect: Mood normal          Behavior: Behavior normal          Thought Content:  Thought content normal          Judgment: Judgment normal          Vital Signs  ED Triage Vitals [02/12/22 1222]   Temperature Pulse Respirations Blood Pressure SpO2   98 7 °F (37 1 °C) 73 17 (!) 190/86 98 %      Temp Source Heart Rate Source Patient Position - Orthostatic VS BP Location FiO2 (%)   Tympanic Monitor -- -- --      Pain Score       --           Vitals:    02/12/22 1222 02/12/22 1300   BP: (!) 190/86 164/76   Pulse: 73 65         Visual Acuity  Visual Acuity      Most Recent Value   L Pupil Size (mm) 2   R Pupil Size (mm) 2          ED Medications  Medications - No data to display    Diagnostic Studies  Results Reviewed     Procedure Component Value Units Date/Time    CBC and differential [[id number]]     Lab Status: No result Specimen: Blood     Basic metabolic panel [[id number]]     Lab Status: No result Specimen: Blood     Protime-INR [[id number]]     Lab Status: No result Specimen: Blood     APTT [[id number]]     Lab Status: No result Specimen: Blood     HS Troponin 0hr (reflex protocol) [[id number]]     Lab Status: No result Specimen: Blood TRAUMA - CT head wo contrast    (Results Pending)   TRAUMA - CT spine cervical wo contrast    (Results Pending)              Procedures  ECG 12 Lead Documentation Only    Date/Time: 2/12/2022 1:10 PM  Performed by: Dereck Delgado MD  Authorized by: Dereck Delgdao MD     Indications / Diagnosis:  Dizzyness  ECG reviewed by me, the ED Provider: yes    Patient location:  ED  Previous ECG:     Comparison to cardiac monitor: Yes    Interpretation:     Interpretation: abnormal    Rate:     ECG rate:  65    ECG rate assessment: normal    Rhythm:     Rhythm: sinus rhythm and A-V block    Ectopy:     Ectopy: none    QRS:     QRS axis:  Normal  Conduction:     Conduction: normal    ST segments:     ST segments:  Normal  T waves:     T waves: non-specific               ED Course                                             MDM    Disposition  Final diagnoses:   None     ED Disposition     None      Follow-up Information    None         Patient's Medications   Discharge Prescriptions    No medications on file       No discharge procedures on file      PDMP Review       Value Time User    PDMP Reviewed  Yes 6/8/2020  9:46 AM Iker Lima PA-C          ED Provider  Electronically Signed by           Dereck Delgado MD  02/12/22 1312       Dereck Delgado MD  02/12/22 2028

## 2022-02-12 NOTE — ASSESSMENT & PLAN NOTE
Lab Results   Component Value Date    EGFR 49 02/12/2022    EGFR 61 01/03/2022    EGFR 64 01/27/2021    CREATININE 1 02 02/12/2022    CREATININE 0 86 01/03/2022    CREATININE 0 84 01/27/2021     · Had episodes of elevated BP when in the ED, likely secondary to pain due to her fall  Received 25 mg metoprolol  · BP improved, now WNL  · amlodipine 5 mg q h s , losartan 50 mg daily  · PRN Hydralazine  · Slight elevation in creatinine from baseline    · Continue gentle IV fluids  · Avoid hypotension, nephrotoxins

## 2022-02-12 NOTE — ASSESSMENT & PLAN NOTE
· Trace right-sided pneumothorax seen on CT chest/abdomen/pelvis  · ED Discussed with general surgery on-call  We will see her tomorrow  Does not need urgent intervention at this time  · Hemodynamically stable, and stable respiratory status  · Provide supplemental O2 as needed    · Incentive spirometry  · Repeat chest x-ray in the morning

## 2022-02-12 NOTE — ASSESSMENT & PLAN NOTE
· Continue pre-admission levothyroxine seen 50 mcg Monday, Wednesday and Friday, and 75 mcg on Tuesdays, Thursdays, Saturdays, Sundays

## 2022-02-12 NOTE — H&P
[address] 1935, 80 y o  female MRN: [id number]  Unit/Bed#: ED 21 Encounter: [id number]  Primary Care Provider: Teo Walton MD   Date and time admitted to hospital: 2/12/2022 12:17 PM    No new Assessment & Plan notes have been filed under this hospital service since the last note was generated    Service: Hospitalist

## 2022-02-12 NOTE — H&P
[address] 1935, 80 y o  female MRN: [id number]  Unit/Bed#: [facility] 07 Encounter: [id number]  Primary Care Provider: Cyrus Mckinney MD   Date and time admitted to hospital: 2/12/2022 12:17 PM    * Syncopal episodes  Assessment & Plan  · Possibly bradycardia related  · Hold toprol xl  · Monitor telemetry - HR in 50s when I was evaluating her      Closed fracture of multiple ribs of right side with routine healing  Assessment & Plan  · S/p fall earlier today  Evaluated as trauma alert  · CT head with no intracranial abnormality; microangiopathic changes  · CT cervical spine without acute fracture or abnormality   · CT c/a/p: Displaced fracture of the posterior right 11th rib and nondisplaced fracture of the lateral right 11th rib with trace pneumothorax  Mildly displaced fracture of the posterior right 12th rib  · Rib protocol order set  · CXR 2/13 at 1300    Pneumothorax, traumatic  Assessment & Plan  · Trace right-sided pneumothorax seen on CT chest/abdomen/pelvis  · ED Discussed with general surgery on-call  We will see her tomorrow  Does not need urgent intervention at this time  · Hemodynamically stable, and stable respiratory status  · Provide supplemental O2 as needed  · Incentive spirometry  · Repeat chest x-ray in the morning    Anxiety  Assessment & Plan  · Continue home Klonopin and Lexapro     Benign hypertension with CKD (chronic kidney disease) stage III [facility])  Assessment & Plan  Lab Results   Component Value Date    EGFR 49 02/12/2022    EGFR 61 01/03/2022    EGFR 64 01/27/2021    CREATININE 1 02 02/12/2022    CREATININE 0 86 01/03/2022    CREATININE 0 84 01/27/2021     · Had episodes of elevated BP when in the ED, likely secondary to pain due to her fall  Received 25 mg metoprolol  · BP improved, now WNL  · amlodipine 5 mg q h s , losartan 50 mg daily  · PRN Hydralazine  · Slight elevation in creatinine from baseline    · Continue gentle IV fluids  · Avoid hypotension, nephrotoxins    Hyponatremia  Assessment & Plan  · Noted to be 132 on initial labs  · Gentle IV fluids with normal saline  · Recheck a m  BMP     Acquired hypothyroidism  Assessment & Plan  · Continue pre-admission levothyroxine seen 50 mcg Monday, Wednesday and Friday, and 75 mcg on Tuesdays, Thursdays, Saturdays, Sundays     VTE Pharmacologic Prophylaxis: VTE Score: 4 Moderate Risk (Score 3-4) - Pharmacological DVT Prophylaxis Ordered: enoxaparin (Lovenox)  Code Status: Level 1 - Full Code   Discussion with patient    Anticipated Length of Stay: Patient will be admitted on an observation basis with an anticipated length of stay of less than 2 midnights secondary to fall with rib fractures and small pneumothorax  Chief Complaint: fall    History of Present Illness:  Riana Milligan is a 80 y o  female with a PMH of HTN, CKD, hypothyroidism, anxiety who presents to the ED via EMS after a fall that occurred earlier today, afternoon  She was feeling anxious and tried to take her Klonopin when she fell  Had feeling of light headed and passed out  Hit table with face and had rib pain  Reports first episode of lightheadedness was today  Review of Systems:  Review of Systems   Constitutional: Negative for chills and fever  HENT: Negative for rhinorrhea, sore throat and trouble swallowing  Eyes: Negative for discharge and redness  Respiratory: Negative for cough and shortness of breath  Cardiovascular: Negative for chest pain and leg swelling  Gastrointestinal: Negative for abdominal pain, diarrhea, nausea and vomiting  Genitourinary: Negative for dysuria and hematuria  Musculoskeletal: Negative for back pain and neck pain  Rib pain   Skin: Positive for wound  Neurological: Positive for syncope and headaches  Psychiatric/Behavioral: Negative for agitation and confusion         Past Medical and Surgical History:   Past Medical History:   Diagnosis Date    Anxiety     Benign hypertension with CKD (chronic kidney disease) stage III ([facility] [address] ) 2/12/2022    CAD (coronary artery disease)     Carotid Duplex 03/06/2018    Plaque formation was prominent bilaterally    Depression     Disease of thyroid gland     Diverticulitis     Dyslipidemia     ECHO 09/14/2017    EF 55%, mild mitral regurg, small pericardial effusion   Hypertension     Mitral regurgitation     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 infarction)     Shingles     Takotsubo cardiomyopathy        Past Surgical History:   Procedure Laterality Date    CARDIAC CATHETERIZATION  08/27/2012    EF 35%, 70% stenosis of diagonal, Otherwise all OK apart from myopathy    CARDIAC CATHETERIZATION  10/10/2012    EF 55%, no significant CAD  Mild stress induced cardiomyopathy    CATARACT EXTRACTION Bilateral     TONSILLECTOMY  childhood       Meds/Allergies:  Prior to Admission medications    Medication Sig Start Date End Date Taking? Authorizing Provider   amLODIPine (NORVASC) 5 mg tablet Take 1 tablet (5 mg total) by mouth daily at bedtime 12/13/21  Yes Maryann Mccray MD   aspirin 81 mg chewable tablet Chew 81 mg daily 8/10/15  Yes Historical Provider, MD   cholecalciferol (VITAMIN D3) 1,000 units tablet Take 1,000 Units by mouth daily   Yes Historical Provider, MD   clonazePAM (KlonoPIN) 0 5 mg tablet Take 0 5 tablets (0 25 mg total) by mouth 2 (two) times a day for 10 days  Patient taking differently: 0 25 mg 2 (two) times a day as needed Take 1/2 tablet (0 25-mg) by oral route 2 times a day   6/26/20  Yes Allyson Connors MD   escitalopram (LEXAPRO) 10 mg tablet Take 1 5 tablets (15 mg total) by mouth daily  Patient taking differently: Take 20 mg by mouth daily  6/27/20  Yes Allyson Connors MD   lansoprazole (PREVACID) 30 mg capsule Take 1 capsule (30 mg total) by mouth daily 6/26/20  Yes Allyson Connors MD   levothyroxine 50 mcg tablet take 1 tablet by mouth ON MONDAY, [facility], AND FRIDAY 7/23/20  Yes Mary Wasserman MD Jamie   levothyroxine 75 mcg tablet Take 1 tablet (75 mcg total) by mouth 4 (four) times a week On Tuesdays, Thursdays and Saturdays and Sundays 6/27/20  Yes Kortney Beth MD   losartan (COZAAR) 50 mg tablet Take 1 tablet (50 mg total) by mouth daily 12/13/21  Yes Mallika Harris MD   melatonin 3 mg Take 1 tablet (3 mg total) by mouth daily at bedtime 6/26/20  Yes Kortney Beth MD   metoprolol succinate (TOPROL-XL) 25 mg 24 hr tablet Take 0 5 tablets (12 5 mg total) by mouth every 12 (twelve) hours 1/21/21  Yes Mallika Harris MD   Multiple Vitamin (MULTIVITAMIN) capsule Take 1 capsule by mouth daily    Historical Provider, MD   ondansetron (Zofran ODT) 4 mg disintegrating tablet Take 1 tablet (4 mg total) by mouth every 6 (six) hours as needed for nausea or vomiting 1/3/22   Amberly Justine,    polyethylene glycol (MIRALAX) 17 g packet Take 17 g by mouth daily    Historical Provider, MD   prednisoLONE acetate (PRED FORTE) 1 % ophthalmic suspension Administer 1 drop to both eyes 2 (two) times a day 6/26/20   Kortney Beth MD   sucralfate (CARAFATE) 1 g/10 mL suspension Take 10 mL (1 g total) by mouth 4 (four) times a day 7/30/20   Adenike Thornton MD   valACYclovir (VALTREX) 500 mg tablet Take 500 mg by mouth daily 10/30/20   Historical Provider, MD     I have reviewed home medications using recent Epic encounter  Allergies: Allergies   Allergen Reactions    No Known Allergies        Social History:  Marital Status:     Occupation: retired  Patient Pre-hospital Living Situation: Home  Patient Pre-hospital Level of Mobility: walks with walker at night for safety  Patient Pre-hospital Diet Restrictions: none  Substance Use History:   Social History     Substance and Sexual Activity   Alcohol Use Never     Social History     Tobacco Use   Smoking Status Never Smoker   Smokeless Tobacco Never Used     Social History     Substance and Sexual Activity   Drug Use Never Family History:  Family History   Problem Relation Age of Onset    Cancer Father     Prostate cancer Father     No Known Problems Mother        Physical Exam:     Vitals:   Blood Pressure: (!) 191/84 (02/12/22 2108)  Pulse: 64 (02/12/22 2108)  Temperature: 98 4 °F (36 9 °C) (02/12/22 2108)  Temp Source: Tympanic (02/12/22 2108)  Respirations: 18 (02/12/22 2108)  SpO2: 92 % (02/12/22 2200)    Physical Exam  Vitals reviewed  Constitutional:       Appearance: Normal appearance  Comments: Pleasant elderly  female   HENT:      Head: Normocephalic  Comments: Multiple Abrasion right side nose      Nose: Nose normal    Eyes:      General:         Right eye: No discharge  Left eye: No discharge  Extraocular Movements: Extraocular movements intact  Conjunctiva/sclera: Conjunctivae normal    Cardiovascular:      Rate and Rhythm: Regular rhythm  Bradycardia present  Pulmonary:      Effort: Pulmonary effort is normal  No respiratory distress  Breath sounds: Normal breath sounds  No wheezing  Abdominal:      General: Bowel sounds are normal  There is no distension  Palpations: Abdomen is soft  Tenderness: There is no abdominal tenderness  There is no guarding  Musculoskeletal:         General: Tenderness (Right lateral ribs) present  No swelling  Normal range of motion  Cervical back: Normal range of motion  Right lower leg: No edema  Left lower leg: No edema  Skin:     General: Skin is warm and dry  Capillary Refill: Capillary refill takes less than 2 seconds  Coloration: Skin is pale  Neurological:      General: No focal deficit present  Mental Status: She is alert and oriented to person, place, and time  Mental status is at baseline  Psychiatric:         Mood and Affect: Mood normal          Behavior: Behavior normal          Thought Content:  Thought content normal          Judgment: Judgment normal           Additional Data:     Lab Results:  Results from last 7 days   Lab Units 02/12/22  1317   WBC Thousand/uL 6 53   HEMOGLOBIN g/dL 9 7*   HEMATOCRIT % 30 6*   PLATELETS Thousands/uL 166   NEUTROS PCT % 79*   LYMPHS PCT % 10*   MONOS PCT % 8   EOS PCT % 1     Results from last 7 days   Lab Units 02/12/22  1317   SODIUM mmol/L 132*   POTASSIUM mmol/L 4 1   CHLORIDE mmol/L 97   CO2 mmol/L 29   BUN mg/dL 15   CREATININE mg/dL 1 02   ANION GAP mmol/L 6   CALCIUM mg/dL 9 2   GLUCOSE RANDOM mg/dL 99     Results from last 7 days   Lab Units 02/12/22  1317   INR  1 06       Imaging: Reviewed radiology reports from this admission including: CT head, CT cervical spine, CT chest/abdomen/pelvis  CT chest abdomen pelvis wo contrast   Final Result by Tate Wilks MD (02/12 1605)      Displaced fracture of the posterior right 11th rib and nondisplaced fracture of the lateral right 11th rib with trace pneumothorax   Mildly displaced fracture of the posterior right 12th rib  I personally discussed this study with Tomi Sanchez on 2/12/2022 at 4:05 PM                         Workstation performed: [id number]         TRAUMA - CT head wo contrast   Final Result by Arlon Lanes, MD (02/12 1316)      No acute intracranial CT abnormality  Microangiopathic change  Workstation performed: [id number]         TRAUMA - CT spine cervical wo contrast   Final Result by Arlon Lanes, MD (02/12 1314)      No acute cervical spine fracture or traumatic malalignment  Workstation performed: [id number]         XR chest pa & lateral (24 hours after admission)    (Results Pending)       ** Please Note: This note has been constructed using a voice recognition system   **

## 2022-02-12 NOTE — ASSESSMENT & PLAN NOTE
· S/p fall earlier today  Evaluated as trauma alert  · CT head with no intracranial abnormality; microangiopathic changes  · CT cervical spine without acute fracture or abnormality   · CT c/a/p: Displaced fracture of the posterior right 11th rib and nondisplaced fracture of the lateral right 11th rib with trace pneumothorax  Mildly displaced fracture of the posterior right 12th rib    · Rib protocol order set  · CXR 2/13 at 1300

## 2022-02-13 ENCOUNTER — APPOINTMENT (OUTPATIENT)
Dept: RADIOLOGY | Facility: HOSPITAL | Age: 87
DRG: 200 | End: 2022-02-13
Payer: COMMERCIAL

## 2022-02-13 PROBLEM — S22.41XA MULTIPLE CLOSED FRACTURES OF RIBS OF RIGHT SIDE: Status: ACTIVE | Noted: 2022-02-12

## 2022-02-13 LAB
ANION GAP SERPL CALCULATED.3IONS-SCNC: 8 MMOL/L (ref 4–13)
BUN SERPL-MCNC: 19 MG/DL (ref 5–25)
CALCIUM SERPL-MCNC: 9.1 MG/DL (ref 8.4–10.2)
CHLORIDE SERPL-SCNC: 97 MMOL/L (ref 96–108)
CO2 SERPL-SCNC: 26 MMOL/L (ref 21–32)
CREAT SERPL-MCNC: 1.06 MG/DL (ref 0.6–1.3)
GFR SERPL CREATININE-BSD FRML MDRD: 47 ML/MIN/1.73SQ M
GLUCOSE P FAST SERPL-MCNC: 91 MG/DL (ref 65–99)
GLUCOSE SERPL-MCNC: 91 MG/DL (ref 65–140)
POTASSIUM SERPL-SCNC: 4.6 MMOL/L (ref 3.5–5.3)
SODIUM SERPL-SCNC: 131 MMOL/L (ref 135–147)

## 2022-02-13 PROCEDURE — 99222 1ST HOSP IP/OBS MODERATE 55: CPT | Performed by: SURGERY

## 2022-02-13 PROCEDURE — 71045 X-RAY EXAM CHEST 1 VIEW: CPT

## 2022-02-13 PROCEDURE — 80048 BASIC METABOLIC PNL TOTAL CA: CPT

## 2022-02-13 PROCEDURE — NC001 PR NO CHARGE

## 2022-02-13 PROCEDURE — 97163 PT EVAL HIGH COMPLEX 45 MIN: CPT

## 2022-02-13 PROCEDURE — 99232 SBSQ HOSP IP/OBS MODERATE 35: CPT | Performed by: STUDENT IN AN ORGANIZED HEALTH CARE EDUCATION/TRAINING PROGRAM

## 2022-02-13 PROCEDURE — 99222 1ST HOSP IP/OBS MODERATE 55: CPT | Performed by: INTERNAL MEDICINE

## 2022-02-13 RX ORDER — METOPROLOL SUCCINATE 25 MG/1
12.5 TABLET, EXTENDED RELEASE ORAL DAILY
Status: DISCONTINUED | OUTPATIENT
Start: 2022-02-13 | End: 2022-02-14 | Stop reason: HOSPADM

## 2022-02-13 RX ADMIN — PANTOPRAZOLE SODIUM 40 MG: 40 TABLET, DELAYED RELEASE ORAL at 09:06

## 2022-02-13 RX ADMIN — Medication 1000 UNITS: at 09:06

## 2022-02-13 RX ADMIN — ASPIRIN 81 MG CHEWABLE TABLET 81 MG: 81 TABLET CHEWABLE at 09:06

## 2022-02-13 RX ADMIN — LIDOCAINE 1 PATCH: 50 PATCH TOPICAL at 09:07

## 2022-02-13 RX ADMIN — PREDNISOLONE ACETATE 1 DROP: 10 SUSPENSION/ DROPS OPHTHALMIC at 12:28

## 2022-02-13 RX ADMIN — ESCITALOPRAM OXALATE 20 MG: 10 TABLET ORAL at 09:06

## 2022-02-13 RX ADMIN — CLONAZEPAM 0.25 MG: 0.5 TABLET ORAL at 20:14

## 2022-02-13 RX ADMIN — ENOXAPARIN SODIUM 30 MG: 100 INJECTION SUBCUTANEOUS at 09:06

## 2022-02-13 RX ADMIN — AMLODIPINE BESYLATE 5 MG: 5 TABLET ORAL at 22:49

## 2022-02-13 RX ADMIN — METOPROLOL SUCCINATE 12.5 MG: 25 TABLET, EXTENDED RELEASE ORAL at 20:13

## 2022-02-13 RX ADMIN — VALACYCLOVIR HYDROCHLORIDE 500 MG: 500 TABLET, FILM COATED ORAL at 12:27

## 2022-02-13 RX ADMIN — LEVOTHYROXINE SODIUM 75 MCG: 75 TABLET ORAL at 05:15

## 2022-02-13 RX ADMIN — ONDANSETRON 4 MG: 4 TABLET, ORALLY DISINTEGRATING ORAL at 18:52

## 2022-02-13 RX ADMIN — LOSARTAN POTASSIUM 50 MG: 50 TABLET, FILM COATED ORAL at 09:06

## 2022-02-13 NOTE — ASSESSMENT & PLAN NOTE
· Possibly bradycardia related  · NMS obtained in March 2020 normal    · Hold toprol xl  · Monitor telemetry   · Cardiology consult  · Will check echocardiogram

## 2022-02-13 NOTE — CONSULTS
Consultation - General Surgery   Saroj Brown 80 y o  female MRN: [id number]  Unit/Bed#: [facility] Encounter: [id number]    Assessment/Plan     Assessment:  Multiple injuries secondary to traumatic fall from syncopal episode at home yesterday  Patient had CT chest abdomen pelvis closed rib fracture with displaced fracture posterior right 11th rib and nondisplaced fracture of lateral right 11th rib with trace pneumothorax, mildly displaced fracture posterior right 12th rib  Abrasion face and right nose    Accelerated hypertension, likely secondary to pain and anxiety    CT scan head and cervical spine without acute fracture or intracranial abnormality  SpO2 98% on 2 L of nasal cannula O2  Patient with complaints of right lateral lower rib pain with deep inspiration, using incentive spirometry  No cough or hemoptysis  She denies any dizziness or headache at present  Remains in normal sinus rhythm on telemetry  Vital signs show elevated /105, pulse 74 respirations 18  Plan:    Portable chest x-ray performed this morning stat, pending results  Evaluate for trace right pneumothorax seen on CT scan chest abdomen pelvis yesterday  Continue Lidoderm pain patch right lower lateral ribcage  Apply ice pack over area as needed for patient comfort  Nasal cannula O2 at 2 liters/minute  Patient encouraged use of incentive spirometry hourly while throughout the day  Monitor vital signs, telemetry  Cardiology consultation  Medical management including BP control by the attending hospitalist providers  Analgesics as ordered, patient right now comfortable with current pain meds  Dr Skip Solo is the general surgeon on-call this weekend he will evaluate the patient, currently awaiting results of chest x-ray obtained this morning  Will call for report and have Dr Jorge Das review in addition        History of Present Illness     HPI:  Saroj Brown is a 80 y o  female who presented to the ED here yesterday after syncopal episode  She was in her kitchen in slipped off from a chair, she says that she thinks she lost consciousness and was dizzy prior to the episode  She has no prior history of similar episodes or TIAs  She was able to call her son on the phone when after the episode, she is unsure how long she lost consciousness  She was bleeding from the right nose and the son called the ambulance  She thinks that she hit the right side of her face on the table and fell to the floor  She was feeling dizzy on admission with some mild neck pain  She had a thorough workup including CT scan of the head, cervical spine and chest abdomen pelvis  General surgery is asked evaluate the patient with regards to the findings of a trace pneumothorax in the right lung as well as multiple right 11th and 12th closed rib fractures  She has been followed by cardiology as an outpatient, son states she was last seen about 4 months ago  No history of cardiac arrhythmia is or heart attack  Patient denies any chest pain or shortness of breath  No blurred vision or swallowing problems  Mostly with discomfort upon inspiration on the right lower lateral ribcage  Currently resting comfortably, she has a lidocaine pain patch applied to a right lower thorax  Son is present with her at this time  He expresses some concern that if she had to remain in [facility] for care that he is unable to call her if he leaves the hospital   He is asking if there is any bed availability his mother will be able to be sent upstairs to her room any time soon        Consults    Review of Systems   Constitutional: Negative for activity change, appetite change, chills, diaphoresis, fatigue and fever  HENT: Positive for facial swelling and hearing loss  Negative for congestion, drooling, ear pain, mouth sores, nosebleeds, postnasal drip, rhinorrhea, sinus pressure, sinus pain, sneezing, sore throat, trouble swallowing and voice change  Eyes: Negative  Respiratory: Negative for cough, choking, chest tightness, shortness of breath, wheezing and stridor  Cardiovascular: Negative for chest pain, palpitations and leg swelling  Gastrointestinal: Negative  Endocrine: Negative  Genitourinary: Negative  Musculoskeletal: Negative for arthralgias, back pain, gait problem, joint swelling, myalgias, neck pain and neck stiffness  Skin: Positive for wound  Facial wound secondary to blunt trauma during followed home  Allergic/Immunologic: Negative  Neurological: Positive for dizziness, syncope and headaches  Negative for tremors, seizures, facial asymmetry, speech difficulty, weakness, light-headedness and numbness  Hematological: Negative  Psychiatric/Behavioral: Negative for agitation, behavioral problems and confusion  The patient is nervous/anxious  Historical Information   Past Medical History:   Diagnosis Date    Anxiety     Benign hypertension with CKD (chronic kidney disease) stage III ([facility] [address] ) 2/12/2022    CAD (coronary artery disease)     Carotid Duplex 03/06/2018    Plaque formation was prominent bilaterally    Depression     Disease of thyroid gland     Diverticulitis     Dyslipidemia     ECHO 09/14/2017    EF 55%, mild mitral regurg, small pericardial effusion   Hypertension     Mitral regurgitation     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 infarction)     Shingles     Takotsubo cardiomyopathy      Past Surgical History:   Procedure Laterality Date    CARDIAC CATHETERIZATION  08/27/2012    EF 35%, 70% stenosis of diagonal, Otherwise all OK apart from myopathy    CARDIAC CATHETERIZATION  10/10/2012    EF 55%, no significant CAD    Mild stress induced cardiomyopathy    CATARACT EXTRACTION Bilateral     TONSILLECTOMY  childhood     Social History   Social History     Substance and Sexual Activity   Alcohol Use Never     Social History     Substance and Sexual Activity   Drug Use Never     E-Cigarette/Vaping    E-Cigarette Use Never User      E-Cigarette/Vaping Substances    Nicotine No     THC No     CBD No     Flavoring No     Other No     Unknown No      Social History     Tobacco Use   Smoking Status Never Smoker   Smokeless Tobacco Never Used     Family History: non-contributory    Meds/Allergies   current meds:   Current Facility-Administered Medications   Medication Dose Route Frequency    acetaminophen (TYLENOL) tablet 650 mg  650 mg Oral Q6H PRN    amLODIPine (NORVASC) tablet 5 mg  5 mg Oral HS    aspirin chewable tablet 81 mg  81 mg Oral Daily    cholecalciferol (VITAMIN D3) tablet 1,000 Units  1,000 Units Oral Daily    clonazePAM (KlonoPIN) tablet 0 25 mg  0 25 mg Oral BID PRN    enoxaparin (LOVENOX) subcutaneous injection 30 mg  30 mg Subcutaneous Daily    escitalopram (LEXAPRO) tablet 20 mg  20 mg Oral Daily    hydrALAZINE (APRESOLINE) injection 10 mg  10 mg Intravenous Q6H PRN    [START ON 2/14/2022] levothyroxine tablet 50 mcg  50 mcg Oral Once per day on Mon Wed Fri    levothyroxine tablet 75 mcg  75 mcg Oral Once per day on Sun Tue Thu Sat    lidocaine (LIDODERM) 5 % patch 1 patch  1 patch Topical Daily    losartan (COZAAR) tablet 50 mg  50 mg Oral Daily    melatonin tablet 3 mg  3 mg Oral HS    ondansetron (ZOFRAN-ODT) dispersible tablet 4 mg  4 mg Oral Q6H PRN    oxyCODONE (ROXICODONE) IR tablet 2 5 mg  2 5 mg Oral Q6H PRN    oxyCODONE (ROXICODONE) IR tablet 5 mg  5 mg Oral Q6H PRN    pantoprazole (PROTONIX) EC tablet 40 mg  40 mg Oral Early Morning    polyethylene glycol (MIRALAX) packet 17 g  17 g Oral Daily    prednisoLONE acetate (PRED FORTE) 1 % ophthalmic suspension 1 drop  1 drop Both Eyes BID    valACYclovir (VALTREX) tablet 500 mg  500 mg Oral Daily     Allergies   Allergen Reactions    No Known Allergies        Objective   First Vitals:   Blood Pressure: (!) 190/86 (02/12/22 1222)  Pulse: 71 (02/12/22 1220)  Temperature: 98 7 °F (37 1 °C) (02/12/22 1222)  Temp Source: Tympanic (02/12/22 1222)  Respirations: 17 (02/12/22 1222)  SpO2: 95 % (02/12/22 1220)    Current Vitals:   Blood Pressure: 158/74 (02/13/22 0254)  Pulse: 60 (02/13/22 0254)  Temperature: 98 7 °F (37 1 °C) (02/13/22 0254)  Temp Source: Tympanic (02/13/22 0254)  Respirations: 16 (02/13/22 0254)  SpO2: 99 % (02/13/22 0700)    No intake or output data in the 24 hours ending 02/13/22 0802    Invasive Devices  Report    Peripheral Intravenous Line            Peripheral IV 02/12/22 Right Antecubital <1 day                Physical Exam     BP (!) 176/105 (BP Location: Left arm)   Pulse 74   Temp 98 7 °F (37 1 °C) (Tympanic)   Resp 18   SpO2 98%     Awake, no respiratory distress  Slightly hard of hearing  Answers all questions appropriately  Son is present with her  ENT shows superficial abrasion right lateral nasal fold  No active bleeding  Pupils equal, extraocular movements intact  Sclera white both eyes  No nystagmus  Head shows superficial very mild abrasion right anterior forehead without bleeding  Neck moves neck in all spheres  No crepitus, trachea midline  No adenopathy or goiter  No carotid bruit heard  Chest symmetric  No bruising noted  The patient has tenderness over the right lateral lower thoracic cage worsen with deep inspiration  There was no clicking or crepitus  No subcutaneous emphysema noted  Heart regular rate and rhythm on cardiac telemetry  No murmurs heard  Lungs clear to auscultation  Good inspiratory effort  Equal breath sounds  No adventitious sounds  Abdomen positive bowel sounds, soft, nontender  No guarding or rebound  No masses felt  Extremities equal  strength enhanced  Moves all 4 extremities well  Neurological exam shows patient alert and fully oriented  Cranial nerves 2-12 were tested and appear symmetric  Ambulation not observed  No focal motor or sensory neurological weakness is apparent      Lab Results:   I have personally reviewed pertinent lab results  , CBC:   Lab Results   Component Value Date    WBC 6 53 02/12/2022    HGB 9 7 (L) 02/12/2022    HCT 30 6 (L) 02/12/2022    MCV 98 02/12/2022     02/12/2022    MCH 31 1 02/12/2022    MCHC 31 7 02/12/2022    RDW 12 8 02/12/2022    MPV 8 8 (L) 02/12/2022    NRBC 0 02/12/2022   , CMP:   Lab Results   Component Value Date    SODIUM 132 (L) 02/12/2022    K 4 1 02/12/2022    CL 97 02/12/2022    CO2 29 02/12/2022    BUN 15 02/12/2022    CREATININE 1 02 02/12/2022    CALCIUM 9 2 02/12/2022    EGFR 49 02/12/2022   , Coagulation:   Lab Results   Component Value Date    INR 1 06 02/12/2022   , Urinalysis: No results found for: Yves Kurtz, [address], [address] [facility], [address] [place] [address], [address] [place] [address], GLUCOSEU, KETONESU, BILIRUBINUR, BLOODU  Imaging: I have personally reviewed pertinent films in PACS       CT CHEST, ABDOMEN AND PELVIS WITHOUT IV CONTRAST     INDICATION: Right-sided chest and abdominal pain following fall     COMPARISON:  No prior chest CT available for comparison  CT abdomen and pelvis January 26, 2021     OSSEOUS STRUCTURES:  Acute fracture of the posterior right 11th rib with one shaft width displacement and a small amount of hemorrhage in the adjacent intercostal space (series 2, images 52-54)  Additional nondisplaced fracture of the lateral aspect of   the right 11th rib (series 2, image 64)  Minimally displaced fracture of the posterior right 12th rib (series 2, image 59)     IMPRESSION:     Displaced fracture of the posterior right 11th rib and nondisplaced fracture of the lateral right 11th rib with trace pneumothorax  Mildly displaced fracture of the posterior right 12th rib      CT CERVICAL SPINE - WITHOUT CONTRAST     INDICATION:   TRAUMA      COMPARISON:  None      IMPRESSION:     No acute cervical spine fracture or traumatic malalignment       CT BRAIN - WITHOUT CONTRAST     INDICATION:   TRAUMA      COMPARISON:  Head CT 10/7/2019     IMPRESSION:     No acute intracranial CT abnormality  Microangiopathic change         EKG, Pathology, and Other Studies: I have personally reviewed pertinent reports  Counseling / Coordination of Care  Total floor / unit time spent today 30 minutes  Greater than 50% of total time was spent with the patient and / or family counseling and / or coordination of care  A description of the counseling / coordination of care:  Review of CT scan imaging, examination of patient, review existing orders and laboratory findings, discussion with Dr Zeenat Smith will examine the patient later today  Will obtain chest x-ray this morning for evaluation of trace right pneumothorax  Esdras Dang, TRISTA

## 2022-02-13 NOTE — ASSESSMENT & PLAN NOTE
· S/p fall earlier today  Evaluated as trauma alert  · CT head with no intracranial abnormality; microangiopathic changes  · CT cervical spine without acute fracture or abnormality   · CT c/a/p: Displaced fracture of the posterior right 11th rib and nondisplaced fracture of the lateral right 11th rib with trace pneumothorax  Mildly displaced fracture of the posterior right 12th rib    · Repeat chest x-ray pending formal read  · Rib protocol order set  · Surgery consult, appreciate recommendations  · Pain control

## 2022-02-13 NOTE — PLAN OF CARE
Problem: MOBILITY - ADULT  Goal: Maintain or return to baseline ADL function  Description: INTERVENTIONS:  -  Assess patient's ability to carry out ADLs; assess patient's baseline for ADL function and identify physical deficits which impact ability to perform ADLs (bathing, care of mouth/teeth, toileting, grooming, dressing, etc )  - Assess/evaluate cause of self-care deficits   - Assess range of motion  - Assess patient's mobility; develop plan if impaired  - Assess patient's need for assistive devices and provide as appropriate  - Encourage maximum independence but intervene and supervise when necessary  - Involve family in performance of ADLs  - Assess for home care needs following discharge   - Consider OT consult to assist with ADL evaluation and planning for discharge  - Provide patient education as appropriate  Outcome: Progressing  Goal: Maintains/Returns to pre admission functional level  Description: INTERVENTIONS:  - Perform BMAT or MOVE assessment daily    - Set and communicate daily mobility goal to care team and patient/family/caregiver     - Collaborate with rehabilitation services on mobility goals if consulted  - Dangle patient 3 times a day  - Stand patient 3 times a day  - Ambulate patient 3 times a day  - Out of bed to chair 3 times a day   - Out of bed for meals 3 times a day  - Out of bed for toileting  - Record patient progress and toleration of activity level   Outcome: Progressing     Problem: PAIN - ADULT  Goal: Verbalizes/displays adequate comfort level or baseline comfort level  Description: Interventions:  - Encourage patient to monitor pain and request assistance  - Assess pain using appropriate pain scale  - Administer analgesics based on type and severity of pain and evaluate response  - Implement non-pharmacological measures as appropriate and evaluate response  - Consider cultural and social influences on pain and pain management  - Notify physician/advanced practitioner if interventions unsuccessful or patient reports new pain  Outcome: Progressing     Problem: DISCHARGE PLANNING  Goal: Discharge to home or other facility with appropriate resources  Description: INTERVENTIONS:  - Identify barriers to discharge w/patient and caregiver  - Arrange for needed discharge resources and transportation as appropriate  - Identify discharge learning needs (meds, wound care, etc )  - Arrange for interpretive services to assist at discharge as needed  - Refer to Case Management Department for coordinating discharge planning if the patient needs post-hospital services based on physician/advanced practitioner order or complex needs related to functional status, cognitive ability, or social support system  Outcome: Progressing     Problem: Knowledge Deficit  Goal: Patient/family/caregiver demonstrates understanding of disease process, treatment plan, medications, and discharge instructions  Description: Complete learning assessment and assess knowledge base    Interventions:  - Provide teaching at level of understanding  - Provide teaching via preferred learning methods  Outcome: Progressing     Problem: SAFETY ADULT  Goal: Patient will remain free of falls  Description: INTERVENTIONS:  - Educate patient/family on patient safety including physical limitations  - Instruct patient to call for assistance with activity   - Consult OT/PT to assist with strengthening/mobility   - Keep Call bell within reach  - Keep bed low and locked with side rails adjusted as appropriate  - Keep care items and personal belongings within reach  - Initiate and maintain comfort rounds  - Make Fall Risk Sign visible to staff  - Obtain necessary fall risk management equipment  - Apply yellow socks and bracelet for high fall risk patients  - Consider moving patient to room near nurses station  Outcome: Progressing     Problem: RESPIRATORY - ADULT  Goal: Achieves optimal ventilation and oxygenation  Description: INTERVENTIONS:  - Assess for changes in respiratory status  - Assess for changes in mentation and behavior  - Position to facilitate oxygenation and minimize respiratory effort  - Oxygen administered by appropriate delivery if ordered  - Initiate smoking cessation education as indicated  - Encourage broncho-pulmonary hygiene including cough, deep breathe, Incentive Spirometry  - Assess the need for suctioning and aspirate as needed  - Assess and instruct to report SOB or any respiratory difficulty  - Respiratory Therapy support as indicated  Outcome: Progressing

## 2022-02-13 NOTE — PROGRESS NOTES
Nathan U  66   Progress Note - [place] 1935, 80 y o  female MRN: [id number]  Unit/Bed#: [facility] 07 Encounter: [id number]  Primary Care Provider: Hussain Trammell MD   Date and time admitted to hospital: 2/12/2022 12:17 PM    * Syncopal episodes  Assessment & Plan  · Possibly bradycardia related  · Hold toprol xl  · Monitor telemetry   · Cardiology consult      Benign hypertension with CKD (chronic kidney disease) stage III [facility])  Assessment & Plan  Lab Results   Component Value Date    EGFR 49 02/12/2022    EGFR 61 01/03/2022    EGFR 64 01/27/2021    CREATININE 1 02 02/12/2022    CREATININE 0 86 01/03/2022    CREATININE 0 84 01/27/2021     · Had episodes of elevated BP when in the ED, likely secondary to pain due to her fall  Received 25 mg metoprolol  · BP improved, now WNL  · amlodipine 5 mg q h s , losartan 50 mg daily  · PRN Hydralazine  · Slight elevation in creatinine from baseline  · Continue gentle IV fluids  · Avoid hypotension, nephrotoxins    Pneumothorax, traumatic  Assessment & Plan  · Trace right-sided pneumothorax seen on CT chest/abdomen/pelvis  · ED Discussed with general surgery on-call  · Hemodynamically stable, and stable respiratory status  · Provide supplemental O2 as needed  · Incentive spirometry  · Repeat chest x-ray pending formal read    Multiple closed fractures of ribs of right side  Assessment & Plan  · S/p fall earlier today  Evaluated as trauma alert  · CT head with no intracranial abnormality; microangiopathic changes  · CT cervical spine without acute fracture or abnormality   · CT c/a/p: Displaced fracture of the posterior right 11th rib and nondisplaced fracture of the lateral right 11th rib with trace pneumothorax  Mildly displaced fracture of the posterior right 12th rib    · Repeat chest x-ray pending formal read  · Rib protocol order set  · Surgery consult, appreciate recommendations  · Pain control    Hyponatremia  Assessment & Plan  · Noted to be 132 on initial labs  · Gentle IV fluids with normal saline  · Morning BMP still pending    Anxiety  Assessment & Plan  · Continue home Klonopin and Lexapro     Acquired hypothyroidism  Assessment & Plan  · Continue pre-admission levothyroxine seen 50 mcg Monday, Wednesday and Friday, and 75 mcg on , , , Sundays         VTE Pharmacologic Prophylaxis: VTE Score: 4 Moderate Risk (Score 3-4) - Pharmacological DVT Prophylaxis Ordered: enoxaparin (Lovenox)  Patient Centered Rounds: I performed bedside rounds with nursing staff today  Discussions with Specialists or Other Care Team Provider:  Surgery, cardiology    Education and Discussions with Family / Patient: Updated  (son) at bedside  Time Spent for Care: 45 minutes  More than 50% of total time spent on counseling and coordination of care as described above  Current Length of Stay: 0 day(s)  Current Patient Status: Inpatient   Certification Statement: The patient will continue to require additional inpatient hospital stay due to Pain control, further workup by Cardiology and surgery  Discharge Plan: Pending clinical course     Code Status: Level 1 - Full Code    Subjective:       Objective:     Vitals:   Temp (24hrs), Av 4 °F (36 9 °C), Min:97 7 °F (36 5 °C), Max:98 7 °F (37 1 °C)    Temp:  [97 7 °F (36 5 °C)-98 7 °F (37 1 °C)] 98 7 °F (37 1 °C)  HR:  [57-74] 74  Resp:  [16-18] 18  BP: (146-221)/() 176/105  SpO2:  [91 %-99 %] 98 %  There is no height or weight on file to calculate BMI  Input and Output Summary (last 24 hours): Intake/Output Summary (Last 24 hours) at 2022 1110  Last data filed at 2022 0900  Gross per 24 hour   Intake 180 ml   Output --   Net 180 ml       Physical Exam:   Physical Exam  HENT:      Head: Normocephalic  Cardiovascular:      Rate and Rhythm: Regular rhythm  Bradycardia present  Pulses: Normal pulses     Pulmonary:      Effort: No respiratory distress  Abdominal:      General: Abdomen is flat  Bowel sounds are normal    Musculoskeletal:         General: Tenderness present  Normal range of motion  Cervical back: Normal range of motion  Skin:     General: Skin is warm  Neurological:      General: No focal deficit present  Mental Status: She is alert  Mental status is at baseline  Psychiatric:         Mood and Affect: Mood normal          Thought Content: Thought content normal           Additional Data:     Labs:  Results from last 7 days   Lab Units 02/12/22  1317   WBC Thousand/uL 6 53   HEMOGLOBIN g/dL 9 7*   HEMATOCRIT % 30 6*   PLATELETS Thousands/uL 166   NEUTROS PCT % 79*   LYMPHS PCT % 10*   MONOS PCT % 8   EOS PCT % 1     Results from last 7 days   Lab Units 02/12/22  1317   SODIUM mmol/L 132*   POTASSIUM mmol/L 4 1   CHLORIDE mmol/L 97   CO2 mmol/L 29   BUN mg/dL 15   CREATININE mg/dL 1 02   ANION GAP mmol/L 6   CALCIUM mg/dL 9 2   GLUCOSE RANDOM mg/dL 99     Results from last 7 days   Lab Units 02/12/22  1317   INR  1 06                   Lines/Drains:  Invasive Devices  Report    Peripheral Intravenous Line            Peripheral IV 02/12/22 Right Antecubital <1 day                  Telemetry:  Telemetry Orders (From admission, onward)             24 Hour Telemetry Monitoring  Continuous x 24 Hours (Telem)        References:    Telemetry Guidelines   Question:  Reason for 24 Hour Telemetry  Answer:  Syncope suspected to be cardiac in origin                 Telemetry Reviewed:  Danie Glaser  Indication for Continued Telemetry Use: Arrthymias requiring medical therapy             Imaging: No pertinent imaging reviewed      Recent Cultures (last 7 days):         Last 24 Hours Medication List:   Current Facility-Administered Medications   Medication Dose Route Frequency Provider Last Rate    acetaminophen  650 mg Oral Q6H PRN Marly Wilhelm PA-C      amLODIPine  5 mg Oral HS Abhinav Kim PA-C      aspirin  81 mg Oral Daily Luis Roberts PA-C      cholecalciferol  1,000 Units Oral Daily Luis Roberts PA-C      clonazePAM  0 25 mg Oral BID PRN Luis Roberts PA-C      enoxaparin  30 mg Subcutaneous Daily Luis Roebrts PA-C      escitalopram  20 mg Oral Daily Abdirahman Donahue      hydrALAZINE  10 mg Intravenous Q6H PRN Yanely Arora PA-C      [START ON 2/14/2022] levothyroxine  50 mcg Oral Once per day on Mon Wed Fri Luis Roberts PA-C      levothyroxine  75 mcg Oral Once per day on Sun Tue Thu Sat Luis Roberts PA-C      lidocaine  1 patch Topical Daily [place] Rick PA-C      losartan  50 mg Oral Daily Abdirahman Donahue      melatonin  3 mg Oral HS Luis Roberts PA-C      ondansetron  4 mg Oral Q6H PRN Luis Roberts PA-C      oxyCODONE  2 5 mg Oral Q6H PRN Yanely Arora PA-C      oxyCODONE  5 mg Oral Q6H PRN Yanely Arora PA-C      pantoprazole  40 mg Oral Early Morning Luis Rboerts PA-C      polyethylene glycol  17 g Oral Daily Luis Roberts PA-C      prednisoLONE acetate  1 drop Both Eyes BID Luis Roberts PA-C      valACYclovir  500 mg Oral Daily Luis Roberts PA-C          Today, Patient Was Seen By: Macario Rawls DO    **Please Note: This note may have been constructed using a voice recognition system  **

## 2022-02-13 NOTE — ASSESSMENT & PLAN NOTE
· Noted to be 132 on initial labs  · Gentle IV fluids with normal saline  · Morning BMP still pending

## 2022-02-13 NOTE — RESPIRATORY THERAPY NOTE
RT Protocol Note  Millie Pulling 80 y o  female MRN: [id number]  Unit/Bed#: [facility] 07 Encounter: [id number]    Assessment    Principal Problem:    Syncopal episodes  Active Problems:    Acquired hypothyroidism    Anxiety    Hyponatremia    Closed fracture of multiple ribs of right side with routine healing    Pneumothorax, traumatic    Benign hypertension with CKD (chronic kidney disease) stage III ([facility])      Home Pulmonary Medications:  none       Past Medical History:   Diagnosis Date    Anxiety     Benign hypertension with CKD (chronic kidney disease) stage III ([address] ) 2/12/2022    CAD (coronary artery disease)     Carotid Duplex 03/06/2018    Plaque formation was prominent bilaterally    Depression     Disease of thyroid gland     Diverticulitis     Dyslipidemia     ECHO 09/14/2017    EF 55%, mild mitral regurg, small pericardial effusion   Hypertension     Mitral regurgitation     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 infarction)     Shingles     Takotsubo cardiomyopathy      Social History     Socioeconomic History    Marital status:       Spouse name: None    Number of children: None    Years of education: None    Highest education level: None   Occupational History    None   Tobacco Use    Smoking status: Never Smoker    Smokeless tobacco: Never Used   Vaping Use    Vaping Use: Never used   Substance and Sexual Activity    Alcohol use: Never    Drug use: Never    Sexual activity: Not Currently   Other Topics Concern    None   Social History Narrative    Caffeine - 2 cups hot tea/day     Social Determinants of Health     Financial Resource Strain: Not on file   Food Insecurity: Not on file   Transportation Needs: Not on file   Physical Activity: Not on file   Stress: Not on file   Social Connections: Not on file   Intimate Partner Violence: Not on file   Housing Stability: Not on file       Subjective         Objective    Physical Exam:   Assessment Type: Assess only  General Appearance: Awake,Alert  Respiratory Pattern: Normal  Chest Assessment: Chest expansion symmetrical,Trachea midline  Bilateral Breath Sounds: Clear,Diminished  O2 Device: (P) nc    Vitals:  Blood pressure (!) 176/105, pulse 74, temperature 98 7 °F (37 1 °C), temperature source Tympanic, resp  rate 18, SpO2 98 %, not currently breastfeeding  Imaging and other studies: I have personally reviewed pertinent reports  O2 Device: (P) nc     Plan       Airway Clearance Plan: Incentive Spirometer     Resp Comments: Pt  s/p fall with multiple rib fxs  Pt initiated with incentive spirometer  Pt tolerating well   Will continue to follow

## 2022-02-13 NOTE — PHYSICAL THERAPY NOTE
PHYSICAL THERAPY EVALUATION  NAME:  Shady Ennis  DATE: 02/13/22    AGE:   80 y o  Mrn:   [id number]  ADMIT DX:  Rib pain [R07 81]  Traumatic pneumothorax, initial encounter [S27  0XXA]  Closed fracture of multiple ribs of right side, initial encounter [S22 41XA]  Problem List:   Patient Active Problem List   Diagnosis    Lower abdominal pain    Constipation    Acquired hypothyroidism    Takotsubo cardiomyopathy    Type 2 MI (myocardial infarction) ([facility] )    Hypertensive emergency    EKG abnormality    Anxiety    Other fatigue    Depression    Essential hypertension    Osteoporosis    Seasonal allergic rhinitis    Palpitations    Hypertensive urgency    Generalized abdominal pain    Compression fracture of lumbar vertebra with routine healing    Hyponatremia    Mild protein-calorie malnutrition (HCC)    Severe episode of recurrent major depressive disorder, without psychotic features ([facility] )    IFEANYI (generalized anxiety disorder)    PAD [facility]peripheral artery disease) ([facility]    Closed fracture of multiple ribs of right side with routine healing    Pneumothorax, traumatic    Benign hypertension with CKD (chronic kidney disease) stage III ([facility])    Syncopal episodes       Past Medical History  Past Medical History:   Diagnosis Date    Anxiety     Benign hypertension with CKD (chronic kidney disease) stage III ([facility] ) 2/12/2022    CAD (coronary artery disease)     Carotid Duplex 03/06/2018    Plaque formation was prominent bilaterally    Depression     Disease of thyroid gland     Diverticulitis     Dyslipidemia     ECHO 09/14/2017    EF 55%, mild mitral regurg, small pericardial effusion      Hypertension     Mitral regurgitation     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 infarction)     Shingles     Takotsubo cardiomyopathy        Past Surgical History  Past Surgical History:   Procedure Laterality Date    CARDIAC CATHETERIZATION  08/27/2012    EF 35%, 70% stenosis of diagonal, Otherwise all OK apart from myopathy    CARDIAC CATHETERIZATION  10/10/2012    EF 55%, no significant CAD  Mild stress induced cardiomyopathy    CATARACT EXTRACTION Bilateral     TONSILLECTOMY  childhood       Length Of Stay: 0  Performed at least 2 patient identifiers during session: Doron Atkins         02/13/22 0856   PT Last Visit   PT Visit Date 02/13/22   Note Type   Note type Evaluation   Pain Assessment   Pain Assessment Tool 0-10   Pain Score 5   Pain Location/Orientation Orientation: Right;Location: Rib Cage   Pain Onset/Description Descriptor: Pressure  (hurts with mvt only)   Restrictions/Precautions   Weight Bearing Precautions Per Order No   Braces or Orthoses   (none preported)   Other Precautions O2;Pain;Hard of hearing; Fall Risk;Multiple lines  (2L/min)   Home Living   Type of [address] [place] [address] Two level; Able to live on main level with bedroom/bathroom; Performs ADLs on one level;1/2 bath on main level  (goes up/down stairs 1x/day; 0 FLO)   Bathroom Shower/Tub   (sponge bath)   Bathroom Toilet Standard   Bathroom Equipment Grab bars around toilet   [address] [facility]  (uses at night)   Additional Comments enjoys reading and baking   Prior Function   Level of [place] Independent with ADLs and functional mobility   Lives With Alone   Receives Help From Family   ADL Assistance Independent   IADLs Independent   Falls in the last 6 months 1 to 4   Vocational Retired   Comments +    General   Family/Caregiver Present No   Cognition   Overall Cognitive Status WFL   Arousal/Participation Alert   Orientation Level Oriented X4   Memory Within functional limits   Following Commands Follows one step commands without difficulty   Subjective   Subjective pt reports that she is feeling better   RLE Assessment   RLE Assessment WFL   LLE Assessment   LLE Assessment WFL   Vision-Basic Assessment   Current Vision Wears glasses all the time   Coordination   Movements are Fluid and Coordinated 1   Sensation WFL   Bed Mobility   Supine to Sit 4  Minimal assistance   Additional items Assist x 1;LE management;Verbal cues; Increased time required   Sit to Supine 4  Minimal assistance   Additional items Assist x 1;LE management;Verbal cues; Increased time required   Additional Comments instructed pt  on log rolling technique   Transfers   Sit to Stand 4  Minimal assistance   Additional items Verbal cues; Increased time required;Assist x 1   Stand to Sit 4  Minimal assistance   Additional items Assist x 1;Verbal cues; Increased time required   Additional Comments denied dizziness with transitional movement however did report "zingers' in the rib iwth certain movements   Ambulation/Elevation   Gait pattern Improper Weight shift; Excessively slow; Antalgic   Gait Assistance 4  Minimal assist   Additional items Assist x 1;Verbal cues   Assistive Device Rolling walker   Distance 15 ft   Balance   Static Sitting Good   Dynamic Sitting Fair +   Static Standing Fair   Dynamic Standing Fair -   Ambulatory Fair -   Endurance Deficit   Endurance Deficit Yes   Endurance Deficit Description OOB tolerance limited due to pain, very slow mvt   Activity Tolerance   Activity Tolerance Patient limited by pain   Nurse Made Aware ANGELA Ross made aware of treatment findings   Assessment   Prognosis Fair   Problem List Decreased endurance; Impaired balance;Decreased mobility; Impaired hearing;Pain   Assessment Pt is 80 y o  female seen for PT evaluation s/p admit to [facility] [employer] [facility] on 2/12/2022 w/ Syncopal episodes  PT consulted to assess pt's functional mobility and d/c needs  Order placed for PT eval and tx, w/ up w/ A order  Pt agreeable to PT  session upon arrival, pt found supine in bed  Pt requires Min A assistance for bed mobility, transfers, and ambulation with RW  PTA, pt was independent w/ all functional mobility w/ RW, ambulates community distances and elevations, lives alone in 2 level home and retired    Pt to benefit from continued PT tx to address deficits and maximize level of functional independent mobility and consistency  From PT/mobility standpoint, recommendation at time of d/c would be post acute rehabilitation services pending progress in order to facilitate return to PLOF  Upon conclusion pt  supine in bed   Barriers to Discharge Decreased caregiver support   Goals   Patient Goals get up for a walk   LTG Expiration Date 02/23/22   Long Term Goal #1 Pt will: Perform bed mobility tasks to modified I to improve ease of bed mobility  Perform transfers to modified I to improve ease of transfers  Perform ambulation with MI and RW for 250 feet to  increase Indep in home environment  Increase dynamic standing balance to F to decrease fall risk  Increase OOB activity tolerance to 10 minutes without s/s of exertion to decrease fall risk  Plan   Treatment/Interventions Functional transfer training; Therapeutic exercise; Endurance training;Bed mobility;Gait training   PT Frequency 3-5x/wk   Recommendation   PT Discharge Recommendation Post acute rehabilitation services   Equipment Recommended   (pt owns RW)   AM-PAC Basic Mobility Inpatient   Turning in Bed Without Bedrails 4   Lying on Back to Sitting on Edge of Flat Bed 3   Moving Bed to Chair 3   Standing Up From Chair 3   Walk in Room 3   Climb 3-5 Stairs 3   Basic Mobility Inpatient Raw Score 19   Basic Mobility Standardized Score 42 48   Highest Level Of Mobility   -[facility] Goal 6: Walk 10 steps or more     Time In: 0856  Time Out: 0926  Total Evaluation Minutes: [address], PT

## 2022-02-13 NOTE — CONSULTS
[address] 1935, 80 y o  female MRN: [id number]  Unit/Bed#: [facility] 07 Encounter: [id number]  Primary Care Provider: Bernabe Valdez MD   Date and time admitted to hospital: 2/12/2022 12:17 PM    Inpatient consult to Cardiology  Consult performed by: Yeny Beckett DO  Consult ordered by: Conor Kulkarni DO        Chief Complaint:  I fell    History of Present Illness:  54-year-old very pleasant white female with a chronic anxiety history well documented by multiple physicians as well as her family, son and daughter at the bedside today relating such to me  Was on Xanax for decades, switch to Klonopin by her mental health provider  Importantly she had not taken Klonopin prior to this event though felt an episode of anxiety coming on, felt like she needed to take Klonopin, had recently been sitting for some time, then got up to get Klonopin, and went to sit down on a chair and apparently missed the chair and struck her head and chest   She is not sure if she completely blacked out, she does remember the events fairly well so this is unlikely  No chest pain no shortness of breath no palpitations at this time  She has chronic nocturnal palpitations that occasionally awaken her, to the point and she needs to go pass her water  EKG shows no acute changes  Cardiac enzymes negative  CT scan shows 11th and 12th right-sided rib fractures with a trace right-sided pneumothorax  She has maintained good saturations and sinus rhythm on telemetry  Heart rate of 50 beats per minute was documented on initial evaluation  We are asked to comment  Review of Systems: a 12 point review of systems was conducted and is negative except for as mentioned in the HPI or as below  Review of Systems   Constitutional: Negative for chills, diaphoresis, malaise/fatigue and weight gain  HENT: Negative for nosebleeds and stridor      Eyes: Negative for double vision, vision loss in left eye, vision loss in right eye and visual disturbance  Cardiovascular: Positive for syncope (Versus fall)  Negative for chest pain, claudication, cyanosis, dyspnea on exertion, irregular heartbeat, leg swelling, near-syncope, orthopnea, palpitations and paroxysmal nocturnal dyspnea  Respiratory: Negative for cough, shortness of breath, snoring and wheezing  Endocrine: Negative for polydipsia, polyphagia and polyuria  Hematologic/Lymphatic: Negative for bleeding problem  Does not bruise/bleed easily  Skin: Negative for flushing and rash  Musculoskeletal: Negative for falls and myalgias  Gastrointestinal: Negative for abdominal pain, heartburn, hematemesis, hematochezia, melena and nausea  Genitourinary: Negative for hematuria  Neurological: Positive for light-headedness ( episodic usually triggered by anxiety symptoms 1st)  Negative for brief paralysis, dizziness, focal weakness, headaches, loss of balance and vertigo  Psychiatric/Behavioral: Negative for altered mental status and substance abuse  The patient is nervous/anxious  Allergic/Immunologic: Negative for hives  Past Medical and Surgical History:  Past Medical History:   Diagnosis Date    Anxiety     Benign hypertension with CKD (chronic kidney disease) stage III ([facility] [address] ) 2/12/2022    CAD (coronary artery disease)     Carotid Duplex 03/06/2018    Plaque formation was prominent bilaterally    Depression     Disease of thyroid gland     Diverticulitis     Dyslipidemia     ECHO 09/14/2017    EF 55%, mild mitral regurg, small pericardial effusion   Hypertension     Mitral regurgitation     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 infarction)     Shingles     Takotsubo cardiomyopathy      Past Surgical History:   Procedure Laterality Date    CARDIAC CATHETERIZATION  08/27/2012    EF 35%, 70% stenosis of diagonal, Otherwise all OK apart from myopathy    CARDIAC CATHETERIZATION  10/10/2012    EF 55%, no significant CAD    Mild stress induced cardiomyopathy    CATARACT EXTRACTION Bilateral     TONSILLECTOMY  childhood     Social History     Substance and Sexual Activity   Alcohol Use Never     Social History     Substance and Sexual Activity   Drug Use Never     Social History     Tobacco Use   Smoking Status Never Smoker   Smokeless Tobacco Never Used       Family History:  Family History   Problem Relation Age of Onset    Cancer Father     Prostate cancer Father     No Known Problems Mother        Medication:  Medications Prior to Admission   Medication    amLODIPine (NORVASC) 5 mg tablet    aspirin 81 mg chewable tablet    cholecalciferol (VITAMIN D3) 1,000 units tablet    clonazePAM (KlonoPIN) 0 5 mg tablet    escitalopram (LEXAPRO) 10 mg tablet    lansoprazole (PREVACID) 30 mg capsule    levothyroxine 50 mcg tablet    levothyroxine 75 mcg tablet    losartan (COZAAR) 50 mg tablet    melatonin 3 mg    metoprolol succinate (TOPROL-XL) 25 mg 24 hr tablet    Multiple Vitamin (MULTIVITAMIN) capsule    ondansetron (Zofran ODT) 4 mg disintegrating tablet    polyethylene glycol (MIRALAX) 17 g packet    prednisoLONE acetate (PRED FORTE) 1 % ophthalmic suspension    sucralfate (CARAFATE) 1 g/10 mL suspension    valACYclovir (VALTREX) 500 mg tablet      Current Facility-Administered Medications   Medication Dose Route Frequency Provider Last Rate Last Admin    acetaminophen (TYLENOL) tablet 650 mg  650 mg Oral Q6H PRN Lyle Zhao PA-C   650 mg at 02/12/22 2017    amLODIPine (NORVASC) tablet 5 mg  5 mg Oral HS Ginger Webber PA-C   5 mg at 02/12/22 2118    aspirin chewable tablet 81 mg  81 mg Oral Daily Camelia Madison PA-C   81 mg at 02/13/22 4021    cholecalciferol (VITAMIN D3) tablet 1,000 Units  1,000 Units Oral Daily Camelia Madison PA-C   1,000 Units at 02/13/22 0906    clonazePAM (KlonoPIN) tablet 0 25 mg  0 25 mg Oral BID PRN Camelia Madison PA-C        enoxaparin (LOVENOX) subcutaneous injection 30 mg  30 mg Subcutaneous Daily Macarena Sessions, PA-C   30 mg at 02/13/22 0791    escitalopram (LEXAPRO) tablet 20 mg  20 mg Oral Daily Macarena Sessions, PA-C   20 mg at 02/13/22 3927    hydrALAZINE (APRESOLINE) injection 10 mg  10 mg Intravenous Q6H PRN Guerline Buckner PA-C        [START ON 2/14/2022] levothyroxine tablet 50 mcg  50 mcg Oral Once per day on Mon Wed Fri Amanda Fett, TRISTA        levothyroxine tablet 75 mcg  75 mcg Oral Once per day on Sun Tue Thu Sat Macarena Sessions, PA-C   75 mcg at 02/13/22 0515    lidocaine (LIDODERM) 5 % patch 1 patch  1 patch Topical Daily Guerline Buckner PA-C   1 patch at 02/13/22 1889    losartan (COZAAR) tablet 50 mg  50 mg Oral Daily Macarena Sessions, PA-C   50 mg at 02/13/22 2319    melatonin tablet 3 mg  3 mg Oral HS Macarena Sessions, TRISTA        metoprolol succinate (TOPROL-XL) 24 hr tablet 12 5 mg  12 5 mg Oral Daily Mike Escobar DO        ondansetron (ZOFRAN-ODT) dispersible tablet 4 mg  4 mg Oral Q6H PRN Macarena Sessions, TRISTA        oxyCODONE (ROXICODONE) IR tablet 2 5 mg  2 5 mg Oral Q6H PRN Guerline Buckner, TRISTA        oxyCODONE (ROXICODONE) IR tablet 5 mg  5 mg Oral Q6H PRN Guerline Buckner, TRISTA   5 mg at 02/12/22 2118    pantoprazole (PROTONIX) EC tablet 40 mg  40 mg Oral Early Morning Macarena Sessions, PA-C   40 mg at 02/13/22 3335    polyethylene glycol (MIRALAX) packet 17 g  17 g Oral Daily Macarena Sessions, TRISTA        prednisoLONE acetate (PRED FORTE) 1 % ophthalmic suspension 1 drop  1 drop Both Eyes BID Macarena Sessions, TRISTA        valACYclovir (VALTREX) tablet 500 mg  500 mg Oral Daily Macarena Sessions, TRISTA           Allergies: Allergies   Allergen Reactions    No Known Allergies        Physical Exam:  Vitals: Blood pressure (!) 176/105, pulse 74, temperature 98 7 °F (37 1 °C), temperature source Tympanic, resp  rate 18, SpO2 98 %, not currently breastfeeding  , There is no height or weight on file to calculate BMI ,   Orthostatic Blood Pressures      Most Recent Value   Blood Pressure 176/105 filed at 02/13/2022 7207   Patient Position - Orthostatic VS Lying filed at 02/13/2022 4125        Systolic (82SID), NYT:808 , Min:146 , KIL:374   , Diastolic (09IVS), AMY:18, Min:68, Max:105    Physical Exam    Most Recent Cardiac Imaging:  None    EKG:  Stable    Lab Results:   Troponins:     CBC with diff:   Results from last 7 days   Lab Units 02/12/22  1317   WBC Thousand/uL 6 53   HEMOGLOBIN g/dL 9 7*   HEMATOCRIT % 30 6*   MCV fL 98   PLATELETS Thousands/uL 166   MCH pg 31 1   MCHC g/dL 31 7   RDW % 12 8   MPV fL 8 8*   NRBC AUTO /100 WBCs 0     CMP:  Results from last 7 days   Lab Units 02/12/22  1317   POTASSIUM mmol/L 4 1   CHLORIDE mmol/L 97   CO2 mmol/L 29   BUN mg/dL 15   CREATININE mg/dL 1 02   CALCIUM mg/dL 9 2   EGFR ml/min/1 73sq m 49     Lipid Profile:       * Syncopal episodes  Assessment & Plan  No clear cardiac cause  No evidence for ACS, heart failure, nor dysrhythmia  Doubt true cardiac syncope  Likely anxiety mediated orthostasis  Doubt significant bradycardia  This episode may indeed have been a simple slip and fall off of the chair she was sitting on  Would avoid alpha blocker therapy  Would generally avoid diuretic therapy  Would avoid over zealous treatment of hypertension  Will halve dose of Toprol and resume tonight, and I would have her take this in the evening post discharge for recurrent nocturnal palpitations  I will arrange outpatient 1 week Zio patch for completeness  Primary hypertension  Assessment & Plan  Significant documentation in past of orthostasis triggered by anxiety episodes  Would not over zealous lead treat hypertension at her age  However may titrate amlodipine and/or losartan as needed  Would shoot for goal of approximately 140 over 80  Takotsubo cardiomyopathy  Assessment & Plan  No evidence for congestive heart failure  No recent EF reassessment    Will check echocardiogram     Anxiety  Assessment & Plan  This seems to be a significant issue  She is known well to Dr Ty Pantoja who will be rounding tomorrow, further recommendations pending her echocardiogram   Continue telemetry 24 hour longer

## 2022-02-13 NOTE — ASSESSMENT & PLAN NOTE
Significant documentation in past of orthostasis triggered by anxiety episodes  Would not over zealous lead treat hypertension at her age  However may titrate amlodipine and/or losartan as needed  Would shoot for goal of approximately 140 over 80

## 2022-02-13 NOTE — ASSESSMENT & PLAN NOTE
No clear cardiac cause  No evidence for ACS, heart failure, nor dysrhythmia  Doubt true cardiac syncope  Likely anxiety mediated orthostasis  Doubt significant bradycardia  This episode may indeed have been a simple slip and fall off of the chair she was sitting on  Would avoid alpha blocker therapy  Would generally avoid diuretic therapy  Would avoid over zealous treatment of hypertension  Will halve dose of Toprol and resume tonight, and I would have her take this in the evening post discharge for recurrent nocturnal palpitations  I will arrange outpatient 1 week Zio patch for completeness

## 2022-02-13 NOTE — UTILIZATION REVIEW
Initial Clinical Review    Admission: Date/Time/Statement: 2/12/22 AT 1624 OBSERVATION - CONVERTED TO INPATIENT 2/13/22 AT 1125 2ND SYNCOPE WITH FALL + MULTIPLE TRAUMA - INCLUDING MULTIPLE RIB FRACTURES WITH TRACE RIGHT-SIDED PTX - NEEDS CONTINUED INPATIENT STAY > 2 MIDNITES + AFTER OBSERVATION - MONITOR RESP STATUS, SUPP O2, INCENTIVE SPIROMETRY, ANALGESIA, FOLLOW UP CHEST X RAY, CARDIOLOGY EVALUATION     02/13/22 1126  Inpatient Admission  Once        Transfer Service: Hospitalist       Question Answer Comment   Level of Care Med Surg    Estimated length of stay More than 2 Midnights    Certification I certify that inpatient services are medically necessary for this patient for a duration of greater than two midnights  See H&P and MD Progress Notes for additional information about the patient's course of treatment  02/13/22 1125   02/12/22 1624  Place in Observation  (ED Bridging Orders Panel)  Once        Question: Level of Care Answer: Med Surg    02/12/22 1624     ED Arrival Information     Expected Arrival Acuity    - 2/12/2022 12:17 Emergent    Means of arrival Escorted by Service Admission type    Ambulance [facility] Emergency    Arrival complaint    Fall     Chief Complaint   Patient presents with   Ace Marcum     Initial Presentation:   81 y/o female with PMHx, HTN, CKD, Hypothyroidism, chronic anxiety on Klonopin; takes baby ASA daily - presents to [address] [facility] ED via EMS on 2/12/22 as a Trauma alert after a fall that occurred this afternoon - reports feeling anxious, slightly lightheaded and dizzy with plan to take her Klonopin,  when she tried to sit on a chair in her kitchen but the chair slipped out from underneath her she banged her head and face on the table and fell to the floor  She is unsure if she lost consciousness but she does not recall some of the events -  Does state her nose was bleeding     In ED - reports feeling shaky and dizzy with some neck, right-sided rib with deep inspiration + abdominal pain  Temp 98 7  /96  Exam:  alert + oriented x 3  No focal neuro deficit  abrasion on her right nasal labial fold which had been bleeding,  some mild tenderness mostly in the trapezius area right-sided   CT Head + C-Spine negative  CT Chest, Abdomen, Pelvis shows displaced fracture of the posterior right 11th rib and nondisplaced fracture of the lateral right 11th rib with trace pneumothorax  Mildly displaced fracture of the posterior right 12th rib shows Displaced fracture of the posterior right 11th rib and nondisplaced fracture of the lateral right 11th rib with trace pneumothorax  Mildly displaced fracture of the posterior right 12th rib  ED Tx: lopressor po, klonopin, cozaar, tylenol  Placed in Observation 2/12/22 at 1624 and Converted to Inpatient 2/13/22 at 1125 2nd  Fall with multiple injuries/ Closed fracture of multiple ribs of right side with trace PTX - Incentive Spirometry, supp O2 prn, Repeat Chest x ray in am;   Syncope - per Cardiology - Telemetry, halve dose of Toprol and resume tonight     2/12 CARDIOLOGY:  Syncopal episodes - No clear cardiac cause, No evidence for ACS, heart failure, nor dysrhythmia  Doubt true cardiac syncope  Likely anxiety mediated orthostasis; Doubt significant bradycardia  This episode may indeed have been a simple slip and fall off of the chair she was sitting on    PLAN:  Would avoid alpha blocker therapy  Would generally avoid diuretic therapy  Would avoid over zealous treatment of hypertension  Will halve dose of Toprol and resume tonight, and I would have her take this in the evening post discharge for recurrent nocturnal palpitations   Arrange outpatient 1 week Zio patch for completen    2/13 GEN SURGERY:  Multiple injuries secondary to traumatic fall from syncopal episode at home yesterday    Patient had CT chest abdomen pelvis closed rib fracture with displaced fracture posterior right 11th rib and nondisplaced fracture of lateral right 11th rib with trace pneumothorax, mildly displaced fracture posterior right 12th rib  Abrasion face and right nose   Accelerated hypertension, likely secondary to pain and anxiety   CT scan head and cervical spine without acute fracture or intracranial abnormality    SpO2 98% on 2 L of nasal cannula O2  Complaints of right lateral lower rib pain with deep inspiration, using incentive spirometry  Plan: Portable chest x-ray performed this morning stat, pending results  Evaluate for trace right pneumothorax seen on CT scan chest abdomen pelvis yesterday  Continue Lidoderm pain patch right lower lateral ribcage  Apply ice pack over area as needed for patient comfort  Nasal cannula O2 at 2 liters/minute / encouraged use of incentive spirometry    ometry hourly while throughout the day  Monitor vital signs,Telemetry  Medical management including BP control by Percolate      ED Triage Vitals   Temperature Pulse Respirations Blood Pressure SpO2   02/12/22 1222 02/12/22 1220 02/12/22 1222 02/12/22 1222 02/12/22 1220   98 7 °F (37 1 °C) 71 17 (!) 190/86 95 %      Temp Source Heart Rate Source Patient Position - Orthostatic VS BP Location FiO2 (%)   02/12/22 1222 02/12/22 1222 02/12/22 1407 02/12/22 2108 --   Tympanic Monitor Lying Left arm       Pain Score       02/12/22 1437       7          Wt Readings from Last 1 Encounters:   01/03/22 58 1 kg (128 lb)     Additional Vital Signs:   Date/Time Temp Pulse Resp BP MAP (mmHg) SpO2 Calculated FIO2 (%) - Nasal Cannula Nasal Cannula O2 (L/min) O2 Device Patient Position - Orthostatic VS   02/13/22 0817 98 7 °F (37 1 °C) 74 18 176/105   Abnormal  -- 98 % -- -- Nasal cannula Lying   02/13/22 0700 -- -- -- -- -- 99 % 28 2 L/min Nasal cannula --   02/13/22 0600 -- -- -- -- -- 98 % 28 2 L/min Nasal cannula --   02/13/22 0500 -- -- -- -- -- 99 % 28 2 L/min Nasal cannula --   02/13/22 0400 -- -- -- -- -- 98 % 28 2 L/min Nasal cannula --   02/13/22 0300 -- -- -- -- -- 98 % 28 2 L/min Nasal cannula --   02/13/22 0254 98 7 °F (37 1 °C) 60 16 158/74 -- 98 % 28 2 L/min Nasal cannula Lying   02/13/22 0200 -- -- -- -- -- 96 % 28 2 L/min Nasal cannula --   02/13/22 0100 -- -- -- -- -- 97 % 28 2 L/min Nasal cannula --   02/13/22 0000 -- -- -- -- -- 97 % 28 2 L/min Nasal cannula --   02/12/22 2300 97 7 °F (36 5 °C) 57 16 146/68 -- 95 % -- -- None (Room air) Lying   02/12/22 2200 -- -- -- -- -- 92 % -- -- None (Room air) --   02/12/22 2108 98 4 °F (36 9 °C) 64 18 191/84 Abnormal  120 98 % -- -- None (Room air) Sitting   02/12/22 2100 -- -- -- -- -- 98 % -- -- None (Room air) --   02/12/22 1640 -- 61 -- -- -- 96 % -- -- -- --   02/12/22 1635 -- 60 -- -- -- 96 % -- -- -- --   02/12/22 1630 -- 60 -- 153/68 -- 97 % -- -- -- --   02/12/22 1625 -- 60 -- -- -- 97 % -- -- -- --   02/12/22 1620 -- 60 -- -- -- 94 % -- -- -- --   02/12/22 1615 -- 57 -- 162/79 -- 98 % -- -- -- --   02/12/22 1610 -- 61 -- -- -- 97 % -- -- -- --   02/12/22 1607 -- 61 18 155/74 -- 97 % -- -- None (Room air) Lying   02/12/22 1605 -- 59 -- -- -- 96 % -- -- -- --   02/12/22 1600 -- 60 -- -- -- 96 % -- -- -- --   02/12/22 1555 -- 61 -- -- -- 97 % -- -- -- --   02/12/22 1550 -- 60 -- -- -- 96 % -- -- -- --   02/12/22 1545 -- 59 -- 163/76 -- 97 % -- -- -- --   02/12/22 1540 -- 60 -- -- -- 97 % -- -- -- --   02/12/22 1535 -- 62 -- -- -- 96 % -- -- -- --   02/12/22 1530 -- 62 -- 162/74 -- 97 % -- -- -- --   02/12/22 1525 -- 62 -- 183/82 Abnormal  -- 97 % -- -- -- --   02/12/22 1514 -- 60 -- -- -- 93 % -- -- -- --   02/12/22 1510 -- 61 -- -- -- 95 % -- -- -- --   02/12/22 1507 -- 62 18 163/77 -- 96 % -- -- None (Room air) Lying   02/12/22 1505 -- 62 -- -- -- 97 % -- -- -- --   02/12/22 1500 -- 63 -- -- -- 96 % -- -- -- --   02/12/22 1455 -- 62 -- -- -- 96 % -- -- -- --   02/12/22 1450 -- 62 -- -- -- 97 % -- -- -- --   02/12/22 1445 -- 62 -- 178/82 Abnormal  -- 97 % -- -- -- --   02/12/22 1440 -- 62 -- -- -- 97 % -- -- -- -- 02/12/22 1437 -- 63 18 203/80 Abnormal  -- 97 % -- -- None (Room air) --   02/12/22 1435 -- 64 -- -- -- 96 % -- -- -- --   02/12/22 1430 -- 63 -- -- -- 97 % -- -- -- --   02/12/22 1425 -- 64 -- -- -- 98 % -- -- -- --   02/12/22 1420 -- 64 -- -- -- 96 % -- -- -- --   02/12/22 1415 -- 65 -- 199/91 Abnormal  -- 96 % -- -- -- --   02/12/22 1410 -- 63 -- -- -- 96 % -- -- -- --   02/12/22 1407 -- 63 18 199/91 Abnormal  -- 96 % -- -- None (Room air) Lying   02/12/22 1405 -- 62 -- -- -- 97 % -- -- -- --   02/12/22 1400 -- 63 18 200/88 Abnormal  -- 98 % -- -- -- --   02/12/22 1355 -- 63 -- -- -- 97 % -- -- -- --   02/12/22 1350 -- 63 -- -- -- 97 % -- -- -- --   02/12/22 1345 -- 62 -- 217/82 Abnormal  -- 97 % -- -- -- --   02/12/22 1340 -- 62 -- -- -- 98 % -- -- -- --   02/12/22 1337 -- 63 18 221/95 Abnormal  -- 99 % -- -- None (Room air) --   02/12/22 1335 -- 62 -- -- -- 96 % -- -- -- --   02/12/22 1330 -- 63 -- 213/95 Abnormal  -- 98 % -- -- -- --   02/12/22 1325 -- 64 -- 177/81 Abnormal  -- 97 % -- -- -- --   02/12/22 1320 -- 62 -- -- -- 97 % -- -- -- --   02/12/22 1315 -- 62 -- -- -- 97 % -- -- -- --   02/12/22 1310 -- 63 -- -- -- 95 % -- -- -- --   02/12/22 1307 -- 63 18 177/81 Abnormal  -- 96 % -- -- None (Room air) --   02/12/22 1305 -- 65 -- -- -- 96 % -- -- -- --   02/12/22 1300 -- 65 17 164/76 -- 97 % -- -- None (Room air) --   02/12/22 1255 -- 65 -- -- -- 98 % -- -- -- --   02/12/22 1252 -- 65 18 187/82 Abnormal  -- 98 % -- -- None (Room air) --      02/13 0701   02/14 0700   P  O  180   Total Intake 180   Net +180     Pertinent Labs/Diagnostic Test Results:   2/12 CT HEAD: no intracranial abnormality; microangiopathic changes    2/12 CT C-SPINE:  no acute fracture or abnormality     2/12 CT CHEST ABDOMEN PELVIS:  Displaced fracture of the posterior right 11th rib and nondisplaced fracture of the lateral right 11th rib with trace pneumothorax  Mildly displaced fracture of the posterior right 12th rib      2/12 - 12 LEAD EKG:  Sinus rhythm with 1st degree A-V block  RSR' or QR pattern in V1 suggests right ventricular conduction delay  Possible Anteroseptal infarct (cited on or before 12-FEB-2022)  Abnormal ECG  No significant change since prior tracing    2/13 CHEST X RAY:  Cardiomediastinal silhouette appears unremarkable  Minimal bibasilar linear atelectasis   No focal consolidation   No pneumothorax or pleural effusion  Known rib fractures are better seen on CT performed one day prior       Results from last 7 days   Lab Units 02/12/22  1317   WBC Thousand/uL 6 53   HEMOGLOBIN g/dL 9 7*   HEMATOCRIT % 30 6*   PLATELETS Thousands/uL 166   NEUTROS ABS Thousands/µL 5 24       Results from last 7 days   Lab Units 02/12/22  1317   SODIUM mmol/L 132*   POTASSIUM mmol/L 4 1   CHLORIDE mmol/L 97   CO2 mmol/L 29   ANION GAP mmol/L 6   BUN mg/dL 15   CREATININE mg/dL 1 02   EGFR ml/min/1 73sq m 49   CALCIUM mg/dL 9 2     Results from last 7 days   Lab Units 02/12/22  1317   GLUCOSE RANDOM mg/dL 99     Results from last 7 days   Lab Units 02/12/22  2217 02/12/22  1514 02/12/22  1318   HS TNI 0HR ng/L  --   --  6   HS TNI 2HR ng/L  --  6  --    HSTNI D2 ng/L  --  0  --    HS TNI 4HR ng/L 9  --   --    HSTNI D4 ng/L 3  --   --        Results from last 7 days   Lab Units 02/12/22  1317   PROTIME seconds 13 7   INR  1 06   PTT seconds 27     ED Treatment:   Medication Administration from 02/12/2022 1217 to 02/12/2022 2116       Date/Time Order Dose Route Action     02/12/2022 1411 losartan (COZAAR) tablet 50 mg 50 mg Oral Given     02/12/2022 1411 metoprolol tartrate (LOPRESSOR) tablet 25 mg 25 mg Oral Given     02/12/2022 1411 acetaminophen (TYLENOL) tablet 650 mg 650 mg Oral Given     02/12/2022 1508 clonazePAM (KlonoPIN) tablet 0 25 mg 0 25 mg Oral Given     Past Medical History:   Diagnosis Date    Anxiety     Benign hypertension with CKD (chronic kidney disease) stage III ([facility] [address] ) 2/12/2022    CAD (coronary artery disease)     Carotid Duplex 03/06/2018    Plaque formation was prominent bilaterally    Depression     Disease of thyroid gland     Diverticulitis     Dyslipidemia     ECHO 09/14/2017    EF 55%, mild mitral regurg, small pericardial effusion   Hypertension     Mitral regurgitation     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 infarction)     Shingles     Takotsubo cardiomyopathy      Present on Admission:   Pneumothorax, traumatic   Acquired hypothyroidism   Anxiety   Hyponatremia   Syncopal episodes   Takotsubo cardiomyopathy    Admitting Diagnosis: Rib pain [R07 81]  Traumatic pneumothorax, initial encounter [S27  0XXA]  Closed fracture of multiple ribs of right side, initial encounter [S22 41XA]    Age/Sex: 80 y o  female    Admission Orders:  Telemetry  VS q4hrs  Orthostatic BP q shift  Nasal O2 at 2 L/min - Titrate SpO2 > 92 %  Continuous Pulse Oximetry  SCD  Cervical Collar  Up with assistance   Diet Cardiovascular; Cardiac  I+O q shift  Daily weight  Serial HS Troponin q2hrs x 3  ECHO  PT + OT Evals    Scheduled Medications:  amLODIPine, 5 mg, Oral, HS  aspirin, 81 mg, Oral, Daily  cholecalciferol, 1,000 Units, Oral, Daily  enoxaparin, 30 mg, Subcutaneous, Daily  escitalopram, 20 mg, Oral, Daily  [START ON 2/14/2022] levothyroxine, 50 mcg, Oral, Once per day on Mon Wed Fri  levothyroxine, 75 mcg, Oral, Once per day on Sun Tue Thu Sat  lidocaine, 1 patch, Topical, Daily  losartan, 50 mg, Oral, Daily  melatonin, 3 mg, Oral, HS  metoprolol succinate, 12 5 mg, Oral, Daily  pantoprazole, 40 mg, Oral, Early Morning  polyethylene glycol, 17 g, Oral, Daily  prednisoLONE acetate, 1 drop, Both Eyes, BID  valACYclovir, 500 mg, Oral, Daily    Continuous IV Infusions:  NONE    PRN Meds:   acetaminophen, 650 mg, Oral, Q6H PRN - 2/11 X 1  clonazePAM, 0 25 mg, Oral, BID PRN  hydrALAZINE, 10 mg, Intravenous, Q6H PRN  ondansetron, 4 mg, Oral, Q6H PRN  oxyCODONE, 2 5 mg, Oral, Q6H PRN  oxyCODONE, 5 mg, Oral, Q6H PRN - 2/11 X 1    IP CONSULT TO CASE MANAGEMENT  IP CONSULT TO CARDIOLOGY    Network Utilization Review Department  ATTENTION: Please call with any questions or concerns to [phone] and carefully listen to the prompts so that you are directed to the right person  All voicemails are confidential   Talia Grand all requests for admission clinical reviews, approved or denied determinations and any other requests to dedicated fax number below belonging to the campus where the patient is receiving treatment   List of dedicated fax numbers for the Facilities:  [address] DENIALS (Administrative/Medical Necessity) [phone]   [address] (Maternity/NICU/Pediatrics) [phone]   [address]  [address] [address] [place] [address] [address] [facility] [place] [address] [phone]

## 2022-02-13 NOTE — ASSESSMENT & PLAN NOTE
· Possibly bradycardia related  · Hold toprol xl  · Monitor telemetry - HR in 50s when I was evaluating her

## 2022-02-13 NOTE — ASSESSMENT & PLAN NOTE
· Trace right-sided pneumothorax seen on CT chest/abdomen/pelvis  · ED Discussed with general surgery on-call  · Hemodynamically stable, and stable respiratory status  · Provide supplemental O2 as needed    · Incentive spirometry  · Repeat chest x-ray pending formal read

## 2022-02-13 NOTE — PLAN OF CARE
Problem: PHYSICAL THERAPY ADULT  Goal: Performs mobility at highest level of function for planned discharge setting  See evaluation for individualized goals  Description: Treatment/Interventions: Functional transfer training,Therapeutic exercise,Endurance training,Bed mobility,Gait training          See flowsheet documentation for full assessment, interventions and recommendations  Outcome: Progressing  Note: Prognosis: Fair  Problem List: Decreased endurance,Impaired balance,Decreased mobility,Impaired hearing,Pain  Assessment: Pt is 80 y o  female seen for PT evaluation s/p admit to [address] on 2/12/2022 w/ Syncopal episodes  PT consulted to assess pt's functional mobility and d/c needs  Order placed for PT eval and tx, w/ up w/ A order  Pt agreeable to PT  session upon arrival, pt found supine in bed  Pt requires Min A assistance for bed mobility, transfers, and ambulation with RW  PTA, pt was independent w/ all functional mobility w/ RW, ambulates community distances and elevations, lives alone in 2 level home and retired  Pt to benefit from continued PT tx to address deficits and maximize level of functional independent mobility and consistency  From PT/mobility standpoint, recommendation at time of d/c would be post acute rehabilitation services pending progress in order to facilitate return to PLOF  Upon conclusion pt  supine in bed  Barriers to Discharge: Decreased caregiver support        PT Discharge Recommendation: Post acute rehabilitation services          See flowsheet documentation for full assessment     Sheila Wan PT

## 2022-02-14 VITALS
OXYGEN SATURATION: 97 % | RESPIRATION RATE: 19 BRPM | SYSTOLIC BLOOD PRESSURE: 121 MMHG | HEART RATE: 64 BPM | DIASTOLIC BLOOD PRESSURE: 79 MMHG | TEMPERATURE: 97.8 F

## 2022-02-14 LAB
ERYTHROCYTE [DISTWIDTH] IN BLOOD BY AUTOMATED COUNT: 13 % (ref 11.6–15.1)
HCT VFR BLD AUTO: 35.6 % (ref 34.8–46.1)
HGB BLD-MCNC: 11.4 G/DL (ref 11.5–15.4)
MCH RBC QN AUTO: 31.1 PG (ref 26.8–34.3)
MCHC RBC AUTO-ENTMCNC: 32 G/DL (ref 31.4–37.4)
MCV RBC AUTO: 97 FL (ref 82–98)
PLATELET # BLD AUTO: 184 THOUSANDS/UL (ref 149–390)
PMV BLD AUTO: 8.3 FL (ref 8.9–12.7)
RBC # BLD AUTO: 3.66 MILLION/UL (ref 3.81–5.12)
WBC # BLD AUTO: 5.73 THOUSAND/UL (ref 4.31–10.16)

## 2022-02-14 PROCEDURE — 97530 THERAPEUTIC ACTIVITIES: CPT

## 2022-02-14 PROCEDURE — 99232 SBSQ HOSP IP/OBS MODERATE 35: CPT | Performed by: INTERNAL MEDICINE

## 2022-02-14 PROCEDURE — 97116 GAIT TRAINING THERAPY: CPT

## 2022-02-14 PROCEDURE — 99239 HOSP IP/OBS DSCHRG MGMT >30: CPT | Performed by: INTERNAL MEDICINE

## 2022-02-14 PROCEDURE — 85027 COMPLETE CBC AUTOMATED: CPT | Performed by: INTERNAL MEDICINE

## 2022-02-14 PROCEDURE — 97166 OT EVAL MOD COMPLEX 45 MIN: CPT

## 2022-02-14 PROCEDURE — 99232 SBSQ HOSP IP/OBS MODERATE 35: CPT

## 2022-02-14 RX ORDER — METOPROLOL SUCCINATE 25 MG/1
12.5 TABLET, EXTENDED RELEASE ORAL DAILY
Qty: 30 TABLET | Refills: 0 | Status: SHIPPED | OUTPATIENT
Start: 2022-02-14

## 2022-02-14 RX ORDER — ESCITALOPRAM OXALATE 10 MG/1
20 TABLET ORAL DAILY
Start: 2022-02-14

## 2022-02-14 RX ORDER — CLONAZEPAM 0.5 MG/1
0.25 TABLET ORAL 2 TIMES DAILY PRN
Start: 2022-02-14 | End: 2022-06-19

## 2022-02-14 RX ADMIN — ASPIRIN 81 MG CHEWABLE TABLET 81 MG: 81 TABLET CHEWABLE at 10:25

## 2022-02-14 RX ADMIN — LOSARTAN POTASSIUM 50 MG: 50 TABLET, FILM COATED ORAL at 10:25

## 2022-02-14 RX ADMIN — LEVOTHYROXINE SODIUM 50 MCG: 50 TABLET ORAL at 06:17

## 2022-02-14 RX ADMIN — Medication 1000 UNITS: at 10:25

## 2022-02-14 RX ADMIN — PREDNISOLONE ACETATE 1 DROP: 10 SUSPENSION/ DROPS OPHTHALMIC at 10:25

## 2022-02-14 RX ADMIN — PANTOPRAZOLE SODIUM 40 MG: 40 TABLET, DELAYED RELEASE ORAL at 06:17

## 2022-02-14 RX ADMIN — POLYETHYLENE GLYCOL 3350 17 G: 17 POWDER, FOR SOLUTION ORAL at 10:25

## 2022-02-14 RX ADMIN — ENOXAPARIN SODIUM 30 MG: 100 INJECTION SUBCUTANEOUS at 10:25

## 2022-02-14 RX ADMIN — VALACYCLOVIR HYDROCHLORIDE 500 MG: 500 TABLET, FILM COATED ORAL at 10:25

## 2022-02-14 RX ADMIN — ESCITALOPRAM OXALATE 20 MG: 10 TABLET ORAL at 10:25

## 2022-02-14 RX ADMIN — LIDOCAINE 1 PATCH: 50 PATCH TOPICAL at 10:25

## 2022-02-14 NOTE — PROGRESS NOTES
[address]  Progress Note - Andriy Martinez 1935, 80 y o  female MRN: [id number]  Unit/Bed#: [facility] 07 Encounter: [id number]  Primary Care Provider: Dahiana Polanco MD   Date and time admitted to hospital: 2/12/2022 12:17 PM    Primary hypertension  Assessment & Plan  Would avoid aggressive treatment of htn given her age and orthostasis triggered by anxiety (previously documented)  If BP remains elevated, consider titrating amlodipine and/or losartan as needed  Would shoot for goal of approximately 140 over 80  Anxiety  Assessment & Plan  Previously well documented  This seems to be a significant issue  Continue current medication regimen    Takotsubo cardiomyopathy  Assessment & Plan  No evidence for congestive heart failure  No recent EF reassessment  Echo pending    * Syncopal episodes  Assessment & Plan  No clear cardiac cause  No evidence for ACS, heart failure, nor dysrhythmia  Doubt true cardiac syncope  Likely anxiety mediated orthostasis  Doubt significant bradycardia  This episode may indeed have been a simple slip and fall off of the chair she was sitting on  Would avoid alpha blocker therapy  Would generally avoid diuretic therapy  Toprol dose reduced last evening  Continue HS dosing due to recurrent nocturnal palpitations  Will arrange outpatient 1 week Zio patch for completeness  Outpatient Cardiologist: Dr Jessica Squires:   Patient seen and examined  No significant events overnight  Offers no concerns this morning  Denies chest pain, pressure, tightness, palpitations, SOB  No LH/DZ when getting OOB  Summary comments:  80year old with significant and well documented history of anxiety  She apparently was feeling anxious and stood up to get her medications  She slipped and fell vs had a syncopal episode and struck her head and chest when she went to sit down  She does have recollection of the events making LOC less likely  She was found to have R sided rib fractures and a trace right-sided ptx  From a cardiac perspective, no documented EKG changes, HS trops were negative  Await echo results  If no findings of concern, will consider discharge for outpatient follow-up with a 1 week ambulatory monitor  Telemetry/ECG/Cardiac testing:   Echo pending    Myoview 3/6/2020 normal perfusion     Echo 11/30/2019 ([facility]) EF 60%  Mod DD  Mild TR, MR, AI    Vitals: Blood pressure 121/79, pulse 64, temperature 97 8 °F (36 6 °C), temperature source Tympanic, resp  rate 19, SpO2 97 %, not currently breastfeeding ,   Orthostatic Blood Pressures      Most Recent Value   Blood Pressure 121/79 filed at 02/14/2022 0700   Patient Position - Orthostatic VS Lying filed at 02/14/2022 0700      ,   Weight (last 2 days)     None          Physical Exam:    General:  Normal appearance in no distress, small abrasions noted on face  Eyes:  Anicteric  Oral mucosa:  Moist   Neck:  No JVD  Carotid upstrokes are brisk without bruits  No masses  Chest:  Clear to auscultation   Cardiac:  No palpable PMI  Normal S1 and S2  No murmur gallop or rub  Abdomen:  Soft and nontender  No palpable organomegaly or aortic enlargement  Extremities:  Trace bilat LE edema  Neuro:  Grossly symmetric  Psych:  Alert and oriented x3        Medications:      Current Facility-Administered Medications:     acetaminophen (TYLENOL) tablet 650 mg, 650 mg, Oral, Q6H PRN, Arely Peters, PA-C, 650 mg at 02/12/22 2017    amLODIPine (NORVASC) tablet 5 mg, 5 mg, Oral, HS, Chip Anes, PA-C, 5 mg at 02/13/22 2249    aspirin chewable tablet 81 mg, 81 mg, Oral, Daily, Chip Anes, PA-C, 81 mg at 02/13/22 1493    cholecalciferol (VITAMIN D3) tablet 1,000 Units, 1,000 Units, Oral, Daily, Chip Anes, PA-C, 1,000 Units at 02/13/22 7313    clonazePAM (KlonoPIN) tablet 0 25 mg, 0 25 mg, Oral, BID PRN, Chip Anes, PA-C, 0 25 mg at 02/13/22 2014    enoxaparin (LOVENOX) subcutaneous injection 30 mg, 30 mg, Subcutaneous, Daily, Juan Daniel Soto PA-C, 30 mg at 02/13/22 0906    escitalopram (LEXAPRO) tablet 20 mg, 20 mg, Oral, Daily, Juan Daniel Soto PA-C, 20 mg at 02/13/22 0906    hydrALAZINE (APRESOLINE) injection 10 mg, 10 mg, Intravenous, Q6H PRN, Shaka Sandoval PA-C    levothyroxine tablet 50 mcg, 50 mcg, Oral, Once per day on Mon Wed Fri, Juan Daniel Soto PA-C, 50 mcg at 02/14/22 9098    levothyroxine tablet 75 mcg, 75 mcg, Oral, Once per day on Sun Tue Thu Sat, Juan Daniel Soto PA-C, 75 mcg at 02/13/22 0515    lidocaine (LIDODERM) 5 % patch 1 patch, 1 patch, Topical, Daily, Shaka Sandoval PA-C, 1 patch at 02/13/22 1539    losartan (COZAAR) tablet 50 mg, 50 mg, Oral, Daily, Juan Daniel Soto PA-C, 50 mg at 02/13/22 0747    melatonin tablet 3 mg, 3 mg, Oral, HS, Juan Daniel Soto PA-C    metoprolol succinate (TOPROL-XL) 24 hr tablet 12 5 mg, 12 5 mg, Oral, Daily, Mike Escobar DO, 12 5 mg at 02/13/22 2013    ondansetron (ZOFRAN-ODT) dispersible tablet 4 mg, 4 mg, Oral, Q6H PRN, Juan Daniel Soto PA-C, 4 mg at 02/13/22 1852    oxyCODONE (ROXICODONE) IR tablet 2 5 mg, 2 5 mg, Oral, Q6H PRN, Shaka Sandoval PA-C    oxyCODONE (ROXICODONE) IR tablet 5 mg, 5 mg, Oral, Q6H PRN, Shaka Sandoval PA-C, 5 mg at 02/12/22 2118    pantoprazole (PROTONIX) EC tablet 40 mg, 40 mg, Oral, Early Morning, Juan Daniel Soto PA-C, 40 mg at 02/14/22 0617    polyethylene glycol (MIRALAX) packet 17 g, 17 g, Oral, Daily, Juan Daniel Soto PA-C    prednisoLONE acetate (PRED FORTE) 1 % ophthalmic suspension 1 drop, 1 drop, Both Eyes, BID, Juan Daniel Soto PA-C, 1 drop at 02/13/22 1228    valACYclovir (VALTREX) tablet 500 mg, 500 mg, Oral, Daily, Juan Daniel Soto PA-C, 500 mg at 02/13/22 1227     Labs & Results:    Troponins:    Results from last 7 days   Lab Units 02/12/22  2217 02/12/22  1514 02/12/22  1318   HS TNI 0HR ng/L  --   --  6   HS TNI 2HR ng/L  --  6  --    HSTNI D2 ng/L  --  0  --    HS TNI 4HR ng/L 9  --   --    HSTNI D4 ng/L 3  --   --         BNP:     CBC with diff:   Results from last 7 days   Lab Units 02/12/22  1317   WBC Thousand/uL 6 53   HEMOGLOBIN g/dL 9 7*   HEMATOCRIT % 30 6*   MCV fL 98   PLATELETS Thousands/uL 166     TSH:     CMP:   Results from last 7 days   Lab Units 02/13/22  0538 02/12/22  1317   POTASSIUM mmol/L 4 6 4 1   CHLORIDE mmol/L 97 97   CO2 mmol/L 26 29   BUN mg/dL 19 15   CREATININE mg/dL 1 06 1 02   EGFR ml/min/1 73sq m 47 49     Lipid Profile:     Coags:   Results from last 7 days   Lab Units 02/12/22  1317   INR  1 06

## 2022-02-14 NOTE — ASSESSMENT & PLAN NOTE
Would avoid aggressive treatment of htn given her age and orthostasis triggered by anxiety (previously documented)  If BP remains elevated, consider titrating amlodipine and/or losartan as needed  Would shoot for goal of approximately 140 over 80

## 2022-02-14 NOTE — ASSESSMENT & PLAN NOTE
· Trace right-sided pneumothorax seen on CT chest/abdomen/pelvis  · ED Discussed with general surgery on-call  · Hemodynamically stable, and stable respiratory status  · Provide supplemental O2 as needed    · Incentive spirometry  · Repeat Chest x-ray on 02/13/2022 with no pneumothorax identified

## 2022-02-14 NOTE — OCCUPATIONAL THERAPY NOTE
Occupational Therapy Evaluation      Dilcia Doe    2/14/2022    Patient Active Problem List   Diagnosis    Lower abdominal pain    Constipation    Acquired hypothyroidism    Takotsubo cardiomyopathy    Type 2 MI (myocardial infarction) ([facility]    Hypertensive emergency    EKG abnormality    Anxiety    Other fatigue    Depression    Primary hypertension    Osteoporosis    Seasonal allergic rhinitis    Palpitations    Hypertensive urgency    Generalized abdominal pain    Compression fracture of lumbar vertebra with routine healing    Hyponatremia    Mild protein-calorie malnutrition (HCC)    Severe episode of recurrent major depressive disorder, without psychotic features ([facility] )    IFEANYI (generalized anxiety disorder)    [facility]    Multiple closed fractures of ribs of right side    Pneumothorax, traumatic    Benign hypertension with CKD [facility]chronic kidney disease) stage III ([facility]    Syncopal episodes       Past Medical History:   Diagnosis Date    Anxiety     Benign hypertension with CKD (chronic kidney disease) stage III ([facility] ) 2/12/2022    CAD (coronary artery disease)     Carotid Duplex 03/06/2018    Plaque formation was prominent bilaterally    Depression     Disease of thyroid gland     Diverticulitis     Dyslipidemia     ECHO 09/14/2017    EF 55%, mild mitral regurg, small pericardial effusion   Hypertension     Mitral regurgitation     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 infarction)     Shingles     Takotsubo cardiomyopathy        Past Surgical History:   Procedure Laterality Date    CARDIAC CATHETERIZATION  08/27/2012    EF 35%, 70% stenosis of diagonal, Otherwise all OK apart from myopathy    CARDIAC CATHETERIZATION  10/10/2012    EF 55%, no significant CAD    Mild stress induced cardiomyopathy    CATARACT EXTRACTION Bilateral     TONSILLECTOMY  childhood        02/14/22 0800   OT Last Visit   OT Visit Date 02/14/22   Note Type   Note type Evaluation Additional Comments Pt agreeable to OT eval  Upon arrival pt supine in bed with HOB elevated    Restrictions/Precautions   Weight Bearing Precautions Per Order No   Braces or Orthoses   (none reported)   Other Precautions Fall Risk;Pain;Hard of hearing   Pain Assessment   Pain Assessment Tool 0-10   Pain Score 3   Pain Location/Orientation Orientation: Right;Location: Rib Cage   Pain Onset/Description Onset: Ongoing; Descriptor: Sore;Descriptor: Aching   Hospital Pain Intervention(s) Ambulation/increased activity;Repositioned; Emotional support   Home Living   Type of [address] Two level;Performs ADLs on one level; Able to live on main level with bedroom/bathroom; Access  (0 FLO, goes up/down stairs 1x/day)   Bathroom Shower/Tub   (sponge bathes at baseline )   Bathroom Toilet Standard   Bathroom Equipment Grab bars around toilet   [address] [facility]  (no AD used during day, RW at night )   Additional Comments Sleeps on couch  Pt reports that she goes upstairs 1x/day to get her clothing     Prior Function   Level of [place] Independent with ADLs and functional mobility   Lives With Alone   Receives Help From Family   ADL Assistance Independent   IADLs Independent   Falls in the last 6 months 1 to 4  (1 fall PTA)   Vocational Retired   Comments (+) driving    Lifestyle   Autonomy Patient reporting being independent with ADLs/IADLs, ambulatory with no AD or RW and lives alone in a two story house    Reciprocal Relationships pt reports family can assist PRN   Service to Others retired    Intrinsic Gratification enjoys reading and [facility] 6  Modified independent   [address] [facility] 5  [facility] [place] [address]  Supervision/Setup Toileting Deficit Steadying;Verbal cueing;Supervison/safety; Increased time to complete;Grab bar use   Additional Comments ADL levels based on functional performance during OT eval     Bed Mobility   Supine to Sit 5  Supervision   Additional items Assist x 1;HOB elevated; Bedrails; Increased time required   Sit to Supine   (DNT: pt seated OOB in recliner at end of eval )   Additional Comments Pt denied lightheaded/dizziness with transitional movements    Transfers   Sit to Stand 5  Supervision   Additional items Assist x 1;Bedrails; Increased time required;Verbal cues   Stand to Sit 5  Supervision   Additional items Assist x 1; Increased time required;Verbal cues;Armrests   Toilet transfer 5  Supervision   Additional items Assist x 1; Increased time required;Verbal cues;Standard toilet  (L grab bar usage )   Additional Comments Pt completed STS transfers with RW for BUE support  Occasional verbal cues provided for proper body mechanics and hand placement with good understanding demonstrated  Functional Mobility   Functional Mobility 5  Supervision   Additional Comments Pt ambulated to/from bathroom with no overt LOB or SOB  Verbal cues provided for RW management    Additional items Rolling walker   Balance   Static Sitting Good   Dynamic Sitting Fair +   Static Standing Fair   Dynamic Standing Fair -   Activity Tolerance   Activity Tolerance Patient limited by pain   Medical Staff Made Aware Pt seen with PT due to the patient's co-morbidities, clinically unstable presentation, and present impairments which are a regression from the patient's baseline      Nurse Made Aware ANGELA Romano made aware of outcomes    RUE Assessment   RUE Assessment WFL  (grossly 4-/5 MMT)   LUE Assessment   LUE Assessment WFL  (grossly 4-/5 MMT)   Hand Function   Gross Motor Coordination Functional   Fine Motor Coordination Functional   Sensation   Light Touch No apparent deficits   Vision-Basic Assessment   Current Vision Wears glasses all the time   Patient Visual Report   (no significant changes reported )   Cognition   Overall Cognitive Status [facility]   Arousal/Participation Alert; Responsive; Cooperative   Attention Attends with cues to redirect   Orientation Level Oriented X4   Memory Within functional limits   Following Commands Follows one step commands without difficulty   Assessment   Limitation Decreased ADL status; Decreased UE strength;Decreased endurance;Decreased self-care trans;Decreased high-level ADLs   Prognosis Good   Assessment Patient is a 80 y o  female seen for OT evaluation s/p admit to [place] [address] on 2/12/2022 w/Syncopal episodes  Commorbidities affecting patient's functional performance at time of assessment include: hypothyroidism, anxiety, HTN, hyponatremia, multiple closed fx of ribs, and HTN  Orders placed for OT evaluation and treatment and up with assistance  Performed at least two patient identifiers during session including name and wristband  Prior to admission, Patient reporting being independent with ADLs/IADLs, ambulatory with no AD or RW and lives alone in a two story house  Personal factors affecting patient at time of initial evaluation include: limited caregiver support, steps to enter and difficulty performing IADLs  Upon evaluation, patient requires supervision assist for UB ADLs, minimal  assist for LB ADLs, transfers and functional ambulation in room and bathroom with supervision assist, with the use of Rolling Walker  Patient is oriented x 4  Occupational performance is affected by the following deficits: decreased muscle strength, dynamic sit/ stand balance deficit with poor standing tolerance time for self care and functional mobility, decreased activity tolerance, increased pain and delayed righting and equilibrium reactions  Based on the mentioned OT evaluation outcomes, functional performance deficits, and assessment findings, pt has been identified as a moderate complexity evaluation  Patient to benefit from continued Occupational Therapy treatment while in the hospital to address deficits as defined above and maximize level of functional independence with ADLs and functional mobility  Occupational Performance areas to address include: grooming , bathing/ shower, dressing, toilet hygiene, transfer to all surfaces, functional ambulation, medication routine/ management, IADLS: Household maintenance, IADLs: safety procedures and IADLs: meal prep/ clean up  From OT standpoint, recommendation at time of d/c would be Home with home health rehabilitation  Goals   Patient Goals to have less pain    Plan   Treatment Interventions ADL retraining;Functional transfer training;UE strengthening/ROM; Endurance training;Patient/family training;Equipment evaluation/education; Compensatory technique education; Activityengagement; Energy conservation   Goal Expiration Date 02/24/22   OT Treatment Day 0   OT Frequency 3-5x/wk   Recommendation   OT Discharge Recommendation Home with home health rehabilitation   OT - OK to Discharge Yes  (Once medically cleared )   Additional Comments  At end of eval, pt seated OOB in recliner with all needs met and call bell within reach    Additional Comments 2 The patient's raw score on the AM-PAC Daily Activity inpatient short form is 20, standardized score is 42 03, greater than 39 4  Patients at this level are likely to benefit from discharge to home  Please refer to the recommendation of the Occupational Therapist for safe discharge planning     AM-PAC Daily Activity Inpatient   Lower Body Dressing 3   Bathing 3   Toileting 3   Upper Body Dressing 3   Grooming 4   Eating 4   Daily Activity Raw Score 20   Daily Activity Standardized Score (Calc for Raw Score >=11) 42 03   AM-PAC Applied Cognition Inpatient   Following a Speech/Presentation 4   Understanding Ordinary Conversation 4   Taking Medications 3   Remembering Where Things Are Placed or Put Away 3   Remembering List of 4-5 Errands 3   Taking Care of Complicated Tasks 3   Applied Cognition Raw Score 20   Applied Cognition Standardized Score 41 76     GOALS:    *ADL transfers with (I) for inc'd independence with ADLs/purposeful tasks    *UB ADL with (I) for inc'd independence with self cares    *LB ADL with (I) using AE prn for inc'd independence with self cares    *Toileting with (I) for clothing management and hygiene for return to PLOF with personal care    *Increase stand tolerance x5 m for inc'd tolerance with standing purposeful tasks    *Participate in 10m UE therex to increase overall stamina/activity tolerance for purposeful tasks    *Bed mobility- (I) for inc'd independence to manage own comfort and initiate EOB & OOB purposeful tasks    *Patient will verbalize 3 safety awareness/ principles to prevent falls in the home setting  *Patient will verbalize and demonstrate use of energy conservation/deep breathing techniques and work simplification skills during functional activities with no verbal cues  *Patient will increase OOB/sitting tolerance to 2-4 hours per day to increase participation in self-care and leisure tasks with no s/s of exertion       Tylor Palmer, OTD, OTR/L

## 2022-02-14 NOTE — ASSESSMENT & PLAN NOTE
No clear cardiac cause  No evidence for ACS, heart failure, nor dysrhythmia  Doubt true cardiac syncope  Likely anxiety mediated orthostasis  Doubt significant bradycardia  This episode may indeed have been a simple slip and fall off of the chair she was sitting on  Would avoid alpha blocker therapy  Would generally avoid diuretic therapy  Toprol dose reduced last evening  Continue HS dosing due to recurrent nocturnal palpitations  Will arrange outpatient 1 week Zio patch for completeness

## 2022-02-14 NOTE — ASSESSMENT & PLAN NOTE
Previously well documented  This seems to be a significant issue  Continue current medication regimen

## 2022-02-14 NOTE — NURSING NOTE
IV removed  Discharge instructions reviewed with patient and patients daughter at bedside, both parties verbalized understanding and all questions were answered  Patient was transported to main entrance via wheelchair by Helen Beltre for discharge with all personal belongings

## 2022-02-14 NOTE — ASSESSMENT & PLAN NOTE
· Had episodes of elevated BP when in the ED, likely secondary to pain due to her fall  Received 25 mg metoprolol  · BP improved, now WNL     · amlodipine 5 mg q h s , losartan 50 mg daily  · PRN Hydralazine  · Creatinine appears to be at baseline

## 2022-02-14 NOTE — UTILIZATION REVIEW
Continued Stay Review    Date:2/14                        Current Patient Class: Inpatient   Current Level of Care: Med/surg    HPI:86 y o  female initially admitted on 2/13  Assessment/Plan: Pain under control  Continue with deep breathing, incentive spirometry  Lungs are clear  Cardiology consult:  No clear cardiac issues to be causing syncope  Avoid alpha blockers  Likely anxiety related syncope  Reduced BB dosing  Gen/surg consult: CTAP 02/12/2022 with displaced fracture of posterior right 11th rib and nondisplaced fracture of lateral right 11th rib, midly displaced fracture of posterior right 12th rib, trace pneumothroax  Ice to affected area  Encourage ambulation     Vital Signs:   Time Temp Pulse Resp BP MAP (mmHg) SpO2 Calculated FIO2 (%) - Nasal Cannula Nasal Cannula O2 Flow Rate (L/min) O2 Device Patient Position - Orthostatic VS   02/14/22 0700 97 8 °F (36 6 °C) 64 19 121/79 114 97 % 28 2 L/min Nasal cannula Lying   02/14/22 0300 97 4 °F (36 3 °C) Abnormal  65 18 176/101 Abnormal  133 97 % 28 2 L/min Nasal cannula Lying   02/13/22 2249 97 °F (36 1 °C) Abnormal  -- 20 151/74 -- 98 % 28 2 L/min Nasal cannula Lying   02/13/22 2133 -- 69 16 139/65 -- 96 % -- -- -- Sitting   02/13/22 1909 -- 72 -- 151/67 -- -- -- -- -- --   02/13/22 1600 -- -- -- -- -- 98 % -- -- None (Room air) --   02/13/22 1400 -- -- -- -- -- 98 % -- -- None (Room air) --   02/13/22 1200               Pertinent Labs/Diagnostic Results:       Results from last 7 days   Lab Units 02/14/22  1121 02/12/22  1317   WBC Thousand/uL 5 73 6 53   HEMOGLOBIN g/dL 11 4* 9 7*   HEMATOCRIT % 35 6 30 6*   PLATELETS Thousands/uL 184 166   NEUTROS ABS Thousands/µL  --  5 24         Results from last 7 days   Lab Units 02/13/22  0538 02/12/22  1317   SODIUM mmol/L 131* 132*   POTASSIUM mmol/L 4 6 4 1   CHLORIDE mmol/L 97 97   CO2 mmol/L 26 29   ANION GAP mmol/L 8 6   BUN mg/dL 19 15   CREATININE mg/dL 1 06 1 02   EGFR ml/min/1 73sq m 47 49 CALCIUM mg/dL 9 1 9 2             Results from last 7 days   Lab Units 02/13/22  0538 02/12/22  1317   GLUCOSE RANDOM mg/dL 91 99                 Results from last 7 days   Lab Units 02/12/22  2217 02/12/22  1514 02/12/22  1318   HS TNI 0HR ng/L  --   --  6   HS TNI 2HR ng/L  --  6  --    HSTNI D2 ng/L  --  0  --    HS TNI 4HR ng/L 9  --   --    HSTNI D4 ng/L 3  --   --          Results from last 7 days   Lab Units 02/12/22  1317   PROTIME seconds 13 7   INR  1 06   PTT seconds 27         Medications:   Scheduled Medications:  amLODIPine, 5 mg, Oral, HS  aspirin, 81 mg, Oral, Daily  cholecalciferol, 1,000 Units, Oral, Daily  enoxaparin, 30 mg, Subcutaneous, Daily  escitalopram, 20 mg, Oral, Daily  levothyroxine, 50 mcg, Oral, Once per day on Mon Wed Fri  levothyroxine, 75 mcg, Oral, Once per day on Sun Tue Thu Sat  lidocaine, 1 patch, Topical, Daily  losartan, 50 mg, Oral, Daily  melatonin, 3 mg, Oral, HS  metoprolol succinate, 12 5 mg, Oral, Daily  pantoprazole, 40 mg, Oral, Early Morning  polyethylene glycol, 17 g, Oral, Daily  prednisoLONE acetate, 1 drop, Both Eyes, BID  valACYclovir, 500 mg, Oral, Daily      Continuous IV Infusions:     PRN Meds:  acetaminophen, 650 mg, Oral, Q6H PRN  clonazePAM, 0 25 mg, Oral, BID PRN  hydrALAZINE, 10 mg, Intravenous, Q6H PRN  ondansetron, 4 mg, Oral, Q6H PRN  oxyCODONE, 2 5 mg, Oral, Q6H PRN  oxyCODONE, 5 mg, Oral, Q6H PRN        Discharge Plan:d  Network Utilization Review Department  ATTENTION: Please call with any questions or concerns to [phone] and carefully listen to the prompts so that you are directed to the right person  All voicemails are confidential   [facility] all requests for admission clinical reviews, approved or denied determinations and any other requests to dedicated fax number below belonging to the campus where the patient is receiving treatment   List of dedicated fax numbers for the Facilities:  FACILITY NAME UR FAX NUMBER   ADMISSION DENIALS (Administrative/Medical Necessity) [phone]   [address] (Maternity/NICU/Pediatrics) [address] [facility],7[address] Floor [facility] 40 [address] [facility]  [phone]   [address]ia [address] [facility] [place] [address] [id number] [place] [address] [address] Xander [address] [facility] [place] [address] [phone]

## 2022-02-14 NOTE — PHYSICAL THERAPY NOTE
Physical Therapy Treatment Session Note    Patient's Name: Evie Stevens    Admitting Diagnosis  Rib pain [R07 81]  Traumatic pneumothorax, initial encounter [S27  0XXA]  Closed fracture of multiple ribs of right side, initial encounter [S22 41XA]    Problem List  Patient Active Problem List   Diagnosis    Lower abdominal pain    Constipation    Acquired hypothyroidism    Takotsubo cardiomyopathy    Type 2 MI (myocardial infarction) ([facility] )    Hypertensive emergency    EKG abnormality    Anxiety    Other fatigue    Depression    Primary hypertension    Osteoporosis    Seasonal allergic rhinitis    Palpitations    Hypertensive urgency    Generalized abdominal pain    Compression fracture of lumbar vertebra with routine healing    Hyponatremia    Mild protein-calorie malnutrition (HCC)    Severe episode of recurrent major depressive disorder, without psychotic features ([facility] )    IFEANYI (generalized anxiety disorder)    PAD (peripheral artery disease) ([facility]    Multiple closed fractures of ribs of right side    Pneumothorax, traumatic    Benign hypertension with CKD (chronic kidney disease) stage III ([facility])    Syncopal episodes       Past Medical History  Past Medical History:   Diagnosis Date    Anxiety     Benign hypertension with CKD (chronic kidney disease) stage III ([facility] ) 2/12/2022    CAD (coronary artery disease)     Carotid Duplex 03/06/2018    Plaque formation was prominent bilaterally    Depression     Disease of thyroid gland     Diverticulitis     Dyslipidemia     ECHO 09/14/2017    EF 55%, mild mitral regurg, small pericardial effusion      Hypertension     Mitral regurgitation     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 infarction)     Shingles     Takotsubo cardiomyopathy        Past Surgical History  Past Surgical History:   Procedure Laterality Date    CARDIAC CATHETERIZATION  08/27/2012    EF 35%, 70% stenosis of diagonal, Otherwise all OK apart from myopathy    CARDIAC CATHETERIZATION 10/10/2012    EF 55%, no significant CAD  Mild stress induced cardiomyopathy    CATARACT EXTRACTION Bilateral     TONSILLECTOMY  childhood        02/14/22 0801   PT Last Visit   PT Visit Date 02/14/22   Note Type   Note Type Treatment   Pain Assessment   Pain Assessment Tool 0-10   Pain Score 3   Pain Location/Orientation Orientation: Right;Location: Rib Cage   Pain Onset/Description Onset: Ongoing;Frequency: Constant/Continuous; Descriptor: Aching   Effect of Pain on Daily Activities yes   Patient's Stated Pain Goal No pain   Hospital Pain Intervention(s) Medication (See MAR); Repositioned; Ambulation/increased activity; Emotional support; Environmental changes   Multiple Pain Sites No   Restrictions/Precautions   Weight Bearing Precautions Per Order No   Other Precautions O2;Fall Risk;Pain;Hard of hearing  (2 L NC)   General   Chart Reviewed Yes   Additional Pertinent History Chavez Buddle OT present for co-treatment due to medical complexity, trialing of advanced tasks,required skilled interventions of 2 clincians  Response to Previous Treatment Patient with no complaints from previous session  Family/Caregiver Present No   Cognition   Overall Cognitive Status WFL   Arousal/Participation Alert; Responsive; Cooperative   Attention Attends with cues to redirect   Orientation Level Oriented X4   Memory Within functional limits   Following Commands Follows one step commands without difficulty   Comments Pt  agreeable to PT tx session,pleasant  Subjective   Subjective "I feel better"   Bed Mobility   Supine to Sit 5  Supervision   Additional items Assist x 1;HOB elevated; Bedrails; Increased time required;Verbal cues   Sit to Supine   (DNT pt  was sitting out of bed on recliner upon conclusion )   Additional Comments Verbal cues for appropriate hand placement with transition to WB tasks  Transfers   Sit to Stand   (CGA)   Additional items Assist x 1;Bedrails; Increased time required;Verbal cues   Stand to Sit   (CGA) Additional items Assist x 1;Bedrails; Increased time required;Verbal cues   Stand pivot   (CGA)   Additional items Assist x 1; Increased time required;Verbal cues   Toilet transfer   (CGA)   Additional items Assist x 1; Increased time required;Verbal cues;Standard toilet; Other  (L grab bar)   Additional Comments Verbal cues for appropriate L grab bar usage, no overt LOB or reported dizziness throughout tx session  Ambulation/Elevation   Gait pattern Forward Flexion; Short stride; Improper Weight shift   Gait Assistance 5  Supervision  (close)   Additional items Assist x 1;Verbal cues   Assistive Device Rolling walker   Distance 60 feet x 2   Stair Management Assistance Not tested   Balance   Static Sitting Good   Dynamic Sitting Fair +   Static Standing Fair +   Dynamic Standing Fair +   Ambulatory Fair   Endurance Deficit   Endurance Deficit No   Activity Tolerance   Activity Tolerance Patient tolerated treatment well   Nurse Made Aware yes, [facility] PCA; CM informed of d/c disposition recommendation update  Assessment   Prognosis Good   Problem List Impaired balance;Decreased mobility; Impaired hearing;Pain   Assessment Pt seen for PT treatment session this date with interventions consisting of gait training to reduce the risk of medical complications w/ emphasis on improving pt's ability to ambulate level surfaces x 120 feet total with close S provided by therapist with RW and therapeutic activity to reduce fall risk consisting of training: supine<>sit transfers, sit<>stand transfers, static sitting tolerance at EOB for 5 minutes w/ o UE support, static standing tolerance for 3 minutes w/ B UE support, vc and tactile cues for static sitting posture faciliation, vc and tactile cues for static standing posture faciliation, stand pivot transfers towards B direction and toileting  Pt agreeable to PT treatment session upon arrival, pt found supine in bed w/ HOB elevated, A&O x 4   In comparison to previous session, pt with improvements in ambulatory distance, task advancement, balance  Post session: pt returned back to recliner, chair alarm engaged, all needs in reach and RN notified of session findings/recommendations  Updated recommendation of home with home health rehabilitation at time of d/c in order to maximize pt's functional independence and safety w/ mobility  Pt continues to be functioning below baseline level, and remains limited 2* factors listed above and including gait deviations  PT will continue to see pt during current hospitalization in order to address the deficits listed above and provide interventions consistent w/ POC in effort to achieve LTGs  Barriers to Discharge None   Goals   Patient Goals to have breakfast  (setup accordingly upon conclusion)   LTG Expiration Date 02/23/22   Long Term Goal #1 LTGs remain approrpiate   PT Treatment Day 1   Plan   Treatment/Interventions Functional transfer training;Elevations; Patient/family training;Equipment eval/education; Bed mobility;Gait training;Spoke to nursing;OT   Progress Improving as expected   PT Frequency 3-5x/wk   Recommendation   PT Discharge Recommendation Home with home health rehabilitation  (updated recommendation)   Equipment Recommended Walker  (pt  has own)   [place] Recommended Wheeled walker   Change/add to [place]? No   Additional Comments Upon conclusion, pt  was sitting out of bed on recliner w/BLEs elevated, and all needs within reach  Additional Comments 2 Pt's raw score on the [facility] Basic Mobility inpatient short form is 20, standardized score is 43 99  Patients at this level are likely to benefit from DC to home  However, please refer to therapist recommendation for safe DC planning     AM-PAC Basic Mobility Inpatient   Turning in Bed Without Bedrails 4   Lying on Back to Sitting on Edge of Flat Bed 4   Moving Bed to Chair 3   Standing Up From Chair 3   Walk in Room 3   Climb 3-5 Stairs 3   Basic Mobility Inpatient Raw Score 20   Basic Mobility Standardized Score 43 99   Highest Level Of Mobility   [facility] Goal 6: Walk 10 steps or more   End of Consult   Patient Position at End of Consult Bed/Chair alarm activated     Treatment Session Note: [id number]    Maryann Mendez, PT

## 2022-02-14 NOTE — PLAN OF CARE
Problem: PHYSICAL THERAPY ADULT  Goal: Performs mobility at highest level of function for planned discharge setting  See evaluation for individualized goals  Description: Treatment/Interventions: Functional transfer training,Therapeutic exercise,Endurance training,Bed mobility,Gait training          See flowsheet documentation for full assessment, interventions and recommendations  Outcome: Progressing  Note: Prognosis: Good  Problem List: Impaired balance,Decreased mobility,Impaired hearing,Pain  Assessment: Pt seen for PT treatment session this date with interventions consisting of gait training to reduce the risk of medical complications w/ emphasis on improving pt's ability to ambulate level surfaces x 120 feet total with close S provided by therapist with RW and therapeutic activity to reduce fall risk consisting of training: supine<>sit transfers, sit<>stand transfers, static sitting tolerance at EOB for 5 minutes w/ o UE support, static standing tolerance for 3 minutes w/ B UE support, vc and tactile cues for static sitting posture faciliation, vc and tactile cues for static standing posture faciliation, stand pivot transfers towards B direction and toileting  Pt agreeable to PT treatment session upon arrival, pt found supine in bed w/ HOB elevated, A&O x 4  In comparison to previous session, pt with improvements in ambulatory distance, task advancement, balance  Post session: pt returned back to recliner, chair alarm engaged, all needs in reach and RN notified of session findings/recommendations  Updated recommendation of home with home health rehabilitation at time of d/c in order to maximize pt's functional independence and safety w/ mobility  Pt continues to be functioning below baseline level, and remains limited 2* factors listed above and including gait deviations   PT will continue to see pt during current hospitalization in order to address the deficits listed above and provide interventions consistent w/ POC in effort to achieve LTGs  Barriers to Discharge: None        PT Discharge Recommendation: (S) Home with home health rehabilitation (updated recommendation)          See flowsheet documentation for full assessment

## 2022-02-14 NOTE — ASSESSMENT & PLAN NOTE
· Patient with history of hyponatremia  Sodium level appears to be stable  Continue home medications    Follow-up routine labs with PCP as outpatient

## 2022-02-14 NOTE — ASSESSMENT & PLAN NOTE
· S/p fall earlier today  Evaluated as trauma alert  · CT head with no intracranial abnormality; microangiopathic changes  · CT cervical spine without acute fracture or abnormality   · CT c/a/p: Displaced fracture of the posterior right 11th rib and nondisplaced fracture of the lateral right 11th rib with trace pneumothorax  Mildly displaced fracture of the posterior right 12th rib    · Chest x-ray with no pneumothorax  · Continue incentive spirometry  · Patient follow-up with PCP  · Continue pain control with Tylenol as outpatient

## 2022-02-14 NOTE — DISCHARGE SUMMARY
[place] 45  Discharge- jessica Raymon 1935, 80 y o  female MRN: [id number]  Unit/Bed#: [facility] Encounter: [id number]  Primary Care Provider: Licha San MD   Date and time admitted to hospital: 2/12/2022 12:17 PM    Multiple closed fractures of ribs of right side  Assessment & Plan  · S/p fall earlier today  Evaluated as trauma alert  · CT head with no intracranial abnormality; microangiopathic changes  · CT cervical spine without acute fracture or abnormality   · CT c/a/p: Displaced fracture of the posterior right 11th rib and nondisplaced fracture of the lateral right 11th rib with trace pneumothorax  Mildly displaced fracture of the posterior right 12th rib  · Chest x-ray with no pneumothorax  · Continue incentive spirometry  · Patient follow-up with PCP  · Continue pain control with Tylenol as outpatient    Pneumothorax, traumatic  Assessment & Plan  · Trace right-sided pneumothorax seen on CT chest/abdomen/pelvis  · ED Discussed with general surgery on-call  · Hemodynamically stable, and stable respiratory status  · Provide supplemental O2 as needed  · Incentive spirometry  · Repeat Chest x-ray on 02/13/2022 with no pneumothorax identified    * Syncopal episodes  Assessment & Plan  · Possibly bradycardia related  · NMS obtained in March 2020 normal  · Toprol resumed at decreased dose of 12 5 mg daily as opposed to b i d  · Cardiology input appreciated  · Outpatient follow-up  No further inpatient workup recommended    Benign hypertension with CKD (chronic kidney disease) stage III ([facility] [address] )  Assessment & Plan  · Had episodes of elevated BP when in the ED, likely secondary to pain due to her fall  Received 25 mg metoprolol  · BP improved, now WNL  · amlodipine 5 mg q h s , losartan 50 mg daily  · PRN Hydralazine  · Creatinine appears to be at baseline    Hyponatremia  Assessment & Plan  · Patient with history of hyponatremia  Sodium level appears to be stable    Continue home medications  Follow-up routine labs with PCP as outpatient    Anxiety  Assessment & Plan  · Continue home Klonopin and Lexapro     Acquired hypothyroidism  Assessment & Plan  · Continue pre-admission levothyroxine seen 50 mcg Monday, Wednesday and Friday, and 75 mcg on Tuesdays, Thursdays, Saturdays, Sundays     Discharging Physician / Practitioner: Viola Hagen MD  PCP: Licha San MD  Admission Date:   Admission Orders (From admission, onward)     Ordered        02/13/22 1125  Inpatient Admission  Once            02/12/22 1624  Place in Observation  Once                      Discharge Date: 02/14/22    Medical Problems             Resolved Problems  Date Reviewed: 2/14/2022    None                Consultations During Hospital Stay:  · Cardiology    Procedures Performed:   · none    Significant Findings / Test Results:   · Rib fracture    Incidental Findings:   · none     Test Results Pending at Discharge (will require follow up):   none     Outpatient Tests Requested:  · Routine labs with pcp    Complications:     none    Reason for Admission:  Syncope/rib fracture    Hospital Course:     Shady Ennis is a 80 y o  female patient who originally presented to the hospital on 2/12/2022 due to fall  Patient was found to have right rib fractures  Suspected syncope on admission, monitored on tele  Noted to have bradycardia, Toprol dose adjusted  Patient was seen by Cardiology  Patient was also seen by surgical team for pneumothorax and rib fractures  Repeat chest x-ray done on 02/13 with no pneumothorax noted  Patient doing well clinically off oxygen  Pain currently controlled  Physical therapy recommended home with home care  Case management arranged for home care  Patient discharged home with outpatient follow-up with Cardiology  Patient will get a Holter monitor as outpatient    Spoke with patient's daughter at bedside, advised to return to the ER if she had any worsening symptoms    Please see above list of diagnoses and related plan for additional information  Condition at Discharge: stable     Discharge Day Visit / Exam:     Subjective:  No complaints at this time    Vitals: Blood Pressure: 121/79 (02/14/22 0700)  Pulse: 64 (02/14/22 0700)  Temperature: 97 8 °F (36 6 °C) (02/14/22 0700)  Temp Source: Tympanic (02/14/22 0700)  Respirations: 19 (02/14/22 0700)  SpO2: 97 % (02/14/22 0700)     Exam:   Physical Exam  Constitutional:       General: She is not in acute distress  Comments: Frail elderly female   HENT:      Head: Normocephalic and atraumatic  Nose: Nose normal       Mouth/Throat:      Mouth: Mucous membranes are moist    Eyes:      Extraocular Movements: Extraocular movements intact  Conjunctiva/sclera: Conjunctivae normal    Cardiovascular:      Rate and Rhythm: Normal rate and regular rhythm  Pulmonary:      Effort: Pulmonary effort is normal  No respiratory distress  Abdominal:      Palpations: Abdomen is soft  Tenderness: There is no abdominal tenderness  Musculoskeletal:      Cervical back: Normal range of motion and neck supple  Comments: Generalized weakness  Tenderness of right chest wall   Skin:     General: Skin is warm and dry  Neurological:      General: No focal deficit present  Mental Status: She is alert  Cranial Nerves: No cranial nerve deficit  Psychiatric:         Mood and Affect: Mood normal          Behavior: Behavior normal          Discussion with Family:  Spoke with patient's daughter at bedside regarding discharge plan    Discharge instructions/Information to patient and family:   See after visit summary for information provided to patient and family  Provisions for Follow-Up Care:  See after visit summary for information related to follow-up care and any pertinent home health orders        Disposition:     Home with VNA Services (Reminder: Complete face to face encounter)      Planned Readmission:    no     Discharge Statement:  I spent 35 minutes discharging the patient  This time was spent on the day of discharge  I had direct contact with the patient on the day of discharge  Greater than 50% of the total time was spent examining patient, answering all patient questions, arranging and discussing plan of care with patient as well as directly providing post-discharge instructions  Additional time then spent on discharge activities  Discharge Medications:  See after visit summary for reconciled discharge medications provided to patient and family        ** Please Note: This note has been constructed using a voice recognition system **

## 2022-02-14 NOTE — PROGRESS NOTES
Progress Note - General Surgery   Oswaldo Badillo 80 y o  female MRN: [id number]  Unit/Bed#: [facility] Encounter: [id number]    Assessment:  80 y o  female presenting following syncopal episode with multiple closed fractures of right sided ribs and pneumothorax   Afebrile, hypertensive at 176/101, other vital signs stable on 2L via NC   Na 131  CTAP 02/12/2022 with displaced fracture of posterior right 11th rib and nondisplaced fracture of lateral right 11th rib, midly displaced fracture of posterior right 12th rib, trace pneumothroax  CXR 02/13/2022 with known rib fractures and no pneumothorax   Patient currently denying pain to right ribs at rest, received new lidocaine patch before entering room    Plan:  Rib fracture protocol  Lidocaine patch to affected area  Antiemetics and analgesics prn   Ice pack to affected area as needed  Supplemental oxygen  Incentive spirometry while awake  Encourage OOB  Medical management per [facility]  Cardiology following    Subjective/Objective   Chief Complaint: None    Subjective: No acute overnight events  Patient is doing well this morning with no acute complaints  She notes increased pain to right ribs with movement but otherwise denies pain at rest  Current pain regimen controlling pain well  Denies shortness of breath, chest pain, palpitations, cough  Denies abdominal pain, nausea, vomiting  Denies fever, chills  Objective:   Blood pressure 121/79, pulse 64, temperature 97 8 °F (36 6 °C), temperature source Tympanic, resp  rate 19, SpO2 97 %, not currently breastfeeding  ,There is no height or weight on file to calculate BMI        Intake/Output Summary (Last 24 hours) at 2/14/2022 1037  Last data filed at 2/14/2022 0800  Gross per 24 hour   Intake 300 ml   Output --   Net 300 ml       Invasive Devices  Report    Peripheral Intravenous Line            Peripheral IV 02/12/22 Right Antecubital 1 day              Physical Exam: /79 (BP Location: Left arm)   Pulse 64   Temp 97 8 °F (36 6 °C) (Tympanic)   Resp 19   SpO2 97%   General appearance: alert and oriented, in no acute distress   Lungs: clear to auscultation bilaterally, symmetric, no erythema, edema, ecchymosis noted, no tenderness to palpation elicited in exam  Heart: regular rate and rhythm, S1, S2 normal, no murmur, click, rub or gallop  Abdomen: soft, non-tender; bowel sounds normal; no masses,  no organomegaly  Extremities: extremities normal, warm and well-perfused; no cyanosis, clubbing, or edema  Skin: Skin color, texture, turgor normal  No rashes or lesions    Lab, Imaging and other studies:I have personally reviewed pertinent lab results  No new labs pending       VTE Pharmacologic Prophylaxis: Enoxaparin (Lovenox)  VTE Mechanical Prophylaxis: sequential compression device     Dani Ocampo PA-C

## 2022-02-14 NOTE — CASE MANAGEMENT
Case Management Assessment & Discharge Planning Note    Patient name Millie Mansfield  Location [facility] 80/KEF 19 MRN [id number]  : 1935 Date 2022       Current Admission Date: 2022  Current Admission Diagnosis:Syncopal episodes   Patient Active Problem List    Diagnosis Date Noted    Multiple closed fractures of ribs of right side 2022    Pneumothorax, traumatic 2022    Benign hypertension with CKD (chronic kidney disease) stage III ([facility] ) 2022    Syncopal episodes 2022    PAD (peripheral artery disease) ([place] [address] ) 2021    Severe episode of recurrent major depressive disorder, without psychotic features ([place] [address] ) 2020    IFEANYI (generalized anxiety disorder) 2020    Hyponatremia 2020    Mild protein-calorie malnutrition ([place] [address] ) 2020    Palpitations 2020    Hypertensive urgency 2020    Compression fracture of lumbar vertebra with routine healing 2020    Generalized abdominal pain     Other fatigue 2020    Anxiety 2020    EKG abnormality 2020    Type 2 MI (myocardial infarction) ([place] [address] ) 2020    Hypertensive emergency 2020    Takotsubo cardiomyopathy 2019    Acquired hypothyroidism 2019    Lower abdominal pain 06/10/2019    Constipation 06/10/2019    Depression 2016    Seasonal allergic rhinitis 2016    Primary hypertension 2015    Osteoporosis 2011      LOS (days): 1  Geometric Mean LOS (GMLOS) (days):   Days to GMLOS:     OBJECTIVE:    Risk of Unplanned Readmission Score: 15         Current admission status: Inpatient  Referral Reason: Other (d/c planning)    Preferred Pharmacy:   [facility], [address] [facility] [address] [address]  Phone: [phone] Fax: [phone]    [place] [address] [address]  Phone: [phone] Fax: [address] [place] [address],  [address]  [address]  [address] Josh Santana [address] 24[address] [facility]  Phone: [phone] Fax: [phone]    Primary Care Provider: Harjinder Cardenas MD    Primary Insurance: Eteinne Ng [facility]  Secondary Insurance:     ASSESSMENT:  Active Health Care Agents    There are no active Health Care Agents on file  Readmission Root Cause  30 Day Readmission: No    Patient Information  Admitted from[de-identified] Home  Mental Status: Alert  During Assessment patient was accompanied by: Not accompanied during assessment  Assessment information provided by[de-identified] Patient  Primary Caregiver: Self  Support Systems: Daughter  [place] of Residence: [facility] [address] do you live in?: [address] [employer] entry access options   Select all that apply : No steps to enter home (no steps in the back)  Type of Current Residence: 2 story home  Upon entering residence, is there a bedroom on the main floor (no further steps)?: No (pt has been sleeping downstairs)  A bedroom is located on the following floor levels of residence (select all that apply):: 2nd Floor  Upon entering residence, is there a bathroom on the main floor (no further steps)?: Yes  Number of steps to 2nd floor from main floor: One Flight  In the last 12 months, was there a time when you were not able to pay the mortgage or rent on time?: No  In the last 12 months, how many places have you lived?: 1  In the last 12 months, was there a time when you did not have a steady place to sleep or slept in a shelter (including now)?: No  Homeless/housing insecurity resource given?: N/A  Living Arrangements: Lives Alone  Is patient a ?: No    Activities of Daily Living Prior to Admission  Functional Status: Independent  Ambulates independently?: Yes  Does patient use assisted devices?: Yes  Assisted Devices (DME) used: Starla Peralta  Does patient currently own DME?: Yes  What DME does the patient currently own?: Walker,Shower Chair,Bedside Commode  Does patient have a history of Outpatient Therapy (PT/OT)?: No  Does the patient have a history of Short-Term Rehab?: No  Does patient have a history of HHC?: No  Does patient currently have [facility]?: No         Patient Information Continued  Income Source: Pension/longterm  Does patient have prescription coverage?: Yes [email or web address] [address] [place] [address])  Within the past 12 months, you worried that your food would run out before you got the money to buy more : Never true  Within the past 12 months, the food you bought just didnt last and you didnt have money to get more : Never true  Food insecurity resource given?: N/A  Does patient receive dialysis treatments?: No  Does patient have a history of substance abuse?: No  Does patient have a history of Mental Health Diagnosis?: Yes (anxiety)  Is patient receiving treatment for mental health?: Yes (medication from her pcp)  Has patient received inpatient treatment related to mental health in the last 2 years?: No         Means of Transportation  Means of Transport to Appts[de-identified] Drives Self  In the past 12 months, has lack of transportation kept you from medical appointments or from getting medications?: No  In the past 12 months, has lack of transportation kept you from meetings, work, or from getting things needed for daily living?: No  Was application for public transport provided?: N/A        DISCHARGE DETAILS:    Discharge planning discussed with[de-identified] patient and deysi spoke with her at 11:34am and met wiht her at 12:50pm  Freedom of Choice: Yes  Comments - Freedom of Choice: hhc recommended  list was given,  CM contacted family/caregiver?: Yes  Were Treatment Team discharge recommendations reviewed with patient/caregiver?: Yes  Did patient/caregiver verbalize understanding of patient care needs?: Yes  Were patient/caregiver advised of the risks associated with not following Treatment Team discharge recommendations?: Yes    Contacts  Patient Contacts: Severo Espino  Relationship to Patient[de-identified] Family (daughter)  Contact Method: Phone,In Person  Phone Number: [phone]  Reason/Outcome: Discharge Erick Hurtado         Is the patient interested in [facility] at discharge?: Yes    DME Referral Provided  Referral made for DME?: No    Other Referral/Resources/Interventions Provided:  Interventions: HHC  Referral Comments: permission was nasrin to make a blanket referral from her list  pt was accepeted by Chase    Would you like to participate in our [address] service program?  : No - Declined    Treatment Team Recommendation: Home with [address] [employer] (home with [facility] and family support)  Discharge Destination Plan[de-identified] [facility] [employer] (home with [facility] and family suport)  Transport at Discharge : Family,Automobile   avs was faxed, hhc aware of the d/c   Pt and family are in agreement with the d/c and d/c plan                      Accompanied by: Family member           Family notified[de-identified] daughter was in the room

## 2022-02-15 DIAGNOSIS — I51.81 TAKOTSUBO CARDIOMYOPATHY: Primary | ICD-10-CM

## 2022-02-15 DIAGNOSIS — R55 SYNCOPE, UNSPECIFIED SYNCOPE TYPE: ICD-10-CM

## 2022-02-15 NOTE — PROGRESS NOTES
----- Message -----   From: PALLAVI Merrill   Sent: 2/14/2022   1:38 PM EST   To:  [facility]     Needs OP follow up appt and Echo

## 2022-02-18 ENCOUNTER — HOSPITAL ENCOUNTER (OUTPATIENT)
Dept: NON INVASIVE DIAGNOSTICS | Facility: CLINIC | Age: 87
Discharge: HOME | End: 2022-02-18
Payer: COMMERCIAL

## 2022-02-18 ENCOUNTER — CLINICAL SUPPORT (OUTPATIENT)
Dept: CARDIOLOGY CLINIC | Facility: CLINIC | Age: 87
End: 2022-02-18
Payer: COMMERCIAL

## 2022-02-18 VITALS
DIASTOLIC BLOOD PRESSURE: 79 MMHG | SYSTOLIC BLOOD PRESSURE: 121 MMHG | BODY MASS INDEX: 24.17 KG/M2 | HEIGHT: 61 IN | WEIGHT: 128 LBS | HEART RATE: 73 BPM

## 2022-02-18 DIAGNOSIS — I51.81 TAKOTSUBO CARDIOMYOPATHY: ICD-10-CM

## 2022-02-18 DIAGNOSIS — R55 SYNCOPE, UNSPECIFIED SYNCOPE TYPE: ICD-10-CM

## 2022-02-18 LAB
AORTIC ROOT: 2.5 CM
AORTIC VALVE MEAN VELOCITY: 11 M/S
APICAL FOUR CHAMBER EJECTION FRACTION: 63 %
ASCENDING AORTA: 3.2 CM (ref 1.81–2.71)
AV AREA BY CONTINUOUS VTI: 2.2 CM2
AV AREA PEAK VELOCITY: 2.1 CM2
AV LVOT MEAN GRADIENT: 2 MMHG
AV LVOT PEAK GRADIENT: 4 MMHG
AV MEAN GRADIENT: 6 MMHG
AV PEAK GRADIENT: 10 MMHG
AV REGURGITATION PRESSURE HALF TIME: 401 MS
AV VALVE AREA: 2.21 CM2
AV VELOCITY RATIO: 0.66
DOP CALC AO PEAK VEL: 1.57 M/S
DOP CALC AO VTI: 36.73 CM
DOP CALC LVOT AREA: 3.14 CM2
DOP CALC LVOT DIAMETER: 2 CM
DOP CALC LVOT PEAK VEL VTI: 25.84 CM
DOP CALC LVOT PEAK VEL: 1.04 M/S
DOP CALC LVOT STROKE INDEX: 53.8 ML/M2
DOP CALC LVOT STROKE VOLUME: 81.14 CM3
E WAVE DECELERATION TIME: 229 MS
FRACTIONAL SHORTENING: 43 % (ref 28–44)
INTERVENTRICULAR SEPTUM IN DIASTOLE (PARASTERNAL SHORT AXIS VIEW): 1.1 CM (ref 0.49–0.91)
INTERVENTRICULAR SEPTUM: 1.1 CM (ref 0.6–1.1)
LEFT ATRIUM AREA SYSTOLE SINGLE PLANE A4C: 19.3 CM2
LEFT ATRIUM SIZE: 3.5 CM
LEFT INTERNAL DIMENSION IN SYSTOLE: 2.6 CM (ref 2.29–3.46)
LEFT VENTRICULAR INTERNAL DIMENSION IN DIASTOLE: 4.6 CM (ref 3.72–5.54)
LEFT VENTRICULAR POSTERIOR WALL IN END DIASTOLE: 1.1 CM (ref 0.47–0.9)
LEFT VENTRICULAR STROKE VOLUME: 74 ML
LVSV (TEICH): 74 ML
MV E'TISSUE VEL-SEP: 9 CM/S
MV PEAK A VEL: 0.97 M/S
MV PEAK E VEL: 103 CM/S
RIGHT ATRIUM AREA SYSTOLE A4C: 9.9 CM2
RIGHT VENTRICLE ID DIMENSION: 2.5 CM
SL CV AV DECELERATION TIME RETROGRADE: 1384 MS
SL CV AV PEAK GRADIENT RETROGRADE: 58 MMHG
SL CV LV EF: 60
SL CV PED ECHO LEFT VENTRICLE DIASTOLIC VOLUME (MOD BIPLANE) 2D: 99 ML
SL CV PED ECHO LEFT VENTRICLE SYSTOLIC VOLUME (MOD BIPLANE) 2D: 25 ML
TR MAX PG: 23 MMHG
TR PEAK VELOCITY: 2.4 M/S
TRICUSPID VALVE PEAK REGURGITATION VELOCITY: 2.41 M/S
Z-SCORE OF ASCENDING AORTA: 4.15
Z-SCORE OF INTERVENTRICULAR SEPTUM IN END DIASTOLE: 3.71
Z-SCORE OF LEFT VENTRICULAR DIMENSION IN END DIASTOLE: 0.13
Z-SCORE OF LEFT VENTRICULAR DIMENSION IN END SYSTOLE: -0.63
Z-SCORE OF LEFT VENTRICULAR POSTERIOR WALL IN END DIASTOLE: 3.82

## 2022-02-18 PROCEDURE — 93306 TTE W/DOPPLER COMPLETE: CPT | Performed by: INTERNAL MEDICINE

## 2022-02-18 PROCEDURE — 93242 EXT ECG>48HR<7D RECORDING: CPT

## 2022-02-18 PROCEDURE — 93306 TTE W/DOPPLER COMPLETE: CPT

## 2022-02-18 NOTE — PROGRESS NOTES
[facility] applied for 1 week for syncope, s/p [facility] per Dr Erich Cruz  Patient follows in office with Dr Brooke Sharp

## 2022-03-07 ENCOUNTER — CLINICAL SUPPORT (OUTPATIENT)
Dept: CARDIOLOGY CLINIC | Facility: CLINIC | Age: 87
End: 2022-03-07
Payer: COMMERCIAL

## 2022-03-07 DIAGNOSIS — R55 SYNCOPE, UNSPECIFIED SYNCOPE TYPE: ICD-10-CM

## 2022-03-07 DIAGNOSIS — R00.2 PALPITATIONS: ICD-10-CM

## 2022-03-07 DIAGNOSIS — I51.81 TAKOTSUBO CARDIOMYOPATHY: Chronic | ICD-10-CM

## 2022-03-07 PROCEDURE — 93244 EXT ECG>48HR<7D REV&INTERPJ: CPT | Performed by: INTERNAL MEDICINE

## 2022-03-14 ENCOUNTER — OFFICE VISIT (OUTPATIENT)
Dept: CARDIOLOGY CLINIC | Facility: CLINIC | Age: 87
End: 2022-03-14
Payer: COMMERCIAL

## 2022-03-14 VITALS
BODY MASS INDEX: 24.73 KG/M2 | DIASTOLIC BLOOD PRESSURE: 68 MMHG | WEIGHT: 131 LBS | HEART RATE: 68 BPM | HEIGHT: 61 IN | SYSTOLIC BLOOD PRESSURE: 150 MMHG

## 2022-03-14 DIAGNOSIS — F41.9 ANXIETY: ICD-10-CM

## 2022-03-14 DIAGNOSIS — I10 ESSENTIAL HYPERTENSION: ICD-10-CM
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
## 2022-03-14 DIAGNOSIS — R00.2 PALPITATIONS: ICD-10-CM

## 2022-03-14 PROCEDURE — 99213 OFFICE O/P EST LOW 20 MIN: CPT | Performed by: INTERNAL MEDICINE

## 2022-03-14 NOTE — PROGRESS NOTES
[facility] [place]  Pradeep Grier, [address]   [address] [facility], [address]   [phone]                                              Cardiology Office Follow Up  Lin Quinonez, 80 y o  female  YOB: 1935  MRN: [id number] Encounter: [id number]      PCP - Teo Walton MD    Assessment and Plan  1  Hypertension  2  H/o takotsubo cardiomyopathy  · Troponin peak 1 22  · Nuclear stress negative for ischemia in 3/2020  · Echo - 8/2015 - ([facility]) - LVEF was down to 25% with suggestions of takotsubo cardiomyopathy  · [facility]) - 70% stenosis of diag, rest normal, LVEF 35%  3  H/o Syncope (11/2019)  · Event monitor - 12/2019 - NSR, no Afib, occasional PACs & PVCs, 1st degree AV block, asymptomatic 2 3 second pauses, lowest HR 38 bpm  · Echo - 11/2019 ([facility]) - LVEF 60%, mild TR/MR/AI  4  Right rib fractures  · In 11th & 12th rib with pneumothorax  5  Hypothyroidism  6  Anxiety-depression    Plan  Palpitation, Anxiety, Fall  · Zio-patch monitor (3/2022) personally reviewed today -  No atrial fibrillation, sinus rhythm 47-96 beats per minute, first-degree AV block but no high-grade AV block, single 4 beat run of SVT / atrial tachycardia  · Continues to report infrequent and short lasting episodes up to a minute, particularly when she lays down at night, but overall symptoms are much improved  · Palpitation symptoms appear to be more related to anxiety   ·  no further symptoms of dizziness over the last month since discharge  · Continue metoprolol succinate 12 5 mg b i d    · She is awaiting a chest x-ray for her PCP related to her fall --> F/U with PCP    Hypertension  · Mildly elevated blood pressure at 150/68, but is overall well controlled, previously had issues with both high and low BP  · She appears to have had orthostatic symptoms with her fall/ syncope recently, and as a result will not try to aggressively control her blood pressure  · Current regimen:  · Morning: Losartan 50 mg, metoprolol XL 12 5 mg  · Evening: Amlodipine 5 mg, metoprolol XL 12 5 mg  · Continue current regimen      No results found for this visit on 03/14/22  No orders of the defined types were placed in this encounter  Return in about 6 months (around 9/14/2022), or if symptoms worsen or fail to improve  History of Present Illness   80 y o   female comes in for short term follow up evaluation of hypertension  She has longstanding history of hypertension, and has been losartan and metoprolol for many years  Recently her blood pressure has been intermittently very elevated to 145-053 systolic  As a result, her losartan was increased from 25-50 mg weeks ago  But despite that her blood pressure seems to have remained elevated intermittently  Per patient, her blood pressure is usually well controlled during the day in the range of 130s  But her blood pressure is usually elevated in the middle of the night  A couple of days ago around 2:00 a m  She woke up and had a blood pressure of 210  She was recently and diagnosed and treated for diverticulitis  Since then she has continued to have some abdominal discomfort  She was scheduled to undergo an EGD for the same  Mom but when she presented for the same, she was found to have a blood pressure of 698 systolic, and as a result this was canceled  Next a again she went to the ED with elevated blood pressures  She was given a dose of clonidine 0 2 mg, and discharged home with follow-up with Cardiology  Since and getting the clonidine 0 2 mg, she has been feeling lightheaded and dizzy, and particularly with standing up  Interval History: 7/31/19  Has been doing relatively well  Continues to have a lot of anxiety  She continues to check her BP 2-3 times/day, and has been keeping a log  Her dizziness improved the same day after the effects of clonidine wore off      Interval history - 11/4/19  She comes back for 2 month follow-up of her blood pressure  No more symptoms of dizziness or lightheadedness after having been off clonidine  She continues to be very anxious about her blood pressure, and addressed to make adjustments on her own based on these blood pressure readings, and also forgets to take blood pressure medications at times due to trying to avoid taking it along with levothyroxine  She had called the office about 10 days ago stating that her blood pressure was 108, and she felt it was too low and as a result was not taking her blood pressure medications  But after that her blood pressure went in the 180s, and as a result she started taking it again    Interval history - 2/19/2020  Since my last follow up visit, she had an episode of syncope on black Friday for which she followed up with Dr Prema Fairbanks, and was ordered an event monitor  This did not reveal any significant findings  Subsequently about a week ago, and she ended up in the hospital with complains of abdominal pain and fullness after eating some fried sweet potatoes and fish for dinner  She was unable to sleep as a result and got very anxious and her blood pressure was very elevated in the 200s  As a result she was brought into the ED for evaluation  Her symptoms resolved after [place]  But during this she was found to have troponin elevation to 1, and Cardiology was consulted  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, related to her elevated blood pressures  Her medications were adjusted and she was set up for outpatient follow-up with me here today  She reports no further symptoms since discharge, and is currently feeling well, although continues to be very anxious about her blood pressure  Interval history - 7/1/2020  She comes back in to the office for an early visit due to again ongoing blood pressure fluctuations  Her medication regimen has been fairly stable over the past 2 months with losartan amlodipine and metoprolol    But at home, she tries to adjust medications based on her blood pressure and her perception of high blood pressure  She continues to be very anxious despite her ten-day psychiatric rehab  She states that she feels lonely at times in the pandemic has made her worry a lot regarding things like not having done enough for her mother 15 years ago, when she passed away  Interval history - 10/13/2020  He comes back for follow-up after 3 months  She has been doing fairly well recently, and does not have any active complains today  Admits to having significant anxiety and depression, and has been maintained on Lexapro, with which she is doing better  Interval history - 1/21/2021  She comes back for follow-up after about 3 months  She has been doing well overall and is happy with her current status  She remains reasonably active and  Is able to walk on level ground while shopping and does not report any symptoms with the same  Her granddaughter comes to visit her every day for lunch, which she enjoys as well  Interval history - 3/1/2021  She comes back for follow-up after about 6 weeks  After my last office visit, within 1 week she was again in the ED with complains of palpitations, which happened at night and when she were to go to the restroom  ECG and telemetry in the hospital were without any significant arrhythmias  She was diagnosed with anxiety, and treated with medications for the same in again discharged home  She has been doing well overall since then and has not had any major complaints  She continues to take anxiety medications  Interval history - 7/7/2021  She comes back for follow up after 4 months  She has been doing well in recent months, and has been able to control her anxiety and stress well  With this her blood pressure is also better controlled  She thinks the metoprolol helps her sleep, and as a result her overall symptoms are under control  No major complains of chest, shortness of breath    She remains active  Interval history - 3/14/2022  She comes back for follow-up in the office after 9 months  I did see her in the hospital about a month ago when she had presented after a fall  He reported trying to get up and then felt dizzy /unsteady, and then fell before she could get to a nearby chair  She unfortunately fractured her 11th and 12th right ribs from this and was in the hospital from pain  Historical Information   Past Medical History:   Diagnosis Date    Anxiety     Benign hypertension with CKD (chronic kidney disease) stage III ([address] ) 2/12/2022    CAD (coronary artery disease)     Carotid Duplex 03/06/2018    Plaque formation was prominent bilaterally    Depression     Disease of thyroid gland     Diverticulitis     Dyslipidemia     ECHO 09/14/2017    EF 55%, mild mitral regurg, small pericardial effusion   Hypertension     Mitral regurgitation     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 infarction)     Shingles     Syncope     Takotsubo cardiomyopathy      Past Surgical History:   Procedure Laterality Date    CARDIAC CATHETERIZATION  08/27/2012    EF 35%, 70% stenosis of diagonal, Otherwise all OK apart from myopathy    CARDIAC CATHETERIZATION  10/10/2012    EF 55%, no significant CAD    Mild stress induced cardiomyopathy    CATARACT EXTRACTION Bilateral     TONSILLECTOMY  childhood     Family History   Problem Relation Age of Onset    Cancer Father     Prostate cancer Father     No Known Problems Mother      Current Outpatient Medications on File Prior to Visit   Medication Sig Dispense Refill    amLODIPine (NORVASC) 5 mg tablet Take 1 tablet (5 mg total) by mouth daily at bedtime 90 tablet 3    aspirin 81 mg chewable tablet Chew 81 mg daily      cholecalciferol (VITAMIN D3) 1,000 units tablet Take 1,000 Units by mouth daily      clonazePAM (KlonoPIN) 0 5 mg tablet Take 0 5 tablets (0 25 mg total) by mouth 2 (two) times a day as needed for anxiety for up to 10 days Take 1/2 tablet (0 25-mg) by oral route 2 times a day   escitalopram (LEXAPRO) 10 mg tablet Take 2 tablets (20 mg total) by mouth daily      lansoprazole (PREVACID) 30 mg capsule Take 1 capsule (30 mg total) by mouth daily 30 capsule 0    levothyroxine 50 mcg tablet take 1 tablet by mouth ON MONDAY, WEDNESDAY, AND FRIDAY 12 tablet 0    levothyroxine 75 mcg tablet Take 1 tablet (75 mcg total) by mouth 4 (four) times a week On Tuesdays, Thursdays and Saturdays and Sundays 16 tablet 0    losartan (COZAAR) 50 mg tablet Take 1 tablet (50 mg total) by mouth daily 90 tablet 3    metoprolol succinate (TOPROL-XL) 25 mg 24 hr tablet Take 0 5 tablets (12 5 mg total) by mouth daily 30 tablet 0    Multiple Vitamin (MULTIVITAMIN) capsule Take 1 capsule by mouth daily      polyethylene glycol (MIRALAX) 17 g packet Take 17 g by mouth daily      prednisoLONE acetate (PRED FORTE) 1 % ophthalmic suspension Administer 1 drop to both eyes 2 (two) times a day 5 mL 0    sucralfate (CARAFATE) 1 g/10 mL suspension Take 10 mL (1 g total) by mouth 4 (four) times a day 420 mL 0    melatonin 3 mg Take 1 tablet (3 mg total) by mouth daily at bedtime (Patient not taking: Reported on 3/14/2022 ) 30 tablet 0    ondansetron (Zofran ODT) 4 mg disintegrating tablet Take 1 tablet (4 mg total) by mouth every 6 (six) hours as needed for nausea or vomiting (Patient not taking: Reported on 3/14/2022 ) 10 tablet 0    valACYclovir (VALTREX) 500 mg tablet Take 500 mg by mouth daily (Patient not taking: Reported on 3/14/2022 )       No current facility-administered medications on file prior to visit  Allergies   Allergen Reactions    No Known Allergies      Social History     Socioeconomic History    Marital status:       Spouse name: None    Number of children: None    Years of education: None    Highest education level: None   Occupational History    None   Tobacco Use    Smoking status: Never Smoker    Smokeless tobacco: Never Used Vaping Use    Vaping Use: Never used   Substance and Sexual Activity    Alcohol use: Never    Drug use: Never    Sexual activity: Not Currently   Other Topics Concern    None   Social History Narrative    Caffeine - 2 cups hot tea/day     Social Determinants of Health     Financial Resource Strain: Not on file   Food Insecurity: No Food Insecurity    Worried About Running Out of Food in the Last Year: Never true    Megan of Food in the Last Year: Never true   Transportation Needs: No Transportation Needs    Lack of Transportation (Medical): No    Lack of Transportation (Non-Medical): No   Physical Activity: Not on file   Stress: Not on file   Social Connections: Not on file   Intimate Partner Violence: Not on file   Housing Stability: Low Risk     Unable to Pay for Housing in the Last Year: No    Number of Places Lived in the Last Year: 1    Unstable Housing in the Last Year: No        Review of Systems   All other systems reviewed and are negative  Vitals:  Vitals:    03/14/22 1543   BP: 150/68   Pulse: 68   Weight: 59 4 kg (131 lb)   Height: 5' 1" (1 549 m)     BMI - Body mass index is 24 75 kg/m²  Wt Readings from Last 7 Encounters:   03/14/22 59 4 kg (131 lb)   02/18/22 58 1 kg (128 lb)   01/03/22 58 1 kg (128 lb)   09/24/21 58 5 kg (129 lb)   07/07/21 57 6 kg (127 lb)   03/26/21 55 3 kg (122 lb)   03/01/21 55 9 kg (123 lb 3 8 oz)       Physical Exam  Vitals and nursing note reviewed  Constitutional:       General: She is not in acute distress  Appearance: Normal appearance  She is well-developed  She is not ill-appearing  HENT:      Head: Normocephalic and atraumatic  Nose: No congestion  Eyes:      General: No scleral icterus  Conjunctiva/sclera: Conjunctivae normal    Neck:      Vascular: No carotid bruit or JVD  Cardiovascular:      Rate and Rhythm: Normal rate and regular rhythm  Pulses: Normal pulses  Heart sounds: Normal heart sounds  No murmur heard    No friction rub  No gallop  Pulmonary:      Effort: Pulmonary effort is normal  No respiratory distress  Breath sounds: Normal breath sounds  No rales  Abdominal:      General: There is no distension  Palpations: Abdomen is soft  Tenderness: There is no abdominal tenderness  Musculoskeletal:         General: No swelling or tenderness  Cervical back: Neck supple  Right lower leg: No edema  Left lower leg: No edema  Skin:     General: Skin is warm  Neurological:      General: No focal deficit present  Mental Status: She is alert and oriented to person, place, and time  Mental status is at baseline  Psychiatric:         Mood and Affect: Mood normal          Behavior: Behavior normal          Thought Content: Thought content normal            Labs:  CBC:   Lab Results   Component Value Date    WBC 5 73 02/14/2022    RBC 3 66 (L) 02/14/2022    HGB 11 4 (L) 02/14/2022    HCT 35 6 02/14/2022    MCV 97 02/14/2022     02/14/2022    RDW 13 0 02/14/2022       CMP:   Lab Results   Component Value Date    K 4 6 02/13/2022    CL 97 02/13/2022    CO2 26 02/13/2022    BUN 19 02/13/2022    CREATININE 1 06 02/13/2022    EGFR 47 02/13/2022    CALCIUM 9 1 02/13/2022    AST 19 01/03/2022    ALT 9 01/03/2022    ALKPHOS 78 01/03/2022       Magnesium:  Lab Results   Component Value Date    MG 2 0 01/26/2021       Lipid Profile:   Lab Results   Component Value Date    HDL 70 01/27/2021    TRIG 53 01/27/2021    LDLCALC 113 (H) 01/27/2021       Thyroid Studies:   Lab Results   Component Value Date    [id number] 2 250 01/27/2021       No components found for: Resale Therapy [facility]    Lab Results   Component Value Date    INR 1 06 02/12/2022    INR 1 01 01/26/2021    INR 1 15 06/09/2019   5    Imaging: No results found  Cardiac testing:   No results found for this or any previous visit  No results found for this or any previous visit  No results found for this or any previous visit    No results found for this or any previous visit

## 2022-05-02 ENCOUNTER — APPOINTMENT (OUTPATIENT)
Dept: RADIOLOGY | Facility: CLINIC | Age: 87
End: 2022-05-02
Payer: COMMERCIAL

## 2022-05-02 DIAGNOSIS — S27.0XXD TRAUMATIC PNEUMOTHORAX, SUBSEQUENT ENCOUNTER: ICD-10-CM

## 2022-05-02 PROCEDURE — 71046 X-RAY EXAM CHEST 2 VIEWS: CPT

## 2022-06-19 ENCOUNTER — OFFICE VISIT (OUTPATIENT)
Dept: URGENT CARE | Facility: CLINIC | Age: 87
End: 2022-06-19
Payer: COMMERCIAL

## 2022-06-19 VITALS
RESPIRATION RATE: 18 BRPM | TEMPERATURE: 97.4 F | BODY MASS INDEX: 24.17 KG/M2 | HEART RATE: 77 BPM | HEIGHT: 61 IN | DIASTOLIC BLOOD PRESSURE: 84 MMHG | OXYGEN SATURATION: 96 % | SYSTOLIC BLOOD PRESSURE: 163 MMHG | WEIGHT: 128 LBS

## 2022-06-19 DIAGNOSIS — J06.9 VIRAL URI WITH COUGH: Primary | ICD-10-CM

## 2022-06-19 DIAGNOSIS — R05.9 COUGH: ICD-10-CM

## 2022-06-19 PROCEDURE — U0005 INFEC AGEN DETEC AMPLI PROBE: HCPCS

## 2022-06-19 PROCEDURE — U0003 INFECTIOUS AGENT DETECTION BY NUCLEIC ACID (DNA OR RNA); SEVERE ACUTE RESPIRATORY SYNDROME CORONAVIRUS 2 (SARS-COV-2) (CORONAVIRUS DISEASE [COVID-19]), AMPLIFIED PROBE TECHNIQUE, MAKING USE OF HIGH THROUGHPUT TECHNOLOGIES AS DESCRIBED BY CMS-2020-01-R: HCPCS

## 2022-06-19 PROCEDURE — S9083 URGENT CARE CENTER GLOBAL: HCPCS

## 2022-06-19 PROCEDURE — 99213 OFFICE O/P EST LOW 20 MIN: CPT

## 2022-06-19 RX ORDER — BENZONATATE 100 MG/1
100 CAPSULE ORAL 3 TIMES DAILY PRN
Qty: 20 CAPSULE | Refills: 0 | Status: SHIPPED | OUTPATIENT
Start: 2022-06-19

## 2022-06-19 NOTE — PATIENT INSTRUCTIONS
Will test for COVID-19  Rest and drink extra fluids  Tylenol as needed for pain or fever  Tessalon Perles as needed for cough  Recommended vitamins for Covid- vitamin D3 2000 IU oral daily, Vitamin C 1 gram oral q 12 hours and multivitamin daily  Follow up with PCP if no improvement  Go to the ER with any worsening symptoms, chest pain, shortness of breath, difficulty breathing, lethargy, confusion, dehydration or change in skin color  If Covid tests are negative, you may discontinue isolation when fever free for 24 hours without the use of a fever reducing agent  If covid test is positive, you may discontinue isolation 5 days after symptom onset and when fever free for 24 hours without the use of a fever reducing agent  Upon discontinuing isolation you must continue to wear a mask for an additional 5 days  [address]    Your healthcare provider and/or public health staff have evaluated you and have determined that you do not need to remain in the hospital at this time  At this time you can be isolated at home where you will be monitored by staff from your local or state health department  You should carefully follow the prevention and isolation steps below until a healthcare provider or local or state health department says that you can return to your normal activities  Stay home except to get medical care    People who are mildly ill with COVID-19 are able to isolate at home during their illness  You should restrict activities outside your home, except for getting medical care  Do not go to work, school, or public areas  Avoid using public transportation, ride-sharing, or taxis  Separate yourself from other people and animals in your home    People: As much as possible, you should stay in a specific room and away from other people in your home  Also, you should use a separate bathroom, if available  Animals:  You should restrict contact with pets and other animals while you are sick with COVID-19, just like you would around other people  Although there have not been reports of pets or other animals becoming sick with COVID-19, it is still recommended that people sick with COVID-19 limit contact with animals until more information is known about the virus  When possible, have another member of your household care for your animals while you are sick  If you are sick with COVID-19, avoid contact with your pet, including petting, snuggling, being kissed or licked, and sharing food  If you must care for your pet or be around animals while you are sick, wash your hands before and after you interact with pets and wear a facemask  See COVID-19 and Animals for more information  Call ahead before visiting your doctor    If you have a medical appointment, call the healthcare provider and tell them that you have or may have COVID-19  This will help the healthcare providers office take steps to keep other people from getting infected or exposed  Wear a facemask    You should wear a facemask when you are around other people (e g , sharing a room or vehicle) or pets and before you enter a healthcare providers office  If you are not able to wear a facemask (for example, because it causes trouble breathing), then people who live with you should not stay in the same room with you, or they should wear a facemask if they enter your room  Cover your coughs and sneezes    Cover your mouth and nose with a tissue when you cough or sneeze  Throw used tissues in a lined trash can  Immediately wash your hands with soap and water for at least 20 seconds or, if soap and water are not available, clean your hands with an alcohol-based hand  that contains at least 60% alcohol  Clean your hands often    Wash your hands often with soap and water for at least 20 seconds, especially after blowing your nose, coughing, or sneezing; going to the bathroom; and before eating or preparing food   If soap and water are not readily available, use an alcohol-based hand  with at least 60% alcohol, covering all surfaces of your hands and rubbing them together until they feel dry  Soap and water are the best option if hands are visibly dirty  Avoid touching your eyes, nose, and mouth with unwashed hands  Avoid sharing personal household items    You should not share dishes, drinking glasses, cups, eating utensils, towels, or bedding with other people or pets in your home  After using these items, they should be washed thoroughly with soap and water  Clean all high-touch surfaces everyday    High touch surfaces include counters, tabletops, doorknobs, bathroom fixtures, toilets, phones, keyboards, tablets, and bedside tables  Also, clean any surfaces that may have blood, stool, or body fluids on them  Use a household cleaning spray or wipe, according to the label instructions  Labels contain instructions for safe and effective use of the cleaning product including precautions you should take when applying the product, such as wearing gloves and making sure you have good ventilation during use of the product  Monitor your symptoms    Seek prompt medical attention if your illness is worsening (e g , difficulty breathing)  Before seeking care, call your healthcare provider and tell them that you have, or are being evaluated for, COVID-19  Put on a facemask before you enter the facility  These steps will help the healthcare providers office to keep other people in the office or waiting room from getting infected or exposed  Ask your healthcare provider to call the local or [facility] health department  Persons who are placed under active monitoring or facilitated self-monitoring should follow instructions provided by their local health department or occupational health professionals, as appropriate    If you have a medical emergency and need to call 911, notify the dispatch personnel that you have, or are being evaluated for COVID-19  If possible, put on a facemask before emergency medical services arrive  Discontinuing home isolation    Patients with confirmed COVID-19 should remain under home isolation precautions until the following conditions are met: They have had no fever for at least 24 hours (that is one full day of no fever without the use medicine that reduces fevers)  AND  other symptoms have improved (for example, when their cough or shortness of breath have improved)  AND  If had mild or moderate illness, at least 10 days have passed since their symptoms first appeared or if severe illness (needed oxygen) or immunosuppressed, at least 20 days have passed since symptoms first appeared  Patients with confirmed COVID-19 should also notify close contacts (including their workplace) and ask that they self-quarantine  Currently, close contact is defined as being within 6 feet for 15 minutes or more from the period 24 hours starting 48 hours before symptom onset to the time at which the patient went into isolation  Close contacts of patients diagnosed with COVID-19 should be instructed by the patient to self-quarantine for 14 days from the last time of their last contact with the patient       Source: [employer]

## 2022-06-19 NOTE — PROGRESS NOTES
[facility]        NAME: Philippe Fowler is a 80 y o  female  : 1935    MRN: [id number]  DATE: 2022  TIME: 3:58 PM    Assessment and Plan   Viral URI with cough [J06 9]  1  Viral URI with cough  benzonatate (TESSALON PERLES) 100 mg capsule    COVID Only -Office Collect   2  Cough           Patient Instructions     Patient Instructions   Will test for COVID-19  Rest and drink extra fluids  Tylenol as needed for pain or fever  Tessalon Perles as needed for cough  Recommended vitamins for Covid- vitamin D3 2000 IU oral daily, Vitamin C 1 gram oral q 12 hours and multivitamin daily  Follow up with PCP if no improvement  Go to the ER with any worsening symptoms, chest pain, shortness of breath, difficulty breathing, lethargy, confusion, dehydration or change in skin color  If Covid tests are negative, you may discontinue isolation when fever free for 24 hours without the use of a fever reducing agent  If covid test is positive, you may discontinue isolation 5 days after symptom onset and when fever free for 24 hours without the use of a fever reducing agent  Upon discontinuing isolation you must continue to wear a mask for an additional 5 days  [address]    Your healthcare provider and/or public health staff have evaluated you and have determined that you do not need to remain in the hospital at this time  At this time you can be isolated at home where you will be monitored by staff from your local or state health department  You should carefully follow the prevention and isolation steps below until a healthcare provider or local or state health department says that you can return to your normal activities  Stay home except to get medical care    People who are mildly ill with COVID-19 are able to isolate at home during their illness  You should restrict activities outside your home, except for getting medical care   Do not go to work, school, or public areas  Avoid using public transportation, ride-sharing, or taxis  Separate yourself from other people and animals in your home    People: As much as possible, you should stay in a specific room and away from other people in your home  Also, you should use a separate bathroom, if available  Animals: You should restrict contact with pets and other animals while you are sick with COVID-19, just like you would around other people  Although there have not been reports of pets or other animals becoming sick with COVID-19, it is still recommended that people sick with COVID-19 limit contact with animals until more information is known about the virus  When possible, have another member of your household care for your animals while you are sick  If you are sick with COVID-19, avoid contact with your pet, including petting, snuggling, being kissed or licked, and sharing food  If you must care for your pet or be around animals while you are sick, wash your hands before and after you interact with pets and wear a facemask  See COVID-19 and Animals for more information  Call ahead before visiting your doctor    If you have a medical appointment, call the healthcare provider and tell them that you have or may have COVID-19  This will help the healthcare providers office take steps to keep other people from getting infected or exposed  Wear a facemask    You should wear a facemask when you are around other people (e g , sharing a room or vehicle) or pets and before you enter a healthcare providers office  If you are not able to wear a facemask (for example, because it causes trouble breathing), then people who live with you should not stay in the same room with you, or they should wear a facemask if they enter your room  Cover your coughs and sneezes    Cover your mouth and nose with a tissue when you cough or sneeze  Throw used tissues in a lined trash can   Immediately wash your hands with soap and water for at least 20 seconds or, if soap and water are not available, clean your hands with an alcohol-based hand  that contains at least 60% alcohol  Clean your hands often    Wash your hands often with soap and water for at least 20 seconds, especially after blowing your nose, coughing, or sneezing; going to the bathroom; and before eating or preparing food  If soap and water are not readily available, use an alcohol-based hand  with at least 60% alcohol, covering all surfaces of your hands and rubbing them together until they feel dry  Soap and water are the best option if hands are visibly dirty  Avoid touching your eyes, nose, and mouth with unwashed hands  Avoid sharing personal household items    You should not share dishes, drinking glasses, cups, eating utensils, towels, or bedding with other people or pets in your home  After using these items, they should be washed thoroughly with soap and water  Clean all high-touch surfaces everyday    High touch surfaces include counters, tabletops, doorknobs, bathroom fixtures, toilets, phones, keyboards, tablets, and bedside tables  Also, clean any surfaces that may have blood, stool, or body fluids on them  Use a household cleaning spray or wipe, according to the label instructions  Labels contain instructions for safe and effective use of the cleaning product including precautions you should take when applying the product, such as wearing gloves and making sure you have good ventilation during use of the product  Monitor your symptoms    Seek prompt medical attention if your illness is worsening (e g , difficulty breathing)  Before seeking care, call your healthcare provider and tell them that you have, or are being evaluated for, COVID-19  Put on a facemask before you enter the facility  These steps will help the healthcare providers office to keep other people in the office or waiting room from getting infected or exposed   Ask your healthcare provider to call the local or [facility] health department  Persons who are placed under active monitoring or facilitated self-monitoring should follow instructions provided by their local health department or occupational health professionals, as appropriate  If you have a medical emergency and need to call 911, notify the dispatch personnel that you have, or are being evaluated for COVID-19  If possible, put on a facemask before emergency medical services arrive  Discontinuing home isolation    Patients with confirmed COVID-19 should remain under home isolation precautions until the following conditions are met:   - They have had no fever for at least 24 hours (that is one full day of no fever without the use medicine that reduces fevers)  AND  - other symptoms have improved (for example, when their cough or shortness of breath have improved)  AND  - If had mild or moderate illness, at least 10 days have passed since their symptoms first appeared or if severe illness (needed oxygen) or immunosuppressed, at least 20 days have passed since symptoms first appeared  Patients with confirmed COVID-19 should also notify close contacts (including their workplace) and ask that they self-quarantine  Currently, close contact is defined as being within 6 feet for 15 minutes or more from the period 24 hours starting 48 hours before symptom onset to the time at which the patient went into isolation  Close contacts of patients diagnosed with COVID-19 should be instructed by the patient to self-quarantine for 14 days from the last time of their last contact with the patient  Source: [employer]      Follow up with PCP in 3-5 days  Proceed to  ER if symptoms worsen  Chief Complaint     Chief Complaint   Patient presents with    Cold Like Symptoms     Patient c/o cough and post nasal drip that started last night            History of Present Illness HPI   Souleymane Vega is a 80 y o  female presents today with her daughter for evaluation of nasal congestion, sore throat, and cough  Her symptoms started yesterday  She denies any fevers, but states she felt warm last night  She denies any shortness of breath, wheezing, chest pain, dizziness, vomiting, or diarrhea  She denies any known sick contacts  She denies any known prior COVID illness  She is COVID vaccinated  She has been taking Claritin for her symptoms without improvement  Review of Systems   Review of Systems   Constitutional: Negative for appetite change, chills and fever  HENT: Positive for congestion, rhinorrhea and sore throat  Negative for ear pain, sinus pressure and sinus pain  Eyes: Negative for pain, discharge and visual disturbance  Respiratory: Positive for cough  Negative for shortness of breath and wheezing  Cardiovascular: Negative for chest pain and palpitations  Gastrointestinal: Negative for abdominal pain, diarrhea, nausea and vomiting  Musculoskeletal: Negative for arthralgias and myalgias  Skin: Negative for color change and rash  Neurological: Negative for weakness and headaches           Current Medications       Current Outpatient Medications:     amLODIPine (NORVASC) 5 mg tablet, Take 1 tablet (5 mg total) by mouth daily at bedtime, Disp: 90 tablet, Rfl: 3    aspirin 81 mg chewable tablet, Chew 81 mg daily, Disp: , Rfl:     benzonatate (TESSALON PERLES) 100 mg capsule, Take 1 capsule (100 mg total) by mouth 3 (three) times a day as needed for cough, Disp: 20 capsule, Rfl: 0    cholecalciferol (VITAMIN D3) 1,000 units tablet, Take 1,000 Units by mouth daily, Disp: , Rfl:     clonazePAM (KlonoPIN) 0 5 mg tablet, Take 0 5 tablets (0 25 mg total) by mouth 2 (two) times a day as needed for anxiety for up to 10 days Take 1/2 tablet (0 25-mg) by oral route 2 times a day , Disp: , Rfl:     escitalopram (LEXAPRO) 10 mg tablet, Take 2 tablets (20 mg total) by mouth daily, Disp: , Rfl:     lansoprazole (PREVACID) 30 mg capsule, Take 1 capsule (30 mg total) by mouth daily, Disp: 30 capsule, Rfl: 0    levothyroxine 50 mcg tablet, take 1 tablet by mouth ON MONDAY, WEDNESDAY, AND FRIDAY, Disp: 12 tablet, Rfl: 0    levothyroxine 75 mcg tablet, Take 1 tablet (75 mcg total) by mouth 4 (four) times a week On Tuesdays, Thursdays and Saturdays and Sundays, Disp: 16 tablet, Rfl: 0    losartan (COZAAR) 50 mg tablet, Take 1 tablet (50 mg total) by mouth daily, Disp: 90 tablet, Rfl: 3    metoprolol succinate (TOPROL-XL) 25 mg 24 hr tablet, Take 0 5 tablets (12 5 mg total) by mouth daily, Disp: 30 tablet, Rfl: 0    Multiple Vitamin (MULTIVITAMIN) capsule, Take 1 capsule by mouth daily, Disp: , Rfl:     prednisoLONE acetate (PRED FORTE) 1 % ophthalmic suspension, Administer 1 drop to both eyes 2 (two) times a day, Disp: 5 mL, Rfl: 0    sucralfate (CARAFATE) 1 g/10 mL suspension, Take 10 mL (1 g total) by mouth 4 (four) times a day, Disp: 420 mL, Rfl: 0    melatonin 3 mg, Take 1 tablet (3 mg total) by mouth daily at bedtime (Patient not taking: No sig reported), Disp: 30 tablet, Rfl: 0    ondansetron (Zofran ODT) 4 mg disintegrating tablet, Take 1 tablet (4 mg total) by mouth every 6 (six) hours as needed for nausea or vomiting (Patient not taking: No sig reported), Disp: 10 tablet, Rfl: 0    polyethylene glycol (MIRALAX) 17 g packet, Take 17 g by mouth daily (Patient not taking: Reported on 6/19/2022), Disp: , Rfl:     valACYclovir (VALTREX) 500 mg tablet, Take 500 mg by mouth daily (Patient not taking: No sig reported), Disp: , Rfl:     Current Allergies     Allergies as of 06/19/2022 - Reviewed 06/19/2022   Allergen Reaction Noted    No known allergies  11/09/2015            The following portions of the patient's history were reviewed and updated as appropriate: allergies, current medications, past family history, past medical history, past social history, past surgical history and problem list      Past Medical History:   Diagnosis Date    Anxiety     Benign hypertension with CKD (chronic kidney disease) stage III ([facility] [address] ) 2/12/2022    CAD (coronary artery disease)     Carotid Duplex 03/06/2018    Plaque formation was prominent bilaterally    Depression     Disease of thyroid gland     Diverticulitis     Dyslipidemia     ECHO 09/14/2017    EF 55%, mild mitral regurg, small pericardial effusion   Hypertension     Mitral regurgitation     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 infarction)     Shingles     Syncope     Takotsubo cardiomyopathy        Past Surgical History:   Procedure Laterality Date    CARDIAC CATHETERIZATION  08/27/2012    EF 35%, 70% stenosis of diagonal, Otherwise all OK apart from myopathy    CARDIAC CATHETERIZATION  10/10/2012    EF 55%, no significant CAD  Mild stress induced cardiomyopathy    CATARACT EXTRACTION Bilateral     TONSILLECTOMY  childhood       Family History   Problem Relation Age of Onset    Cancer Father     Prostate cancer Father     No Known Problems Mother          Medications have been verified  Objective   /84   Pulse 77   Temp (!) 97 4 °F (36 3 °C) (Temporal)   Resp 18   Ht 5' 1" (1 549 m)   Wt 58 1 kg (128 lb)   SpO2 96%   BMI 24 19 kg/m²        Physical Exam     Physical Exam  Vitals and nursing note reviewed  Constitutional:       General: She is not in acute distress  Appearance: Normal appearance  HENT:      Head: Normocephalic and atraumatic  Right Ear: Tympanic membrane and ear canal normal  There is impacted cerumen  Left Ear: Tympanic membrane and ear canal normal  There is impacted cerumen  Nose: No congestion or rhinorrhea  Mouth/Throat:      Mouth: Mucous membranes are moist       Pharynx: No oropharyngeal exudate or posterior oropharyngeal erythema     Eyes:      Conjunctiva/sclera: Conjunctivae normal    Cardiovascular:      Rate and Rhythm: Normal rate and regular rhythm  Heart sounds: Normal heart sounds  Pulmonary:      Effort: Pulmonary effort is normal       Breath sounds: Normal breath sounds  No wheezing, rhonchi or rales  Lymphadenopathy:      Cervical: No cervical adenopathy  Skin:     General: Skin is warm and dry  Capillary Refill: Capillary refill takes less than 2 seconds  Psychiatric:         Behavior: Behavior normal  Behavior is cooperative

## 2022-06-20 LAB — SARS-COV-2 RNA RESP QL NAA+PROBE: POSITIVE

## 2022-06-22 ENCOUNTER — TELEPHONE (OUTPATIENT)
Dept: URGENT CARE | Facility: CLINIC | Age: 87
End: 2022-06-22

## 2022-06-22 NOTE — TELEPHONE ENCOUNTER
Spoke with patient's daughter, Hal Ireland regarding positive Covid results  Discussed [facility] quarantine guidelines- patients with positive results must remain on self-quarantine until 5 days after the time of the initial symptom onset OR 24 hours after resolution of fever without fever reducing medications, whichever is LONGER  Continue wearing mask for an additional 5 days  Advised she call patient's PCP today for a follow up appointment  Patient's daughter understands and agrees with plan  All questions answered

## 2022-07-08 ENCOUNTER — TELEPHONE (OUTPATIENT)
Dept: CARDIOLOGY CLINIC | Facility: CLINIC | Age: 87
End: 2022-07-08

## 2022-07-08 NOTE — TELEPHONE ENCOUNTER
Ira Mirlande called today about her blood pressure being low  She started taking clonopin and ever since then it has been running around 108/63  She does complain that she feels tired all of the time and is afraid to go out  At night she takes  metoprolol and her amlodipine    She has not taken the Losartan all week since her bp has been so low in the am

## 2022-07-11 ENCOUNTER — TELEPHONE (OUTPATIENT)
Dept: CARDIOLOGY CLINIC | Facility: CLINIC | Age: 87
End: 2022-07-11

## 2022-07-12 NOTE — TELEPHONE ENCOUNTER
Left message with Jessa Estes at 8:30 this morning but just called her back to be sure she understands Dr Kris Orozco instructions

## 2022-09-02 ENCOUNTER — OFFICE VISIT (OUTPATIENT)
Dept: CARDIOLOGY CLINIC | Facility: CLINIC | Age: 87
End: 2022-09-02
Payer: COMMERCIAL

## 2022-09-02 VITALS
HEART RATE: 68 BPM | SYSTOLIC BLOOD PRESSURE: 100 MMHG | DIASTOLIC BLOOD PRESSURE: 50 MMHG | HEIGHT: 61 IN | BODY MASS INDEX: 24.92 KG/M2 | WEIGHT: 132 LBS

## 2022-09-02 DIAGNOSIS — I10 PRIMARY HYPERTENSION: Primary | ICD-10-CM

## 2022-09-02 DIAGNOSIS — I10 ESSENTIAL HYPERTENSION: Chronic | ICD-10-CM

## 2022-09-02 DIAGNOSIS — I73.9 PAD (PERIPHERAL ARTERY DISEASE) (HCC): ICD-10-CM

## 2022-09-02 PROCEDURE — 99214 OFFICE O/P EST MOD 30 MIN: CPT | Performed by: INTERNAL MEDICINE

## 2022-09-02 PROCEDURE — 93000 ELECTROCARDIOGRAM COMPLETE: CPT | Performed by: INTERNAL MEDICINE

## 2022-09-02 RX ORDER — ESCITALOPRAM OXALATE 20 MG/1
20 TABLET ORAL DAILY
COMMUNITY
Start: 2022-08-15

## 2022-09-02 RX ORDER — AMLODIPINE BESYLATE 2.5 MG/1
2.5 TABLET ORAL
Start: 2022-09-02

## 2022-09-02 NOTE — PROGRESS NOTES
[facility], [address]   [place], [address]   [phone]                                              Cardiology Office Follow Up  Fabby Weathers, 80 y o  female  YOB: 1935  MRN: [id number] Encounter: [id number]      PCP - Shiv Horton MD    Assessment and Plan  Hypertension  H/o takotsubo cardiomyopathy  Troponin peak 1 22  Nuclear stress negative for ischemia in 3/2020  Echo - 8/2015 - [facility]) - LVEF was down to 25% with suggestions of takotsubo cardiomyopathy  [facility] - [facility]) - 70% stenosis of diag, rest normal, LVEF 35%  H/o Syncope (11/2019)  Event monitor - 12/2019 - NSR, no Afib, occasional PACs & PVCs, 1st degree AV block, asymptomatic 2 3 second pauses, lowest HR 38 bpm  Echo - 11/2019 ([facility]) - LVEF 60%, mild TR/MR/AI  H/o Right rib fractures  In 11th & 12th rib with pneumothorax  Hypothyroidism  Anxiety-depression    Plan  Palpitation, Anxiety, Fall  Zio-patch monitor (3/2022) -  No atrial fibrillation, sinus rhythm 47-96 beats per minute, first-degree AV block but no high-grade AV block, single 4 beat run of SVT / atrial tachycardia  Symptoms are mostly resolved and B are brief when they occur infrequently  She has herself cut down on the metoprolol succinate to 12 5 mg daily due to low blood pressures  Continue metoprolol succinate 12 5 mg daily    Hypertension  Blood pressure now on the lower side at 100/50  She states that blood pressures been low since being on Creon the pain  Previous regimen:  Morning: Losartan 50 mg, metoprolol XL 12 5 mg --> she herself discontinued morning BP meds, due to low BP  Evening: Amlodipine 2 5 mg, metoprolol XL 12 5 mg  Continue amlodipine 2 5 mg daily, metoprolol succinate 12 5 mg daily at night    If Blood pressure continues to be low, then can hold amlodipine as well    Disability parking paperwork filled out for her today    Results for orders placed or performed in visit on 09/02/22 POCT ECG    Impression    Normal sinus rhythm with first-degree AV block  RSR' suggests RV conduction delay       Orders Placed This Encounter   Procedures    POCT ECG     Return in about 6 months (around 3/2/2023), or if symptoms worsen or fail to improve  History of Present Illness   80 y o   female comes in for short term follow up evaluation of hypertension  She has longstanding history of hypertension, and has been losartan and metoprolol for many years  Recently her blood pressure has been intermittently very elevated to 205-941 systolic  As a result, her losartan was increased from 25-50 mg weeks ago  But despite that her blood pressure seems to have remained elevated intermittently  Per patient, her blood pressure is usually well controlled during the day in the range of 130s  But her blood pressure is usually elevated in the middle of the night  A couple of days ago around 2:00 a m  She woke up and had a blood pressure of 210  She was recently and diagnosed and treated for diverticulitis  Since then she has continued to have some abdominal discomfort  She was scheduled to undergo an EGD for the same  Mom but when she presented for the same, she was found to have a blood pressure of 062 systolic, and as a result this was canceled  Next a again she went to the ED with elevated blood pressures  She was given a dose of clonidine 0 2 mg, and discharged home with follow-up with Cardiology  Since and getting the clonidine 0 2 mg, she has been feeling lightheaded and dizzy, and particularly with standing up  Interval History: 7/31/19  Has been doing relatively well  Continues to have a lot of anxiety  She continues to check her BP 2-3 times/day, and has been keeping a log  Her dizziness improved the same day after the effects of clonidine wore off  Interval history - 11/4/19  She comes back for 2 month follow-up of her blood pressure    No more symptoms of dizziness or lightheadedness after having been off clonidine  She continues to be very anxious about her blood pressure, and addressed to make adjustments on her own based on these blood pressure readings, and also forgets to take blood pressure medications at times due to trying to avoid taking it along with levothyroxine  She had called the office about 10 days ago stating that her blood pressure was 108, and she felt it was too low and as a result was not taking her blood pressure medications  But after that her blood pressure went in the 180s, and as a result she started taking it again    Interval history - 2/19/2020  Since my last follow up visit, she had an episode of syncope on black Friday for which she followed up with Dr Madiha Mo, and was ordered an event monitor  This did not reveal any significant findings  Subsequently about a week ago, and she ended up in the hospital with complains of abdominal pain and fullness after eating some fried sweet potatoes and fish for dinner  She was unable to sleep as a result and got very anxious and her blood pressure was very elevated in the 200s  As a result she was brought into the ED for evaluation  Her symptoms resolved after Mylanta  But during this she was found to have troponin elevation to 1, and Cardiology was consulted  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, related to her elevated blood pressures  Her medications were adjusted and she was set up for outpatient follow-up with me here today  She reports no further symptoms since discharge, and is currently feeling well, although continues to be very anxious about her blood pressure  Interval history - 7/1/2020  She comes back in to the office for an early visit due to again ongoing blood pressure fluctuations  Her medication regimen has been fairly stable over the past 2 months with losartan amlodipine and metoprolol    But at home, she tries to adjust medications based on her blood pressure and her perception of high blood pressure  She continues to be very anxious despite her ten-day psychiatric rehab  She states that she feels lonely at times in the pandemic has made her worry a lot regarding things like not having done enough for her mother 15 years ago, when she passed away  Interval history - 10/13/2020  He comes back for follow-up after 3 months  She has been doing fairly well recently, and does not have any active complains today  Admits to having significant anxiety and depression, and has been maintained on Lexapro, with which she is doing better  Interval history - 1/21/2021  She comes back for follow-up after about 3 months  She has been doing well overall and is happy with her current status  She remains reasonably active and  Is able to walk on level ground while shopping and does not report any symptoms with the same  Her granddaughter comes to visit her every day for lunch, which she enjoys as well  Interval history - 3/1/2021  She comes back for follow-up after about 6 weeks  After my last office visit, within 1 week she was again in the ED with complains of palpitations, which happened at night and when she were to go to the restroom  ECG and telemetry in the hospital were without any significant arrhythmias  She was diagnosed with anxiety, and treated with medications for the same in again discharged home  She has been doing well overall since then and has not had any major complaints  She continues to take anxiety medications  Interval history - 7/7/2021  She comes back for follow up after 4 months  She has been doing well in recent months, and has been able to control her anxiety and stress well  With this her blood pressure is also better controlled  She thinks the metoprolol helps her sleep, and as a result her overall symptoms are under control  No major complains of chest, shortness of breath  She remains active      Interval history - 3/14/2022  She comes back for follow-up in the office after 9 months  I did see her in the hospital about a month ago when she had presented after a fall  He reported trying to get up and then felt dizzy /unsteady, and then fell before she could get to a nearby chair  She unfortunately fractured her 11th and 12th right ribs from this and was in the hospital from pain  Interval history - 9/2/2022  She comes back for follow-up after about 6 months  No current complains of chest pain or shortness of breath  She is in fact back to driving short distances locally  Her blood pressure has been on the lower side and as a result she has not been taking her morning blood pressure medications  Even the amlodipine has been lowered to 2 5 mg at night  But blood pressure continues to be well controlled and she does not have any further palpitations in recent months  She is requesting paperwork for disability parking allotment          Historical Information   Past Medical History:   Diagnosis Date    Anxiety     Benign hypertension with CKD (chronic kidney disease) stage III ([facility] [address] ) 2/12/2022    CAD (coronary artery disease)     Carotid Duplex 03/06/2018    Plaque formation was prominent bilaterally    Depression     Disease of thyroid gland     Diverticulitis     Dyslipidemia     ECHO 09/14/2017    EF 55%, mild mitral regurg, small pericardial effusion   Hypertension     Mitral regurgitation     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 infarction)     Shingles     Syncope     Takotsubo cardiomyopathy      Past Surgical History:   Procedure Laterality Date    CARDIAC CATHETERIZATION  08/27/2012    EF 35%, 70% stenosis of diagonal, Otherwise all OK apart from myopathy    CARDIAC CATHETERIZATION  10/10/2012    EF 55%, no significant CAD    Mild stress induced cardiomyopathy    CATARACT EXTRACTION Bilateral     TONSILLECTOMY  childhood     Family History   Problem Relation Age of Onset    Cancer Father     Prostate cancer Father     No Known Problems Mother      Current Outpatient Medications on File Prior to Visit   Medication Sig Dispense Refill    aspirin 81 mg chewable tablet Chew 81 mg daily      cholecalciferol (VITAMIN D3) 1,000 units tablet Take 1,000 Units by mouth daily      clonazePAM (KlonoPIN) 0 5 mg tablet Take 0 5 tablets (0 25 mg total) by mouth 2 (two) times a day as needed for anxiety for up to 10 days Take 1/2 tablet (0 25-mg) by oral route 2 times a day        escitalopram (LEXAPRO) 20 mg tablet Take 20 mg by mouth daily      lansoprazole (PREVACID) 30 mg capsule Take 1 capsule (30 mg total) by mouth daily 30 capsule 0    levothyroxine 50 mcg tablet take 1 tablet by mouth ON MONDAY, WEDNESDAY, AND FRIDAY 12 tablet 0    levothyroxine 75 mcg tablet Take 1 tablet (75 mcg total) by mouth 4 (four) times a week On Tuesdays, Thursdays and Saturdays and Sundays 16 tablet 0    metoprolol succinate (TOPROL-XL) 25 mg 24 hr tablet Take 0 5 tablets (12 5 mg total) by mouth daily 30 tablet 0    Multiple Vitamin (MULTIVITAMIN) capsule Take 1 capsule by mouth daily      prednisoLONE acetate (PRED FORTE) 1 % ophthalmic suspension Administer 1 drop to both eyes 2 (two) times a day 5 mL 0    sucralfate (CARAFATE) 1 g/10 mL suspension Take 10 mL (1 g total) by mouth 4 (four) times a day 420 mL 0    [DISCONTINUED] amLODIPine (NORVASC) 5 mg tablet Take 1 tablet (5 mg total) by mouth daily at bedtime 90 tablet 3    benzonatate (TESSALON PERLES) 100 mg capsule Take 1 capsule (100 mg total) by mouth 3 (three) times a day as needed for cough (Patient not taking: Reported on 9/2/2022) 20 capsule 0    escitalopram (LEXAPRO) 10 mg tablet Take 2 tablets (20 mg total) by mouth daily (Patient not taking: Reported on 9/2/2022)      melatonin 3 mg Take 1 tablet (3 mg total) by mouth daily at bedtime (Patient not taking: No sig reported) 30 tablet 0    ondansetron (Zofran ODT) 4 mg disintegrating tablet Take 1 tablet (4 mg total) by mouth every 6 (six) hours as needed for nausea or vomiting (Patient not taking: No sig reported) 10 tablet 0    polyethylene glycol (MIRALAX) 17 g packet Take 17 g by mouth daily (Patient not taking: No sig reported)      valACYclovir (VALTREX) 500 mg tablet Take 500 mg by mouth daily (Patient not taking: No sig reported)      [DISCONTINUED] losartan (COZAAR) 50 mg tablet Take 1 tablet (50 mg total) by mouth daily (Patient not taking: Reported on 9/2/2022) 90 tablet 3     No current facility-administered medications on file prior to visit  Allergies   Allergen Reactions    No Known Allergies      Social History     Socioeconomic History    Marital status:      Spouse name: None    Number of children: None    Years of education: None    Highest education level: None   Occupational History    None   Tobacco Use    Smoking status: Never Smoker    Smokeless tobacco: Never Used   Vaping Use    Vaping Use: Never used   Substance and Sexual Activity    Alcohol use: Never    Drug use: Never    Sexual activity: Not Currently   Other Topics Concern    None   Social History Narrative    Caffeine - 2 cups hot tea/day     Social Determinants of Health     Financial Resource Strain: Not on file   Food Insecurity: No Food Insecurity    Worried About Running Out of Food in the Last Year: Never true    Megan of Food in the Last Year: Never true   Transportation Needs: No Transportation Needs    Lack of Transportation (Medical): No    Lack of Transportation (Non-Medical): No   Physical Activity: Not on file   Stress: Not on file   Social Connections: Not on file   Intimate Partner Violence: Not on file   Housing Stability: Low Risk     Unable to Pay for Housing in the Last Year: No    Number of Places Lived in the Last Year: 1    Unstable Housing in the Last Year: No        Review of Systems   All other systems reviewed and are negative      Vitals:  Vitals:    09/02/22 1355   BP: 100/50   Pulse: 68   Weight: 59 9 kg (132 lb) Height: 5' 1" (1 549 m)     BMI - Body mass index is 24 94 kg/m²  Wt Readings from Last 7 Encounters:   09/02/22 59 9 kg (132 lb)   08/05/22 60 3 kg (133 lb)   06/19/22 58 1 kg (128 lb)   03/14/22 59 4 kg (131 lb)   02/18/22 58 1 kg (128 lb)   01/03/22 58 1 kg (128 lb)   09/24/21 58 5 kg (129 lb)       Physical Exam  Vitals and nursing note reviewed  Constitutional:       General: She is not in acute distress  Appearance: Normal appearance  She is well-developed  She is not ill-appearing  HENT:      Head: Normocephalic and atraumatic  Nose: No congestion  Eyes:      General: No scleral icterus  Conjunctiva/sclera: Conjunctivae normal    Neck:      Vascular: No carotid bruit or JVD  Cardiovascular:      Rate and Rhythm: Normal rate and regular rhythm  Pulses: Normal pulses  Heart sounds: Normal heart sounds  No murmur heard  No friction rub  No gallop  Pulmonary:      Effort: Pulmonary effort is normal  No respiratory distress  Breath sounds: Normal breath sounds  No rales  Abdominal:      General: There is no distension  Palpations: Abdomen is soft  Tenderness: There is no abdominal tenderness  Musculoskeletal:         General: No swelling or tenderness  Cervical back: Neck supple  Right lower leg: No edema  Left lower leg: No edema  Skin:     General: Skin is warm  Neurological:      General: No focal deficit present  Mental Status: She is alert and oriented to person, place, and time  Mental status is at baseline  Psychiatric:         Mood and Affect: Mood normal          Behavior: Behavior normal          Thought Content:  Thought content normal            Labs:  CBC:   Lab Results   Component Value Date    WBC 5 73 02/14/2022    RBC 3 66 (L) 02/14/2022    HGB 11 4 (L) 02/14/2022    HCT 35 6 02/14/2022    MCV 97 02/14/2022     02/14/2022    RDW 13 0 02/14/2022       CMP:   Lab Results   Component Value Date    K 4 6 02/13/2022    CL 97 02/13/2022    CO2 26 02/13/2022    BUN 19 02/13/2022    CREATININE 1 06 02/13/2022    EGFR 47 02/13/2022    CALCIUM 9 1 02/13/2022    AST 19 01/03/2022    ALT 9 01/03/2022    ALKPHOS 78 01/03/2022       Magnesium:  Lab Results   Component Value Date    MG 2 0 01/26/2021       Lipid Profile:   Lab Results   Component Value Date    HDL 70 01/27/2021    TRIG 53 01/27/2021    LDLCALC 113 (H) 01/27/2021       Thyroid Studies:   Lab Results   Component Value Date    [id number] 2 250 01/27/2021       No components found for: [facility]    Lab Results   Component Value Date    INR 1 06 02/12/2022    INR 1 01 01/26/2021    INR 1 15 06/09/2019   5    Imaging: No results found  Cardiac testing:   No results found for this or any previous visit  No results found for this or any previous visit  No results found for this or any previous visit  No results found for this or any previous visit

## 2022-10-08 ENCOUNTER — OFFICE VISIT (OUTPATIENT)
Dept: URGENT CARE | Facility: CLINIC | Age: 87
End: 2022-10-08
Payer: COMMERCIAL

## 2022-10-08 VITALS
RESPIRATION RATE: 18 BRPM | OXYGEN SATURATION: 96 % | TEMPERATURE: 98 F | BODY MASS INDEX: 24.94 KG/M2 | WEIGHT: 132 LBS | HEART RATE: 70 BPM

## 2022-10-08 DIAGNOSIS — J02.9 SORETHROAT: Primary | ICD-10-CM

## 2022-10-08 LAB
S PYO AG THROAT QL: NEGATIVE
SARS-COV-2 AG UPPER RESP QL IA: NEGATIVE
VALID CONTROL: NORMAL

## 2022-10-08 PROCEDURE — 87880 STREP A ASSAY W/OPTIC: CPT | Performed by: PHYSICIAN ASSISTANT

## 2022-10-08 PROCEDURE — S9083 URGENT CARE CENTER GLOBAL: HCPCS | Performed by: PHYSICIAN ASSISTANT

## 2022-10-08 PROCEDURE — 87811 SARS-COV-2 COVID19 W/OPTIC: CPT | Performed by: PHYSICIAN ASSISTANT

## 2022-10-08 PROCEDURE — 99213 OFFICE O/P EST LOW 20 MIN: CPT | Performed by: PHYSICIAN ASSISTANT

## 2022-10-08 NOTE — PATIENT INSTRUCTIONS
Continue to monitor symptoms  If new or worsening symptoms develop, go immediately to [facility]  Drink plenty of fluids  Follow up with Family Doctor this week  Upper Respiratory Infection   WHAT YOU NEED TO KNOW:   An upper respiratory infection is also called a cold  It can affect your nose, throat, ears, and sinuses  Cold symptoms are usually worst for the first 3 to 5 days  Most people get better in 7 to 14 days  You may continue to cough for 2 to 3 weeks  Colds are caused by viruses and do not get better with antibiotics  DISCHARGE INSTRUCTIONS:   Call your local emergency number (911 in the [address] [place] [address],[address]) if:   You have chest pain or trouble breathing  Return to the emergency department if:   You have a fever over 102ºF (39ºC)  Call your doctor if:   You have a low fever  Your sore throat gets worse or you see white or yellow spots in your throat  Your symptoms get worse after 3 to 5 days or are not better in 14 days  You have a rash anywhere on your skin  You have large, tender lumps in your neck  You have thick, green, or yellow drainage from your nose  You cough up thick yellow, green, or bloody mucus  You have a bad earache  You have questions or concerns about your condition or care  Medicines: You may need any of the following:  Decongestants  help reduce nasal congestion and help you breathe more easily  If you take decongestant pills, they may make you feel restless or cause problems with your sleep  Do not use decongestant sprays for more than a few days  Cough suppressants  help reduce coughing  Ask your healthcare provider which type of cough medicine is best for you  NSAIDs , such as ibuprofen, help decrease swelling, pain, and fever  NSAIDs can cause stomach bleeding or kidney problems in certain people  If you take blood thinner medicine, always ask your healthcare provider if NSAIDs are safe for you   Always read the medicine label and follow directions  Acetaminophen  decreases pain and fever  It is available without a doctor's order  Ask how much to take and how often to take it  Follow directions  Read the labels of all other medicines you are using to see if they also contain acetaminophen, or ask your doctor or pharmacist  Acetaminophen can cause liver damage if not taken correctly  Do not use more than 4 grams (4,000 milligrams) total of acetaminophen in one day  Take your medicine as directed  Contact your healthcare provider if you think your medicine is not helping or if you have side effects  Tell him or her if you are allergic to any medicine  Keep a list of the medicines, vitamins, and herbs you take  Include the amounts, and when and why you take them  Bring the list or the pill bottles to follow-up visits  Carry your medicine list with you in case of an emergency  Self-care:   Rest as much as possible  Slowly start to do more each day  Drink more liquids as directed  Liquids will help thin and loosen mucus so you can cough it up  Liquids will also help prevent dehydration  Liquids that help prevent dehydration include water, fruit juice, and broth  Do not drink liquids that contain caffeine  Caffeine can increase your risk for dehydration  Ask your healthcare provider how much liquid to drink each day  Soothe a sore throat  Gargle with warm salt water  Make salt water by dissolving ¼ teaspoon salt in 1 cup warm water  You may also suck on hard candy or throat lozenges  You may use a sore throat spray  Use a humidifier or vaporizer  Use a cool mist humidifier or a vaporizer to increase air moisture in your home  This may make it easier for you to breathe and help decrease your cough  Use saline nasal drops as directed  These help relieve congestion  Apply petroleum-based jelly around the outside of your nostrils  This can decrease irritation from blowing your nose  Do not smoke    Nicotine and other chemicals in cigarettes and cigars can make your symptoms worse  They can also cause infections such as bronchitis or pneumonia  Ask your healthcare provider for information if you currently smoke and need help to quit  E-cigarettes or smokeless tobacco still contain nicotine  Talk to your healthcare provider before you use these products  Prevent a cold: Wash your hands often  Use soap and water every time you wash your hands  Rub your soapy hands together, lacing your fingers  Use the fingers of one hand to scrub under the nails of the other hand  Wash for at least 20 seconds  Rinse with warm, running water for several seconds  Then dry your hands  Use germ-killing gel if soap and water are not available  Do not touch your eyes or mouth without washing your hands first          Cover a sneeze or cough  Use a tissue that covers your mouth and nose  Put the used tissue in the trash right away  Use the bend of your arm if a tissue is not available  Wash your hands well with soap and water or use a hand   Do not stand close to anyone who is sneezing or coughing  Try to stay away from others while you are sick  This is especially important during the first 2 to 3 days when the virus is more easily spread  Wait until a fever, cough, or other symptoms are gone before you return to work or other regular activities  Do not share items while you are sick  This includes food, drinks, eating utensils, and dishes  Follow up with your doctor as directed:  Write down your questions so you remember to ask them during your visits  © [employer] 2022 Information is for End User's use only and may not be sold, redistributed or otherwise used for commercial purposes  All illustrations and images included in CareNotes® are the copyrighted property of [employer]  or Kevan Roth   The above information is an  only   It is not intended as medical advice for individual conditions or treatments  Talk to your doctor, nurse or pharmacist before following any medical regimen to see if it is safe and effective for you

## 2022-10-08 NOTE — PROGRESS NOTES
[facility]        NAME: Saroj Brown is a 80 y o  female  : 1935    MRN: [id number]  DATE: 2022  TIME: 1:38 PM    Assessment and Plan   Sorethroat [J02 9]  1  Sorethroat  Poct Covid 19 Rapid Antigen Test    POCT rapid strepA         Patient Instructions       Continue to monitor symptoms  If new or worsening symptoms develop, go immediately to Er  Drink plenty of fluids  Follow up with Family Doctor this week  Chief Complaint     Chief Complaint   Patient presents with    Headache    Sore Throat     X 3 days         History of Present Illness       Cough  This is a new problem  Episode onset: 3 days ago  The problem has been gradually worsening  The problem occurs every few minutes  The cough is non-productive  Associated symptoms include headaches, nasal congestion, postnasal drip and a sore throat  Pertinent negatives include no chest pain, chills, ear pain, fever, myalgias, rash, rhinorrhea, shortness of breath or wheezing  Nothing aggravates the symptoms  Treatments tried: claritin  The treatment provided mild relief  Eating and drinking  No fatigue  No sick contacts  Pt is vaccinated  Review of Systems   Review of Systems   Constitutional: Negative for chills, diaphoresis, fatigue and fever  HENT: Positive for postnasal drip, sinus pressure, sinus pain, sneezing and sore throat  Negative for congestion, ear pain, rhinorrhea and voice change  Eyes: Negative  Respiratory: Positive for cough  Negative for chest tightness, shortness of breath and wheezing  Cardiovascular: Negative for chest pain and palpitations  Gastrointestinal: Negative for abdominal pain, constipation, diarrhea, nausea and vomiting  Endocrine: Negative  Genitourinary: Negative for dysuria  Musculoskeletal: Negative for back pain, myalgias and neck pain  Skin: Negative for pallor and rash  Allergic/Immunologic: Negative  Neurological: Positive for headaches   Negative for dizziness and syncope  Hematological: Negative  Psychiatric/Behavioral: Negative            Current Medications       Current Outpatient Medications:     amLODIPine (NORVASC) 2 5 mg tablet, Take 1 tablet (2 5 mg total) by mouth daily at bedtime, Disp: , Rfl:     aspirin 81 mg chewable tablet, Chew 81 mg daily, Disp: , Rfl:     cholecalciferol (VITAMIN D3) 1,000 units tablet, Take 1,000 Units by mouth daily, Disp: , Rfl:     clonazePAM (KlonoPIN) 0 5 mg tablet, Take 0 5 tablets (0 25 mg total) by mouth 2 (two) times a day as needed for anxiety for up to 10 days Take 1/2 tablet (0 25-mg) by oral route 2 times a day , Disp: , Rfl:     escitalopram (LEXAPRO) 20 mg tablet, Take 20 mg by mouth daily, Disp: , Rfl:     lansoprazole (PREVACID) 30 mg capsule, Take 1 capsule (30 mg total) by mouth daily, Disp: 30 capsule, Rfl: 0    levothyroxine 75 mcg tablet, Take 1 tablet (75 mcg total) by mouth 4 (four) times a week On Tuesdays, Thursdays and Saturdays and Sundays, Disp: 16 tablet, Rfl: 0    metoprolol succinate (TOPROL-XL) 25 mg 24 hr tablet, Take 0 5 tablets (12 5 mg total) by mouth daily, Disp: 30 tablet, Rfl: 0    Multiple Vitamin (MULTIVITAMIN) capsule, Take 1 capsule by mouth daily, Disp: , Rfl:     prednisoLONE acetate (PRED FORTE) 1 % ophthalmic suspension, Administer 1 drop to both eyes 2 (two) times a day, Disp: 5 mL, Rfl: 0    sucralfate (CARAFATE) 1 g/10 mL suspension, Take 10 mL (1 g total) by mouth 4 (four) times a day, Disp: 420 mL, Rfl: 0    benzonatate (TESSALON PERLES) 100 mg capsule, Take 1 capsule (100 mg total) by mouth 3 (three) times a day as needed for cough (Patient not taking: No sig reported), Disp: 20 capsule, Rfl: 0    escitalopram (LEXAPRO) 10 mg tablet, Take 2 tablets (20 mg total) by mouth daily (Patient not taking: No sig reported), Disp: , Rfl:     levothyroxine 50 mcg tablet, take 1 tablet by mouth ON MONDAY, WEDNESDAY, AND FRIDAY (Patient not taking: Reported on 10/8/2022), Disp: 12 tablet, Rfl: 0    melatonin 3 mg, Take 1 tablet (3 mg total) by mouth daily at bedtime (Patient not taking: No sig reported), Disp: 30 tablet, Rfl: 0    ondansetron (Zofran ODT) 4 mg disintegrating tablet, Take 1 tablet (4 mg total) by mouth every 6 (six) hours as needed for nausea or vomiting (Patient not taking: No sig reported), Disp: 10 tablet, Rfl: 0    polyethylene glycol (MIRALAX) 17 g packet, Take 17 g by mouth daily (Patient not taking: No sig reported), Disp: , Rfl:     valACYclovir (VALTREX) 500 mg tablet, Take 500 mg by mouth daily (Patient not taking: No sig reported), Disp: , Rfl:     Current Allergies     Allergies as of 10/08/2022 - Reviewed 10/08/2022   Allergen Reaction Noted    No known allergies  11/09/2015            The following portions of the patient's history were reviewed and updated as appropriate: allergies, current medications, past family history, past medical history, past social history, past surgical history and problem list      Past Medical History:   Diagnosis Date    Anxiety     Benign hypertension with CKD (chronic kidney disease) stage III ([facility] [address] ) 2/12/2022    CAD (coronary artery disease)     Carotid Duplex 03/06/2018    Plaque formation was prominent bilaterally    Depression     Disease of thyroid gland     Diverticulitis     Dyslipidemia     ECHO 09/14/2017    EF 55%, mild mitral regurg, small pericardial effusion   Hypertension     Mitral regurgitation     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 infarction)     Shingles     Syncope     Takotsubo cardiomyopathy        Past Surgical History:   Procedure Laterality Date    CARDIAC CATHETERIZATION  08/27/2012    EF 35%, 70% stenosis of diagonal, Otherwise all OK apart from myopathy    CARDIAC CATHETERIZATION  10/10/2012    EF 55%, no significant CAD    Mild stress induced cardiomyopathy    CATARACT EXTRACTION Bilateral     TONSILLECTOMY  childhood       Family History   Problem Relation Age of Onset    Cancer Father     Prostate cancer Father     No Known Problems Mother          Medications have been verified  Objective   Pulse 70   Temp 98 °F (36 7 °C)   Resp 18   Wt 59 9 kg (132 lb)   SpO2 96%   BMI 24 94 kg/m²        Physical Exam     Physical Exam  Vitals and nursing note reviewed  Constitutional:       General: She is not in acute distress  Appearance: Normal appearance  She is well-developed  She is not ill-appearing or diaphoretic  HENT:      Head: Normocephalic and atraumatic  Right Ear: Tympanic membrane, ear canal and external ear normal  No drainage, swelling or tenderness  No middle ear effusion  Tympanic membrane is not erythematous  Left Ear: Tympanic membrane, ear canal and external ear normal  No drainage, swelling or tenderness  No middle ear effusion  Tympanic membrane is not erythematous  Nose: Congestion present  No rhinorrhea  Mouth/Throat:      Pharynx: No posterior oropharyngeal erythema  Eyes:      General:         Right eye: No discharge  Left eye: No discharge  Conjunctiva/sclera: Conjunctivae normal    Cardiovascular:      Rate and Rhythm: Normal rate and regular rhythm  Heart sounds: Normal heart sounds  Pulmonary:      Effort: Pulmonary effort is normal  No respiratory distress  Breath sounds: Normal breath sounds  No wheezing, rhonchi or rales  Musculoskeletal:      Cervical back: Normal range of motion and neck supple  Lymphadenopathy:      Cervical: No cervical adenopathy  Skin:     General: Skin is warm  Capillary Refill: Capillary refill takes less than 2 seconds  Findings: No rash  Neurological:      Mental Status: She is alert

## 2022-11-04 ENCOUNTER — TELEPHONE (OUTPATIENT)
Dept: CARDIOLOGY CLINIC | Facility: CLINIC | Age: 87
End: 2022-11-04

## 2022-11-04 NOTE — TELEPHONE ENCOUNTER
Spoke with estefania, she is no longer taking Prednisone  Relayed Dr Krys Stanton to her and she understands them

## 2022-11-04 NOTE — TELEPHONE ENCOUNTER
Debi Schaffer called her bp has been running on the high side ever since she was on Prednisone 4 weeks ago  Her BP last night was 148/77 and this morning it was 173/86  She is not dizzy or lightheaded and has no other symptoms  Recs?

## 2023-03-17 ENCOUNTER — OFFICE VISIT (OUTPATIENT)
Dept: CARDIOLOGY CLINIC | Facility: CLINIC | Age: 88
End: 2023-03-17

## 2023-03-17 VITALS
BODY MASS INDEX: 25.11 KG/M2 | SYSTOLIC BLOOD PRESSURE: 120 MMHG | HEIGHT: 61 IN | HEART RATE: 64 BPM | DIASTOLIC BLOOD PRESSURE: 60 MMHG | WEIGHT: 133 LBS

## 2023-03-17 DIAGNOSIS — R26.2 AMBULATORY DYSFUNCTION: ICD-10-CM

## 2023-03-17 DIAGNOSIS — I10 PRIMARY HYPERTENSION: Primary | ICD-10-CM

## 2023-03-17 DIAGNOSIS — E03.9 HYPOTHYROIDISM, UNSPECIFIED TYPE: ICD-10-CM

## 2023-03-17 DIAGNOSIS — R00.2 PALPITATIONS: ICD-10-CM

## 2023-03-17 NOTE — PROGRESS NOTES
[facility] [address] [facility], [address]   [place], [address]   [phone]                                              Cardiology Office Follow Up  France Loza, 80 y o  female  YOB: 1935  MRN: [id number] Encounter: [id number]      PCP - Kiersten Singh MD    Assessment and Plan  Hypertension  H/o takotsubo cardiomyopathy  Troponin peak 1 22  Nuclear stress negative for ischemia in 3/2020  Echo - 8/2015 - [facility]) - LVEF was down to 25% with suggestions of takotsubo cardiomyopathy  LH - 2012 [facility]) - 70% stenosis of diag, rest normal, LVEF 35%  H/o Syncope (11/2019)  Event monitor - 12/2019 - NSR, no Afib, occasional PACs & PVCs, 1st degree AV block, asymptomatic 2 3 second pauses, lowest HR 38 bpm  Echo - 11/2019 ([facility]) - LVEF 60%, mild TR/MR/AI  H/o Right rib fractures  In 11th & 12th rib with pneumothorax  Hypothyroidism  Anxiety-depression    Plan  Palpitation, Anxiety, Fall  Overview  2/2022: had a fall with rib fractures & pneumothorax --> BP was low, and metoprolol was decreased to 12 5 mg once daily  (3/2022) - Zio-patch monitor   No atrial fibrillation, sinus rhythm 47-96 beats per minute, first-degree AV block but no high-grade AV block, single 4 beat run of SVT / atrial tachycardia  No major palpitations recently, and symptoms are mostly resolved and are brief when they occur infrequently  No further falls, since discontinuing losartan and decreasing metoprolol & BP is better as well  Plan  Continue metoprolol succinate 12 5 mg daily    Hypertension  Improved, now 120/60  After her fall last year, where her blood pressure was found to be low she has gradually weaned of morning doses of losartan and metoprolol, and has been doing better with the same   Continue amlodipine 2 5 mg, metoprolol XL 12 5 mg    Apparently, she did not receive a disability card after her last parking space disability card paperwork was filled, and requested that the form be filled out again for her today     Results for orders placed or performed in visit on 03/17/23   POCT ECG    Impression    Normal sinus rhythm with first-degree AV block  Incomplete right bundle branch block  Rule out old anteroseptal infarct       Orders Placed This Encounter   Procedures   • POCT ECG     Return in about 1 year (around 3/17/2024), or if symptoms worsen or fail to improve  History of Present Illness   80 y o   female comes in for short term follow up evaluation of hypertension  She has longstanding history of hypertension, and has been losartan and metoprolol for many years  Recently her blood pressure has been intermittently very elevated to 124-310 systolic  As a result, her losartan was increased from 25-50 mg weeks ago  But despite that her blood pressure seems to have remained elevated intermittently  Per patient, her blood pressure is usually well controlled during the day in the range of 130s  But her blood pressure is usually elevated in the middle of the night  A couple of days ago around 2:00 a m  She woke up and had a blood pressure of 210  She was recently and diagnosed and treated for diverticulitis  Since then she has continued to have some abdominal discomfort  She was scheduled to undergo an EGD for the same  Mom but when she presented for the same, she was found to have a blood pressure of 302 systolic, and as a result this was canceled  Next a again she went to the ED with elevated blood pressures  She was given a dose of clonidine 0 2 mg, and discharged home with follow-up with Cardiology  Since and getting the clonidine 0 2 mg, she has been feeling lightheaded and dizzy, and particularly with standing up  Interval History: 7/31/19  Has been doing relatively well  Continues to have a lot of anxiety  She continues to check her BP 2-3 times/day, and has been keeping a log   Her dizziness improved the same day after the effects of clonidine wore off     Interval history - 11/4/19  She comes back for 2 month follow-up of her blood pressure  No more symptoms of dizziness or lightheadedness after having been off clonidine  She continues to be very anxious about her blood pressure, and addressed to make adjustments on her own based on these blood pressure readings, and also forgets to take blood pressure medications at times due to trying to avoid taking it along with levothyroxine  She had called the office about 10 days ago stating that her blood pressure was 108, and she felt it was too low and as a result was not taking her blood pressure medications  But after that her blood pressure went in the 180s, and as a result she started taking it again    Interval history - 2/19/2020  Since my last follow up visit, she had an episode of syncope on black Friday for which she followed up with Dr Rc Olguin, and was ordered an event monitor  This did not reveal any significant findings  Subsequently about a week ago, and she ended up in the hospital with complains of abdominal pain and fullness after eating some fried sweet potatoes and fish for dinner  She was unable to sleep as a result and got very anxious and her blood pressure was very elevated in the 200s  As a result she was brought into the ED for evaluation  Her symptoms resolved after [place]  But during this she was found to have troponin elevation to 1, and Cardiology was consulted  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, related to her elevated blood pressures  Her medications were adjusted and she was set up for outpatient follow-up with me here today  She reports no further symptoms since discharge, and is currently feeling well, although continues to be very anxious about her blood pressure  Interval history - 7/1/2020  She comes back in to the office for an early visit due to again ongoing blood pressure fluctuations    Her medication regimen has been fairly stable over the past 2 months with losartan amlodipine and metoprolol  But at home, she tries to adjust medications based on her blood pressure and her perception of high blood pressure  She continues to be very anxious despite her ten-day psychiatric rehab  She states that she feels lonely at times in the pandemic has made her worry a lot regarding things like not having done enough for her mother 15 years ago, when she passed away  Interval history - 10/13/2020  He comes back for follow-up after 3 months  She has been doing fairly well recently, and does not have any active complains today  Admits to having significant anxiety and depression, and has been maintained on Lexapro, with which she is doing better  Interval history - 1/21/2021  She comes back for follow-up after about 3 months  She has been doing well overall and is happy with her current status  She remains reasonably active and  Is able to walk on level ground while shopping and does not report any symptoms with the same  Her granddaughter comes to visit her every day for lunch, which she enjoys as well  Interval history - 3/1/2021  She comes back for follow-up after about 6 weeks  After my last office visit, within 1 week she was again in the ED with complains of palpitations, which happened at night and when she were to go to the restroom  ECG and telemetry in the hospital were without any significant arrhythmias  She was diagnosed with anxiety, and treated with medications for the same in again discharged home  She has been doing well overall since then and has not had any major complaints  She continues to take anxiety medications  Interval history - 7/7/2021  She comes back for follow up after 4 months  She has been doing well in recent months, and has been able to control her anxiety and stress well  With this her blood pressure is also better controlled    She thinks the metoprolol helps her sleep, and as a result her overall symptoms are under control  No major complains of chest, shortness of breath  She remains active  Interval history - 3/14/2022  She comes back for follow-up in the office after 9 months  I did see her in the hospital about a month ago when she had presented after a fall  He reported trying to get up and then felt dizzy /unsteady, and then fell before she could get to a nearby chair  She unfortunately fractured her 11th and 12th right ribs from this and was in the hospital from pain  Interval history - 9/2/2022  She comes back for follow-up after about 6 months  No current complains of chest pain or shortness of breath  She is in fact back to driving short distances locally  Her blood pressure has been on the lower side and as a result she has not been taking her morning blood pressure medications  Even the amlodipine has been lowered to 2 5 mg at night  But blood pressure continues to be well controlled and she does not have any further palpitations in recent months  She is requesting paperwork for disability parking allotment    Interval history - 3/17/2023  She comes in for follow-up after about 6 months  She has otherwise been doing well and has not had any issues with palpitations, dizziness or lightheadedness, near-syncope or syncope  She continues to drive locally but has difficulty walking longer distances and gets quite tired/short of breath with walking even short distances (eg  getting from the parking lot to the store at [facility])    She states that she filed disability parking space paperwork previously, but never received the card back subsequently          Historical Information   Past Medical History:   Diagnosis Date   • Anxiety    • Benign hypertension with CKD (chronic kidney disease) stage III ([facility] [address] ) 02/12/2022   • CAD (coronary artery disease)    • Carotid Duplex 03/06/2018    Plaque formation was prominent bilaterally   • Depression    • Disease of thyroid gland    • Diverticulitis    • Dyslipidemia    • ECHO 09/14/2017    EF 55%, mild mitral regurg, small pericardial effusion  • Hypertension    • Hypothyroidism    • Mitral regurgitation    • 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 infarction)    • Shingles    • Syncope    • Takotsubo cardiomyopathy      Past Surgical History:   Procedure Laterality Date   • CARDIAC CATHETERIZATION  08/27/2012    EF 35%, 70% stenosis of diagonal, Otherwise all OK apart from myopathy   • CARDIAC CATHETERIZATION  10/10/2012    EF 55%, no significant CAD  Mild stress induced cardiomyopathy   • CATARACT EXTRACTION Bilateral    • TONSILLECTOMY  childhood     Family History   Problem Relation Age of Onset   • Cancer Father    • Prostate cancer Father    • No Known Problems Mother      Current Outpatient Medications on File Prior to Visit   Medication Sig Dispense Refill   • amLODIPine (NORVASC) 2 5 mg tablet Take 1 tablet (2 5 mg total) by mouth daily at bedtime     • aspirin 81 mg chewable tablet Chew 81 mg daily     • cholecalciferol (VITAMIN D3) 1,000 units tablet Take 1,000 Units by mouth daily     • clonazePAM (KlonoPIN) 0 5 mg tablet Take 0 5 tablets (0 25 mg total) by mouth 2 (two) times a day as needed for anxiety for up to 10 days Take 1/2 tablet (0 25-mg) by oral route 2 times a day       • escitalopram (LEXAPRO) 20 mg tablet Take 20 mg by mouth daily     • lansoprazole (PREVACID) 30 mg capsule Take 1 capsule (30 mg total) by mouth daily 30 capsule 0   • levothyroxine 75 mcg tablet Take 1 tablet (75 mcg total) by mouth 4 (four) times a week On Tuesdays, Thursdays and Saturdays and Sundays 16 tablet 0   • metoprolol succinate (TOPROL-XL) 25 mg 24 hr tablet Take 0 5 tablets (12 5 mg total) by mouth daily 30 tablet 0   • Multiple Vitamin (MULTIVITAMIN) capsule Take 1 capsule by mouth daily     • prednisoLONE acetate (PRED FORTE) 1 % ophthalmic suspension Administer 1 drop to both eyes 2 (two) times a day 5 mL 0   • sucralfate (CARAFATE) 1 g/10 mL suspension Take 10 mL (1 g total) by mouth 4 (four) times a day 420 mL 0   • benzonatate (TESSALON PERLES) 100 mg capsule Take 1 capsule (100 mg total) by mouth 3 (three) times a day as needed for cough (Patient not taking: Reported on 9/2/2022) 20 capsule 0   • melatonin 3 mg Take 1 tablet (3 mg total) by mouth daily at bedtime (Patient not taking: Reported on 3/14/2022) 30 tablet 0   • ondansetron (Zofran ODT) 4 mg disintegrating tablet Take 1 tablet (4 mg total) by mouth every 6 (six) hours as needed for nausea or vomiting (Patient not taking: Reported on 3/14/2022) 10 tablet 0   • polyethylene glycol (MIRALAX) 17 g packet Take 17 g by mouth daily (Patient not taking: Reported on 6/19/2022)     • valACYclovir (VALTREX) 500 mg tablet Take 500 mg by mouth daily (Patient not taking: Reported on 3/14/2022)     • [DISCONTINUED] escitalopram (LEXAPRO) 10 mg tablet Take 2 tablets (20 mg total) by mouth daily (Patient not taking: Reported on 9/2/2022)     • [DISCONTINUED] levothyroxine 50 mcg tablet take 1 tablet by mouth ON MONDAY, WEDNESDAY, AND FRIDAY (Patient not taking: Reported on 10/8/2022) 12 tablet 0     No current facility-administered medications on file prior to visit  Allergies   Allergen Reactions   • No Known Allergies      Social History     Socioeconomic History   • Marital status:       Spouse name: None   • Number of children: None   • Years of education: None   • Highest education level: None   Occupational History   • None   Tobacco Use   • Smoking status: Never   • Smokeless tobacco: Never   Vaping Use   • Vaping Use: Never used   Substance and Sexual Activity   • Alcohol use: Never   • Drug use: Never   • Sexual activity: Not Currently   Other Topics Concern   • None   Social History Narrative    Caffeine - 2 cups hot tea/day     Social Determinants of Health     Financial Resource Strain: Not on file   Food Insecurity: Not on file   Transportation Needs: Not on file   Physical Activity: Not on file   Stress: Not on file Social Connections: Not on file   Intimate Partner Violence: Not on file   Housing Stability: Not on file        Review of Systems   All other systems reviewed and are negative  Vitals:  Vitals:    03/17/23 1333   BP: 120/60   Pulse: 64   Weight: 60 3 kg (133 lb)   Height: 5' 1" (1 549 m)     BMI - Body mass index is 25 13 kg/m²  Wt Readings from Last 7 Encounters:   03/17/23 60 3 kg (133 lb)   10/08/22 59 9 kg (132 lb)   09/02/22 59 9 kg (132 lb)   08/05/22 60 3 kg (133 lb)   06/19/22 58 1 kg (128 lb)   03/14/22 59 4 kg (131 lb)   02/18/22 58 1 kg (128 lb)       Physical Exam  Vitals and nursing note reviewed  Constitutional:       General: She is not in acute distress  Appearance: Normal appearance  She is well-developed  She is not ill-appearing  HENT:      Head: Normocephalic and atraumatic  Nose: No congestion  Eyes:      General: No scleral icterus  Conjunctiva/sclera: Conjunctivae normal    Neck:      Vascular: No carotid bruit or JVD  Cardiovascular:      Rate and Rhythm: Normal rate and regular rhythm  Pulses: Normal pulses  Heart sounds: Normal heart sounds  No murmur heard  No friction rub  No gallop  Pulmonary:      Effort: Pulmonary effort is normal  No respiratory distress  Breath sounds: Normal breath sounds  No rales  Abdominal:      General: There is no distension  Palpations: Abdomen is soft  Tenderness: There is no abdominal tenderness  Musculoskeletal:         General: No swelling or tenderness  Cervical back: Neck supple  Right lower leg: No edema  Left lower leg: No edema  Skin:     General: Skin is warm  Neurological:      General: No focal deficit present  Mental Status: She is alert and oriented to person, place, and time  Mental status is at baseline  Psychiatric:         Mood and Affect: Mood normal          Behavior: Behavior normal          Thought Content:  Thought content normal            Labs:  CBC: Lab Results   Component Value Date    WBC 5 73 02/14/2022    RBC 3 66 (L) 02/14/2022    HGB 11 4 (L) 02/14/2022    HCT 35 6 02/14/2022    MCV 97 02/14/2022     02/14/2022    RDW 13 0 02/14/2022       CMP:   Lab Results   Component Value Date    K 4 6 02/13/2022    CL 97 02/13/2022    CO2 26 02/13/2022    BUN 19 02/13/2022    CREATININE 1 06 02/13/2022    EGFR 47 02/13/2022    CALCIUM 9 1 02/13/2022    AST 19 01/03/2022    ALT 9 01/03/2022    ALKPHOS 78 01/03/2022       Magnesium:  Lab Results   Component Value Date    MG 2 0 01/26/2021       Lipid Profile:   Lab Results   Component Value Date    HDL 70 01/27/2021    TRIG 53 01/27/2021    LDLCALC 113 (H) 01/27/2021       Thyroid Studies:   Lab Results   Component Value Date    [id number] 2 250 01/27/2021       No components found for: [employer] [facility]    Lab Results   Component Value Date    INR 1 06 02/12/2022    INR 1 01 01/26/2021    INR 1 15 06/09/2019   5    Imaging: No results found  Cardiac testing:   No results found for this or any previous visit  No results found for this or any previous visit  No results found for this or any previous visit  No results found for this or any previous visit

## 2023-05-30 DIAGNOSIS — I10 ESSENTIAL HYPERTENSION: Chronic | ICD-10-CM

## 2023-05-31 DIAGNOSIS — I10 ESSENTIAL HYPERTENSION: Chronic | ICD-10-CM

## 2023-05-31 RX ORDER — AMLODIPINE BESYLATE 2.5 MG/1
2.5 TABLET ORAL
Qty: 90 TABLET | Refills: 3 | Status: SHIPPED | OUTPATIENT
Start: 2023-05-31

## 2023-07-11 ENCOUNTER — TELEPHONE (OUTPATIENT)
Dept: CARDIOLOGY CLINIC | Facility: CLINIC | Age: 88
End: 2023-07-11

## 2023-07-11 NOTE — TELEPHONE ENCOUNTER
Best Keita called, she was in the [facility] ER. recs updated. These are the med changes you wanted you to be aware. She wanted to see you sooner but since you aren't here, she is okay to see Sunday Emiliano next week. Once you review her chart, if you want any changes/tests, please let me know and I will call her. ..    busPIRone 5 MG tablet  Commonly known as: BUSPAR  Take 1 tablet (5 mg total) by mouth 2 (two) times a day.           CHANGE how you take these medications     metoprolol 25 MG 24 hr tablet  Commonly known as: TOPROL-XL  1 tab PO nightly  What changed: additional instructions

## 2023-07-18 ENCOUNTER — OFFICE VISIT (OUTPATIENT)
Dept: CARDIOLOGY CLINIC | Facility: CLINIC | Age: 88
End: 2023-07-18
Payer: COMMERCIAL

## 2023-07-18 VITALS
BODY MASS INDEX: 24.55 KG/M2 | SYSTOLIC BLOOD PRESSURE: 128 MMHG | OXYGEN SATURATION: 96 % | HEIGHT: 61 IN | WEIGHT: 130 LBS | HEART RATE: 64 BPM | DIASTOLIC BLOOD PRESSURE: 62 MMHG | RESPIRATION RATE: 16 BRPM

## 2023-07-18 DIAGNOSIS — I73.9 PAD (PERIPHERAL ARTERY DISEASE) (HCC): ICD-10-CM

## 2023-07-18 DIAGNOSIS — F41.9 ANXIETY: Chronic | ICD-10-CM

## 2023-07-18 DIAGNOSIS — I51.81 TAKOTSUBO CARDIOMYOPATHY: Primary | Chronic | ICD-10-CM

## 2023-07-18 DIAGNOSIS — N18.30 STAGE 3 CHRONIC KIDNEY DISEASE, UNSPECIFIED WHETHER STAGE 3A OR 3B CKD (HCC): ICD-10-CM

## 2023-07-18 DIAGNOSIS — I10 PRIMARY HYPERTENSION: ICD-10-CM

## 2023-07-18 PROCEDURE — 99214 OFFICE O/P EST MOD 30 MIN: CPT | Performed by: PHYSICIAN ASSISTANT

## 2023-07-18 RX ORDER — TRIAMCINOLONE ACETONIDE 1 MG/G
CREAM TOPICAL
COMMUNITY
Start: 2023-06-27

## 2023-07-18 RX ORDER — BUSPIRONE HYDROCHLORIDE 5 MG/1
5 TABLET ORAL 2 TIMES DAILY
COMMUNITY
Start: 2023-07-10

## 2023-07-18 RX ORDER — PREDNISONE 20 MG/1
TABLET ORAL
COMMUNITY
Start: 2023-06-29

## 2023-07-18 RX ORDER — ALUMINUM HYDROXIDE, MAGNESIUM HYDROXIDE, DIMETHICONE 800; 800; 80 MG/10ML; MG/10ML; MG/10ML
SUSPENSION ORAL
COMMUNITY
Start: 2023-07-12

## 2023-07-18 RX ORDER — ALUMINA, MAGNESIA, AND SIMETHICONE 2400; 2400; 240 MG/30ML; MG/30ML; MG/30ML
5 SUSPENSION ORAL EVERY 6 HOURS PRN
COMMUNITY
Start: 2023-07-12

## 2023-07-18 NOTE — PATIENT INSTRUCTIONS
Check  echo     Return in three months to follow with Dr. Della Marion     Call if there are any concerns in the meantime

## 2023-07-18 NOTE — PROGRESS NOTES
[facility]   Outpatient Note  Karma Mansfield  1935  [id number]  [facility] [address] [facility]  [address] [place] [address] [address] [place] [id number] [address] [facility]    Karma Mansfield is a 80 y.o. female    Assessment and Plan:   Takotsubo cardiomyopathy  EF now normalized  Symptomatically improved    [facility]- 2012- 70% stenosis of diagonal LVEF 35%  History of syncope  Repeat echo 2/18/2022: Normal LVEF 60% mild TR/MR/AI mild MAC small pericardial effusion without tamponade  Nuclear stress test 3/20: Negative for ischemia  Event monitor 12/19- NSR, no A-fib occasional PAC and PVC first-degree AV block asymptomatic 2 to 3-second pause lowest heart rate 38 bpm    Anxiety  Playing a role in symptoms  Now on Lexapro and BuSpar    Primary hypertension  Controlled on current medical regimen  Continue amlodipine 2.5 mg daily and metoprolol 25 mg daily        Additional Plan:   Medications as detailed above. We will repeat echocardiogram prior to next visit. Available lab and test results are reviewed with the patient and any additional required labs are ordered as noted. Return visit will be in three months or earlier if there are problems. The patient is encouraged to call in the meantime if there are questions or concerns. Subjective: The patient is seen in follow-up after being in the emergency room [facility] for hypertensive urgency. Patient was overwhelmed with housework and was thought to have panic attack. Anxiety played a role in her symptoms. BuSpar was added to her medications as well metoprolol was increased to 25 mg daily her symptoms have since resolved and blood pressure is well controlled on current medical regimen. Patient has a PMH of Takotsubo cardiomyopathy with [facility] in 2012 showing a 70% stenosis of the diagonal and normal remaining coronaries with an LVEF of 35% at the time.   She did have a history of syncope in 2019. Echo in 2/18/2022 shows EF n has normalized to 60%. Patient also underwent a nuclear stress test in 3/20 that was negative for ischemia. Her only complaint today is that of left breast tenderness. She states that her pain is improved when wearing a bra to support. This is nonexertional and is not accompanied by diaphoresis nausea or shortness of breath. Patient has no cardiac symptoms she has no exertional chest pain or dyspnea. She does not have any edema orthopnea or PND. She denies any palpitations dizziness lightheadedness or syncope. He has no TIA or CVA symptoms. Social History  Social History     Tobacco Use   Smoking Status Never   Smokeless Tobacco Never   ,   Social History     Substance and Sexual Activity   Alcohol Use Never   ,   Social History     Substance and Sexual Activity   Drug Use Never     Family History   Problem Relation Age of Onset   • Cancer Father    • Prostate cancer Father    • No Known Problems Mother        Medical and Surgical History  Past Medical History:   Diagnosis Date   • Anxiety    • Benign hypertension with CKD (chronic kidney disease) stage III ([address]) 02/12/2022   • CAD (coronary artery disease)    • Carotid Duplex 03/06/2018    Plaque formation was prominent bilaterally   • Depression    • Disease of thyroid gland    • Diverticulitis    • Dyslipidemia    • ECHO 09/14/2017    EF 55%, mild mitral regurg, small pericardial effusion. • Hypertension    • Hypothyroidism    • Mitral regurgitation    • 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 infarction)    • Shingles    • Syncope    • Takotsubo cardiomyopathy      Past Surgical History:   Procedure Laterality Date   • CARDIAC CATHETERIZATION  08/27/2012    EF 35%, 70% stenosis of diagonal, Otherwise all OK apart from myopathy   • CARDIAC CATHETERIZATION  10/10/2012    EF 55%, no significant CAD.   Mild stress induced cardiomyopathy   • CATARACT EXTRACTION Bilateral    • TONSILLECTOMY  childhood         Current Outpatient Medications:   •  aluminum-magnesium hydroxide-simethicone (MAALOX MAX) 400-400-40 MG/5ML suspension, Take 5 mL by mouth every 6 (six) hours as needed, Disp: , Rfl:   •  amLODIPine (NORVASC) 2.5 mg tablet, Take 1 tablet (2.5 mg total) by mouth daily at bedtime, Disp: 90 tablet, Rfl: 3  •  Antacid/Anti-Gas 400-400-40 MG/5ML suspension, take 5 milliliters by mouth every 6 hours if needed for indigestion or HEARTBURN, Disp: , Rfl:   •  aspirin 81 mg chewable tablet, Chew 81 mg daily, Disp: , Rfl:   •  busPIRone (BUSPAR) 5 mg tablet, Take 5 mg by mouth 2 (two) times a day, Disp: , Rfl:   •  cholecalciferol (VITAMIN D3) 1,000 units tablet, Take 1,000 Units by mouth daily, Disp: , Rfl:   •  clonazePAM (KlonoPIN) 0.5 mg tablet, Take 0.5 tablets (0.25 mg total) by mouth 2 (two) times a day as needed for anxiety for up to 10 days Take 1/2 tablet (0.25-mg) by oral route 2 times a day., Disp: , Rfl:   •  escitalopram (LEXAPRO) 20 mg tablet, Take 20 mg by mouth daily, Disp: , Rfl:   •  lansoprazole (PREVACID) 30 mg capsule, Take 1 capsule (30 mg total) by mouth daily, Disp: 30 capsule, Rfl: 0  •  levothyroxine 75 mcg tablet, Take 1 tablet (75 mcg total) by mouth 4 (four) times a week On Tuesdays, Thursdays and Saturdays and Sundays, Disp: 16 tablet, Rfl: 0  •  metoprolol succinate (TOPROL-XL) 25 mg 24 hr tablet, Take 0.5 tablets (12.5 mg total) by mouth daily, Disp: 30 tablet, Rfl: 0  •  Multiple Vitamin (MULTIVITAMIN) capsule, Take 1 capsule by mouth daily, Disp: , Rfl:   •  prednisoLONE acetate (PRED FORTE) 1 % ophthalmic suspension, Administer 1 drop to both eyes 2 (two) times a day, Disp: 5 mL, Rfl: 0  •  predniSONE 20 mg tablet, , Disp: , Rfl:   •  sucralfate (CARAFATE) 1 g/10 mL suspension, Take 10 mL (1 g total) by mouth 4 (four) times a day, Disp: 420 mL, Rfl: 0  •  triamcinolone (KENALOG) 0.1 % cream, apply topically twice a day to right arm for up to 10 days, Disp: , Rfl:   •  polyethylene glycol (MIRALAX) 17 g packet, Take 17 g by mouth daily (Patient not taking: Reported on 6/19/2022), Disp: , Rfl:   Allergies   Allergen Reactions   • No Known Allergies        ROS    Objective:   /62 (BP Location: Left arm, Patient Position: Sitting, Cuff Size: Standard)   Pulse 64   Resp 16   Ht 5' 1" (1.549 m)   Wt 59 kg (130 lb)   SpO2 96%   BMI 24.56 kg/m²   Physical Exam    Lab Review:   No results found for: "CHOL"  Lab Results   Component Value Date    HDL 70 01/27/2021     Lab Results   Component Value Date    LDLCALC 113 (H) 01/27/2021     Lab Results   Component Value Date    TRIG 53 01/27/2021     Results Reviewed     None        Results Reviewed     None        Results Reviewed     None          Recent Cardiovascular Testing:   Echo 2/18/2022: Normal LVEF 60% no WMA, mild LAE, mild AI/MR/TR mild MAC small pericardial effusion without tamponade    ECG Review:   3/17/2023:Normal sinus rhythm with first-degree AV block   Incomplete right bundle branch block   Rule out old anteroseptal infarct

## 2023-07-18 NOTE — ASSESSMENT & PLAN NOTE
EF now normalized  Symptomatically improved    [facility]- 2012- 70% stenosis of diagonal LVEF 35%  History of syncope  Repeat echo 2/18/2022: Normal LVEF 60% mild TR/MR/AI mild MAC small pericardial effusion without tamponade  Nuclear stress test 3/20: Negative for ischemia  Event monitor 12/19- NSR, no A-fib occasional PAC and PVC first-degree AV block asymptomatic 2 to 3-second pause lowest heart rate 38 bpm

## 2023-10-23 ENCOUNTER — OFFICE VISIT (OUTPATIENT)
Dept: CARDIOLOGY CLINIC | Facility: CLINIC | Age: 88
End: 2023-10-23
Payer: COMMERCIAL

## 2023-10-23 VITALS
DIASTOLIC BLOOD PRESSURE: 76 MMHG | WEIGHT: 132 LBS | BODY MASS INDEX: 24.92 KG/M2 | HEART RATE: 60 BPM | SYSTOLIC BLOOD PRESSURE: 170 MMHG | HEIGHT: 61 IN

## 2023-10-23 DIAGNOSIS — I10 PRIMARY HYPERTENSION: Primary | ICD-10-CM

## 2023-10-23 DIAGNOSIS — R55 SYNCOPE, UNSPECIFIED SYNCOPE TYPE: ICD-10-CM

## 2023-10-23 DIAGNOSIS — I10 ESSENTIAL HYPERTENSION: Chronic | ICD-10-CM

## 2023-10-23 PROCEDURE — 99213 OFFICE O/P EST LOW 20 MIN: CPT | Performed by: INTERNAL MEDICINE

## 2023-10-23 PROCEDURE — 93000 ELECTROCARDIOGRAM COMPLETE: CPT | Performed by: INTERNAL MEDICINE

## 2023-10-23 RX ORDER — AMLODIPINE BESYLATE 2.5 MG/1
5 TABLET ORAL DAILY
Start: 2023-10-23

## 2023-10-23 RX ORDER — METOPROLOL SUCCINATE 25 MG/1
25 TABLET, EXTENDED RELEASE ORAL
Qty: 90 TABLET | Refills: 3
Start: 2023-10-23

## 2023-10-23 NOTE — PROGRESS NOTES
[facility] [address], [address]   [facility], [facility]   [phone]                                              Cardiology Office Follow Up  Huber Santillan, 80 y.o. female  YOB: 1935  MRN: [id number] Encounter: [id number]      PCP - Faiza Chavez MD    Assessment and Plan  Hypertension  H/o takotsubo cardiomyopathy  Troponin peak 1.22  Nuclear stress negative for ischemia in 3/2020  Echo - 8/2015 - [facility]) - LVEF was down to 25% with suggestions of takotsubo cardiomyopathy  [facility] - 2012 [facility]) - 70% stenosis of diag, rest normal, LVEF 35%  H/o Syncope (11/2019)  Event monitor - 12/2019 - NSR, no Afib, occasional PACs & PVCs, 1st degree AV block, asymptomatic 2.3 second pauses, lowest HR 38 bpm  Echo - 11/2019 ([facility]) - LVEF 60%, mild TR/MR/AI  H/o Right rib fractures  In 11th & 12th rib with pneumothorax  Hypothyroidism  Anxiety-depression    Plan  Palpitation, Anxiety  Overview  2/2022: had a fall with rib fractures & pneumothorax --> BP was low, and metoprolol was decreased to 12.5 mg once daily  (3/2022) - Zio-patch monitor   No atrial fibrillation, sinus rhythm 47-96 beats per minute, first-degree AV block but no high-grade AV block, single 4 beat run of SVT / atrial tachycardia  3/2023: No major palpitations recently, and symptoms are mostly resolved and are brief when they occur infrequently  No further falls, since discontinuing losartan and decreasing metoprolol & BP is better as well  10/2023: No palpitations or dizziness. No further falls.   Metoprolol was increased to 25 mg daily at some point in between  Plan  Continue metoprolol succinate 25 mg daily at bedtime    Hypertension  Mildly elevated at 170/76  Amlodipine was recently switched to morning, and she wasn't sure - so she didn't take it  Currently on amlodipine 5 in AM, metoprolol XL 25 mg at bedtime  Anxiety largely contributing to his Reynold systolic elevation blood pressure  Continue same, low-salt diet    Results for orders placed or performed in visit on 10/23/23   POCT ECG    Impression    Normal sinus rhythm with first-degree AV block  Incomplete right bundle branch block  Cannot rule out old septal infarct       Orders Placed This Encounter   Procedures   • POCT ECG     Return in about 1 year (around 10/23/2024), or if symptoms worsen or fail to improve. History of Present Illness   80 y.o.  female comes in for short term follow up evaluation of hypertension. She has longstanding history of hypertension, and has been losartan and metoprolol for many years. Recently her blood pressure has been intermittently very elevated to 288-511 systolic. As a result, her losartan was increased from 25-50 mg weeks ago. But despite that her blood pressure seems to have remained elevated intermittently. Per patient, her blood pressure is usually well controlled during the day in the range of 130s. But her blood pressure is usually elevated in the middle of the night. A couple of days ago around 2:00 a.m. She woke up and had a blood pressure of 210. She was recently and diagnosed and treated for diverticulitis. Since then she has continued to have some abdominal discomfort. She was scheduled to undergo an EGD for the same. Mom but when she presented for the same, she was found to have a blood pressure of 261 systolic, and as a result this was canceled. Next a again she went to the ED with elevated blood pressures. She was given a dose of clonidine 0.2 mg, and discharged home with follow-up with Cardiology. Since and getting the clonidine 0.2 mg, she has been feeling lightheaded and dizzy, and particularly with standing up. Interval History: 7/31/19  Has been doing relatively well. Continues to have a lot of anxiety. She continues to check her BP 2-3 times/day, and has been keeping a log. Her dizziness improved the same day after the effects of clonidine wore off.     Interval history - 11/4/19  She comes back for 2 month follow-up of her blood pressure. No more symptoms of dizziness or lightheadedness after having been off clonidine. She continues to be very anxious about her blood pressure, and addressed to make adjustments on her own based on these blood pressure readings, and also forgets to take blood pressure medications at times due to trying to avoid taking it along with levothyroxine. She had called the office about 10 days ago stating that her blood pressure was 108, and she felt it was too low and as a result was not taking her blood pressure medications. But after that her blood pressure went in the 180s, and as a result she started taking it again    Interval history - 2/19/2020  Since my last follow up visit, she had an episode of syncope on black Friday for which she followed up with Dr. Dk Phelps, and was ordered an event monitor. This did not reveal any significant findings. Subsequently about a week ago, and she ended up in the hospital with complains of abdominal pain and fullness after eating some fried sweet potatoes and fish for dinner. She was unable to sleep as a result and got very anxious and her blood pressure was very elevated in the 200s. As a result she was brought into the ED for evaluation. Her symptoms resolved after [place]. But during this she was found to have troponin elevation to 1, and Cardiology was consulted. 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 Her medications were adjusted and she was set up for outpatient follow-up with me here today. She reports no further symptoms since discharge, and is currently feeling well, although continues to be very anxious about her blood pressure. Interval history - 7/1/2020  She comes back in to the office for an early visit due to again ongoing blood pressure fluctuations.   Her medication regimen has been fairly stable over the past 2 months with losartan amlodipine and metoprolol. But at home, she tries to adjust medications based on her blood pressure and her perception of high blood pressure. She continues to be very anxious despite her ten-day psychiatric rehab. She states that she feels lonely at times in the pandemic has made her worry a lot regarding things like not having done enough for her mother 15 years ago, when she passed away. Interval history - 10/13/2020  He comes back for follow-up after 3 months. She has been doing fairly well recently, and does not have any active complains today. Admits to having significant anxiety and depression, and has been maintained on Lexapro, with which she is doing better. Interval history - 1/21/2021  She comes back for follow-up after about 3 months. She has been doing well overall and is happy with her current status. She remains reasonably active and  Is able to walk on level ground while shopping and does not report any symptoms with the same. Her granddaughter comes to visit her every day for lunch, which she enjoys as well. Interval history - 3/1/2021  She comes back for follow-up after about 6 weeks. After my last office visit, within 1 week she was again in the ED with complains of palpitations, which happened at night and when she were to go to the restroom. ECG and telemetry in the hospital were without any significant arrhythmias. She was diagnosed with anxiety, and treated with medications for the same in again discharged home. She has been doing well overall since then and has not had any major complaints. She continues to take anxiety medications. Interval history - 7/7/2021  She comes back for follow up after 4 months. She has been doing well in recent months, and has been able to control her anxiety and stress well. With this her blood pressure is also better controlled. She thinks the metoprolol helps her sleep, and as a result her overall symptoms are under control.   No major complains of chest, shortness of breath. She remains active. Interval history - 3/14/2022  She comes back for follow-up in the office after 9 months. I did see her in the hospital about a month ago when she had presented after a fall. He reported trying to get up and then felt dizzy /unsteady, and then fell before she could get to a nearby chair. She unfortunately fractured her 11th and 12th right ribs from this and was in the hospital from pain. Interval history - 9/2/2022  She comes back for follow-up after about 6 months. No current complains of chest pain or shortness of breath. She is in fact back to driving short distances locally. Her blood pressure has been on the lower side and as a result she has not been taking her morning blood pressure medications. Even the amlodipine has been lowered to 2.5 mg at night. But blood pressure continues to be well controlled and she does not have any further palpitations in recent months. She is requesting paperwork for disability parking allotment    Interval history - 3/17/2023  She comes in for follow-up after about 6 months. She has otherwise been doing well and has not had any issues with palpitations, dizziness or lightheadedness, near-syncope or syncope. She continues to drive locally but has difficulty walking longer distances and gets quite tired/short of breath with walking even short distances (eg. getting from the parking lot to the store at [facility]). She states that she filed disability parking space paperwork previously, but never received the card back subsequently    Interval history - 10/23/2023  She comes back for follow-up after about 7 months. In the interim she saw. July 2023 after having been to the ED at [facility]. She was felt to have had a panic attack and blood pressure was high thereafter. An echocardiogram was ordered at that time, but was denied by insurance.   She is feeling well otherwise and has not had any further complaints of chest pain or shortness of breath. He remains free of any major palpitations. She does continue to be concerned about blood pressure being elevated in 140s on & off, and also about taking medication when her blood pressure is already in the 120s. She did not take amlodipine for while due to this. Historical Information   Past Medical History:   Diagnosis Date   • Anxiety    • Benign hypertension with CKD (chronic kidney disease) stage III ([address]) 02/12/2022   • CAD (coronary artery disease)    • Carotid Duplex 03/06/2018    Plaque formation was prominent bilaterally   • Depression    • Disease of thyroid gland    • Diverticulitis    • Dyslipidemia    • ECHO 09/14/2017    EF 55%, mild mitral regurg, small pericardial effusion. • Hypertension    • Hypothyroidism    • Mitral regurgitation    • 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 infarction)    • Shingles    • Syncope    • Takotsubo cardiomyopathy      Past Surgical History:   Procedure Laterality Date   • CARDIAC CATHETERIZATION  08/27/2012    EF 35%, 70% stenosis of diagonal, Otherwise all OK apart from myopathy   • CARDIAC CATHETERIZATION  10/10/2012    EF 55%, no significant CAD.   Mild stress induced cardiomyopathy   • CATARACT EXTRACTION Bilateral    • TONSILLECTOMY  childhood     Family History   Problem Relation Age of Onset   • Cancer Father    • Prostate cancer Father    • No Known Problems Mother      Current Outpatient Medications on File Prior to Visit   Medication Sig Dispense Refill   • aluminum-magnesium hydroxide-simethicone (MAALOX MAX) 400-400-40 MG/5ML suspension Take 5 mL by mouth every 6 (six) hours as needed     • Antacid/Anti-Gas 400-400-40 MG/5ML suspension take 5 milliliters by mouth every 6 hours if needed for indigestion or HEARTBURN     • aspirin 81 mg chewable tablet Chew 81 mg daily     • busPIRone (BUSPAR) 5 mg tablet Take 5 mg by mouth 2 (two) times a day     • cholecalciferol (VITAMIN D3) 1,000 units tablet Take 1,000 Units by mouth daily     • clonazePAM (KlonoPIN) 0.5 mg tablet Take 0.5 tablets (0.25 mg total) by mouth 2 (two) times a day as needed for anxiety for up to 10 days Take 1/2 tablet (0.25-mg) by oral route 2 times a day. • escitalopram (LEXAPRO) 20 mg tablet Take 20 mg by mouth daily     • lansoprazole (PREVACID) 30 mg capsule Take 1 capsule (30 mg total) by mouth daily 30 capsule 0   • levothyroxine 75 mcg tablet Take 1 tablet (75 mcg total) by mouth 4 (four) times a week On Tuesdays, Thursdays and Saturdays and Sundays 16 tablet 0   • Multiple Vitamin (MULTIVITAMIN) capsule Take 1 capsule by mouth daily     • polyethylene glycol (MIRALAX) 17 g packet Take 17 g by mouth daily     • prednisoLONE acetate (PRED FORTE) 1 % ophthalmic suspension Administer 1 drop to both eyes 2 (two) times a day 5 mL 0   • sucralfate (CARAFATE) 1 g/10 mL suspension Take 10 mL (1 g total) by mouth 4 (four) times a day 420 mL 0   • triamcinolone (KENALOG) 0.1 % cream apply topically twice a day to right arm for up to 10 days     • [DISCONTINUED] amLODIPine (NORVASC) 2.5 mg tablet Take 1 tablet (2.5 mg total) by mouth daily at bedtime 90 tablet 3   • [DISCONTINUED] metoprolol succinate (TOPROL-XL) 25 mg 24 hr tablet Take 0.5 tablets (12.5 mg total) by mouth daily 30 tablet 0   • predniSONE 20 mg tablet  (Patient not taking: Reported on 10/23/2023)       No current facility-administered medications on file prior to visit. Allergies   Allergen Reactions   • No Known Allergies      Social History     Socioeconomic History   • Marital status:       Spouse name: None   • Number of children: None   • Years of education: None   • Highest education level: None   Occupational History   • None   Tobacco Use   • Smoking status: Never   • Smokeless tobacco: Never   Vaping Use   • Vaping Use: Never used   Substance and Sexual Activity   • Alcohol use: Never   • Drug use: Never   • Sexual activity: Not Currently   Other Topics Concern   • None   Social History Narrative    Caffeine - 2 cups hot tea/day     Social Determinants of Health     Financial Resource Strain: Not on file   Food Insecurity: No Food Insecurity (2/14/2022)    Hunger Vital Sign    • Worried About Running Out of Food in the Last Year: Never true    • Ran Out of Food in the Last Year: Never true   Transportation Needs: No Transportation Needs (2/14/2022)    PRAPARE - Transportation    • Lack of Transportation (Medical): No    • Lack of Transportation (Non-Medical): No   Physical Activity: Not on file   Stress: Not on file   Social Connections: Not on file   Intimate Partner Violence: Not on file   Housing Stability: Low Risk  (2/14/2022)    Housing Stability Vital Sign    • Unable to Pay for Housing in the Last Year: No    • Number of Places Lived in the Last Year: 1    • Unstable Housing in the Last Year: No        Review of Systems   All other systems reviewed and are negative. Vitals:  Vitals:    10/23/23 1341   BP: 170/76   Pulse: 60   Weight: 59.9 kg (132 lb)   Height: 5' 1" (1.549 m)     BMI - Body mass index is 24.94 kg/m². Wt Readings from Last 7 Encounters:   10/23/23 59.9 kg (132 lb)   07/18/23 59 kg (130 lb)   03/17/23 60.3 kg (133 lb)   10/08/22 59.9 kg (132 lb)   09/02/22 59.9 kg (132 lb)   08/05/22 60.3 kg (133 lb)   06/19/22 58.1 kg (128 lb)       Physical Exam  Vitals and nursing note reviewed. Constitutional:       General: She is not in acute distress. Appearance: Normal appearance. She is well-developed. She is not ill-appearing. HENT:      Head: Normocephalic and atraumatic. Nose: No congestion. Eyes:      General: No scleral icterus. Conjunctiva/sclera: Conjunctivae normal.   Neck:      Vascular: No carotid bruit or JVD. Cardiovascular:      Rate and Rhythm: Normal rate and regular rhythm. Pulses: Normal pulses. Heart sounds: Normal heart sounds. No murmur heard. No friction rub. No gallop.    Pulmonary:      Effort: Pulmonary effort is normal. No respiratory distress. Breath sounds: Normal breath sounds. No rales. Abdominal:      General: There is no distension. Palpations: Abdomen is soft. Tenderness: There is no abdominal tenderness. Musculoskeletal:         General: No swelling or tenderness. Cervical back: Neck supple. Right lower leg: No edema. Left lower leg: No edema. Skin:     General: Skin is warm. Neurological:      General: No focal deficit present. Mental Status: She is alert and oriented to person, place, and time. Mental status is at baseline. Psychiatric:         Mood and Affect: Mood normal.         Behavior: Behavior normal.         Thought Content: Thought content normal.           Labs:  CBC:   Lab Results   Component Value Date    WBC 5.73 02/14/2022    RBC 3.66 (L) 02/14/2022    HGB 11.4 (L) 02/14/2022    HCT 35.6 02/14/2022    MCV 97 02/14/2022     02/14/2022    RDW 13.0 02/14/2022       CMP:   Lab Results   Component Value Date    K 4.6 02/13/2022    CL 97 02/13/2022    CO2 26 02/13/2022    BUN 19 02/13/2022    CREATININE 1.06 02/13/2022    EGFR 47 02/13/2022    CALCIUM 9.1 02/13/2022    AST 19 01/03/2022    ALT 9 01/03/2022    ALKPHOS 78 01/03/2022       Magnesium:  Lab Results   Component Value Date    MG 2.0 01/26/2021       Lipid Profile:   Lab Results   Component Value Date    HDL 70 01/27/2021    TRIG 53 01/27/2021    LDLCALC 113 (H) 01/27/2021       Thyroid Studies:   Lab Results   Component Value Date    DNW2OHWCMAQA 2.250 01/27/2021       No components found for: "HGA1C"    Lab Results   Component Value Date    INR 1.06 02/12/2022    INR 1.01 01/26/2021    INR 1.15 06/09/2019   5    Imaging: No results found. Cardiac testing:   No results found for this or any previous visit. No results found for this or any previous visit. No results found for this or any previous visit. No results found for this or any previous visit.

## 2025-01-16 ENCOUNTER — OFFICE VISIT (OUTPATIENT)
Dept: CARDIOLOGY CLINIC | Facility: CLINIC | Age: OVER 89
End: 2025-01-16
Payer: COMMERCIAL

## 2025-01-16 VITALS
BODY MASS INDEX: 23.6 KG/M2 | DIASTOLIC BLOOD PRESSURE: 74 MMHG | HEART RATE: 72 BPM | SYSTOLIC BLOOD PRESSURE: 160 MMHG | HEIGHT: 61 IN | WEIGHT: 125 LBS

## 2025-01-16 DIAGNOSIS — I10 PRIMARY HYPERTENSION: ICD-10-CM

## 2025-01-16 DIAGNOSIS — E87.1 HYPONATREMIA: ICD-10-CM

## 2025-01-16 DIAGNOSIS — I10 ESSENTIAL HYPERTENSION: Primary | Chronic | ICD-10-CM

## 2025-01-16 DIAGNOSIS — I73.9 PAD (PERIPHERAL ARTERY DISEASE) (HCC): ICD-10-CM

## 2025-01-16 DIAGNOSIS — N18.30 STAGE 3 CHRONIC KIDNEY DISEASE, UNSPECIFIED WHETHER STAGE 3A OR 3B CKD (HCC): ICD-10-CM

## 2025-01-16 DIAGNOSIS — I51.81 TAKOTSUBO CARDIOMYOPATHY: ICD-10-CM

## 2025-01-16 PROCEDURE — 99214 OFFICE O/P EST MOD 30 MIN: CPT | Performed by: INTERNAL MEDICINE

## 2025-01-16 RX ORDER — AMLODIPINE BESYLATE 5 MG/1
5 TABLET ORAL DAILY
Qty: 90 TABLET | Refills: 1 | Status: SHIPPED | OUTPATIENT
Start: 2025-01-16 | End: 2025-01-16 | Stop reason: SDUPTHER

## 2025-01-16 RX ORDER — METOPROLOL SUCCINATE 25 MG/1
25 TABLET, EXTENDED RELEASE ORAL
Qty: 90 TABLET | Refills: 4 | Status: SHIPPED | OUTPATIENT
Start: 2025-01-16

## 2025-01-16 RX ORDER — AMLODIPINE BESYLATE 5 MG/1
5 TABLET ORAL DAILY
Qty: 90 TABLET | Refills: 4 | Status: SHIPPED | OUTPATIENT
Start: 2025-01-16

## 2025-01-16 NOTE — PROGRESS NOTES
[facility] [place]  [address] [facility] [address] [phone]                                                Cardiology Office Follow Up  Holly Rutledge, 89 y.o. female  YOB: 1935  MRN: [id number] Encounter: [id number]      PCP - Lawrence Lamar MD    Assessment and Plan  Hypertension  H/o takotsubo cardiomyopathy  Troponin peak 1.22  Nuclear stress negative for ischemia in 3/2020  Echo - 8/2015 - ([facility]) - LVEF was down to 25% with suggestions of takotsubo cardiomyopathy  [facility] - 2012 ([facility]) - 70% stenosis of diag, rest normal, LVEF 35%  H/o Syncope (11/2019)  Event monitor - 12/2019 - NSR, no Afib, occasional PACs & PVCs, 1st degree AV block, asymptomatic 2.3 second pauses, lowest HR 38 bpm  Echo - 11/2019 ([facility]) - LVEF 60%, mild TR/MR/AI  H/o Right rib fractures  In 11th & 12th rib with pneumothorax  Hypothyroidism  Anxiety-depression    Plan  Palpitation, Anxiety  Overview  2/2022: had a fall with rib fractures & pneumothorax --> BP was low, and metoprolol was decreased to 12.5 mg once daily  (3/2022) - Zio-patch monitor   No atrial fibrillation, sinus rhythm 47-96 beats per minute, first-degree AV block but no high-grade AV block, single 4 beat run of SVT / atrial tachycardia  3/2023: No major palpitations recently, and symptoms are mostly resolved and are brief when they occur infrequently  No further falls, since discontinuing losartan and decreasing metoprolol & BP is better as well  10/2023: No palpitations or dizziness.  No further falls.  Metoprolol was increased to 25 mg daily at some point in between  1/2025: no palpitations  Plan  Continue current therapy with metoprolol succinate 20 mg daily at bedtime     Hypertension, Hyponatremia, h/o takotsubo cardiomyopathy  Mainly has isolated systolic hypertension, but mean arterial pressure is within acceptable range  -blood pressure today 160/74   Continue amlodipine 5 mg daily, metoprolol  succinate 25 mg at bedtime   Continue low-sodium diet  Sodium was down to 130 on BMP in October 2024 --> follow up on CMP, lipid panel  Complete echocardiogram that was previously ordered    No results found for this visit on 01/16/25.      Orders Placed This Encounter   Procedures   • Comprehensive metabolic panel   • Lipid Panel with Direct LDL reflex   • Echo complete w/ contrast if indicated       Return in about 1 year (around 1/16/2026), or if symptoms worsen or fail to improve.      History of Present Illness   89 y.o.  female comes in for short term follow up evaluation of hypertension.    She has longstanding history of hypertension, and has been losartan and metoprolol for many years.  Recently her blood pressure has been intermittently very elevated to 170-220 systolic.  As a result, her losartan was increased from 25-50 mg weeks ago.  But despite that her blood pressure seems to have remained elevated intermittently.  Per patient, her blood pressure is usually well controlled during the day in the range of 130s.  But her blood pressure is usually elevated in the middle of the night.  A couple of days ago around 2:00 a.m. She woke up and had a blood pressure of 210.    She was recently and diagnosed and treated for diverticulitis.  Since then she has continued to have some abdominal discomfort.  She was scheduled to undergo an EGD for the same.  Mom but when she presented for the same, she was found to have a blood pressure of 220 systolic, and as a result this was canceled.  Next a again she went to the ED with elevated blood pressures.  She was given a dose of clonidine 0.2 mg, and discharged home with follow-up with Cardiology.    Since and getting the clonidine 0.2 mg, she has been feeling lightheaded and dizzy, and particularly with standing up.    Interval History: 7/31/19  Has been doing relatively well. Continues to have a lot of anxiety. She continues to check her BP 2-3 times/day, and has been  keeping a log. Her dizziness improved the same day after the effects of clonidine wore off.    Interval history - 11/4/19  She comes back for 2 month follow-up of her blood pressure.  No more symptoms of dizziness or lightheadedness after having been off clonidine.  She continues to be very anxious about her blood pressure, and addressed to make adjustments on her own based on these blood pressure readings, and also forgets to take blood pressure medications at times due to trying to avoid taking it along with levothyroxine.  She had called the office about 10 days ago stating that her blood pressure was 108, and she felt it was too low and as a result was not taking her blood pressure medications.  But after that her blood pressure went in the 180s, and as a result she started taking it again    Interval history - 2/19/2020  Since my last follow up visit, she had an episode of syncope on black Friday for which she followed up with Dr. Bailey, and was ordered an event monitor.  This did not reveal any significant findings.  Subsequently about a week ago, and she ended up in the hospital with complains of abdominal pain and fullness after eating some fried sweet potatoes and fish for dinner.  She was unable to sleep as a result and got very anxious and her blood pressure was very elevated in the 200s.  As a result she was brought into the ED for evaluation.  Her symptoms resolved after [place].  But during this she was found to have troponin elevation to 1, and Cardiology was consulted.  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  Her medications were adjusted and she was set up for outpatient follow-up with me here today.  She reports no further symptoms since discharge, and is currently feeling well, although continues to be very anxious about her blood pressure.    Interval history - 7/1/2020  She comes back in to the office for an early visit due to again ongoing blood pressure  fluctuations.  Her medication regimen has been fairly stable over the past 2 months with losartan amlodipine and metoprolol.  But at home, she tries to adjust medications based on her blood pressure and her perception of high blood pressure.  She continues to be very anxious despite her ten-day psychiatric rehab.  She states that she feels lonely at times in the pandemic has made her worry a lot regarding things like not having done enough for her mother 15 years ago, when she passed away.    Interval history - 10/13/2020  He comes back for follow-up after 3 months.  She has been doing fairly well recently, and does not have any active complains today.  Admits to having significant anxiety and depression, and has been maintained on Lexapro, with which she is doing better.      Interval history - 1/21/2021  She comes back for follow-up after about 3 months.  She has been doing well overall and is happy with her current status.  She remains reasonably active and  Is able to walk on level ground while shopping and does not report any symptoms with the same.  Her granddaughter comes to visit her every day for lunch, which she enjoys as well.    Interval history - 3/1/2021  She comes back for follow-up after about 6 weeks.  After my last office visit, within 1 week she was again in the ED with complains of palpitations, which happened at night and when she were to go to the restroom.  ECG and telemetry in the hospital were without any significant arrhythmias.  She was diagnosed with anxiety, and treated with medications for the same in again discharged home.  She has been doing well overall since then and has not had any major complaints.  She continues to take anxiety medications.    Interval history - 7/7/2021  She comes back for follow up after 4 months.  She has been doing well in recent months, and has been able to control her anxiety and stress well.  With this her blood pressure is also better controlled.  She  thinks the metoprolol helps her sleep, and as a result her overall symptoms are under control.  No major complains of chest, shortness of breath.  She remains active.    Interval history - 3/14/2022  She comes back for follow-up in the office after 9 months.  I did see her in the hospital about a month ago when she had presented after a fall.  He reported trying to get up and then felt dizzy /unsteady, and then fell before she could get to a nearby chair.  She unfortunately fractured her 11th and 12th right ribs from this and was in the hospital from pain.    Interval history - 9/2/2022  She comes back for follow-up after about 6 months.  No current complains of chest pain or shortness of breath.  She is in fact back to driving short distances locally.  Her blood pressure has been on the lower side and as a result she has not been taking her morning blood pressure medications.  Even the amlodipine has been lowered to 2.5 mg at night.  But blood pressure continues to be well controlled and she does not have any further palpitations in recent months.  She is requesting paperwork for disability parking allotment    Interval history - 3/17/2023  She comes in for follow-up after about 6 months.  She has otherwise been doing well and has not had any issues with palpitations, dizziness or lightheadedness, near-syncope or syncope.  She continues to drive locally but has difficulty walking longer distances and gets quite tired/short of breath with walking even short distances (eg. getting from the parking lot to the store at [facility]).  She states that she filed disability parking space paperwork previously, but never received the card back subsequently    Interval history - 10/23/2023  She comes back for follow-up after about 7 months.  In the interim she saw.  July 2023 after having been to the ED at [facility].  She was felt to have had a panic attack and blood pressure was high thereafter.  An echocardiogram was ordered at that  time, but was denied by insurance.  She is feeling well otherwise and has not had any further complaints of chest pain or shortness of breath.  He remains free of any major palpitations.  She does continue to be concerned about blood pressure being elevated in 140s on & off, and also about taking medication when her blood pressure is already in the 120s.  She did not take amlodipine for while due to this.    Interval history - 1/16/2025  She returns for follow-up after about 15 months.  She continues to do well and remains free of chest pain, shortness of breath, palpitations or dizziness.  Her blood pressure remains reasonably controlled, but she mainly has isolated systolic hypertension      Historical Information   Past Medical History:   Diagnosis Date   • Anxiety    • Benign hypertension with CKD (chronic kidney disease) stage III (HCC) 02/12/2022   • CAD (coronary artery disease)    • Carotid Duplex 03/06/2018    Plaque formation was prominent bilaterally   • Depression    • Disease of thyroid gland    • Diverticulitis    • Dyslipidemia    • ECHO 09/14/2017    EF 55%, mild mitral regurg, small pericardial effusion.   • Hypertension    • Hypothyroidism    • Mitral regurgitation    • 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 infarction)    • Shingles    • Syncope    • Takotsubo cardiomyopathy      Past Surgical History:   Procedure Laterality Date   • CARDIAC CATHETERIZATION  08/27/2012    EF 35%, 70% stenosis of diagonal, Otherwise all OK apart from myopathy   • CARDIAC CATHETERIZATION  10/10/2012    EF 55%, no significant CAD.  Mild stress induced cardiomyopathy   • CATARACT EXTRACTION Bilateral    • TONSILLECTOMY  childhood     Family History   Problem Relation Age of Onset   • Cancer Father    • Prostate cancer Father    • No Known Problems Mother      Current Outpatient Medications on File Prior to Visit   Medication Sig Dispense Refill   • aluminum-magnesium hydroxide-simethicone (MAALOX MAX) 400-400-40 MG/5ML suspension Take 5  mL by mouth every 6 (six) hours as needed     • Antacid/Anti-Gas 400-400-40 MG/5ML suspension take 5 milliliters by mouth every 6 hours if needed for indigestion or HEARTBURN     • aspirin 81 mg chewable tablet Chew 81 mg daily     • busPIRone (BUSPAR) 5 mg tablet Take 5 mg by mouth 2 (two) times a day     • cholecalciferol (VITAMIN D3) 1,000 units tablet Take 1,000 Units by mouth daily     • clonazePAM (KlonoPIN) 0.5 mg tablet Take 0.5 tablets (0.25 mg total) by mouth 2 (two) times a day as needed for anxiety for up to 10 days Take 1/2 tablet (0.25-mg) by oral route 2 times a day.     • escitalopram (LEXAPRO) 20 mg tablet Take 20 mg by mouth daily     • lansoprazole (PREVACID) 30 mg capsule Take 1 capsule (30 mg total) by mouth daily 30 capsule 0   • levothyroxine 75 mcg tablet Take 1 tablet (75 mcg total) by mouth 4 (four) times a week On Tuesdays, Thursdays and Saturdays and Sundays 16 tablet 0   • Multiple Vitamin (MULTIVITAMIN) capsule Take 1 capsule by mouth daily     • polyethylene glycol (MIRALAX) 17 g packet Take 17 g by mouth daily     • prednisoLONE acetate (PRED FORTE) 1 % ophthalmic suspension Administer 1 drop to both eyes 2 (two) times a day 5 mL 0   • sucralfate (CARAFATE) 1 g/10 mL suspension Take 10 mL (1 g total) by mouth 4 (four) times a day 420 mL 0   • triamcinolone (KENALOG) 0.1 % cream apply topically twice a day to right arm for up to 10 days     • [DISCONTINUED] amLODIPine (NORVASC) 2.5 mg tablet Take 2 tablets (5 mg total) by mouth daily     • [DISCONTINUED] metoprolol succinate (TOPROL-XL) 25 mg 24 hr tablet Take 1 tablet (25 mg total) by mouth daily at bedtime 90 tablet 3   • predniSONE 20 mg tablet        No current facility-administered medications on file prior to visit.     Allergies   Allergen Reactions   • No Known Allergies      Social History     Socioeconomic History   • Marital status:      Spouse name: None   • Number of children: None   • Years of education: None   •  Highest education level: None   Occupational History   • None   Tobacco Use   • Smoking status: Never   • Smokeless tobacco: Never   Vaping Use   • Vaping status: Never Used   Substance and Sexual Activity   • Alcohol use: Never   • Drug use: Never   • Sexual activity: Not Currently   Other Topics Concern   • None   Social History Narrative    Caffeine - 2 cups hot tea/day     Social Drivers of Health     Financial Resource Strain: Low Risk  (7/9/2023)    [facility]    Overall Financial Resource Strain (CARDIA)    • Difficulty of Paying Living Expenses: Not hard at all   Food Insecurity: No Food Insecurity (7/9/2023)    [facility]    Hunger Vital Sign    • Worried About Running Out of Food in the Last Year: Never true    • Ran Out of Food in the Last Year: Never true   Transportation Needs: No Transportation Needs (7/9/2023)    [facility]    PRAPARE - Transportation    • Lack of Transportation (Medical): No    • Lack of Transportation (Non-Medical): No   Physical Activity: Not on file   Stress: Not on file   Social Connections: Not on file   Intimate Partner Violence: Not At Risk (7/9/2023)    [facility]    Humiliation, Afraid, Rape, and Kick questionnaire    • Fear of Current or Ex-Partner: No    • Emotionally Abused: No    • Physically Abused: No    • Sexually Abused: No   Housing Stability: Low Risk  (7/9/2023)    [facility]    Housing Stability Vital Sign    • Unable to Pay for Housing in the Last Year: No    • Number of Places Lived in the Last Year: 1    • Unstable Housing in the Last Year: No        Review of Systems   All other systems reviewed and are negative.    Vitals:  Vitals:    01/16/25 1435   BP:  "160/74   Pulse: 72   Weight: 56.7 kg (125 lb)   Height: 5' 1\" (1.549 m)     BMI - Body mass index is 23.62 kg/m².  Wt Readings from Last 7 Encounters:   01/16/25 56.7 kg (125 lb)   04/11/24 58.1 kg (128 lb)   10/23/23 59.9 kg (132 lb)   07/18/23 59 kg (130 lb)   03/17/23 60.3 kg (133 lb)   10/08/22 59.9 kg (132 lb)   09/02/22 59.9 kg (132 lb)       Physical Exam  Vitals and nursing note reviewed.   Constitutional:       General: She is not in acute distress.     Appearance: Normal appearance. She is well-developed. She is not ill-appearing.   HENT:      Head: Normocephalic and atraumatic.      Nose: No congestion.   Eyes:      General: No scleral icterus.     Conjunctiva/sclera: Conjunctivae normal.   Neck:      Vascular: No carotid bruit or JVD.   Cardiovascular:      Rate and Rhythm: Normal rate and regular rhythm.      Pulses: Normal pulses.      Heart sounds: Normal heart sounds. No murmur heard.     No friction rub. No gallop.   Pulmonary:      Effort: Pulmonary effort is normal. No respiratory distress.      Breath sounds: Normal breath sounds. No rales.   Abdominal:      General: There is no distension.      Palpations: Abdomen is soft.      Tenderness: There is no abdominal tenderness.   Musculoskeletal:         General: No swelling or tenderness.      Cervical back: Neck supple.      Right lower leg: No edema.      Left lower leg: No edema.   Skin:     General: Skin is warm.   Neurological:      General: No focal deficit present.      Mental Status: She is alert and oriented to person, place, and time. Mental status is at baseline.   Psychiatric:         Mood and Affect: Mood normal.         Behavior: Behavior normal.         Thought Content: Thought content normal.           Labs:  CBC:   Lab Results   Component Value Date    WBC 5.73 02/14/2022    RBC 3.66 (L) 02/14/2022    HGB 11.4 (L) 02/14/2022    HCT 35.6 02/14/2022    MCV 97 02/14/2022     02/14/2022    RDW 13.0 02/14/2022       CMP:   Lab " "Results   Component Value Date    K 3.6 10/24/2024    CL 95 (L) 10/24/2024    CO2 27 10/24/2024    BUN 13 10/24/2024    CREATININE 1.06 10/24/2024    EGFR 50 (L) 10/24/2024    CALCIUM 8.7 10/24/2024    AST 19 10/24/2024    ALT 8 10/24/2024    ALKPHOS 85 10/24/2024       Magnesium:  Lab Results   Component Value Date    MG 2.0 06/24/2024       Lipid Profile:   Lab Results   Component Value Date    HDL 70 01/27/2021    TRIG 53 01/27/2021    LDLCALC 113 (H) 01/27/2021       Thyroid Studies:   Lab Results   Component Value Date    [id number] 2.250 01/27/2021    FREET4 0.97 06/05/2024       No components found for: \"HGA1C\"    Lab Results   Component Value Date    INR 1.0 06/26/2023    INR 1.06 02/12/2022    INR 1.01 01/26/2021   5    Imaging: No results found.    Cardiac testing:   No results found for this or any previous visit.  No results found for this or any previous visit.  No results found for this or any previous visit.  No results found for this or any previous visit.      "

## 2025-01-29 ENCOUNTER — HOSPITAL ENCOUNTER (OUTPATIENT)
Dept: NON INVASIVE DIAGNOSTICS | Facility: CLINIC | Age: OVER 89
Discharge: HOME | End: 2025-01-29
Payer: COMMERCIAL

## 2025-01-29 VITALS
WEIGHT: 125 LBS | HEIGHT: 61 IN | SYSTOLIC BLOOD PRESSURE: 160 MMHG | DIASTOLIC BLOOD PRESSURE: 74 MMHG | HEART RATE: 72 BPM | BODY MASS INDEX: 23.6 KG/M2

## 2025-01-29 DIAGNOSIS — I10 PRIMARY HYPERTENSION: ICD-10-CM

## 2025-01-29 DIAGNOSIS — I10 ESSENTIAL HYPERTENSION: Chronic | ICD-10-CM

## 2025-01-29 LAB
AORTIC ROOT: 2.9 CM
AORTIC VALVE MEAN VELOCITY: 10.3 M/S
ASCENDING AORTA: 3.2 CM
AV LVOT MEAN GRADIENT: 2 MMHG
AV LVOT PEAK GRADIENT: 4 MMHG
AV MEAN PRESS GRAD SYS DOP V1V2: 5 MMHG
AV PEAK GRADIENT: 8 MMHG
AV REGURGITATION PRESSURE HALF TIME: 414 MS
AV VELOCITY RATIO: 0.69
AV VMAX SYS DOP: 1.42 M/S
BSA FOR ECHO PROCEDURE: 1.55 M2
DOP CALC AO VTI: 38.38 CM
DOP CALC LVOT PEAK VEL VTI: 26.45 CM
DOP CALC LVOT PEAK VEL: 1.01 M/S
E WAVE DECELERATION TIME: 176 MS
E/A RATIO: 1.2
FRACTIONAL SHORTENING: 33 (ref 28–44)
INTERVENTRICULAR SEPTUM IN DIASTOLE (PARASTERNAL SHORT AXIS VIEW): 1 CM
INTERVENTRICULAR SEPTUM: 1 CM (ref 0.6–1.1)
LAAS-AP2: 18.6 CM2
LAAS-AP4: 23.5 CM2
LEFT ATRIUM SIZE: 3.8 CM
LEFT ATRIUM VOLUME (MOD BIPLANE): 66 ML
LEFT ATRIUM VOLUME INDEX (MOD BIPLANE): 42.6 ML/M2
LEFT INTERNAL DIMENSION IN SYSTOLE: 2.9 CM (ref 2.1–4)
LEFT VENTRICULAR INTERNAL DIMENSION IN DIASTOLE: 4.3 CM (ref 3.5–6)
LEFT VENTRICULAR POSTERIOR WALL IN END DIASTOLE: 0.9 CM
LEFT VENTRICULAR STROKE VOLUME: 50 ML
LV EF US.2D.A4C+ESTIMATED: 67 %
LVSV (TEICH): 50 ML
MV E'TISSUE VEL-SEP: 7 CM/S
MV PEAK A VEL: 1.02 M/S
MV PEAK E VEL: 122 CM/S
MV STENOSIS PRESSURE HALF TIME: 51 MS
MV VALVE AREA P 1/2 METHOD: 4.31
RIGHT ATRIAL 2D VOLUME: 36 ML
RIGHT ATRIUM AREA SYSTOLE A4C: 15.5 CM2
RIGHT VENTRICLE ID DIMENSION: 3.1 CM
SL CV AV DECELERATION TIME RETROGRADE: 1429 MS
SL CV AV PEAK GRADIENT RETROGRADE: 83 MMHG
SL CV LEFT ATRIUM LENGTH A2C: 5.2 CM
SL CV LV EF: 60
SL CV PED ECHO LEFT VENTRICLE DIASTOLIC VOLUME (MOD BIPLANE) 2D: 84 ML
SL CV PED ECHO LEFT VENTRICLE SYSTOLIC VOLUME (MOD BIPLANE) 2D: 34 ML
TR MAX PG: 33 MMHG
TR PEAK VELOCITY: 2.9 M/S
TRICUSPID ANNULAR PLANE SYSTOLIC EXCURSION: 2.4 CM
TRICUSPID VALVE PEAK REGURGITATION VELOCITY: 2.88 M/S

## 2025-01-29 PROCEDURE — 93306 TTE W/DOPPLER COMPLETE: CPT

## 2025-01-29 PROCEDURE — 93306 TTE W/DOPPLER COMPLETE: CPT | Performed by: INTERNAL MEDICINE

## 2025-01-30 ENCOUNTER — RESULTS FOLLOW-UP (OUTPATIENT)
Dept: CARDIOLOGY CLINIC | Facility: MEDICAL CENTER | Age: OVER 89
End: 2025-01-30

## 2025-02-04 ENCOUNTER — APPOINTMENT (OUTPATIENT)
Dept: LAB | Facility: CLINIC | Age: OVER 89
End: 2025-02-04
Payer: COMMERCIAL

## 2025-02-04 DIAGNOSIS — E87.1 HYPONATREMIA: ICD-10-CM

## 2025-02-04 DIAGNOSIS — I10 ESSENTIAL HYPERTENSION: Chronic | ICD-10-CM

## 2025-02-04 DIAGNOSIS — I10 PRIMARY HYPERTENSION: ICD-10-CM

## 2025-02-04 DIAGNOSIS — I73.9 PAD (PERIPHERAL ARTERY DISEASE) (HCC): ICD-10-CM

## 2025-02-04 LAB
ALBUMIN SERPL BCG-MCNC: 4.3 G/DL (ref 3.5–5)
ALP SERPL-CCNC: 83 U/L (ref 34–104)
ALT SERPL W P-5'-P-CCNC: 9 U/L (ref 7–52)
ANION GAP SERPL CALCULATED.3IONS-SCNC: 8 MMOL/L (ref 4–13)
AST SERPL W P-5'-P-CCNC: 20 U/L (ref 13–39)
BILIRUB SERPL-MCNC: 0.56 MG/DL (ref 0.2–1)
BUN SERPL-MCNC: 15 MG/DL (ref 5–25)
CALCIUM SERPL-MCNC: 9.4 MG/DL (ref 8.4–10.2)
CHLORIDE SERPL-SCNC: 96 MMOL/L (ref 96–108)
CHOLEST SERPL-MCNC: 201 MG/DL (ref ?–200)
CO2 SERPL-SCNC: 30 MMOL/L (ref 21–32)
CREAT SERPL-MCNC: 1.01 MG/DL (ref 0.6–1.3)
GFR SERPL CREATININE-BSD FRML MDRD: 49 ML/MIN/1.73SQ M
GLUCOSE P FAST SERPL-MCNC: 90 MG/DL (ref 65–99)
HDLC SERPL-MCNC: 69 MG/DL
LDLC SERPL CALC-MCNC: 112 MG/DL (ref 0–100)
POTASSIUM SERPL-SCNC: 4.2 MMOL/L (ref 3.5–5.3)
PROT SERPL-MCNC: 7.3 G/DL (ref 6.4–8.4)
SODIUM SERPL-SCNC: 134 MMOL/L (ref 135–147)
TRIGL SERPL-MCNC: 98 MG/DL (ref ?–150)

## 2025-02-04 PROCEDURE — 36415 COLL VENOUS BLD VENIPUNCTURE: CPT

## 2025-02-04 PROCEDURE — 80061 LIPID PANEL: CPT

## 2025-02-04 PROCEDURE — 80053 COMPREHEN METABOLIC PANEL: CPT

## 2025-07-17 DIAGNOSIS — I10 ESSENTIAL HYPERTENSION: Chronic | ICD-10-CM

## 2025-07-17 RX ORDER — AMLODIPINE BESYLATE 5 MG/1
5 TABLET ORAL DAILY
Qty: 90 TABLET | Refills: 1 | Status: SHIPPED | OUTPATIENT
Start: 2025-07-17

## 2025-07-17 NOTE — TELEPHONE ENCOUNTER
Reason for call:   [x] Refill   [] Prior Auth  [x] Other: PHARMACY CHANGE    Office:   [] PCP/Provider -   [x] Specialty/Provider - [facility]     Medication: amLODIPine (NORVASC) 5 mg tablet Take 1 tablet (5 mg total) by mouth daily     Quantity: 90 tablet (90 day supply)    Pharmacy: [facility]. [address]     Local Pharmacy   Does the patient have enough for 3 days?   [] Yes   [] No - Send as HP to POD    Mail Away Pharmacy   Does the patient have enough for 10 days?   [x] Yes   [] No - Send as HP to POD